# Patient Record
Sex: FEMALE | Race: WHITE | HISPANIC OR LATINO | Employment: OTHER | URBAN - METROPOLITAN AREA
[De-identification: names, ages, dates, MRNs, and addresses within clinical notes are randomized per-mention and may not be internally consistent; named-entity substitution may affect disease eponyms.]

---

## 2017-01-03 RX ORDER — PANTOPRAZOLE SODIUM 40 MG/1
TABLET, DELAYED RELEASE ORAL
Qty: 90 TABLET | Refills: 0 | Status: SHIPPED | OUTPATIENT
Start: 2017-01-03 | End: 2017-01-18

## 2017-01-03 NOTE — TELEPHONE ENCOUNTER
Pt's PCP is now Dr. Gaston.  Advised Omeprazole refill has been received.  Important to see Dr. Gaston once a year for annual exam and fasting labs.  LOV Dr. Gaston 9/26/16.

## 2017-01-11 ENCOUNTER — LAB VISIT (OUTPATIENT)
Dept: LAB | Facility: HOSPITAL | Age: 82
End: 2017-01-11
Attending: INTERNAL MEDICINE
Payer: MEDICARE

## 2017-01-11 DIAGNOSIS — I25.10 CORONARY ARTERY DISEASE INVOLVING NATIVE CORONARY ARTERY OF NATIVE HEART WITHOUT ANGINA PECTORIS: ICD-10-CM

## 2017-01-11 DIAGNOSIS — E78.5 HYPERLIPIDEMIA: ICD-10-CM

## 2017-01-11 LAB
ALT SERPL W/O P-5'-P-CCNC: 25 U/L
ANION GAP SERPL CALC-SCNC: 7 MMOL/L
AST SERPL-CCNC: 23 U/L
BUN SERPL-MCNC: 10 MG/DL
CALCIUM SERPL-MCNC: 11.6 MG/DL
CHLORIDE SERPL-SCNC: 105 MMOL/L
CHOLEST/HDLC SERPL: 2.7 {RATIO}
CO2 SERPL-SCNC: 28 MMOL/L
CREAT SERPL-MCNC: 0.8 MG/DL
EST. GFR  (AFRICAN AMERICAN): >60 ML/MIN/1.73 M^2
EST. GFR  (NON AFRICAN AMERICAN): >60 ML/MIN/1.73 M^2
GLUCOSE SERPL-MCNC: 104 MG/DL
HDL/CHOLESTEROL RATIO: 36.9 %
HDLC SERPL-MCNC: 111 MG/DL
HDLC SERPL-MCNC: 41 MG/DL
LDLC SERPL CALC-MCNC: 51.2 MG/DL
NONHDLC SERPL-MCNC: 70 MG/DL
POTASSIUM SERPL-SCNC: 4.3 MMOL/L
SODIUM SERPL-SCNC: 140 MMOL/L
TRIGL SERPL-MCNC: 94 MG/DL

## 2017-01-11 PROCEDURE — 84450 TRANSFERASE (AST) (SGOT): CPT

## 2017-01-11 PROCEDURE — 84460 ALANINE AMINO (ALT) (SGPT): CPT

## 2017-01-11 PROCEDURE — 80048 BASIC METABOLIC PNL TOTAL CA: CPT

## 2017-01-11 PROCEDURE — 36415 COLL VENOUS BLD VENIPUNCTURE: CPT | Mod: PO

## 2017-01-11 PROCEDURE — 80061 LIPID PANEL: CPT

## 2017-01-18 ENCOUNTER — OFFICE VISIT (OUTPATIENT)
Dept: CARDIOLOGY | Facility: CLINIC | Age: 82
End: 2017-01-18
Payer: MEDICARE

## 2017-01-18 VITALS
HEIGHT: 60 IN | BODY MASS INDEX: 25.51 KG/M2 | SYSTOLIC BLOOD PRESSURE: 164 MMHG | DIASTOLIC BLOOD PRESSURE: 66 MMHG | WEIGHT: 129.94 LBS | HEART RATE: 64 BPM

## 2017-01-18 DIAGNOSIS — I25.10 CORONARY ARTERY DISEASE INVOLVING NATIVE CORONARY ARTERY OF NATIVE HEART WITHOUT ANGINA PECTORIS: ICD-10-CM

## 2017-01-18 DIAGNOSIS — I10 ESSENTIAL HYPERTENSION: Primary | ICD-10-CM

## 2017-01-18 DIAGNOSIS — I70.1 RENAL ARTERY STENOSIS: ICD-10-CM

## 2017-01-18 DIAGNOSIS — E78.00 PURE HYPERCHOLESTEROLEMIA: ICD-10-CM

## 2017-01-18 PROCEDURE — 93000 ELECTROCARDIOGRAM COMPLETE: CPT | Mod: S$GLB,,, | Performed by: INTERNAL MEDICINE

## 2017-01-18 PROCEDURE — 99499 UNLISTED E&M SERVICE: CPT | Mod: S$GLB,,, | Performed by: INTERNAL MEDICINE

## 2017-01-18 PROCEDURE — 1159F MED LIST DOCD IN RCRD: CPT | Mod: S$GLB,,, | Performed by: INTERNAL MEDICINE

## 2017-01-18 PROCEDURE — 1126F AMNT PAIN NOTED NONE PRSNT: CPT | Mod: S$GLB,,, | Performed by: INTERNAL MEDICINE

## 2017-01-18 PROCEDURE — 1157F ADVNC CARE PLAN IN RCRD: CPT | Mod: S$GLB,,, | Performed by: INTERNAL MEDICINE

## 2017-01-18 PROCEDURE — 1160F RVW MEDS BY RX/DR IN RCRD: CPT | Mod: S$GLB,,, | Performed by: INTERNAL MEDICINE

## 2017-01-18 PROCEDURE — 99213 OFFICE O/P EST LOW 20 MIN: CPT | Mod: S$GLB,,, | Performed by: INTERNAL MEDICINE

## 2017-01-18 PROCEDURE — 99999 PR PBB SHADOW E&M-EST. PATIENT-LVL IV: CPT | Mod: PBBFAC,,, | Performed by: INTERNAL MEDICINE

## 2017-01-18 RX ORDER — CLONIDINE HYDROCHLORIDE 0.1 MG/1
0.1 TABLET ORAL DAILY PRN
Qty: 30 TABLET | Refills: 3 | Status: SHIPPED | OUTPATIENT
Start: 2017-01-18 | End: 2018-08-13 | Stop reason: SDUPTHER

## 2017-01-18 RX ORDER — ATORVASTATIN CALCIUM 80 MG/1
80 TABLET, FILM COATED ORAL DAILY
Qty: 90 TABLET | Refills: 3 | Status: SHIPPED | OUTPATIENT
Start: 2017-01-18 | End: 2018-03-17 | Stop reason: SDUPTHER

## 2017-01-18 RX ORDER — METOPROLOL SUCCINATE 50 MG/1
50 TABLET, EXTENDED RELEASE ORAL DAILY
Qty: 30 TABLET | Refills: 11 | Status: SHIPPED | OUTPATIENT
Start: 2017-01-18 | End: 2017-11-16 | Stop reason: DRUGHIGH

## 2017-01-18 NOTE — PROGRESS NOTES
Subjective:   Patient ID:  Nicolasa Jurado is a 86 y.o. female who presents for follow-up of Coronary Artery Disease and Hypertension      Problem List:  Labile hypertension    CAD    -LCX coated stent 08/14/2003    Diastolic heart failure    SANA s/p stent on the right side  Hypercholesterolemia    Hypercalcemia and primary hyperparathyroidism    Asthma      HPI:   Nicolasa Jurado has difficult to control hypertension. I had referred her for renal denervation or CALM. She did not qualify for renal denervation because she had renal artery stent in the past.  She used to drink 3 cups of coffee a day with 2 tsp of sugar in each. She tells me that she lost 10 lbs because she cut out the sugar. Her BP is elevated today, but she did not take any of her antihypertensive meds this morning or afternoon because her BP was 118/55 mm Hg. She tells me that her BP is usually high in the evenings and low in the mornings. She takes ramipril bid, amlodipine in the afternoon and metoprolol around 10 pm. Also on a loop diuretic. She holds amlodipine and takes 1/2 a tab of metoprolol if her BP is a bit low.  She has mild shortness of breath on exertion. She does not report orthopnea, PND or edema.      Review of Systems   Constitution: Positive for malaise/fatigue and weight loss. Negative for weakness and weight gain.   HENT: Negative for headaches.    Eyes: Positive for visual disturbance.   Cardiovascular: Positive for chest pain (sometimes), dyspnea on exertion and palpitations (sometimes). Negative for claudication, irregular heartbeat, leg swelling, near-syncope and syncope.   Respiratory: Negative for cough, hemoptysis, sputum production and wheezing.    Hematologic/Lymphatic: Does not bruise/bleed easily.   Musculoskeletal: Positive for arthritis, muscle cramps and muscle weakness. Negative for back pain, joint pain and joint swelling.   Gastrointestinal: Positive for abdominal pain and heartburn. Negative for change in bowel  habit and melena.   Genitourinary: Positive for frequency and nocturia. Negative for hematuria.   Neurological: Positive for dizziness, loss of balance, numbness and paresthesias. Negative for light-headedness.   Psychiatric/Behavioral: Negative for depression. The patient is nervous/anxious.        Current Outpatient Prescriptions   Medication Sig    albuterol (PROVENTIL) 2.5 mg /3 mL (0.083 %) nebulizer solution 1 VIAL PER NEBULIZER EVERY 4 TO 6 HOURS AS NEEDED    albuterol 90 mcg/actuation inhaler Inhale 2 puffs into the lungs every 6 (six) hours as needed for Wheezing.    amlodipine (NORVASC) 10 MG tablet TAKE 1 TABLET BY MOUTH DAILY    amlodipine (NORVASC) 10 MG tablet TAKE 1 TABLET BY MOUTH DAILY at lunch    aspirin 81 mg Tab Take 81 mg by mouth every other day.     atorvastatin (LIPITOR) 80 MG tablet TAKE 1 TABLET BY MOUTH EVERY DAY    AZOPT 1 % ophthalmic suspension Place 1 drop into the left eye every 12 (twelve) hours.    brimonidine 0.2% (ALPHAGAN) 0.2 % Drop Place 1 drop into both eyes 3 (three) times daily.     clonazePAM (KLONOPIN) 0.5 MG tablet Take 1 tablet (0.5 mg total) by mouth 2 (two) times daily. as needed for anxiety.    furosemide (LASIX) 20 MG tablet Take 1 tablet (20 mg total) by mouth once daily.    gabapentin (NEURONTIN) 100 MG capsule Take 1 capsule (100 mg total) by mouth every evening.    latanoprost 0.005 % ophthalmic solution 1 drop every evening.    metoprolol succinate (TOPROL-XL) 100 MG 24 hr tablet TAKE 1 TABLET BY MOUTH DAILY at about 10 pm    nitroGLYCERIN (NITROSTAT) 0.4 MG SL tablet Place 0.4 mg under the tongue. 1 Tablet, Sublingual Sublingual .  One tablet under tounge as needed for chest pain.    omega-3 fatty acids 1,000 mg Cap     pantoprazole (PROTONIX) 40 MG tablet TAKE 1 TABLET BY MOUTH EVERY DAY    ramipril (ALTACE) 10 MG capsule Take 1 capsule (10 mg total) by mouth 2 (two) times daily.    vit C-vit E-copper-ZnOx-lutein (PRESERVISION) 226-200-5  mg-unit-mg Cap Take by mouth. 2 Capsule Oral Every day         Social History   Substance Use Topics    Smoking status: Never Smoker    Smokeless tobacco: Never Used    Alcohol use No         Objective:     Physical Exam   Constitutional: She is oriented to person, place, and time. She appears well-developed and well-nourished.   BP (!) 164/66  Pulse 64  Ht 5' (1.524 m)  Wt 59 kg (129 lb 15.4 oz)  BMI 25.38 kg/m2     Neck: No JVD present.   Cardiovascular: Normal rate and regular rhythm.    No murmur heard.  Pulses:       Radial pulses are 2+ on the right side, and 2+ on the left side.        Posterior tibial pulses are 2+ on the right side, and 2+ on the left side.   Pulmonary/Chest: She has no decreased breath sounds. She has no wheezes. She has no rales. Chest wall is not dull to percussion.   Abdominal: Soft. Normal appearance. There is no splenomegaly or hepatomegaly. There is no tenderness.   Musculoskeletal:        Right lower leg: She exhibits no edema.        Left lower leg: She exhibits no edema.   Neurological: She is alert and oriented to person, place, and time.   Skin: Skin is warm. No bruising noted. Nails show no clubbing.   Psychiatric: Her speech is normal and behavior is normal. Cognition and memory are normal.         Lab Results   Component Value Date    CHOL 111 (L) 01/11/2017    HDL 41 01/11/2017    LDLCALC 51.2 (L) 01/11/2017    TRIG 94 01/11/2017    CHOLHDL 36.9 01/11/2017     Lab Results   Component Value Date     01/11/2017    CREATININE 0.8 01/11/2017    BUN 10 01/11/2017     01/11/2017    K 4.3 01/11/2017     01/11/2017    CO2 28 01/11/2017     Lab Results   Component Value Date    ALT 25 01/11/2017    AST 23 01/11/2017    ALKPHOS 92 08/18/2016    BILITOT 0.7 08/18/2016       ECG today reviewed by me. It reveals sinus rhythm with no ST or T abnormality.      Assessment:     1. Essential hypertension    2. Coronary artery disease involving native coronary artery of  native heart without angina pectoris    3. Renal artery stenosis    4. Pure hypercholesterolemia          Plan:     Essential hypertension  Decrease metoprolol to 50 mg. Take with pm dose of ramipril.  Clonidine 0.1 mg PRN for SBP >200 mm Hg    RTC in 11/17

## 2017-01-18 NOTE — MR AVS SNAPSHOT
[place] [facility]   [address]  [place] [address]  Phone: [phone]                  Nicolasa Jurado   2017 1:30 PM   Office Visit    Descripción:  Female : 1930   Personal Médico:  Omaira Zapien MD   Departamento:  [place] [facility]           Razón de la joseph     Coronary Artery Disease     Hypertension           Diagnósticos de Esta Visita        Comentarios    Essential hypertension    -  Primario     Coronary artery disease involving native coronary artery of native heart without angina pectoris         Renal artery stenosis         Pure hypercholesterolemia                Lista de tareas           Citas próximas        Personal Médico Departamento Tfno del dpto    2017 8:40 AM Blair [facility] MD [facility] [phone]      Metas (5 Years of Data)     Ninguna      Follow-Up and Disposition     Return in about 10 months (around 2017).      Recetas para recoger        Disp Refills Start End    atorvastatin (LIPITOR) 80 MG tablet 90 tablet 3 2017     Take 1 tablet (80 mg total) by mouth once daily. - Oral    [facility] [address] [facility] [address] [employer] [place] [address] de tlfo: #: [phone]       cloNIDine (CATAPRES) 0.1 MG tablet 30 tablet 3 2017    Take 1 tablet (0.1 mg total) by mouth daily as needed (for SBP >200 mm Hg). - Oral    [facility] [address] [place] No[address] de tlfo: #: [phone]       metoprolol succinate (TOPROL-XL) 50 MG 24 hr tablet 30 tablet 11 2017     Take 1 tablet (50 mg total) by mouth once daily. - Oral    [facility] [place] [facility]  [address] [place] No. de tlfo: #: [phone]         [facility]     [facility] [place] [facility] - Asistencia   Enfermeras registradas de [facility] pueden ayudarle a  reservar rashida joseph, proveer educación para la oanh, asesoría clínica, y otros servicios de asesoramiento.   Llame para jair servicio gratuito a [phone].             Medicamentos           Mensaje sobre Medicamentos     Verificar los cambios y / o adiciones a machado régimen de medicación son los mismos que discutir con machado médico. Si cualquiera de estos cambios o adiciones son incorrectos, por favor notifique a machado proveedor de atención médica.        EMPEZAR a ibis estos medicamentos NUEVOS        Refills    cloNIDine (CATAPRES) 0.1 MG tablet 3    Sig: Take 1 tablet (0.1 mg total) by mouth daily as needed (for SBP >200 mm Hg).    Categoría: Normal    Vía: Oral      CAMBIAR la forma en que está tomando estos medicamentos     Start Taking Instead of    atorvastatin (LIPITOR) 80 MG tablet atorvastatin (LIPITOR) 80 MG tablet    Dosage:  Take 1 tablet (80 mg total) by mouth once daily. Dosage:  TAKE 1 TABLET BY MOUTH EVERY DAY    Reason for Change:  Reorder     metoprolol succinate (TOPROL-XL) 50 MG 24 hr tablet metoprolol succinate (TOPROL-XL) 100 MG 24 hr tablet    Dosage:  Take 1 tablet (50 mg total) by mouth once daily. Dosage:  TAKE 1 TABLET BY MOUTH DAILY    Reason for Change:  Reorder       DEJAR de ibis estos medicamentos     pantoprazole (PROTONIX) 40 MG tablet TAKE 1 TABLET BY MOUTH EVERY DAY           Verifique que la siguiente lista de medicamentos es rashida representación exacta de los medicamentos que está tomando actualmente. Si no hay ningunos reportados, la lista puede estar en quinteros. Si no es correcta, por favor póngase en contacto con machado proveedor de atención médica. Lleve esta lista con usted en tiffani de emergencia.           Medicamentos Actuales     albuterol (PROVENTIL) 2.5 mg /3 mL (0.083 %) nebulizer solution 1 VIAL PER NEBULIZER EVERY 4 TO 6 HOURS AS NEEDED    albuterol 90 mcg/actuation inhaler Inhale 2 puffs into the lungs every 6 (six) hours as needed for Wheezing.    amlodipine (NORVASC) 10  MG tablet TAKE 1 TABLET BY MOUTH DAILY    aspirin 81 mg Tab Take 81 mg by mouth every other day.     atorvastatin (LIPITOR) 80 MG tablet Take 1 tablet (80 mg total) by mouth once daily.    AZOPT 1 % ophthalmic suspension Place 1 drop into the left eye every 12 (twelve) hours.    brimonidine 0.2% (ALPHAGAN) 0.2 % Drop Place 1 drop into both eyes 3 (three) times daily.     clonazePAM (KLONOPIN) 0.5 MG tablet Take 1 tablet (0.5 mg total) by mouth 2 (two) times daily. as needed for anxiety.    ERGOCALCIFEROL, VITAMIN D2, (VITAMIN D2 ORAL) Take 1 tablet by mouth every evening.    furosemide (LASIX) 20 MG tablet Take 1 tablet (20 mg total) by mouth once daily.    gabapentin (NEURONTIN) 100 MG capsule Take 1 capsule (100 mg total) by mouth every evening.    latanoprost 0.005 % ophthalmic solution 1 drop every evening.    metoprolol succinate (TOPROL-XL) 50 MG 24 hr tablet Take 1 tablet (50 mg total) by mouth once daily.    nitroGLYCERIN (NITROSTAT) 0.4 MG SL tablet Place 0.4 mg under the tongue. 1 Tablet, Sublingual Sublingual .  One tablet under tounge as needed for chest pain.    omega-3 fatty acids 1,000 mg Cap     ramipril (ALTACE) 10 MG capsule Take 1 capsule (10 mg total) by mouth 2 (two) times daily.    vit C-vit E-copper-ZnOx-lutein (PRESERVISION) 226-200-5 mg-unit-mg Cap Take by mouth. 2 Capsule Oral Every day    cloNIDine (CATAPRES) 0.1 MG tablet Take 1 tablet (0.1 mg total) by mouth daily as needed (for SBP >200 mm Hg).           Información de referencia clínica           Signos vitales - más recientes  Última actualización: 1/18/2017  1:59 PM por Yvonne Barron LPN    PS [place] BMI ([facility])       (!) 164/66 64 5' (1.524 m) 59 kg (129 lb 15.4 oz) 25.38 kg/m2       Blood Pressure          Most Recent Value    BP  (!)  164/66      Alergias     A partir del:  1/18/2017        Venom-wasp    Augmentin  [Amoxicillin-pot Clavulanate]      Vacunas     Administradas en la fecha de la visita:  1/18/2017         None      Orders Placed During Today's Visit      Órdenes normales de esta visita    EKG 12-lead       Registrarse para [facility]     La activación de machado cuenta [facility] es tan fácil hannah 1-2-3!    1) Ir a [email or web address], seleccione Registrarse Ahora, meter el código de activación y machado fecha de nacimiento, y seleccione Próximo.    [id number]  Expires: 3/4/2017  3:02 PM      2) Crear un nombre de usuario y contraseña para usar cuando se visita [facility] en el futuro y selecciona rashida pregunta de seguridad en tiffani de que pierda machado contraseña y seleccione Próximo.    3) Introduzca machado dirección de correo electrónico y dwight clic en Registrarse!    Información Adicional  Si tiene alguna pregunta, por favor, e-mail [email or web address] o llame al [phone] para hablar con nuestro personal. Recuerde, [facility] no debe ser usada para necesidades urgentes. En tiffani de emergencia médica, llame al [phone].        Instrucciones    Take your NORVASC aka amlodipine as soon as you get home.  Take 50 mg METOPROLOL at the same time as the pm dose of RAMIPRIL.  Take CLONIDINE for SBP >200 mm Hg                  Nicolasa Jurado   2017 1:30 PM   Office Visit    Description:  Female : 1930   Provider:  Omaira Zapien MD   Department:  [place] - Cardiology           Reason for Visit     Coronary Artery Disease     Hypertension           Diagnoses this Visit        Comments    Essential hypertension    -  Primary     Coronary artery disease involving native coronary artery of native heart without angina pectoris         Renal artery stenosis         Pure hypercholesterolemia                To Do List           Future Appointments        Provider Department Dept Phone    2017 8:40 AM Blair Sinha MD [facility] Card [phone]      Goals     None      Follow-Up and Disposition     Return in about 10 months (around 2017).       These Medications        Disp Refills Start End     atorvastatin (LIPITOR) 80 MG tablet 90 tablet 3 1/18/2017     Take 1 tablet (80 mg total) by mouth once daily. - Oral    Pharmacy: [facility] [address] [place] [address] [address] [facility] [address] [place] Ph #: [phone]       cloNIDine (CATAPRES) 0.1 MG tablet 30 tablet 3 1/18/2017 1/18/2018    Take 1 tablet (0.1 mg total) by mouth daily as needed (for SBP >200 mm Hg). - Oral    Pharmacy: [facility] [address] [place] [address] [address] AT [facility] [address] [place] Ph #: [phone]       metoprolol succinate (TOPROL-XL) 50 MG 24 hr tablet 30 tablet 11 1/18/2017     Take 1 tablet (50 mg total) by mouth once daily. - Oral    Pharmacy: [facility] [address] [place] [id number] [address] AT [facility] [address] [place] Ph #: [phone]         [facility]     [facility] Care Line - 24/7 Assistance  Registered nurses in the [facility] provide clinical advisement, health education, appointment booking, and other advisory services.  Call for this free service at [phone].             Medications           Message regarding Medications     Verify the changes and/or additions to your medication regime listed below are the same as discussed with your clinician today.  If any of these changes or additions are incorrect, please notify your healthcare provider.        START taking these NEW medications        Refills    cloNIDine (CATAPRES) 0.1 MG tablet 3    Sig: Take 1 tablet (0.1 mg total) by mouth daily as needed (for SBP >200 mm Hg).    Class: Normal    Route: Oral      CHANGE how you are taking these medications     Start Taking Instead of    atorvastatin (LIPITOR) 80 MG tablet atorvastatin (LIPITOR) 80 MG tablet    Dosage:  Take 1 tablet (80 mg total) by mouth once daily. Dosage:  TAKE 1 TABLET BY MOUTH EVERY DAY    Reason for Change:  Reorder     metoprolol succinate (TOPROL-XL) 50 MG 24 hr tablet metoprolol succinate (TOPROL-XL) 100 MG 24 hr tablet     Dosage:  Take 1 tablet (50 mg total) by mouth once daily. Dosage:  TAKE 1 TABLET BY MOUTH DAILY    Reason for Change:  Reorder       STOP taking these medications     pantoprazole (PROTONIX) 40 MG tablet TAKE 1 TABLET BY MOUTH EVERY DAY           Verify that the below list of medications is an accurate representation of the medications you are currently taking.  If none reported, the list may be blank. If incorrect, please contact your healthcare provider. Carry this list with you in case of emergency.           Current Medications     albuterol (PROVENTIL) 2.5 mg /3 mL (0.083 %) nebulizer solution 1 VIAL PER NEBULIZER EVERY 4 TO 6 HOURS AS NEEDED    albuterol 90 mcg/actuation inhaler Inhale 2 puffs into the lungs every 6 (six) hours as needed for Wheezing.    amlodipine (NORVASC) 10 MG tablet TAKE 1 TABLET BY MOUTH DAILY    aspirin 81 mg Tab Take 81 mg by mouth every other day.     atorvastatin (LIPITOR) 80 MG tablet Take 1 tablet (80 mg total) by mouth once daily.    AZOPT 1 % ophthalmic suspension Place 1 drop into the left eye every 12 (twelve) hours.    brimonidine 0.2% (ALPHAGAN) 0.2 % Drop Place 1 drop into both eyes 3 (three) times daily.     clonazePAM (KLONOPIN) 0.5 MG tablet Take 1 tablet (0.5 mg total) by mouth 2 (two) times daily. as needed for anxiety.    ERGOCALCIFEROL, VITAMIN D2, (VITAMIN D2 ORAL) Take 1 tablet by mouth every evening.    furosemide (LASIX) 20 MG tablet Take 1 tablet (20 mg total) by mouth once daily.    gabapentin (NEURONTIN) 100 MG capsule Take 1 capsule (100 mg total) by mouth every evening.    latanoprost 0.005 % ophthalmic solution 1 drop every evening.    metoprolol succinate (TOPROL-XL) 50 MG 24 hr tablet Take 1 tablet (50 mg total) by mouth once daily.    nitroGLYCERIN (NITROSTAT) 0.4 MG SL tablet Place 0.4 mg under the tongue. 1 Tablet, Sublingual Sublingual .  One tablet under tounge as needed for chest pain.    omega-3 fatty acids 1,000 mg Cap     ramipril (ALTACE) 10  MG capsule Take 1 capsule (10 mg total) by mouth 2 (two) times daily.    vit C-vit E-copper-ZnOx-lutein (PRESERVISION) 226-200-5 mg-unit-mg Cap Take by mouth. 2 Capsule Oral Every day    cloNIDine (CATAPRES) 0.1 MG tablet Take 1 tablet (0.1 mg total) by mouth daily as needed (for SBP >200 mm Hg).           Clinical Reference Information           Vital Signs - Last Recorded  Most recent update: 1/18/2017  1:59 PM by Yvonne Barron LPN    BP Pulse Ht Wt BMI       (!) 164/66 64 5' (1.524 m) 59 kg (129 lb 15.4 oz) 25.38 kg/m2       Blood Pressure          Most Recent Value    BP  (!)  164/66      Allergies as of 1/18/2017     Venom-wasp    Augmentin  [Amoxicillin-pot Clavulanate]      Immunizations Administered on Date of Encounter - 1/18/2017     None      Orders Placed During Today's Visit      Normal Orders This Visit    EKG 12-lead       [facility] Sign-Up     Activating your [facility] account is as easy as 1-2-3!     1) Visit [email or web address], select Sign Up Now, enter this activation code and your date of birth, then select Next.  [email or web address]  Expires: 3/4/2017  3:02 PM      2) Create a username and password to use when you visit [facility] in the future and select a security question in case you lose your password and select Next.    3) Enter your e-mail address and click Sign Up!    Additional Information  If you have questions, please e-mail [email or web address] or call [phone] to talk to our [facility] staff. Remember, [facility] is NOT to be used for urgent needs. For medical emergencies, dial 911.         Instructions    Take your NORVASC aka amlodipine as soon as you get home.  Take 50 mg METOPROLOL at the same time as the pm dose of RAMIPRIL.  Take CLONIDINE for SBP >200 mm Hg

## 2017-01-18 NOTE — ASSESSMENT & PLAN NOTE
Decrease metoprolol to 50 mg. Take with pm dose of ramipril.  Clonidine 0.1 mg PRN for SBP >200 mm Hg

## 2017-01-18 NOTE — PATIENT INSTRUCTIONS
Take your NORVASC aka amlodipine as soon as you get home.  Take 50 mg METOPROLOL at the same time as the pm dose of RAMIPRIL.  Take CLONIDINE for SBP >200 mm Hg

## 2017-01-24 RX ORDER — CLONAZEPAM 0.5 MG/1
TABLET ORAL
Qty: 30 TABLET | Refills: 2 | Status: SHIPPED | OUTPATIENT
Start: 2017-01-24 | End: 2017-09-14 | Stop reason: SDUPTHER

## 2017-01-25 RX ORDER — CLONIDINE HYDROCHLORIDE 0.1 MG/1
TABLET ORAL
Qty: 30 TABLET | Refills: 3 | Status: SHIPPED | OUTPATIENT
Start: 2017-01-25 | End: 2017-09-28 | Stop reason: SDUPTHER

## 2017-03-21 RX ORDER — RAMIPRIL 10 MG/1
CAPSULE ORAL
Qty: 180 CAPSULE | Refills: 1 | Status: SHIPPED | OUTPATIENT
Start: 2017-03-21 | End: 2018-01-13 | Stop reason: SDUPTHER

## 2017-05-02 ENCOUNTER — HOSPITAL ENCOUNTER (OUTPATIENT)
Dept: RADIOLOGY | Facility: HOSPITAL | Age: 82
Discharge: HOME | End: 2017-05-02
Attending: INTERNAL MEDICINE
Payer: MEDICARE

## 2017-05-02 ENCOUNTER — OFFICE VISIT (OUTPATIENT)
Dept: INTERNAL MEDICINE | Facility: CLINIC | Age: 82
End: 2017-05-02
Payer: MEDICARE

## 2017-05-02 ENCOUNTER — DOCUMENTATION ONLY (OUTPATIENT)
Dept: FAMILY MEDICINE | Facility: CLINIC | Age: 82
End: 2017-05-02

## 2017-05-02 VITALS
BODY MASS INDEX: 25.93 KG/M2 | DIASTOLIC BLOOD PRESSURE: 80 MMHG | HEART RATE: 80 BPM | SYSTOLIC BLOOD PRESSURE: 160 MMHG | HEIGHT: 60 IN | WEIGHT: 132.06 LBS

## 2017-05-02 DIAGNOSIS — E73.9 LACTOSE INTOLERANCE: Primary | ICD-10-CM

## 2017-05-02 DIAGNOSIS — D69.6 THROMBOCYTOPENIA: ICD-10-CM

## 2017-05-02 DIAGNOSIS — K55.1 MESENTERIC ARTERY STENOSIS: Chronic | ICD-10-CM

## 2017-05-02 DIAGNOSIS — R25.2 CRAMPS OF LOWER EXTREMITY, UNSPECIFIED LATERALITY: ICD-10-CM

## 2017-05-02 DIAGNOSIS — I70.1 RENAL ARTERY STENOSIS: Chronic | ICD-10-CM

## 2017-05-02 DIAGNOSIS — R50.9 FEVER, UNSPECIFIED FEVER CAUSE: ICD-10-CM

## 2017-05-02 DIAGNOSIS — I10 ESSENTIAL HYPERTENSION: ICD-10-CM

## 2017-05-02 LAB
BILIRUB SERPL-MCNC: ABNORMAL MG/DL
BILIRUB UR QL STRIP: NEGATIVE
BLOOD URINE, POC: ABNORMAL
CLARITY UR REFRACT.AUTO: CLEAR
COLOR UR AUTO: ABNORMAL
COLOR, POC UA: YELLOW
GLUCOSE UR QL STRIP: NEGATIVE
GLUCOSE UR QL STRIP: NORMAL
HGB UR QL STRIP: NEGATIVE
KETONES UR QL STRIP: ABNORMAL
KETONES UR QL STRIP: NEGATIVE
LEUKOCYTE ESTERASE UR QL STRIP: ABNORMAL
LEUKOCYTE ESTERASE URINE, POC: ABNORMAL
MICROSCOPIC COMMENT: NORMAL
NITRITE UR QL STRIP: NEGATIVE
NITRITE, POC UA: ABNORMAL
PH UR STRIP: 5 [PH] (ref 5–8)
PH, POC UA: 5
PROT UR QL STRIP: NEGATIVE
PROTEIN, POC: ABNORMAL
RBC #/AREA URNS AUTO: 1 /HPF (ref 0–4)
SP GR UR STRIP: 1 (ref 1–1.03)
SPECIFIC GRAVITY, POC UA: 1.01
URN SPEC COLLECT METH UR: ABNORMAL
UROBILINOGEN UR STRIP-ACNC: NEGATIVE EU/DL
UROBILINOGEN, POC UA: NORMAL
WBC #/AREA URNS AUTO: 1 /HPF (ref 0–5)

## 2017-05-02 PROCEDURE — 1159F MED LIST DOCD IN RCRD: CPT | Mod: S$GLB,,, | Performed by: INTERNAL MEDICINE

## 2017-05-02 PROCEDURE — 99214 OFFICE O/P EST MOD 30 MIN: CPT | Mod: 25,S$GLB,, | Performed by: INTERNAL MEDICINE

## 2017-05-02 PROCEDURE — 71020 XR CHEST PA AND LATERAL: CPT | Mod: TC,PO

## 2017-05-02 PROCEDURE — 99999 PR PBB SHADOW E&M-EST. PATIENT-LVL III: CPT | Mod: PBBFAC,,, | Performed by: INTERNAL MEDICINE

## 2017-05-02 PROCEDURE — 99499 UNLISTED E&M SERVICE: CPT | Mod: S$GLB,,, | Performed by: INTERNAL MEDICINE

## 2017-05-02 PROCEDURE — 71020 XR CHEST PA AND LATERAL: CPT | Mod: 26,,, | Performed by: RADIOLOGY

## 2017-05-02 PROCEDURE — 1126F AMNT PAIN NOTED NONE PRSNT: CPT | Mod: S$GLB,,, | Performed by: INTERNAL MEDICINE

## 2017-05-02 PROCEDURE — 81002 URINALYSIS NONAUTO W/O SCOPE: CPT | Mod: S$GLB,,, | Performed by: INTERNAL MEDICINE

## 2017-05-02 PROCEDURE — 1160F RVW MEDS BY RX/DR IN RCRD: CPT | Mod: S$GLB,,, | Performed by: INTERNAL MEDICINE

## 2017-05-02 RX ORDER — DOXAZOSIN 2 MG/1
2 TABLET ORAL NIGHTLY
Qty: 30 TABLET | Refills: 11 | Status: SHIPPED | OUTPATIENT
Start: 2017-05-02 | End: 2017-09-28

## 2017-05-02 NOTE — PROGRESS NOTES
Subjective:       Patient ID: Nicolasa Jurado is a 86 y.o. female.    Chief Complaint: Fever and Diarrhea    HPI     86-year-old female here for evaluation of fever, digestive issues, diarrhea.  She reports that when she eats pork, she has diarrhea the next day. She has to take zantac.  She used to have reflux.  She took pills in the past for this, but stopped.  She uses alovera tea for her reflux, which seems to help.  She used to have an opening and burning in her chest.  Now, she sleeps all night without reflux.  She reports that she had fever yesterday.  Before yesterday, she had pain in her bladder.  She had pain in her bladder for a couple days, which has resolved.  She has diarrhea if she drinks milk.  She is not having diarrhea since she stopped drinking milk.  This does happen if she has pork.  She ate one piece of pork and had diarrhea.    She had some pain when urinating and had to get up at night a few times as well to urinate.  She has pain in her left abdomen in her intestine.  She presses and has pain.  She cannot touch it, because of the pain.  This has been going on about a week.  This started at the same time she stopped the alovera.  Her pain has resolved.  No current dysuria, hematuria.  She does have increased urinary frequency.  She reports a temp of 100.  She has had the post nasal drip and sinus pressure for 2 months.  She has a sore throat.  She has an occasional cough.  She has SOB - chronic (no change recently).    She complains of muscle cramps in her lower extremities.    HTN - Patient is currently on norvasc 10 mg, clonidine 0.1 mg, altace 10 mg, toprol XL 50 mg. She does check her BP at home, and it runs 180s-200s at night. Side effects of medications note: none.    Review of Systems    Objective:      Physical Exam   Constitutional: She is oriented to person, place, and time. She appears well-developed and well-nourished.   HENT:   Head: Normocephalic and atraumatic.   Mouth/Throat:  No oropharyngeal exudate.   Eyes: EOM are normal. Pupils are equal, round, and reactive to light. Right eye exhibits no discharge. Left eye exhibits no discharge. No scleral icterus.   Neck: Normal range of motion. Neck supple. No tracheal deviation present. No thyromegaly present.   Cardiovascular: Normal rate, regular rhythm and normal heart sounds.  Exam reveals no gallop and no friction rub.    No murmur heard.  Pulmonary/Chest: Effort normal and breath sounds normal. No respiratory distress. She has no wheezes. She has no rales. She exhibits no tenderness.   Abdominal: Soft. Bowel sounds are normal. She exhibits no distension and no mass. There is no tenderness. There is no rebound and no guarding.   Musculoskeletal: Normal range of motion. She exhibits no edema or tenderness.   Neurological: She is alert and oriented to person, place, and time.   Skin: Skin is warm and dry. No rash noted. No erythema. No pallor.   Psychiatric: She has a normal mood and affect. Her behavior is normal.   Vitals reviewed.      Assessment:       1. Lactose intolerance    2. Fever, unspecified fever cause    3. Cramps of lower extremity, unspecified laterality    4. Essential hypertension    5. Thrombocytopenia    6. Mesenteric artery stenosis    7. Renal artery stenosis        Plan:       1.  Patient advised to avoid milk.  In addition, I think her diarrhea is secondary to pork as well.  Advised her to avoid pork.  2.  Unclear cause.  Patient will give us urine specimen, check CBC, CMP, ESR, CRP, chest x-ray.  3.  Advised patient to use mustard when this occurs.  4.  Continue Norvasc 10 mg, clonidine 0.1 mrem as prescribed by cardiology, Toprol-XL 50 mrem.  Start Cardura 2 mg daily.  Return to clinic in 2 months.  5.  Stable.  Monitor.  6/7.  This is why patient is not on ACE inhibitor or an ARB.

## 2017-05-03 ENCOUNTER — HOSPITAL ENCOUNTER (EMERGENCY)
Facility: HOSPITAL | Age: 82
Discharge: HOME | End: 2017-05-03
Attending: EMERGENCY MEDICINE
Payer: MEDICARE

## 2017-05-03 ENCOUNTER — TELEPHONE (OUTPATIENT)
Dept: INTERNAL MEDICINE | Facility: CLINIC | Age: 82
End: 2017-05-03

## 2017-05-03 VITALS
WEIGHT: 132 LBS | TEMPERATURE: 98 F | DIASTOLIC BLOOD PRESSURE: 82 MMHG | HEIGHT: 60 IN | RESPIRATION RATE: 21 BRPM | OXYGEN SATURATION: 98 % | SYSTOLIC BLOOD PRESSURE: 204 MMHG | BODY MASS INDEX: 25.91 KG/M2 | HEART RATE: 58 BPM

## 2017-05-03 DIAGNOSIS — E21.0 PRIMARY HYPERPARATHYROIDISM: Chronic | ICD-10-CM

## 2017-05-03 DIAGNOSIS — E83.52 HYPERCALCEMIA: Primary | ICD-10-CM

## 2017-05-03 DIAGNOSIS — I10 HYPERTENSION: ICD-10-CM

## 2017-05-03 LAB
25(OH)D3+25(OH)D2 SERPL-MCNC: 57 NG/ML
ALBUMIN SERPL BCP-MCNC: 4 G/DL
ALP SERPL-CCNC: 83 U/L
ALT SERPL W/O P-5'-P-CCNC: 22 U/L
ANION GAP SERPL CALC-SCNC: 6 MMOL/L
AST SERPL-CCNC: 22 U/L
BASOPHILS # BLD AUTO: 0.01 K/UL
BASOPHILS NFR BLD: 0.2 %
BILIRUB SERPL-MCNC: 0.6 MG/DL
BUN SERPL-MCNC: 10 MG/DL
CA-I BLDV-SCNC: 1.56 MMOL/L
CALCIUM SERPL-MCNC: 11.6 MG/DL
CHLORIDE SERPL-SCNC: 109 MMOL/L
CO2 SERPL-SCNC: 29 MMOL/L
CREAT SERPL-MCNC: 0.8 MG/DL
DIFFERENTIAL METHOD: ABNORMAL
EOSINOPHIL # BLD AUTO: 0 K/UL
EOSINOPHIL NFR BLD: 0.7 %
ERYTHROCYTE [DISTWIDTH] IN BLOOD BY AUTOMATED COUNT: 13.9 %
EST. GFR  (AFRICAN AMERICAN): >60 ML/MIN/1.73 M^2
EST. GFR  (NON AFRICAN AMERICAN): >60 ML/MIN/1.73 M^2
GLUCOSE SERPL-MCNC: 108 MG/DL
HCT VFR BLD AUTO: 38.6 %
HGB BLD-MCNC: 12.9 G/DL
LYMPHOCYTES # BLD AUTO: 1.2 K/UL
LYMPHOCYTES NFR BLD: 27.8 %
MCH RBC QN AUTO: 28 PG
MCHC RBC AUTO-ENTMCNC: 33.4 %
MCV RBC AUTO: 84 FL
MONOCYTES # BLD AUTO: 0.3 K/UL
MONOCYTES NFR BLD: 5.7 %
NEUTROPHILS # BLD AUTO: 2.9 K/UL
NEUTROPHILS NFR BLD: 65.4 %
PLATELET # BLD AUTO: 116 K/UL
PMV BLD AUTO: 12.2 FL
POTASSIUM SERPL-SCNC: 3.9 MMOL/L
PROT SERPL-MCNC: 7.4 G/DL
PTH-INTACT SERPL-MCNC: 139 PG/ML
RBC # BLD AUTO: 4.61 M/UL
SODIUM SERPL-SCNC: 144 MMOL/L
WBC # BLD AUTO: 4.42 K/UL

## 2017-05-03 PROCEDURE — 25000003 PHARM REV CODE 250: Performed by: EMERGENCY MEDICINE

## 2017-05-03 PROCEDURE — 80053 COMPREHEN METABOLIC PANEL: CPT

## 2017-05-03 PROCEDURE — 82330 ASSAY OF CALCIUM: CPT

## 2017-05-03 PROCEDURE — 93005 ELECTROCARDIOGRAM TRACING: CPT

## 2017-05-03 PROCEDURE — 99284 EMERGENCY DEPT VISIT MOD MDM: CPT | Mod: 25

## 2017-05-03 PROCEDURE — 82306 VITAMIN D 25 HYDROXY: CPT

## 2017-05-03 PROCEDURE — 96360 HYDRATION IV INFUSION INIT: CPT

## 2017-05-03 PROCEDURE — 83970 ASSAY OF PARATHORMONE: CPT

## 2017-05-03 PROCEDURE — 82397 CHEMILUMINESCENT ASSAY: CPT

## 2017-05-03 PROCEDURE — 99285 EMERGENCY DEPT VISIT HI MDM: CPT | Mod: ,,, | Performed by: EMERGENCY MEDICINE

## 2017-05-03 PROCEDURE — 96361 HYDRATE IV INFUSION ADD-ON: CPT

## 2017-05-03 PROCEDURE — 85025 COMPLETE CBC W/AUTO DIFF WBC: CPT

## 2017-05-03 PROCEDURE — 93010 ELECTROCARDIOGRAM REPORT: CPT | Mod: ,,, | Performed by: INTERNAL MEDICINE

## 2017-05-03 RX ORDER — CLONIDINE HYDROCHLORIDE 0.1 MG/1
0.1 TABLET ORAL
Status: COMPLETED | OUTPATIENT
Start: 2017-05-03 | End: 2017-05-03

## 2017-05-03 RX ADMIN — SODIUM CHLORIDE 1000 ML: 0.9 INJECTION, SOLUTION INTRAVENOUS at 01:05

## 2017-05-03 RX ADMIN — CLONIDINE HYDROCHLORIDE 0.1 MG: 0.1 TABLET ORAL at 01:05

## 2017-05-03 NOTE — PROGRESS NOTES
Internal Medicine On-Call Note:    Contacted by lab regarding critical calcium.  I attempted to contact the patient on both her home and mobile number but there was no answer.  I left a message asking her to call the  tonight to ensure no cardiac symptoms and her PCP's office in the AM.  Patient has a h/o primary hyperparathyroidism listed.  Will send message to PCP as well    Ry Javier

## 2017-05-03 NOTE — ED PROVIDER NOTES
Encounter Date: 5/3/2017    SCRIBE #1 NOTE: I, Debra Smith, am scribing for, and in the presence of,  Dr. Schwab. I have scribed the entire note.       History     Chief Complaint   Patient presents with    Abnormal Lab     md called me to come to er, calcium 12.5 yest, i don't feel bad     Review of patient's allergies indicates:   Allergen Reactions    Venom-wasp     Augmentin  [amoxicillin-pot clavulanate]      Other reaction(s): diarrea  Other reaction(s): Vomiting     HPI Comments: Time seen by provider: 1:04 PM    This is a 86 y.o. female with history of CAD, HLD, HTN, hypercalcemia, and hyperparathyroidism who presents s/p abnormal labs showing acute hypercalcemia yesterday. Pt states she had been on medication for calcium which was suspended a year ago due to elevated calcium. Pt reports chronic left sided abdominal pain without acute changes. Pt denies urinary symptoms.     The history is provided by the patient.     Past Medical History:   Diagnosis Date    Anemia     Asthma, chronic     CAD (coronary artery disease)     Elevated glucose 2015    GERD (gastroesophageal reflux disease)     protonix 40    Hypercalcemia 10/16/2012    Hyperlipidemia     Hyperparathyroidism     Hypertension     Insomnia 2016    Renal artery stenosis     Right low back pain 2016     Past Surgical History:   Procedure Laterality Date    CHOLECYSTECTOMY      COLONOSCOPY      CORONARY ANGIOPLASTY WITH STENT PLACEMENT      1 heart/ kidney    HYSTERECTOMY      KIDNEY SURGERY      stent in renal artery     Family History   Problem Relation Age of Onset    Splenomegaly Daughter        from ruptured spleen    Miscarriages / Stillbirths Mother     Liver disease Father     No Known Problems Sister     Hypertension Son     Hypercalcemia Neg Hx     Thyroid cancer Neg Hx      Social History   Substance Use Topics    Smoking status: Never Smoker    Smokeless tobacco: Never Used     Alcohol use No     Review of Systems   Constitutional: Negative for fever.   HENT: Negative for nosebleeds.    Eyes: Negative for visual disturbance.   Respiratory: Negative for shortness of breath.    Cardiovascular: Negative for chest pain.   Gastrointestinal: Positive for abdominal pain (Left sided).   Genitourinary: Negative for difficulty urinating and dysuria.   Musculoskeletal: Negative for back pain.   Skin: Negative for rash.   Neurological: Negative for headaches.       Physical Exam   Initial Vitals   BP Pulse Resp Temp SpO2   05/03/17 1200 05/03/17 1200 05/03/17 1200 05/03/17 1200 05/03/17 1200   235/116 90 20 98.3 °F (36.8 °C) 97 %     Physical Exam    Nursing note and vitals reviewed.  Constitutional: She appears well-developed and well-nourished. No distress.   HENT:   Head: Normocephalic and atraumatic.   Mouth/Throat: Oropharynx is clear and moist.   Eyes: Conjunctivae are normal.   Neck: Normal range of motion.   Cardiovascular: Normal rate, regular rhythm and normal heart sounds.   Pulmonary/Chest: Breath sounds normal. No respiratory distress.   Abdominal: Soft. She exhibits no distension. There is no tenderness.   Musculoskeletal: Normal range of motion.   Neurological: She is alert and oriented to person, place, and time.   Skin: Skin is warm and dry.         ED Course   Procedures  Labs Reviewed   CBC W/ AUTO DIFFERENTIAL - Abnormal; Notable for the following:        Result Value    Platelets 116 (*)     All other components within normal limits   COMPREHENSIVE METABOLIC PANEL - Abnormal; Notable for the following:     Calcium 11.6 (*)     Anion Gap 6 (*)     All other components within normal limits   CALCIUM, IONIZED - Abnormal; Notable for the following:     Calcium, Ion 1.56 (*)     All other components within normal limits   PTH, INTACT - Abnormal; Notable for the following:     PTH, Intact 139.0 (*)     All other components within normal limits   VITAMIN D   PTH-RELATED PEPTIDE     EKG  Readings: (Independently Interpreted)   Normal sinus rhythm at 85. Left axis. No acute ischemic changes.           Medical Decision Making:   History:   Old Medical Records: I decided to obtain old medical records.  Initial Assessment:   Emergent evaluation of hypercalcemia. She is asymptomatic. Pt has history of hypercalcemia secondary to primary hyperparathyroidism. Last seen endocrine about a year ago. Will recheck labs including calcium levels, vitamin D level, and PTH level. Will give IV fluids. Will discuss with endocrine.   Independently Interpreted Test(s):   I have ordered and independently interpreted EKG Reading(s) - see prior notes  Clinical Tests:   Lab Tests: Reviewed and Ordered  Medical Tests: Reviewed and Ordered  Other:   I have discussed this case with another health care provider.       <> Summary of the Discussion: Endocrine.             Scribe Attestation:   Scribe #1: I performed the above scribed service and the documentation accurately describes the services I performed. I attest to the accuracy of the note.    Attending Attestation:           Physician Attestation for Scribe:  Physician Attestation Statement for Scribe #1: I, Dr. Schwab, reviewed documentation, as scribed by Debra Smith  in my presence, and it is both accurate and complete.         Attending ED Notes:   4:48 PM  Discussed with endocrinology.  Patient stable for discharge home.  No indication for additional medications at this time.  Patient will follow up with endocrine clinic.  They will contact the patient to arrange follow-up.          ED Course     Clinical Impression:   The primary encounter diagnosis was Hypercalcemia. Diagnoses of Hypertension and Primary hyperparathyroidism were also pertinent to this visit.    Disposition:   Disposition: Discharged  Condition: Stable       Yocasta Schwab MD  05/04/17 [id number]

## 2017-05-03 NOTE — TELEPHONE ENCOUNTER
Spoke with patient and informed per Dr. Gaston.  Patient states she will call for a ride and report to the ER.

## 2017-05-03 NOTE — ED AVS SNAPSHOT
[facility]  [address] [facility] [address]               Nicolasa Jurado   5/3/2017 12:22 PM   ED    Descripción:  Female : 1930   Departamento:  [facility]           Machado Cuidado fue coordinado por:     Provider Role From To    Yocasta Schwab MD Attending Provider 17 1258 --      Razón de la joseph     Abnormal Lab           Diagnósticos de Esta Visita        Comentarios    Hypercalcemia    -  Primario     Hypertension         Primary hyperparathyroidism           ED Disposition     Ninguna           Lista de tareas           Información de seguimiento     Realice un seguimiento con:  [facility] - Endo/Diab/Metab    Especialidad:  Endocrinology    Por qué:  Will contact you for follow-up appointment    Información de contacto:    [address] [facility] [id number] [phone]    Información adicional:    [facility]th Floor [facility]     [facility] - Asistencia   Enfermeras registradas de [facility] pueden ayudarle a reservar rashida joseph, proveer educación para la oanh, asesoría clínica, y otros servicios de asesoramiento.   Llame para jair servicio gratuito a [phone].             Medicamentos           Mensaje sobre Medicamentos     Verificar los cambios y / o adiciones a machado régimen de medicación son los mismos que discutir con machado médico. Si cualquiera de estos cambios o adiciones son incorrectos, por favor notifique a machado proveedor de atención médica.        These medications were administered today        Dose Freq    sodium chloride 0.9% bolus 1,000 mL 1,000 mL ED 1 Time    Sig: Inject 1,000 mLs into the vein ED 1 Time.    Categoría: Normal    Vía: Intravenous    cloNIDine tablet 0.1 mg 0.1 mg ED 1 Time    Sig: Take 1 tablet (0.1 mg total) by mouth ED 1 Time.    Categoría: Normal    Vía: Oral           Verifique que la siguiente lista de medicamentos es rashida representación  exacta de los medicamentos que está tomando actualmente. Si no hay ningunos reportados, la lista puede estar en quinteros. Si no es correcta, por favor póngase en contacto con machado proveedor de atención médica. Lleve esta lista con usted en tiffani de emergencia.           Medicamentos Actuales     albuterol (PROVENTIL) 2.5 mg /3 mL (0.083 %) nebulizer solution 1 VIAL PER NEBULIZER EVERY 4 TO 6 HOURS AS NEEDED    albuterol 90 mcg/actuation inhaler Inhale 2 puffs into the lungs every 6 (six) hours as needed for Wheezing.    amlodipine (NORVASC) 10 MG tablet TAKE 1 TABLET BY MOUTH DAILY    aspirin 81 mg Tab Take 81 mg by mouth every other day.     atorvastatin (LIPITOR) 80 MG tablet Take 1 tablet (80 mg total) by mouth once daily.    AZOPT 1 % ophthalmic suspension Place 1 drop into the left eye every 12 (twelve) hours.    brimonidine 0.2% (ALPHAGAN) 0.2 % Drop Place 1 drop into both eyes 3 (three) times daily.     clonazePAM (KLONOPIN) 0.5 MG tablet TAKE 1 TABLET BY MOUTH TWICE DAILY AS NEEDED FOR ANXIETY    cloNIDine (CATAPRES) 0.1 MG tablet Take 1 tablet (0.1 mg total) by mouth daily as needed (for SBP >200 mm Hg).    cloNIDine (CATAPRES) 0.1 MG tablet TAKE 1 TABLET BY MOUTH EVERY 2 HOURS AS NEEDED FOR SYSTOLIC BLOOD PRESSURE>200    cloNIDine tablet 0.1 mg Take 1 tablet (0.1 mg total) by mouth ED 1 Time.    doxazosin (CARDURA) 2 MG tablet Take 1 tablet (2 mg total) by mouth every evening.    ERGOCALCIFEROL, VITAMIN D2, (VITAMIN D2 ORAL) Take 1 tablet by mouth every evening.    furosemide (LASIX) 20 MG tablet Take 1 tablet (20 mg total) by mouth once daily.    gabapentin (NEURONTIN) 100 MG capsule Take 1 capsule (100 mg total) by mouth every evening.    latanoprost 0.005 % ophthalmic solution 1 drop every evening.    metoprolol succinate (TOPROL-XL) 50 MG 24 hr tablet Take 1 tablet (50 mg total) by mouth once daily.    nitroGLYCERIN (NITROSTAT) 0.4 MG SL tablet Place 0.4 mg under the tongue. 1 Tablet, Sublingual Sublingual  .  One tablet under tounge as needed for chest pain.    omega-3 fatty acids 1,000 mg Cap     ramipril (ALTACE) 10 MG capsule TAKE 1 CAPSULE BY MOUTH TWICE DAILY    vit C-vit E-copper-ZnOx-lutein (PRESERVISION) 226-200-5 mg-unit-mg Cap Take by mouth. 2 Capsule Oral Every day           Información de referencia clínica           Monica signos vitales brynn     PS Pulso Temperatura Resp [place]    185/73 58 98 °F (36.7 °C) (Oral) 21 5' (1.524 m) 59.9 kg (132 lb)    SpO2 BMI ([facility])                98% 25.78 kg/m2          Alergias     A partir del:  5/3/2017           Reacciones    Venom-wasp     Augmentin  [Amoxicillin-pot Clavulanate]     Other reaction(s): diarrea  Other reaction(s): Vomiting      Vacunas     Administradas en la fecha de la visita:  5/3/2017        None      ED Micro, Lab, POCT     Start Ordered       Status Ordering Provider    05/03/17 1310 05/03/17 1309  PTH-related peptide  STAT      In process     05/03/17 1309 05/03/17 1309  Calcium, ionized  STAT      Final result     05/03/17 1309 05/03/17 1309  Vitamin D  STAT      Final result     05/03/17 1309 05/03/17 1309  PTH, intact  STAT      Final result     05/03/17 1308 05/03/17 1309  CBC auto differential  STAT      Final result     05/03/17 1308 05/03/17 1309  Comprehensive metabolic panel  STAT      Final result       ED Imaging Orders     None        Instrucciones a garcía de dwight         Instrucciones de dwight para la hipercalcemia  A usted le baptiste diagnosticado hipercalcemia (exceso de calcio en la ayesha). El calcio es un mineral que ayuda a desarrollar los huesos y los dientes, controla el ritmo del corazón y permite que se contraigan los músculos. Por lo regular la hipercalcemia se da hannah resultado de otros problemas en el cuerpo, incluyendo glándulas hiperactivas, trastornos de los huesos, descanso prolongado en la cama y ciertos tumores o cánceres.  Cuidados en la casa  · Pregúntele al médico cuánto líquido debe beber. La mayoría de los  pacientes con hipercalcemia necesitan beber entre 3 cuartos a 1 galón (de 3 a 4 litros) de líquidos diariamente.  · Lleve la cuenta de cuánto líquido arvin.  ¨ Llene de agua un recipiente enjuagado de 1 galón de leche, o compre un galón de agua, y manténgalo en el refrigerador.  ¨ Procure beber toda el agua del recipiente cornelia el transcurso del día.  · Reduzca machado consumo de productos lácteos.  ¨ Limite radicalmente la cantidad de leche, queso, requesón, yogur y helados que consume.  ¨ Leonor las etiquetas de los alimentos. No compre productos lácteos con calcio añadido.  · Evite los medicamentos antiácidos si indican que contienen calcio. Muchos de estos contienen calcio. Algunos contienen magnesio mary no calcio.  · No reduzca el consumo de sal.  · Chichi ejercicio. Si la hipercalcemia fue causada por un descanso prolongado en la cama, trate de aumentar machado actividad si es posible.  · Vuelva a sharmila actividades normales según lo indique el médico.  · Egan los medicamentos exactamente según las indicaciones.  · Informe al médico sobre todos los medicamentos que esté tomando, con o sin receta, incluyendo las hierbas medicinales.  · Asista a todas las pruebas de laboratorio y a las visitas de control. El médico deberá monitorear de cerca machado trastorno.  Visitas de control  Programe rashida visita de control según le indique el personal médico.  Cuándo debe llamar al médico  Llame al médico inmediatamente si presenta cualquiera de los siguientes síntomas:  · Fatiga extrema  · Pérdida de apetito  · Problemas para orinar o dolor al orinar  · Jarod en la orina  · Vómito o diarrea  · Aumento de la sed  · Ritmo cardíaco irregular  · Mareos o aturdimiento  · Depresión  · Confusión   Date Last Reviewed: 6/19/2015 © 2000-2016 [employer]. [address] [place] [address], [place] [address]. Todos los derechos reservados. Esta información no pretende sustituir la atención médica profesional. Sólo machado médico puede diagnosticar y  tratar un problema de oanh.          Registrarse para [facility]     La activación de machado cuenta [facility] es tan fácil hannah 1-2-3!    1) Ir a [email or web address], seleccione Registrarse Ahora, meter el código de activación y machado fecha de nacimiento, y seleccione Próximo.    [id number]  Expires: 2017  4:50 PM      2) Crear un nombre de usuario y contraseña para usar cuando se visita [facility] en el futuro y selecciona rashida pregunta de seguridad en tiffani de que pierda machado contraseña y seleccione Próximo.    3) Introduzca machado dirección de correo electrónico y dwight clic en Registrarse!    Información Adicional  Si tiene alguna pregunta, por favor, e-mail [email or web address] o llame al [phone] para hablar con nuestro personal. Recuerde, [facility] no debe ser usada para necesidades urgentes. En tiffani de emergencia médica, llame al [phone].         [facility] cumple con las leyes federales aplicables de derechos civiles y no discrimina por motivos de giacomo, color, origen nacional, edad, discapacidad, o sexo.        Language Assistance Services     ATTENTION: Language assistance services are available, free of charge. Please call [phone].      ATENCIÓN: Si habla español, tiene a machado disposición servicios gratuitos de asistencia lingüística. Llame al [phone].     AMANDA Ý: N?u b?n nói Ti?ng Vi?t, có các d?ch v? h? tr? ngôn ng? mi?n phí dành cho b?n. G?i s? [phone].                      [facility]  [address] [facility] [place] [address]               Nicolasa Jurado   5/3/2017 12:22 PM   ED    Description:  Female : 1930   Department:  [facility]           Your Care was Coordinated By:     Provider Role From To    Yocasta Schwab MD Attending Provider 17 1258 --      Reason for Visit     Abnormal Lab           Diagnoses this Visit        Comments    Hypercalcemia    -  Primary     Hypertension         Primary  hyperparathyroidism           ED Disposition     None           To Do List           Follow-up Information     Follow up with [facility] - Endo/Diab/Metab.    Specialty:  Endocrinology    Why:  Will contact you for follow-up appointment    Contact information:    Grecia Morales  [facility] [address] [phone]    Additional information:    9th Floor      [facility]     [facility] Nurse Care Line - 24/7 Assistance  Unless otherwise directed by your provider, please contact [facility], our nurse care line that is available for 24/7 assistance.     Registered nurses in the [facility] Center provide: appointment scheduling, clinical advisement, health education, and other advisory services.  Call: [phone] (toll free)               Medications           Message regarding Medications     Verify the changes and/or additions to your medication regime listed below are the same as discussed with your clinician today.  If any of these changes or additions are incorrect, please notify your healthcare provider.        These medications were administered today        Dose Freq    sodium chloride 0.9% bolus 1,000 mL 1,000 mL ED 1 Time    Sig: Inject 1,000 mLs into the vein ED 1 Time.    Class: Normal    Route: Intravenous    cloNIDine tablet 0.1 mg 0.1 mg ED 1 Time    Sig: Take 1 tablet (0.1 mg total) by mouth ED 1 Time.    Class: Normal    Route: Oral           Verify that the below list of medications is an accurate representation of the medications you are currently taking.  If none reported, the list may be blank. If incorrect, please contact your healthcare provider. Carry this list with you in case of emergency.           Current Medications     albuterol (PROVENTIL) 2.5 mg /3 mL (0.083 %) nebulizer solution 1 VIAL PER NEBULIZER EVERY 4 TO 6 HOURS AS NEEDED    albuterol 90 mcg/actuation inhaler Inhale 2 puffs into the lungs every 6 (six) hours as needed for Wheezing.    amlodipine  (NORVASC) 10 MG tablet TAKE 1 TABLET BY MOUTH DAILY    aspirin 81 mg Tab Take 81 mg by mouth every other day.     atorvastatin (LIPITOR) 80 MG tablet Take 1 tablet (80 mg total) by mouth once daily.    AZOPT 1 % ophthalmic suspension Place 1 drop into the left eye every 12 (twelve) hours.    brimonidine 0.2% (ALPHAGAN) 0.2 % Drop Place 1 drop into both eyes 3 (three) times daily.     clonazePAM (KLONOPIN) 0.5 MG tablet TAKE 1 TABLET BY MOUTH TWICE DAILY AS NEEDED FOR ANXIETY    cloNIDine (CATAPRES) 0.1 MG tablet Take 1 tablet (0.1 mg total) by mouth daily as needed (for SBP >200 mm Hg).    cloNIDine (CATAPRES) 0.1 MG tablet TAKE 1 TABLET BY MOUTH EVERY 2 HOURS AS NEEDED FOR SYSTOLIC BLOOD PRESSURE>200    cloNIDine tablet 0.1 mg Take 1 tablet (0.1 mg total) by mouth ED 1 Time.    doxazosin (CARDURA) 2 MG tablet Take 1 tablet (2 mg total) by mouth every evening.    ERGOCALCIFEROL, VITAMIN D2, (VITAMIN D2 ORAL) Take 1 tablet by mouth every evening.    furosemide (LASIX) 20 MG tablet Take 1 tablet (20 mg total) by mouth once daily.    gabapentin (NEURONTIN) 100 MG capsule Take 1 capsule (100 mg total) by mouth every evening.    latanoprost 0.005 % ophthalmic solution 1 drop every evening.    metoprolol succinate (TOPROL-XL) 50 MG 24 hr tablet Take 1 tablet (50 mg total) by mouth once daily.    nitroGLYCERIN (NITROSTAT) 0.4 MG SL tablet Place 0.4 mg under the tongue. 1 Tablet, Sublingual Sublingual .  One tablet under tounge as needed for chest pain.    omega-3 fatty acids 1,000 mg Cap     ramipril (ALTACE) 10 MG capsule TAKE 1 CAPSULE BY MOUTH TWICE DAILY    vit C-vit E-copper-ZnOx-lutein (PRESERVISION) 226-200-5 mg-unit-mg Cap Take by mouth. 2 Capsule Oral Every day           Clinical Reference Information           Your Vitals Were     BP Pulse Temp Resp Height Weight    185/73 58 98 °F (36.7 °C) (Oral) 21 5' (1.524 m) 59.9 kg (132 lb)    SpO2 BMI                98% 25.78 kg/m2          Allergies as of 5/3/2017         Reactions    Venom-wasp     Augmentin  [Amoxicillin-pot Clavulanate]     Other reaction(s): diarrea  Other reaction(s): Vomiting      Immunizations Administered on Date of Encounter - 5/3/2017     None      ED Micro, Lab, POCT     Start Ordered       Status Ordering Provider    05/03/17 1310 05/03/17 1309  PTH-related peptide  STAT      In process     05/03/17 1309 05/03/17 1309  Calcium, ionized  STAT      Final result     05/03/17 1309 05/03/17 1309  Vitamin D  STAT      Final result     05/03/17 1309 05/03/17 1309  PTH, intact  STAT      Final result     05/03/17 1308 05/03/17 1309  CBC auto differential  STAT      Final result     05/03/17 1308 05/03/17 1309  Comprehensive metabolic panel  STAT      Final result       ED Imaging Orders     None        Discharge Instructions         Instrucciones de dwight para la hipercalcemia  A usted le baptiste diagnosticado hipercalcemia (exceso de calcio en la ayesha). El calcio es un mineral que ayuda a desarrollar los huesos y los dientes, controla el ritmo del corazón y permite que se contraigan los músculos. Por lo regular la hipercalcemia se da hannah resultado de otros problemas en el cuerpo, incluyendo glándulas hiperactivas, trastornos de los huesos, descanso prolongado en la cama y ciertos tumores o cánceres.  Cuidados en la casa  · Pregúntele al médico cuánto líquido debe beber. La mayoría de los pacientes con hipercalcemia necesitan beber entre 3 cuartos a 1 galón (de 3 a 4 litros) de líquidos diariamente.  · Lleve la cuenta de cuánto líquido arvin.  ¨ Llene de agua un recipiente enjuagado de 1 galón de leche, o compre un galón de agua, y manténgalo en el refrigerador.  ¨ Procure beber toda el agua del recipiente cornelia el transcurso del día.  · Reduzca machado consumo de productos lácteos.  ¨ Limite radicalmente la cantidad de leche, queso, requesón, yogur y helados que consume.  ¨ Leonor las etiquetas de los alimentos. No compre productos lácteos con calcio añadido.  · Evite  los medicamentos antiácidos si indican que contienen calcio. Muchos de estos contienen calcio. Algunos contienen magnesio mary no calcio.  · No reduzca el consumo de sal.  · Chichi ejercicio. Si la hipercalcemia fue causada por un descanso prolongado en la cama, trate de aumentar machado actividad si es posible.  · Vuelva a sharmila actividades normales según lo indique el médico.  · [place] los medicamentos exactamente según las indicaciones.  · Informe al médico sobre todos los medicamentos que esté tomando, con o sin receta, incluyendo las hierbas medicinales.  · Asista a todas las pruebas de laboratorio y a las visitas de control. El médico deberá monitorear de cerca machado trastorno.  Visitas de control  Programe rashida visita de control según le indique el personal médico.  Cuándo debe llamar al médico  Llame al médico inmediatamente si presenta cualquiera de los siguientes síntomas:  · Fatiga extrema  · Pérdida de apetito  · Problemas para orinar o dolor al orinar  · Jarod en la orina  · Vómito o diarrea  · Aumento de la sed  · Ritmo cardíaco irregular  · Mareos o aturdimiento  · Depresión  · Confusión   Date Last Reviewed: 6/19/2015  © 2715-0403 [employer]. [address] [place] [address]. Todos los derechos reservados. Esta información no pretende sustituir la atención médica profesional. Sólo machado médico puede diagnosticar y tratar un problema de oanh.          [facility] Sign-Up     Activating your [facility] account is as easy as 1-2-3!     1) Visit [email or web address], select Sign Up Now, enter this activation code and your date of birth, then select Next.  [email or web address]  Expires: 6/17/2017  4:50 PM      2) Create a username and password to use when you visit [facility] in the future and select a security question in case you lose your password and select Next.    3) Enter your e-mail address and click Sign Up!    Additional Information  If you have questions, please e-mail [email or web address] or call  [phone] to talk to our [facility] staff. Remember, [facility] is NOT to be used for urgent needs. For medical emergencies, dial 911.          [facility] complies with applicable Federal civil rights laws and does not discriminate on the basis of race, color, national origin, age, disability, or sex.        Language Assistance Services     ATTENTION: Language assistance services are available, free of charge. Please call [phone].      ATENCIÓN: Si habla rowena, tiene a machado disposición servicios gratuitos de asistencia lingüística. Llame al [phone].     AMANDA Ý: N?u b?n nói Ti?ng Vi?t, có các d?ch v? h? tr? ngôn ng? mi?n phí dành cho b?n. G?i s? [phone].

## 2017-05-03 NOTE — DISCHARGE INSTRUCTIONS
Instrucciones de dwight para la hipercalcemia  A usted le baptiste diagnosticado hipercalcemia (exceso de calcio en la ayesha). El calcio es un mineral que ayuda a desarrollar los huesos y los dientes, controla el ritmo del corazón y permite que se contraigan los músculos. Por lo regular la hipercalcemia se da hannah resultado de otros problemas en el cuerpo, incluyendo glándulas hiperactivas, trastornos de los huesos, descanso prolongado en la cama y ciertos tumores o cánceres.  Cuidados en la casa  · Pregúntele al médico cuánto líquido debe beber. La mayoría de los pacientes con hipercalcemia necesitan beber entre 3 cuartos a 1 galón (de 3 a 4 litros) de líquidos diariamente.  · Lleve la cuenta de cuánto líquido arvin.  ¨ Llene de agua un recipiente enjuagado de 1 galón de leche, o compre un galón de agua, y manténgalo en el refrigerador.  ¨ Procure beber toda el agua del recipiente cornelia el transcurso del día.  · Reduzca machado consumo de productos lácteos.  ¨ Limite radicalmente la cantidad de leche, queso, requesón, yogur y helados que consume.  ¨ Leonor las etiquetas de los alimentos. No compre productos lácteos con calcio añadido.  · Evite los medicamentos antiácidos si indican que contienen calcio. Muchos de estos contienen calcio. Algunos contienen magnesio mary no calcio.  · No reduzca el consumo de sal.  · Chichi ejercicio. Si la hipercalcemia fue causada por un descanso prolongado en la cama, trate de aumentar machado actividad si es posible.  · Vuelva a sharmila actividades normales según lo indique el médico.  · [place] los medicamentos exactamente según las indicaciones.  · Informe al médico sobre todos los medicamentos que esté tomando, con o sin receta, incluyendo las hierbas medicinales.  · Asista a todas las pruebas de laboratorio y a las visitas de control. El médico deberá monitorear de cerca machado trastorno.  Visitas de control  Programe rashida visita de control según le indique el personal médico.  Cuándo debe llamar al  médico  Llame al médico inmediatamente si presenta cualquiera de los siguientes síntomas:  · Fatiga extrema  · Pérdida de apetito  · Problemas para orinar o dolor al orinar  · Jarod en la orina  · Vómito o diarrea  · Aumento de la sed  · Ritmo cardíaco irregular  · Mareos o aturdimiento  · Depresión  · Confusión   Date Last Reviewed: 6/19/2015 © 2000-2016 [employer]. [address] [place] [address]. Todos los derechos reservados. Esta información no pretende sustituir la atención médica profesional. Sólo machado médico puede diagnosticar y tratar un problema de oanh.

## 2017-05-03 NOTE — ED TRIAGE NOTES
Patient received with complaint of abnormal laboratory findings requiring emergency department evaluation.  Reports elevated calcium.  Denies complaints.    No LDA's in place on arrival to department.    Family present.    Pain:  Denies pain.    Psychosocial:  Patient is calm and cooperative.  Patients insight and judgement are appropriate to situation.  Appears clean, well maintained, with clothing appropriate to environment.  No evidence of delusions, hallucinations, or psychosis.    Neuro:  Eyes open spontaneously.  Awake, alert, oriented x 4.  Speech clear and appropriate.  Tolerating saliva secretions well.  Able to follow commands, demonstrating ability to actively and appropriately communicate within context of current conversation.  Symmetrical facial muscles.  Moving all extremities well with no noted weakness.  Adequate muscle tone present.    Movement is purposeful.  No evidence of impaired sensation.  Responds to external stimuli with appropriate reflexes.  Pupils equally round and reactive to light.  No noted drifts.      Airway:  Bilateral chest rise and fall.  RR regular and non-labored.  No crepitus or subcutaneous emphysema noted on palpation.      Circulatory:  Skin warm, dry, and pink.  Radial pulses strong and regular.  Capillary refill/skin blanching less than 3 seconds to distal of 4 extremities.  SR on the CM without ectopy.    Abdomen:  Abdomen soft and non-distended.       Urinary:  Patient reports routine urination without pain, frequency, or urgency.  Voids independently.  Reports urine appears brii/yellow in color.    Extremities:  No redness, heat, swelling, deformity, or pain.    Skin:  Intact with no bruising/discolorations noted.

## 2017-05-03 NOTE — TELEPHONE ENCOUNTER
----- Message from Houston Gaston MD sent at 5/3/2017  7:18 AM CDT -----  Blood counts, inflammatory markers are normal.  With elevated calcium level, please advise patient to go to emergency room.

## 2017-05-04 LAB — BACTERIA UR CULT: NO GROWTH

## 2017-05-05 ENCOUNTER — TELEPHONE (OUTPATIENT)
Dept: INTERNAL MEDICINE | Facility: CLINIC | Age: 82
End: 2017-05-05

## 2017-05-05 DIAGNOSIS — E21.0 PRIMARY HYPERPARATHYROIDISM: Primary | Chronic | ICD-10-CM

## 2017-05-05 LAB — PTH RELATED PROT SERPL-SCNC: 0.4 PMOL/L

## 2017-05-15 ENCOUNTER — OFFICE VISIT (OUTPATIENT)
Dept: NEPHROLOGY | Facility: CLINIC | Age: 82
End: 2017-05-15
Payer: MEDICARE

## 2017-05-15 VITALS
HEIGHT: 60 IN | SYSTOLIC BLOOD PRESSURE: 170 MMHG | HEART RATE: 83 BPM | DIASTOLIC BLOOD PRESSURE: 90 MMHG | OXYGEN SATURATION: 96 % | WEIGHT: 130.94 LBS | BODY MASS INDEX: 25.71 KG/M2

## 2017-05-15 DIAGNOSIS — I10 ESSENTIAL HYPERTENSION: ICD-10-CM

## 2017-05-15 DIAGNOSIS — N18.2 CKD (CHRONIC KIDNEY DISEASE) STAGE 2, GFR 60-89 ML/MIN: ICD-10-CM

## 2017-05-15 DIAGNOSIS — E83.52 HYPERCALCEMIA: ICD-10-CM

## 2017-05-15 DIAGNOSIS — I70.1 RENAL ARTERY STENOSIS: Chronic | ICD-10-CM

## 2017-05-15 DIAGNOSIS — I51.89 LEFT VENTRICULAR DIASTOLIC DYSFUNCTION WITH PRESERVED SYSTOLIC FUNCTION: Chronic | ICD-10-CM

## 2017-05-15 DIAGNOSIS — N18.30 CKD (CHRONIC KIDNEY DISEASE) STAGE 3, GFR 30-59 ML/MIN: ICD-10-CM

## 2017-05-15 DIAGNOSIS — E21.0 PRIMARY HYPERPARATHYROIDISM: Primary | Chronic | ICD-10-CM

## 2017-05-15 PROCEDURE — 1160F RVW MEDS BY RX/DR IN RCRD: CPT | Mod: S$GLB,,, | Performed by: INTERNAL MEDICINE

## 2017-05-15 PROCEDURE — 99204 OFFICE O/P NEW MOD 45 MIN: CPT | Mod: S$GLB,,, | Performed by: INTERNAL MEDICINE

## 2017-05-15 PROCEDURE — 1126F AMNT PAIN NOTED NONE PRSNT: CPT | Mod: S$GLB,,, | Performed by: INTERNAL MEDICINE

## 2017-05-15 PROCEDURE — 99499 UNLISTED E&M SERVICE: CPT | Mod: S$GLB,,, | Performed by: INTERNAL MEDICINE

## 2017-05-15 PROCEDURE — 1159F MED LIST DOCD IN RCRD: CPT | Mod: S$GLB,,, | Performed by: INTERNAL MEDICINE

## 2017-05-15 PROCEDURE — 99999 PR PBB SHADOW E&M-EST. PATIENT-LVL III: CPT | Mod: PBBFAC,,, | Performed by: INTERNAL MEDICINE

## 2017-05-15 RX ORDER — CINACALCET 30 MG/1
30 TABLET, FILM COATED ORAL
Qty: 30 TABLET | Refills: 11 | Status: SHIPPED | OUTPATIENT
Start: 2017-05-15 | End: 2017-07-27 | Stop reason: SDUPTHER

## 2017-05-15 RX ORDER — FUROSEMIDE 20 MG/1
TABLET ORAL
Qty: 90 TABLET | Refills: 3 | Status: SHIPPED | OUTPATIENT
Start: 2017-05-15 | End: 2018-06-21 | Stop reason: SDUPTHER

## 2017-05-15 NOTE — PROGRESS NOTES
Subjective:       Patient ID: Nicolasa Jurado is a 86 y.o. White female who presents for new evaluation of renal function.She speaks both Japanese and English and dose not want an .    HPI  85 yo HF with HTN and primary hyperparathyroidism with elevated serum calcium. 11.5-12.5 recently and ionized calcium of 1.56.  Since at least 2011 her calcium as been as high as 11.   recently. Serum crt 0.8,  UA no protein or hematuria but 1 rbc and wbc. Jadyn has fluctuating Bp and takes medication based on BP.  Yesterday she did not take her medicine because her bp was 115-130 systolic. and today she has not taken amlodipine. She is on RAAS blockade.   No UTIs or nephrolithaisis.  no imagine of kidneys available. Sh urinates well and is on lasix. It is unclear if she is on this for h/o peripheral edema, cardiac disease, or because of elevated serum calcium.    Review of Systems   Constitutional: Negative for appetite change, chills, fatigue, fever and unexpected weight change.   HENT: Negative for congestion, facial swelling, hearing loss, nosebleeds and trouble swallowing.    Eyes: Negative for pain, discharge, redness and visual disturbance.   Respiratory: Negative for cough, chest tightness, shortness of breath and wheezing.    Cardiovascular: Negative for chest pain, palpitations and leg swelling.   Gastrointestinal: Negative for abdominal pain, constipation, diarrhea, nausea and vomiting.   Endocrine: Negative for cold intolerance, heat intolerance and polydipsia.   Genitourinary: Negative for decreased urine volume, difficulty urinating, dysuria, flank pain, hematuria and urgency.   Musculoskeletal: Negative for arthralgias, back pain, joint swelling and myalgias.   Skin: Negative for color change, pallor, rash and wound.   Neurological: Negative for dizziness, tremors, seizures, syncope, speech difficulty, weakness and headaches.   Hematological: Negative for adenopathy. Does not bruise/bleed easily.    Psychiatric/Behavioral: Negative for agitation, behavioral problems, dysphoric mood, self-injury and sleep disturbance.       Objective:     Blood pressure (!) 170/90, pulse 83, height 5' (1.524 m), weight 59.4 kg (130 lb 15.3 oz), SpO2 96 %.    Physical Exam   Constitutional: She is oriented to person, place, and time. She appears well-developed and well-nourished. No distress.   NAD   HENT:   Head: Normocephalic and atraumatic.   Mouth/Throat: No oropharyngeal exudate.   Eyes: Conjunctivae and EOM are normal. Pupils are equal, round, and reactive to light. Right eye exhibits no discharge. Left eye exhibits no discharge. No scleral icterus.   Neck: Normal range of motion. Neck supple. No JVD present. No tracheal deviation present. No thyromegaly present.   Cardiovascular: Normal rate, regular rhythm and intact distal pulses.  Exam reveals no gallop.    No murmur heard.  2/6 socrates no peripheral edema   Pulmonary/Chest: Effort normal and breath sounds normal. No stridor. No respiratory distress. She has no wheezes. She has no rales. She exhibits no tenderness.   Abdominal: Soft. Bowel sounds are normal. She exhibits no distension and no mass. There is no tenderness. There is no rebound and no guarding. No hernia.   Musculoskeletal: She exhibits no edema or tenderness.   Lymphadenopathy:     She has no cervical adenopathy.   Neurological: She is alert and oriented to person, place, and time. She exhibits normal muscle tone.   Skin: Skin is warm and dry. No rash noted. She is not diaphoretic. No erythema.   Psychiatric: She has a normal mood and affect. Judgment and thought content normal.   Nursing note and vitals reviewed.      Assessment:       1. Primary hyperparathyroidism    2. Hypercalcemia    3. CKD (chronic kidney disease) stage 3, GFR 30-59 ml/min    4. Essential hypertension    5. Left ventricular diastolic dysfunction with preserved systolic function    6. Renal artery stenosis    7. CKD (chronic kidney  disease) stage 2, GFR 60-89 ml/min        Plan:    85 yo HF with HTN hypercalcemia of at least 5 years now with worsening hypercalcemia, elevated PTH , PTHrp 0.4, on vit d, with normal renal function. Probable primary hyperparathyroid, will check U calcium excretion as well.    Hypercalemia:  Will check serologies including SPEP and immunofixation and r PTH for malignancy.  Suggest adding sensipar 30 mg daily and follow serum calcium , ionized calcium and PTH. Stop vit d , > 2 L po fluid intake daily    HTN: may in part be due to hypercalcemia, and patient may have h/o SANA: suggest renal US but with normal renal function would treat medically and try to regulate consistent medication schedule so that patient takes them routinely.   No fluid overload, unlear that patient requires furosemide, may want to consider stopping if no h/o pulmonary edema. Will regulate calcium  as above.  Repeat labs 2  And 4 weeks and rtc 6 weeks.

## 2017-05-15 NOTE — LETTER
May 15, 2017      Houston Gaston MD  [address] [facility] [facility] [address] [facility]  [place] [address]           [facility] Nephrology  [address]  [place] [address]  Phone: [phone]  Fax: [phone]          Patient: Nicolasa Jurado   MR Number: [id number]   YOB: 1930   Date of Visit: 5/15/2017       Dear Dr. Houston Gaston:    Thank you for referring Nicolasa Jurado to me for evaluation. Attached you will find relevant portions of my assessment and plan of care.    If you have questions, please do not hesitate to call me. I look forward to following Nicolasa Jurado along with you.    Sincerely,    Nereyda Lange MD    Enclosure  CC:  No Recipients    If you would like to receive this communication electronically, please contact [email or web address] or [phone] to request more information on [facility] access.    For providers and/or their staff who would like to refer a patient to [facility], please contact us through our one-stop-shop provider referral line, [facility], at [phone].    If you feel you have received this communication in error or would no longer like to receive these types of communications, please e-mail [email or web address]

## 2017-05-15 NOTE — PATIENT INSTRUCTIONS
Labs in 2 weeks and  6 weeks    Begin sensipar 30 mg daily    Stop vit d  Fluid intake  2 L daily     take blood pressure medications regularly    rtc 6 weeks

## 2017-05-29 ENCOUNTER — LAB VISIT (OUTPATIENT)
Dept: LAB | Facility: HOSPITAL | Age: 82
End: 2017-05-29
Attending: INTERNAL MEDICINE
Payer: MEDICARE

## 2017-05-29 DIAGNOSIS — I51.89 LEFT VENTRICULAR DIASTOLIC DYSFUNCTION WITH PRESERVED SYSTOLIC FUNCTION: Chronic | ICD-10-CM

## 2017-05-29 DIAGNOSIS — N18.30 CKD (CHRONIC KIDNEY DISEASE) STAGE 3, GFR 30-59 ML/MIN: ICD-10-CM

## 2017-05-29 DIAGNOSIS — E83.52 HYPERCALCEMIA: ICD-10-CM

## 2017-05-29 DIAGNOSIS — I10 ESSENTIAL HYPERTENSION: ICD-10-CM

## 2017-05-29 DIAGNOSIS — E21.0 PRIMARY HYPERPARATHYROIDISM: Chronic | ICD-10-CM

## 2017-05-29 DIAGNOSIS — N18.2 CKD (CHRONIC KIDNEY DISEASE) STAGE 2, GFR 60-89 ML/MIN: ICD-10-CM

## 2017-05-29 DIAGNOSIS — I70.1 RENAL ARTERY STENOSIS: Chronic | ICD-10-CM

## 2017-05-29 LAB
ALBUMIN SERPL BCP-MCNC: 3.6 G/DL
ALBUMIN SERPL BCP-MCNC: 3.6 G/DL
ANION GAP SERPL CALC-SCNC: 7 MMOL/L
ANION GAP SERPL CALC-SCNC: 7 MMOL/L
BUN SERPL-MCNC: 11 MG/DL
BUN SERPL-MCNC: 11 MG/DL
CA-I BLDV-SCNC: 1.42 MMOL/L
CA-I BLDV-SCNC: 1.42 MMOL/L
CALCIUM SERPL-MCNC: 10.3 MG/DL
CALCIUM SERPL-MCNC: 10.3 MG/DL
CHLORIDE SERPL-SCNC: 106 MMOL/L
CHLORIDE SERPL-SCNC: 106 MMOL/L
CO2 SERPL-SCNC: 29 MMOL/L
CO2 SERPL-SCNC: 29 MMOL/L
CREAT SERPL-MCNC: 0.8 MG/DL
CREAT SERPL-MCNC: 0.8 MG/DL
EST. GFR  (AFRICAN AMERICAN): >60 ML/MIN/1.73 M^2
EST. GFR  (AFRICAN AMERICAN): >60 ML/MIN/1.73 M^2
EST. GFR  (NON AFRICAN AMERICAN): >60 ML/MIN/1.73 M^2
EST. GFR  (NON AFRICAN AMERICAN): >60 ML/MIN/1.73 M^2
GLUCOSE SERPL-MCNC: 110 MG/DL
GLUCOSE SERPL-MCNC: 110 MG/DL
MAGNESIUM SERPL-MCNC: 1.9 MG/DL
PHOSPHATE SERPL-MCNC: 2.8 MG/DL
POTASSIUM SERPL-SCNC: 3.6 MMOL/L
POTASSIUM SERPL-SCNC: 3.6 MMOL/L
SODIUM SERPL-SCNC: 142 MMOL/L
SODIUM SERPL-SCNC: 142 MMOL/L
TSH SERPL DL<=0.005 MIU/L-ACNC: 2.08 UIU/ML

## 2017-05-29 PROCEDURE — 80069 RENAL FUNCTION PANEL: CPT

## 2017-05-29 PROCEDURE — 86334 IMMUNOFIX E-PHORESIS SERUM: CPT | Mod: 26,,, | Performed by: PATHOLOGY

## 2017-05-29 PROCEDURE — 86038 ANTINUCLEAR ANTIBODIES: CPT

## 2017-05-29 PROCEDURE — 84443 ASSAY THYROID STIM HORMONE: CPT

## 2017-05-29 PROCEDURE — 86334 IMMUNOFIX E-PHORESIS SERUM: CPT

## 2017-05-29 PROCEDURE — 86255 FLUORESCENT ANTIBODY SCREEN: CPT | Mod: 91

## 2017-05-29 PROCEDURE — 83735 ASSAY OF MAGNESIUM: CPT

## 2017-05-29 PROCEDURE — 84165 PROTEIN E-PHORESIS SERUM: CPT

## 2017-05-29 PROCEDURE — 84165 PROTEIN E-PHORESIS SERUM: CPT | Mod: 26,,, | Performed by: PATHOLOGY

## 2017-05-29 PROCEDURE — 82330 ASSAY OF CALCIUM: CPT

## 2017-05-30 DIAGNOSIS — E83.52 HYPERCALCEMIA: Primary | ICD-10-CM

## 2017-05-30 LAB
ALBUMIN SERPL ELPH-MCNC: 4.01 G/DL
ALPHA1 GLOB SERPL ELPH-MCNC: 0.29 G/DL
ALPHA2 GLOB SERPL ELPH-MCNC: 0.7 G/DL
ANA SER QL IF: NORMAL
B-GLOBULIN SERPL ELPH-MCNC: 0.8 G/DL
GAMMA GLOB SERPL ELPH-MCNC: 0.9 G/DL
INTERPRETATION SERPL IFE-IMP: NORMAL
PATHOLOGIST INTERPRETATION IFE: NORMAL
PATHOLOGIST INTERPRETATION SPE: NORMAL
PROT SERPL-MCNC: 6.7 G/DL

## 2017-05-31 ENCOUNTER — TELEPHONE (OUTPATIENT)
Dept: NEPHROLOGY | Facility: CLINIC | Age: 82
End: 2017-05-31

## 2017-05-31 NOTE — TELEPHONE ENCOUNTER
MD ISABEL Hallman Staff             Serum calcium improved, continue off vitamin d and keep taking sensipar as ordered an be sure to take your BP meds regularly.   Please have patient in for a bp check next week     Spoke with pt pt will follow  instructions and bp check next week

## 2017-06-05 ENCOUNTER — OFFICE VISIT (OUTPATIENT)
Dept: INTERNAL MEDICINE | Facility: CLINIC | Age: 82
End: 2017-06-05
Payer: MEDICARE

## 2017-06-05 VITALS
HEIGHT: 60 IN | RESPIRATION RATE: 15 BRPM | DIASTOLIC BLOOD PRESSURE: 70 MMHG | OXYGEN SATURATION: 98 % | HEART RATE: 79 BPM | SYSTOLIC BLOOD PRESSURE: 138 MMHG | BODY MASS INDEX: 25.52 KG/M2 | WEIGHT: 130 LBS | TEMPERATURE: 99 F

## 2017-06-05 DIAGNOSIS — I51.89 LEFT VENTRICULAR DIASTOLIC DYSFUNCTION WITH PRESERVED SYSTOLIC FUNCTION: Chronic | ICD-10-CM

## 2017-06-05 DIAGNOSIS — E21.0 PRIMARY HYPERPARATHYROIDISM: Chronic | ICD-10-CM

## 2017-06-05 DIAGNOSIS — H35.3290: Chronic | ICD-10-CM

## 2017-06-05 DIAGNOSIS — I25.10 CORONARY ARTERY DISEASE INVOLVING NATIVE CORONARY ARTERY OF NATIVE HEART WITHOUT ANGINA PECTORIS: ICD-10-CM

## 2017-06-05 DIAGNOSIS — I10 ESSENTIAL HYPERTENSION: ICD-10-CM

## 2017-06-05 DIAGNOSIS — Z00.00 ENCOUNTER FOR PREVENTIVE HEALTH EXAMINATION: ICD-10-CM

## 2017-06-05 DIAGNOSIS — E73.9 LACTOSE INTOLERANCE: Chronic | ICD-10-CM

## 2017-06-05 DIAGNOSIS — M81.0 OSTEOPOROSIS, POSTMENOPAUSAL: ICD-10-CM

## 2017-06-05 DIAGNOSIS — G47.00 INSOMNIA, UNSPECIFIED TYPE: ICD-10-CM

## 2017-06-05 DIAGNOSIS — I70.1 RENAL ARTERY STENOSIS: Primary | Chronic | ICD-10-CM

## 2017-06-05 DIAGNOSIS — H91.93 DECREASED HEARING, BILATERAL: ICD-10-CM

## 2017-06-05 DIAGNOSIS — M54.41 CHRONIC RIGHT-SIDED LOW BACK PAIN WITH RIGHT-SIDED SCIATICA: ICD-10-CM

## 2017-06-05 DIAGNOSIS — G89.29 CHRONIC RIGHT-SIDED LOW BACK PAIN WITH RIGHT-SIDED SCIATICA: ICD-10-CM

## 2017-06-05 DIAGNOSIS — I70.0 ATHEROSCLEROSIS OF AORTA: Chronic | ICD-10-CM

## 2017-06-05 DIAGNOSIS — G62.9 NEUROPATHY: ICD-10-CM

## 2017-06-05 DIAGNOSIS — J45.20 MILD INTERMITTENT ASTHMA WITHOUT COMPLICATION: ICD-10-CM

## 2017-06-05 DIAGNOSIS — E66.3 OVERWEIGHT: ICD-10-CM

## 2017-06-05 DIAGNOSIS — D69.6 THROMBOCYTOPENIA: ICD-10-CM

## 2017-06-05 DIAGNOSIS — E83.52 HYPERCALCEMIA: ICD-10-CM

## 2017-06-05 DIAGNOSIS — K55.1 MESENTERIC ARTERY STENOSIS: Chronic | ICD-10-CM

## 2017-06-05 DIAGNOSIS — E78.00 PURE HYPERCHOLESTEROLEMIA: ICD-10-CM

## 2017-06-05 DIAGNOSIS — N18.2 CKD (CHRONIC KIDNEY DISEASE) STAGE 2, GFR 60-89 ML/MIN: ICD-10-CM

## 2017-06-05 DIAGNOSIS — M79.604 RIGHT LEG PAIN: ICD-10-CM

## 2017-06-05 PROCEDURE — G0439 PPPS, SUBSEQ VISIT: HCPCS | Mod: S$GLB,,, | Performed by: NURSE PRACTITIONER

## 2017-06-05 PROCEDURE — 99499 UNLISTED E&M SERVICE: CPT | Mod: S$GLB,,, | Performed by: NURSE PRACTITIONER

## 2017-06-05 PROCEDURE — 99999 PR PBB SHADOW E&M-EST. PATIENT-LVL V: CPT | Mod: PBBFAC,,, | Performed by: NURSE PRACTITIONER

## 2017-06-05 NOTE — PROGRESS NOTES
Nicolasa Jurado presented for a  Medicare AWV and comprehensive Health Risk Assessment today. The following components were reviewed and updated:    · Medical history  · Family History  · Social history  · Allergies and Current Medications  · Health Risk Assessment  · Health Maintenance  · Care Team     ** See Completed Assessments for Annual Wellness Visit within the encounter summary.**       The following assessments were completed:  · Living Situation  · CAGE  · Depression Screening  · Timed Get Up and Go  · Whisper Test  · Cognitive Function Screening  · Nutrition Screening  · ADL Screening  · PAQ Screening    Vitals:    06/05/17 1253   BP: 138/70   Pulse: 79   Resp: 15   Temp: 98.5 °F (36.9 °C)   TempSrc: Oral   SpO2: 98%   Weight: 59 kg (130 lb)   Height: 5' (1.524 m)     Body mass index is 25.39 kg/m².  Physical Exam   Constitutional: She is oriented to person, place, and time. She appears well-developed and well-nourished.   HENT:   Head: Normocephalic and atraumatic.   Cardiovascular: Normal rate and regular rhythm.    Murmur heard.  Pulmonary/Chest: Effort normal and breath sounds normal.   Abdominal: Soft. Bowel sounds are normal. There is no tenderness.   Musculoskeletal: She exhibits no edema.   Neurological: She is alert and oriented to person, place, and time.   Skin: Skin is warm and dry.   Psychiatric: She has a normal mood and affect. Her behavior is normal. Judgment and thought content normal.   Nursing note and vitals reviewed.        Diagnoses and health risks identified today and associated recommendations/orders:    1. Renal artery stenosis  Stable- followed by cardiology    2. Pure hypercholesterolemia  Stable- followed by cardiology    3. Primary hyperparathyroidism  Stable- followed by nephrology    4. Osteoporosis, postmenopausal  Stable- followed by PCP    5. Mesenteric artery stenosis  Stable- followed by cardiology/PCP    6. Essential hypertension  Stable- followed by cardiology    7.  Exudative macular degeneration  Stable- followed by opthalmology  8. CKD (chronic kidney disease) stage 2, GFR 60-89 ml/min  Stable- followed by nephrology    9. Coronary artery disease involving native coronary artery of native heart without angina pectoris  Stable- followed by cardiology    10. Atherosclerosis of aorta  Stable- followed by cardiology    11. Mild intermittent asthma without complication  Stable- followed by PCP  12. Thrombocytopenia  Stable- followed by PCP    13. Hypercalcemia  Stable- followed by nephrology    14. Encounter for preventive health examination  Assessments completed  Preventative health recommendations reviewed       15. Decreased hearing, bilateral  Stable- followed by PCP  - Ambulatory Referral to Audiology    16. Right leg pain  Stable- followed by PCP    17. Chronic right-sided low back pain with right-sided sciatica  Stable- followed by PCP    18. Overweight  CHronic with gradual intentional weight loss.Reviewed current BMI & ideal BMI range. Encouraged weight loss,  diet and exercise. Followed by PCP.    19. Neuropathy  Stable- followed by PCP    20. Insomnia, unspecified type  Stable- followed by PCP    21. Lactose intolerance  Stable- followed by PCP  22. Left ventricular diastolic dysfunction with preserved systolic function  Stable- followed by cardiology      Provided Nicolasa with a 5-10 year written screening schedule and personal prevention plan. Recommendations were developed using the USPSTF age appropriate recommendations. Education, counseling, and referrals were provided as needed. After Visit Summary printed and given to patient which includes a list of additional screenings\tests needed.    Return in about 3 months (around 9/5/2017).    Lisseth Sheehan NP

## 2017-06-05 NOTE — PATIENT INSTRUCTIONS
Counseling and Referral of Other Preventative  (Italic type indicates deductible and co-insurance are waived)    Patient Name: Nicolasa Jurado  Today's Date: 6/5/2017      SERVICE LIMITATIONS RECOMMENDATION    Vaccines    · Pneumococcal (once after 65)    · Influenza (annually)    · Hepatitis B (if medium/high risk)    · Prevnar 13      Hepatitis B medium/high risk factors:       - End-stage renal disease       - Hemophiliacs who received Factor VII or         IX concentrates       - Clients of institutions for the mentally             retarded       - Persons who live in the same house as          a HepB carrier       - Homosexual men       - Illicit injectable drug abusers     Pneumococcal: may need- discuss with Dr Gaston- has had 2 vaccines already     Influenza: current     Hepatitis B: [facility] handout given     Prevnar 13: current    Mammogram (biennial age 50-74)  Annually (age 40 or over)  10/20/16 last done- due in Oct 2017    Pap (up to age 70 and after 70 if unknown history or abnormal study last 10 years)         N/A    Colorectal cancer screening (to age 75)    · Fecal occult blood test (annual)  · Flexible sigmoidoscopy (5y)  · Screening colonoscopy (10y)  · Barium enema   N/A    Diabetes self-management training (no USPSTF recommendations)  Requires referral by treating physician for patient with diabetes or renal disease. 10 hours of initial DSMT sessions of no less than 30 minutes each in a continuous 12-month period. 2 hours of follow-up DSMT in subsequent years.  N/A    Bone mass measurements (age 65 & older, biennial)  Requires diagnosis related to osteoporosis or estrogen deficiency. Biennial benefit unless patient has history of long-term glucocorticoid  6/2/16- last scan- due in 2018    Glaucoma screening (no USPSTF recommendation)  Diabetes mellitus, family history   , age 50 or over    American, age 65 or over  current    Medical nutrition therapy for diabetes or renal  disease (no recommended schedule)  Requires referral by treating physician for patient with diabetes or renal disease or kidney transplant within the past 3 years.  Can be provided in same year as diabetes self-management training (DSMT), and CMS recommends medical nutrition therapy take place after DSMT. Up to 3 hours for initial year and 2 hours in subsequent years.  N/A    Cardiovascular screening blood tests (every 5 years)  · Fasting lipid panel  Order as a panel if possible  current 1/11/17-due in 2018    Diabetes screening tests (at least every 3 years, Medicare covers annually or at 6-month intervals for prediabetic patients)  · Fasting blood sugar (FBS) or glucose tolerance test (GTT)  Patient must be diagnosed with one of the following:       - Hypertension       - Dyslipidemia       - Obesity (BMI 30kg/m2)       - Previous elevated impaired FBS or GTT       ... or any two of the following:       - Overweight (BMI 25 but <30)       - Family history of diabetes       - Age 65 or older       - History of gestational diabetes or birth of baby weighing more than 9 pounds  done    Abdominal aortic aneurysm screening (once)  · Sonogram   Limited to patients who meet one of the following criteria:       - Men who are 65-75 years old and have smoked more than 100 cigarette in their lifetime       - Anyone with a family history of abdominal aortic aneurysm       - Anyone recommended for screening by the USPSTF  N/A    HIV screening (annually for increased risk patients)  · HIV-1 and HIV-2 by EIA, or ALEXANDRA, rapid antibody test or oral mucosa transudate  Patients must be at increased risk for HIV infection per USPSTF guidelines or pregnant. Tests covered annually for patient at increased risk or as requested by the patient. Pregnant patients may receive up to 3 tests during pregnancy.  Risks discussed, screening is declined    Smoking cessation counseling (up to 8 sessions per year)  Patients must be asymptomatic of  tobacco-related conditions to receive as a preventative service.  N/A    Subsequent annual wellness visit  At least 12 months since last AWV  Return in one year     The following information is provided to all patients.  This information is to help you find resources for any of the problems found today that may be affecting your health:                Living healthy guide: www.[facility].[place].[facility]      Understanding Diabetes: www.diabetes.org      Eating healthy: www.cdc.gov/healthyweight      CDC home safety checklist: www.cdc.gov/steadi/patient.html      Agency on Aging: www.goea.[place].[facility]      Alcoholics anonymous (AA): www.aa.org      Physical Activity: www.pierre.nih.gov/cp9tpvw      Tobacco use: www.quitwithusla.org

## 2017-06-05 NOTE — Clinical Note
Primary Care Providers: Houston Gaston MD, MD (General)  Your patient was seen today for a HRA visit. NO  Gap(s) in care (HEDIS gaps) have been identified during this visit that require additional testing and possible follow up.  Orders Placed This Encounter     Ambulatory Referral to Audiology         Referral Priority:Routine         Referral Type:Audiology Exam         Referral Reason:Specialty Services Required         Requested Specialty:Audiology         Number of Visits Requested:1    I have included a copy of my visit note; please review the note and feel free to contact me with any questions.   Thank you for allowing me to participate in the care of your patients. Lisseth Sheehan, PIPPA

## 2017-06-07 ENCOUNTER — CLINICAL SUPPORT (OUTPATIENT)
Dept: NEPHROLOGY | Facility: CLINIC | Age: 82
End: 2017-06-07
Payer: MEDICARE

## 2017-06-07 NOTE — PROGRESS NOTES
First bp [id number] pt sat for about 10 mins next bp reading 160/70 . of the day stated it was ok for pt to leave. Pt son stated the pt bp goes up and down and that usually her normal reading. Dr advised pt to be more accurate with taking medication at the same time.

## 2017-07-13 DIAGNOSIS — E21.0 PRIMARY HYPERPARATHYROIDISM: Primary | Chronic | ICD-10-CM

## 2017-07-13 DIAGNOSIS — E83.52 HYPERCALCEMIA: ICD-10-CM

## 2017-07-13 LAB
ANCA AB TITR SER IF: NORMAL TITER
P-ANCA TITR SER IF: NORMAL TITER

## 2017-07-20 ENCOUNTER — TELEPHONE (OUTPATIENT)
Dept: INTERNAL MEDICINE | Facility: CLINIC | Age: 82
End: 2017-07-20

## 2017-07-21 DIAGNOSIS — N18.2 CKD (CHRONIC KIDNEY DISEASE) STAGE 2, GFR 60-89 ML/MIN: Primary | ICD-10-CM

## 2017-07-21 DIAGNOSIS — E83.52 HYPERCALCEMIA: ICD-10-CM

## 2017-07-27 ENCOUNTER — LAB VISIT (OUTPATIENT)
Dept: LAB | Facility: HOSPITAL | Age: 82
End: 2017-07-27
Attending: INTERNAL MEDICINE
Payer: MEDICARE

## 2017-07-27 DIAGNOSIS — E21.0 PRIMARY HYPERPARATHYROIDISM: Primary | Chronic | ICD-10-CM

## 2017-07-27 DIAGNOSIS — E83.52 HYPERCALCEMIA: ICD-10-CM

## 2017-07-27 DIAGNOSIS — N18.2 CKD (CHRONIC KIDNEY DISEASE) STAGE 2, GFR 60-89 ML/MIN: ICD-10-CM

## 2017-07-27 DIAGNOSIS — E21.0 PRIMARY HYPERPARATHYROIDISM: Chronic | ICD-10-CM

## 2017-07-27 LAB
ALBUMIN SERPL BCP-MCNC: 3.9 G/DL
ALBUMIN SERPL BCP-MCNC: 3.9 G/DL
ANION GAP SERPL CALC-SCNC: 5 MMOL/L
BUN SERPL-MCNC: 8 MG/DL
CA-I BLDV-SCNC: 1.49 MMOL/L
CALCIUM SERPL-MCNC: 10.7 MG/DL
CHLORIDE SERPL-SCNC: 107 MMOL/L
CO2 SERPL-SCNC: 28 MMOL/L
CREAT SERPL-MCNC: 0.8 MG/DL
EST. GFR  (AFRICAN AMERICAN): >60 ML/MIN/1.73 M^2
EST. GFR  (NON AFRICAN AMERICAN): >60 ML/MIN/1.73 M^2
GLUCOSE SERPL-MCNC: 104 MG/DL
PHOSPHATE SERPL-MCNC: 2.9 MG/DL
POTASSIUM SERPL-SCNC: 4.4 MMOL/L
SODIUM SERPL-SCNC: 140 MMOL/L

## 2017-07-27 PROCEDURE — 36415 COLL VENOUS BLD VENIPUNCTURE: CPT

## 2017-07-27 PROCEDURE — 80069 RENAL FUNCTION PANEL: CPT

## 2017-07-27 PROCEDURE — 82330 ASSAY OF CALCIUM: CPT

## 2017-07-27 RX ORDER — CINACALCET 30 MG/1
60 TABLET, FILM COATED ORAL
Qty: 180 TABLET | Refills: 3 | Status: SHIPPED | OUTPATIENT
Start: 2017-07-27 | End: 2017-09-22 | Stop reason: SDUPTHER

## 2017-09-15 RX ORDER — CLONAZEPAM 0.5 MG/1
TABLET ORAL
Qty: 30 TABLET | Refills: 0 | Status: SHIPPED | OUTPATIENT
Start: 2017-09-15 | End: 2018-01-13 | Stop reason: SDUPTHER

## 2017-09-15 NOTE — TELEPHONE ENCOUNTER
Checked  for [place], [place], [place], [place], [place].  1 Prescribers noted.  Refill provided.

## 2017-09-22 DIAGNOSIS — N18.30 CHRONIC KIDNEY DISEASE, STAGE III (MODERATE): Primary | ICD-10-CM

## 2017-09-25 RX ORDER — CINACALCET 30 MG/1
60 TABLET, FILM COATED ORAL
Qty: 180 TABLET | Refills: 3 | Status: SHIPPED | OUTPATIENT
Start: 2017-09-25 | End: 2018-06-01 | Stop reason: SDUPTHER

## 2017-09-28 ENCOUNTER — OFFICE VISIT (OUTPATIENT)
Dept: INTERNAL MEDICINE | Facility: CLINIC | Age: 82
End: 2017-09-28
Payer: MEDICARE

## 2017-09-28 VITALS
WEIGHT: 129 LBS | SYSTOLIC BLOOD PRESSURE: 187 MMHG | DIASTOLIC BLOOD PRESSURE: 87 MMHG | TEMPERATURE: 99 F | HEIGHT: 60 IN | BODY MASS INDEX: 25.32 KG/M2 | HEART RATE: 67 BPM

## 2017-09-28 DIAGNOSIS — E78.00 PURE HYPERCHOLESTEROLEMIA: ICD-10-CM

## 2017-09-28 DIAGNOSIS — E21.0 PRIMARY HYPERPARATHYROIDISM: Chronic | ICD-10-CM

## 2017-09-28 DIAGNOSIS — I10 ESSENTIAL HYPERTENSION: ICD-10-CM

## 2017-09-28 DIAGNOSIS — Z01.818 PRE-OP EVALUATION: Primary | ICD-10-CM

## 2017-09-28 DIAGNOSIS — L82.1 SEBORRHEIC KERATOSES: ICD-10-CM

## 2017-09-28 DIAGNOSIS — D69.6 THROMBOCYTOPENIA: ICD-10-CM

## 2017-09-28 DIAGNOSIS — J30.1 SEASONAL ALLERGIC RHINITIS DUE TO POLLEN, UNSPECIFIED CHRONICITY: ICD-10-CM

## 2017-09-28 PROCEDURE — 93005 ELECTROCARDIOGRAM TRACING: CPT | Mod: S$GLB,,, | Performed by: INTERNAL MEDICINE

## 2017-09-28 PROCEDURE — 3008F BODY MASS INDEX DOCD: CPT | Mod: S$GLB,,, | Performed by: INTERNAL MEDICINE

## 2017-09-28 PROCEDURE — 99999 PR PBB SHADOW E&M-EST. PATIENT-LVL III: CPT | Mod: PBBFAC,,, | Performed by: INTERNAL MEDICINE

## 2017-09-28 PROCEDURE — 99499 UNLISTED E&M SERVICE: CPT | Mod: S$GLB,,, | Performed by: INTERNAL MEDICINE

## 2017-09-28 PROCEDURE — 1159F MED LIST DOCD IN RCRD: CPT | Mod: S$GLB,,, | Performed by: INTERNAL MEDICINE

## 2017-09-28 PROCEDURE — 99214 OFFICE O/P EST MOD 30 MIN: CPT | Mod: S$GLB,,, | Performed by: INTERNAL MEDICINE

## 2017-09-28 PROCEDURE — 93010 ELECTROCARDIOGRAM REPORT: CPT | Mod: S$GLB,,, | Performed by: INTERNAL MEDICINE

## 2017-09-28 PROCEDURE — 1125F AMNT PAIN NOTED PAIN PRSNT: CPT | Mod: S$GLB,,, | Performed by: INTERNAL MEDICINE

## 2017-09-28 RX ORDER — LEVOCETIRIZINE DIHYDROCHLORIDE 5 MG/1
5 TABLET, FILM COATED ORAL NIGHTLY
Qty: 30 TABLET | Refills: 0 | Status: SHIPPED | OUTPATIENT
Start: 2017-09-28 | End: 2017-11-16

## 2017-09-28 RX ORDER — FLUTICASONE PROPIONATE 50 MCG
SPRAY, SUSPENSION (ML) NASAL
Qty: 48 G | Refills: 2 | Status: SHIPPED | OUTPATIENT
Start: 2017-09-28 | End: 2017-11-28

## 2017-09-28 RX ORDER — CLONIDINE HYDROCHLORIDE 0.1 MG/1
0.2 TABLET ORAL
Status: COMPLETED | OUTPATIENT
Start: 2017-09-28 | End: 2017-09-28

## 2017-09-28 RX ORDER — FLUTICASONE PROPIONATE 50 MCG
1 SPRAY, SUSPENSION (ML) NASAL DAILY
Qty: 16 G | Refills: 0 | Status: SHIPPED | OUTPATIENT
Start: 2017-09-28 | End: 2017-09-28 | Stop reason: SDUPTHER

## 2017-09-28 RX ORDER — DORZOLAMIDE HCL 20 MG/ML
1 SOLUTION/ DROPS OPHTHALMIC 3 TIMES DAILY
Refills: 3 | COMMUNITY
Start: 2017-08-24

## 2017-09-28 RX ORDER — NIFEDIPINE 60 MG/1
60 TABLET, EXTENDED RELEASE ORAL DAILY
Qty: 30 TABLET | Refills: 11 | Status: SHIPPED | OUTPATIENT
Start: 2017-09-28 | End: 2017-11-16 | Stop reason: SDUPTHER

## 2017-09-28 RX ADMIN — CLONIDINE HYDROCHLORIDE 0.2 MG: 0.1 TABLET ORAL at 03:09

## 2017-09-28 NOTE — Clinical Note
It looks like she might be having side effects of the sensipar.  She was supposed to follow-up with you a few weeks ago, but missed this appointment.  I am advising her to stop for the next week.  I am going to have her arrange a f/u with you.  Wanted to keep you abreast of the situation.  Houston Gaston

## 2017-09-28 NOTE — PROGRESS NOTES
Subjective:       Patient ID: Nicolasa Jurado is a 86 y.o. female.    Chief Complaint: Pre-op Exam    HPI     85 yo female here for pre-op evaluation of left eye surgery for glaucoma.    Denies CP/SOB/nausea/MI last 3 months, never diagnosed with heart failure, never diagnosed with arrhythmias, never diagnosed with valvular heart disease.    RCRI criteria: - IDDM, - CAD, - CVA, - chronic renal insufficiency, - heart failure, - high risk surgery    Functional status: she is very active. She does everything in her house. She takes care of cleaning and cooking.  She walks daily 2 blocks.  No stairs in her house.  She does garden - weeding and planting.    Bleeding risk - history of bleeding with prior surgeries - none    History of prior anesthetic complications - none    - tobacco, - EtOH, - Illicit substances    Chronic conditions/medication usage - albuterol 90 µg, Norvasc 10 mg (take), aspirin 81 mg (may hold for a week before if needed), Lipitor 80 mg (take), Sensipar 30 mrem (take), Klonopin 0.5 mg (take if needed), clonidine 0.1 mg (take if needed - patient has parameters to take medication), Cardura 2 mg (take), Toprol-XL 50 mg (take), ramipril 10 mg (take).    Chronic Steroid usage - 1 in last year    She gets a pulling sensation in her chest since taking the sensipar.  She has been on this medication for two months.  For a month, she was ok.  For the second month, she has renewed this.  She felt like she was going to pass out after she takes the pill in the morning and gets soreness in her legs.  She has been losing her hair as well.  She has been off the sensipar for the last week.  She does not feel as badly now.    HTN - Patient is currently on norvasc 10 mg, toprol XL 50 mg, altace 10 mg bid, cardura 2 mg, clonidine 0.1 mg as needed. She does check her BP at home, and it runs 140s in the am, sometimes, 124. Side effects of medications note: none. Denies blurred vision, chest pain, shortness of breath,  nausea.  She started with her headache for the last 3 days.  She thinks the sensipar is causing her headaches as well.  She is having pain in her left flank.      Review of Systems    Objective:      Physical Exam   Constitutional: She is oriented to person, place, and time. She appears well-developed and well-nourished.   HENT:   Head: Normocephalic and atraumatic.   Mouth/Throat: No oropharyngeal exudate.   Eyes: EOM are normal. Pupils are equal, round, and reactive to light. Right eye exhibits no discharge. Left eye exhibits no discharge. No scleral icterus.   Neck: Normal range of motion. Neck supple. No tracheal deviation present. No thyromegaly present.   Cardiovascular: Normal rate, regular rhythm and normal heart sounds.  Exam reveals no gallop and no friction rub.    No murmur heard.  Pulmonary/Chest: Effort normal and breath sounds normal. No respiratory distress. She has no wheezes. She has no rales. She exhibits no tenderness.   Abdominal: Soft. Bowel sounds are normal. She exhibits no distension and no mass. There is no tenderness. There is no rebound and no guarding.   Musculoskeletal: Normal range of motion. She exhibits no edema or tenderness.   Neurological: She is alert and oriented to person, place, and time.   Skin: Skin is warm and dry. No rash noted. No erythema. No pallor.   Psychiatric: She has a normal mood and affect. Her behavior is normal.   Vitals reviewed.      Assessment:       1. Pre-op evaluation    2. Primary hyperparathyroidism    3. Essential hypertension    4. Pure hypercholesterolemia    5. Thrombocytopenia    6. Seasonal allergic rhinitis due to pollen, unspecified chronicity    7. Seborrheic keratoses        Plan:       1.  Surgery is not emergent.  Patient has no active cardiac conditions.  Surgery is low risk.  From cardiac standpoint, patient proceed with planned procedure.  However, with hypertension being controlled currently, and other issues, would recommend holding off  until these are resolved.  2.  Advised patient to hold Sensipar 30 mg for now.  Set message to nephrology about potential side effects of the medication.  Advised patient to follow-up with nephrology.  3.  Continue Norvasc 10 mg, Toprol-XL 50 mg, ramipril 10 mg twice a day.  Stop Cardura.  Start nifedipine 60 mg daily.  4.  Continue Lipitor 80 mrem daily.  5.  Monitor.  6.  Flonase and Xyzal prescribed  7.  Refer to dermatology.  8.  EKG - done  9.  Check CBC, CMP.  10 DVT prevention - Recommend use of TEDs, and early ambulation if able.  Consider short term use of anti-coagulation if appropriate post-op.  11.  Chronic medications - albuterol 90 µg, Norvasc 10 mg (take), aspirin 81 mg (may hold for a week before if needed), Lipitor 80 mg (take), Sensipar 30 mrem (take), Klonopin 0.5 mg (take if needed), clonidine 0.1 mg (take if needed - patient has parameters to take medication), Cardura 2 mg (take), Toprol-XL 50 mg (take), ramipril 10 mg (take).  12. Minimize urinary catheter use.    RTC 1 week to reassess or sooner if needed

## 2017-10-04 ENCOUNTER — LAB VISIT (OUTPATIENT)
Dept: LAB | Facility: HOSPITAL | Age: 82
End: 2017-10-04
Attending: INTERNAL MEDICINE
Payer: MEDICARE

## 2017-10-04 DIAGNOSIS — Z01.818 PRE-OP EVALUATION: ICD-10-CM

## 2017-10-04 DIAGNOSIS — E83.52 HYPERCALCEMIA: ICD-10-CM

## 2017-10-04 DIAGNOSIS — E21.0 PRIMARY HYPERPARATHYROIDISM: Chronic | ICD-10-CM

## 2017-10-04 LAB
ALBUMIN SERPL BCP-MCNC: 3.8 G/DL
ALBUMIN SERPL BCP-MCNC: 3.8 G/DL
ALP SERPL-CCNC: 96 U/L
ALT SERPL W/O P-5'-P-CCNC: 20 U/L
ANION GAP SERPL CALC-SCNC: 6 MMOL/L
ANION GAP SERPL CALC-SCNC: 6 MMOL/L
AST SERPL-CCNC: 18 U/L
BASOPHILS # BLD AUTO: 0.01 K/UL
BASOPHILS NFR BLD: 0.2 %
BILIRUB SERPL-MCNC: 0.7 MG/DL
BUN SERPL-MCNC: 11 MG/DL
BUN SERPL-MCNC: 11 MG/DL
CALCIUM SERPL-MCNC: 11.5 MG/DL
CALCIUM SERPL-MCNC: 11.5 MG/DL
CHLORIDE SERPL-SCNC: 108 MMOL/L
CHLORIDE SERPL-SCNC: 108 MMOL/L
CO2 SERPL-SCNC: 26 MMOL/L
CO2 SERPL-SCNC: 26 MMOL/L
CREAT SERPL-MCNC: 0.9 MG/DL
CREAT SERPL-MCNC: 0.9 MG/DL
DIFFERENTIAL METHOD: ABNORMAL
EOSINOPHIL # BLD AUTO: 0 K/UL
EOSINOPHIL NFR BLD: 0.7 %
ERYTHROCYTE [DISTWIDTH] IN BLOOD BY AUTOMATED COUNT: 14.4 %
EST. GFR  (AFRICAN AMERICAN): >60 ML/MIN/1.73 M^2
EST. GFR  (AFRICAN AMERICAN): >60 ML/MIN/1.73 M^2
EST. GFR  (NON AFRICAN AMERICAN): 58.1 ML/MIN/1.73 M^2
EST. GFR  (NON AFRICAN AMERICAN): 58.1 ML/MIN/1.73 M^2
GLUCOSE SERPL-MCNC: 106 MG/DL
GLUCOSE SERPL-MCNC: 106 MG/DL
HCT VFR BLD AUTO: 36.8 %
HGB BLD-MCNC: 11.9 G/DL
LYMPHOCYTES # BLD AUTO: 1.4 K/UL
LYMPHOCYTES NFR BLD: 26.2 %
MCH RBC QN AUTO: 27.3 PG
MCHC RBC AUTO-ENTMCNC: 32.3 G/DL
MCV RBC AUTO: 84 FL
MONOCYTES # BLD AUTO: 0.4 K/UL
MONOCYTES NFR BLD: 7.7 %
NEUTROPHILS # BLD AUTO: 3.5 K/UL
NEUTROPHILS NFR BLD: 65 %
PHOSPHATE SERPL-MCNC: 2.3 MG/DL
PLATELET # BLD AUTO: 121 K/UL
PMV BLD AUTO: 12 FL
POTASSIUM SERPL-SCNC: 4.1 MMOL/L
POTASSIUM SERPL-SCNC: 4.1 MMOL/L
PROT SERPL-MCNC: 6.8 G/DL
RBC # BLD AUTO: 4.36 M/UL
SODIUM SERPL-SCNC: 140 MMOL/L
SODIUM SERPL-SCNC: 140 MMOL/L
WBC # BLD AUTO: 5.34 K/UL

## 2017-10-04 PROCEDURE — 84100 ASSAY OF PHOSPHORUS: CPT

## 2017-10-04 PROCEDURE — 36415 COLL VENOUS BLD VENIPUNCTURE: CPT | Mod: PO

## 2017-10-04 PROCEDURE — 85025 COMPLETE CBC W/AUTO DIFF WBC: CPT

## 2017-10-04 PROCEDURE — 80053 COMPREHEN METABOLIC PANEL: CPT

## 2017-10-05 ENCOUNTER — TELEPHONE (OUTPATIENT)
Dept: INTERNAL MEDICINE | Facility: CLINIC | Age: 82
End: 2017-10-05

## 2017-10-05 ENCOUNTER — OFFICE VISIT (OUTPATIENT)
Dept: INTERNAL MEDICINE | Facility: CLINIC | Age: 82
End: 2017-10-05
Payer: MEDICARE

## 2017-10-05 ENCOUNTER — LAB VISIT (OUTPATIENT)
Dept: LAB | Facility: HOSPITAL | Age: 82
End: 2017-10-05
Attending: INTERNAL MEDICINE
Payer: MEDICARE

## 2017-10-05 VITALS
SYSTOLIC BLOOD PRESSURE: 152 MMHG | DIASTOLIC BLOOD PRESSURE: 63 MMHG | HEART RATE: 93 BPM | WEIGHT: 130.06 LBS | OXYGEN SATURATION: 94 % | TEMPERATURE: 99 F | HEIGHT: 60 IN | BODY MASS INDEX: 25.53 KG/M2

## 2017-10-05 DIAGNOSIS — R10.11 RUQ PAIN: ICD-10-CM

## 2017-10-05 DIAGNOSIS — E83.52 HYPERCALCEMIA: ICD-10-CM

## 2017-10-05 DIAGNOSIS — K21.9 GASTROESOPHAGEAL REFLUX DISEASE, ESOPHAGITIS PRESENCE NOT SPECIFIED: ICD-10-CM

## 2017-10-05 DIAGNOSIS — E83.52 HYPERCALCEMIA: Primary | ICD-10-CM

## 2017-10-05 DIAGNOSIS — I10 ESSENTIAL HYPERTENSION: Primary | ICD-10-CM

## 2017-10-05 LAB
25(OH)D3+25(OH)D2 SERPL-MCNC: 37 NG/ML
ALBUMIN SERPL BCP-MCNC: 4 G/DL
ALP SERPL-CCNC: 102 U/L
ALT SERPL W/O P-5'-P-CCNC: 22 U/L
AMYLASE SERPL-CCNC: 62 U/L
ANION GAP SERPL CALC-SCNC: 5 MMOL/L
AST SERPL-CCNC: 22 U/L
BILIRUB SERPL-MCNC: 0.7 MG/DL
BUN SERPL-MCNC: 11 MG/DL
CALCIUM SERPL-MCNC: 11.5 MG/DL
CHLORIDE SERPL-SCNC: 104 MMOL/L
CO2 SERPL-SCNC: 28 MMOL/L
CREAT SERPL-MCNC: 0.9 MG/DL
EST. GFR  (AFRICAN AMERICAN): >60 ML/MIN/1.73 M^2
EST. GFR  (NON AFRICAN AMERICAN): 58.1 ML/MIN/1.73 M^2
GLUCOSE SERPL-MCNC: 110 MG/DL
LIPASE SERPL-CCNC: 27 U/L
PHOSPHATE SERPL-MCNC: 2.7 MG/DL
POTASSIUM SERPL-SCNC: 4 MMOL/L
PROT SERPL-MCNC: 7.4 G/DL
PTH-INTACT SERPL-MCNC: 169 PG/ML
SODIUM SERPL-SCNC: 137 MMOL/L

## 2017-10-05 PROCEDURE — 83690 ASSAY OF LIPASE: CPT

## 2017-10-05 PROCEDURE — 82150 ASSAY OF AMYLASE: CPT

## 2017-10-05 PROCEDURE — 84165 PROTEIN E-PHORESIS SERUM: CPT | Mod: 26,,, | Performed by: PATHOLOGY

## 2017-10-05 PROCEDURE — 83970 ASSAY OF PARATHORMONE: CPT

## 2017-10-05 PROCEDURE — 84165 PROTEIN E-PHORESIS SERUM: CPT

## 2017-10-05 PROCEDURE — 84100 ASSAY OF PHOSPHORUS: CPT

## 2017-10-05 PROCEDURE — 80053 COMPREHEN METABOLIC PANEL: CPT

## 2017-10-05 PROCEDURE — 99214 OFFICE O/P EST MOD 30 MIN: CPT | Mod: S$GLB,,, | Performed by: INTERNAL MEDICINE

## 2017-10-05 PROCEDURE — 82397 CHEMILUMINESCENT ASSAY: CPT

## 2017-10-05 PROCEDURE — 99999 PR PBB SHADOW E&M-EST. PATIENT-LVL V: CPT | Mod: PBBFAC,,, | Performed by: INTERNAL MEDICINE

## 2017-10-05 PROCEDURE — 99499 UNLISTED E&M SERVICE: CPT | Mod: S$GLB,,, | Performed by: INTERNAL MEDICINE

## 2017-10-05 PROCEDURE — 82306 VITAMIN D 25 HYDROXY: CPT

## 2017-10-05 NOTE — TELEPHONE ENCOUNTER
Blood counts, electrolytes, kidney/liver function are normal.  Calcium is elevated.  Need to workup further.

## 2017-10-05 NOTE — PROGRESS NOTES
Subjective:       Patient ID: Nicolasa Jurado is a 86 y.o. female.    Chief Complaint: Follow-up    HPI     86-year-old female here for one-week follow-up.  HTN - Patient is currently on Norvasc 10 mg, Toprol-XL 50 mrem, ramipril 10 mg twice a day, nifedipine 60 mg. She does check her BP at home, and it runs 143/57 this am, 150/70 last night. Side effects of medications note: none. Denies headaches, blurred vision, chest pain, shortness of breath, nausea.  She is feeling better since stopping the cardura.  Cholesterol   Date Value Ref Range Status   01/11/2017 111 (L) 120 - 199 mg/dL Final     Comment:     The National Cholesterol Education Program (NCEP) has set the  following guidelines (reference ranges) for Cholesterol:  Optimal.....................<200 mg/dL  Borderline High.............200-239 mg/dL  High........................> or = 240 mg/dL       Triglycerides   Date Value Ref Range Status   01/11/2017 94 30 - 150 mg/dL Final     Comment:     The National Cholesterol Education Program (NCEP) has set the  following guidelines (reference values) for triglycerides:  Normal......................<150 mg/dL  Borderline High.............150-199 mg/dL  High........................200-499 mg/dL       HDL   Date Value Ref Range Status   01/11/2017 41 40 - 75 mg/dL Final     Comment:     The National Cholesterol Education Program (NCEP) has set the  following guidelines (reference values) for HDL Cholesterol:  Low...............<40 mg/dL  Optimal...........>60 mg/dL       LDL Cholesterol   Date Value Ref Range Status   01/11/2017 51.2 (L) 63.0 - 159.0 mg/dL Final     Comment:     The National Cholesterol Education Program (NCEP) has set the  following guidelines (reference values) for LDL Cholesterol:  Optimal.......................<130 mg/dL  Borderline High...............130-159 mg/dL  High..........................160-189 mg/dL  Very High.....................>190 mg/dL       She is off the sensipar.  I  communicated with nephrology about the sensipar, which they think is not responsible for the side effects she described.    When she eats fast, she has a RUQ burning pain and epigastric pain as well.  This has been going on the last three days.  She had ground meat, black beans, and rice.  This caused bad pain.  She took an antacid.  This helped her significantly.  She has reflux all the time    She is getting ready to have glaucoma surgery.    Review of Systems    Objective:      Physical Exam   Constitutional: She is oriented to person, place, and time. She appears well-developed and well-nourished.   HENT:   Head: Normocephalic and atraumatic.   Mouth/Throat: No oropharyngeal exudate.   Eyes: EOM are normal. Pupils are equal, round, and reactive to light. Right eye exhibits no discharge. Left eye exhibits no discharge. No scleral icterus.   Neck: Normal range of motion. Neck supple. No tracheal deviation present. No thyromegaly present.   Cardiovascular: Normal rate, regular rhythm and normal heart sounds.  Exam reveals no gallop and no friction rub.    No murmur heard.  Pulmonary/Chest: Effort normal and breath sounds normal. No respiratory distress. She has no wheezes. She has no rales. She exhibits no tenderness.   Abdominal: Soft. Bowel sounds are normal. She exhibits no distension and no mass. There is no tenderness. There is no rebound and no guarding.   Musculoskeletal: Normal range of motion. She exhibits no edema or tenderness.   Neurological: She is alert and oriented to person, place, and time.   Skin: Skin is warm and dry. No rash noted. No erythema. No pallor.   Psychiatric: She has a normal mood and affect. Her behavior is normal.   Vitals reviewed.      Assessment:       1. Essential hypertension    2. RUQ pain    3. Gastroesophageal reflux disease, esophagitis presence not specified        Plan:       1.  Continue Norvasc 10 mg, Toprol-XL 50 mrem, ramipril 10 mg twice a day, nifedipine 60 mg  2.   Check amylase, lipase, right upper quadrant ultrasound.  3.  Zantac provided to take as needed.    Patient may proceed with planned procedure.

## 2017-10-06 ENCOUNTER — HOSPITAL ENCOUNTER (OUTPATIENT)
Dept: RADIOLOGY | Facility: HOSPITAL | Age: 82
Discharge: HOME | End: 2017-10-06
Attending: INTERNAL MEDICINE
Payer: MEDICARE

## 2017-10-06 DIAGNOSIS — R10.11 RUQ PAIN: ICD-10-CM

## 2017-10-06 LAB
ALBUMIN SERPL ELPH-MCNC: 4.21 G/DL
ALPHA1 GLOB SERPL ELPH-MCNC: 0.31 G/DL
ALPHA2 GLOB SERPL ELPH-MCNC: 0.73 G/DL
B-GLOBULIN SERPL ELPH-MCNC: 0.81 G/DL
GAMMA GLOB SERPL ELPH-MCNC: 0.94 G/DL
PATHOLOGIST INTERPRETATION SPE: NORMAL
PROT SERPL-MCNC: 7 G/DL

## 2017-10-06 PROCEDURE — 76705 ECHO EXAM OF ABDOMEN: CPT | Mod: 26,,, | Performed by: RADIOLOGY

## 2017-10-06 PROCEDURE — 76705 ECHO EXAM OF ABDOMEN: CPT | Mod: TC

## 2017-10-10 LAB — PTH RELATED PROT SERPL-SCNC: 0.3 PMOL/L

## 2017-11-02 ENCOUNTER — LAB VISIT (OUTPATIENT)
Dept: LAB | Facility: HOSPITAL | Age: 82
End: 2017-11-02
Attending: INTERNAL MEDICINE
Payer: MEDICARE

## 2017-11-02 DIAGNOSIS — E78.00 PURE HYPERCHOLESTEROLEMIA: ICD-10-CM

## 2017-11-02 DIAGNOSIS — I10 ESSENTIAL HYPERTENSION: ICD-10-CM

## 2017-11-02 DIAGNOSIS — I25.10 CORONARY ARTERY DISEASE INVOLVING NATIVE CORONARY ARTERY OF NATIVE HEART WITHOUT ANGINA PECTORIS: ICD-10-CM

## 2017-11-02 LAB
ALT SERPL W/O P-5'-P-CCNC: 22 U/L
ANION GAP SERPL CALC-SCNC: 5 MMOL/L
AST SERPL-CCNC: 20 U/L
BUN SERPL-MCNC: 12 MG/DL
CALCIUM SERPL-MCNC: 10.1 MG/DL
CHLORIDE SERPL-SCNC: 107 MMOL/L
CHOLEST SERPL-MCNC: 123 MG/DL
CHOLEST/HDLC SERPL: 3.5 {RATIO}
CO2 SERPL-SCNC: 29 MMOL/L
CREAT SERPL-MCNC: 0.8 MG/DL
EST. GFR  (AFRICAN AMERICAN): >60 ML/MIN/1.73 M^2
EST. GFR  (NON AFRICAN AMERICAN): >60 ML/MIN/1.73 M^2
GLUCOSE SERPL-MCNC: 110 MG/DL
HDLC SERPL-MCNC: 35 MG/DL
HDLC SERPL: 28.5 %
LDLC SERPL CALC-MCNC: 64 MG/DL
NONHDLC SERPL-MCNC: 88 MG/DL
POTASSIUM SERPL-SCNC: 3.9 MMOL/L
SODIUM SERPL-SCNC: 141 MMOL/L
TRIGL SERPL-MCNC: 120 MG/DL

## 2017-11-02 PROCEDURE — 80061 LIPID PANEL: CPT

## 2017-11-02 PROCEDURE — 36415 COLL VENOUS BLD VENIPUNCTURE: CPT | Mod: PO

## 2017-11-02 PROCEDURE — 80048 BASIC METABOLIC PNL TOTAL CA: CPT

## 2017-11-02 PROCEDURE — 84450 TRANSFERASE (AST) (SGOT): CPT

## 2017-11-02 PROCEDURE — 84460 ALANINE AMINO (ALT) (SGPT): CPT

## 2017-11-03 DIAGNOSIS — N18.2 CKD (CHRONIC KIDNEY DISEASE) STAGE 2, GFR 60-89 ML/MIN: Primary | ICD-10-CM

## 2017-11-03 DIAGNOSIS — E83.52 HYPERCALCEMIA: ICD-10-CM

## 2017-11-03 NOTE — PROGRESS NOTES
Patient missed October 9 appointment.  Repeat serum calcium 11/2/17 is 10.1.  Repeat RFP and ionized calcium and PTH in 8 weeks and reschedule patient for January.

## 2017-11-16 ENCOUNTER — OFFICE VISIT (OUTPATIENT)
Dept: CARDIOLOGY | Facility: CLINIC | Age: 82
End: 2017-11-16
Payer: MEDICARE

## 2017-11-16 VITALS
SYSTOLIC BLOOD PRESSURE: 221 MMHG | DIASTOLIC BLOOD PRESSURE: 98 MMHG | WEIGHT: 125.13 LBS | BODY MASS INDEX: 24.57 KG/M2 | HEIGHT: 60 IN | HEART RATE: 109 BPM

## 2017-11-16 DIAGNOSIS — I70.1 RENAL ARTERY STENOSIS: Chronic | ICD-10-CM

## 2017-11-16 DIAGNOSIS — E78.00 PURE HYPERCHOLESTEROLEMIA: ICD-10-CM

## 2017-11-16 DIAGNOSIS — R69 DIAGNOSIS DEFERRED: ICD-10-CM

## 2017-11-16 DIAGNOSIS — I10 ESSENTIAL HYPERTENSION: ICD-10-CM

## 2017-11-16 DIAGNOSIS — I25.10 CORONARY ARTERY DISEASE INVOLVING NATIVE CORONARY ARTERY OF NATIVE HEART WITHOUT ANGINA PECTORIS: Primary | ICD-10-CM

## 2017-11-16 PROCEDURE — 99213 OFFICE O/P EST LOW 20 MIN: CPT | Mod: S$GLB,,, | Performed by: INTERNAL MEDICINE

## 2017-11-16 PROCEDURE — 99499 UNLISTED E&M SERVICE: CPT | Mod: S$GLB,,, | Performed by: INTERNAL MEDICINE

## 2017-11-16 PROCEDURE — 99999 PR PBB SHADOW E&M-EST. PATIENT-LVL III: CPT | Mod: PBBFAC,,, | Performed by: INTERNAL MEDICINE

## 2017-11-16 PROCEDURE — 93000 ELECTROCARDIOGRAM COMPLETE: CPT | Mod: S$GLB,,, | Performed by: INTERNAL MEDICINE

## 2017-11-16 RX ORDER — METOPROLOL SUCCINATE 100 MG/1
100 TABLET, EXTENDED RELEASE ORAL DAILY
COMMUNITY
End: 2018-08-13 | Stop reason: SDUPTHER

## 2017-11-16 NOTE — PROGRESS NOTES
Subjective:   Patient ID:  Nicolasa Jurado is a 86 y.o. female who presents for follow-up of Coronary Artery Disease and Hypertension      Problem List:  Labile hypertension    CAD    -LCX coated stent 08/14/2003    Diastolic heart failure    SANA s/p stent on the right side  Hypercholesterolemia    Hypercalcemia and primary hyperparathyroidism    Asthma    HPI:   Nicolasa Jurado' BPs have fluctuated. She woke up one night and felt disoriented. BP was 96/44 mm Hg   She is taking amlodipine and nifedipine.      Review of Systems   Constitution: Positive for malaise/fatigue. Negative for weight gain and weight loss.   Cardiovascular: Positive for chest pain (when BP is elevated) and dyspnea on exertion. Negative for leg swelling and palpitations.   Hematologic/Lymphatic: Does not bruise/bleed easily.   Musculoskeletal: Positive for arthritis. Negative for muscle cramps.   Gastrointestinal: Negative for abdominal pain and melena.   Genitourinary: Positive for nocturia.   Psychiatric/Behavioral: The patient is nervous/anxious.        Current Outpatient Prescriptions   Medication Sig    albuterol (PROVENTIL) 2.5 mg /3 mL (0.083 %) nebulizer solution 1 VIAL PER NEBULIZER EVERY 4 TO 6 HOURS AS NEEDED    albuterol 90 mcg/actuation inhaler Inhale 2 puffs into the lungs every 6 (six) hours as needed for Wheezing.    amlodipine (NORVASC) 10 MG tablet TAKE 1 TABLET BY MOUTH DAILY    aspirin 81 mg Tab Take 81 mg by mouth every other day.     atorvastatin (LIPITOR) 80 MG tablet Take 1 tablet (80 mg total) by mouth once daily.    brimonidine 0.2% (ALPHAGAN) 0.2 % Drop Place 1 drop into both eyes 3 (three) times daily.     cinacalcet (SENSIPAR) 30 MG Tab Take 2 tablets (60 mg total) by mouth daily with breakfast.    clonazePAM (KLONOPIN) 0.5 MG tablet TAKE 1 TABLET BY MOUTH TWICE DAILY AS NEEDED FOR ANXIETY    cloNIDine (CATAPRES) 0.1 MG tablet Take 1 tablet (0.1 mg total) by mouth daily as needed (for SBP >200 mm Hg).     dorzolamide (TRUSOPT) 2 % ophthalmic solution Place 1 drop into both eyes 3 (three) times daily.     fluticasone (FLONASE) 50 mcg/actuation nasal spray SHAKE LIQUID AND USE 1 SPRAY IN EACH NOSTRIL EVERY DAY    furosemide (LASIX) 20 MG tablet TAKE 1 TABLET BY MOUTH ONCE DAILY    latanoprost 0.005 % ophthalmic solution 1 drop every evening.    metoprolol succinate (TOPROL-XL) 100 MG 24 hr tablet Take 100 mg by mouth once daily. Pt taking 1/2 pill once a day.    nifedipine (ADALAT CC) 60 MG TbSR Take 1 tablet (60 mg total) by mouth once daily.    nitroGLYCERIN (NITROSTAT) 0.4 MG SL tablet Place 0.4 mg under the tongue. 1 Tablet, Sublingual Sublingual .  One tablet under tounge as needed for chest pain.    omega-3 fatty acids 1,000 mg Cap     ramipril (ALTACE) 10 MG capsule TAKE 1 CAPSULE BY MOUTH TWICE DAILY    ranitidine (ZANTAC) 150 MG tablet Take 1 tablet (150 mg total) by mouth 2 (two) times daily. PRN heartburn    vit C-vit E-copper-ZnOx-lutein (PRESERVISION) 226-200-5 mg-unit-mg Cap Take by mouth. 2 Capsule Oral Every day    gabapentin (NEURONTIN) 100 MG capsule Take 1 capsule (100 mg total) by mouth every evening.         Social History   Substance Use Topics    Smoking status: Never Smoker    Smokeless tobacco: Never Used    Alcohol use No         Objective:     Physical Exam   Constitutional: She is oriented to person, place, and time. She appears well-developed and well-nourished.   BP (!) 221/98   Pulse 109   Ht 5' (1.524 m)   Wt 56.8 kg (125 lb 1.8 oz)   BMI 24.43 kg/m²      Neck: No JVD present.   Cardiovascular: Regular rhythm.  Tachycardia present.    No murmur heard.  Pulses:       Radial pulses are 2+ on the right side, and 2+ on the left side.        Dorsalis pedis pulses are 2+ on the right side, and 2+ on the left side.   Pulmonary/Chest: She has no decreased breath sounds. She has no wheezes. She has no rales. Chest wall is not dull to percussion.   Abdominal: Soft. Normal  appearance. There is no splenomegaly or hepatomegaly. There is no tenderness.   Musculoskeletal:        Right lower leg: She exhibits no edema.        Left lower leg: She exhibits no edema.   Neurological: She is alert and oriented to person, place, and time.   Skin: Skin is warm. No bruising noted. Nails show no clubbing.   Psychiatric: Her speech is normal and behavior is normal. Cognition and memory are normal.           Lab Results   Component Value Date    CHOL 123 11/02/2017    HDL 35 (L) 11/02/2017    LDLCALC 64.0 11/02/2017    TRIG 120 11/02/2017    CHOLHDL 28.5 11/02/2017     Lab Results   Component Value Date     11/02/2017    CREATININE 0.8 11/02/2017    BUN 12 11/02/2017     11/02/2017    K 3.9 11/02/2017     11/02/2017    CO2 29 11/02/2017     Lab Results   Component Value Date    ALT 22 11/02/2017    AST 20 11/02/2017    ALKPHOS 102 10/05/2017    BILITOT 0.7 10/05/2017       ECG today reviewed by me. It reveals sinus rhythm with no ST or T abnormality.      Assessment and Plan:     1. Coronary artery disease involving native coronary artery of native heart without angina pectoris    2. Essential hypertension    3. Pure hypercholesterolemia    4. Renal artery stenosis             Coronary artery disease involving native coronary artery of native heart without angina pectoris    Essential hypertension    Pure hypercholesterolemia    Renal artery stenosis    Other orders  -     IN OFFICE EKG 12-LEAD (to Muse); Future

## 2017-11-16 NOTE — PATIENT INSTRUCTIONS
Ramipril twice a day  Amlodipine in the morning  Metoprolol (half tablet) at night  Do not take nifedipine; it is the same medicine as amlodipine

## 2017-11-28 RX ORDER — FLUTICASONE PROPIONATE 50 MCG
SPRAY, SUSPENSION (ML) NASAL
Qty: 16 G | Refills: 4 | Status: SHIPPED | OUTPATIENT
Start: 2017-11-28 | End: 2018-01-08 | Stop reason: SDUPTHER

## 2018-01-08 ENCOUNTER — OFFICE VISIT (OUTPATIENT)
Dept: INTERNAL MEDICINE | Facility: CLINIC | Age: 83
End: 2018-01-08
Payer: MEDICARE

## 2018-01-08 ENCOUNTER — LAB VISIT (OUTPATIENT)
Dept: LAB | Facility: HOSPITAL | Age: 83
End: 2018-01-08
Attending: INTERNAL MEDICINE
Payer: MEDICARE

## 2018-01-08 VITALS
WEIGHT: 129.63 LBS | SYSTOLIC BLOOD PRESSURE: 214 MMHG | DIASTOLIC BLOOD PRESSURE: 71 MMHG | HEIGHT: 60 IN | TEMPERATURE: 99 F | BODY MASS INDEX: 25.45 KG/M2 | HEART RATE: 58 BPM

## 2018-01-08 DIAGNOSIS — E83.52 HYPERCALCEMIA: ICD-10-CM

## 2018-01-08 DIAGNOSIS — M25.512 ACUTE PAIN OF LEFT SHOULDER: ICD-10-CM

## 2018-01-08 DIAGNOSIS — N18.2 CKD (CHRONIC KIDNEY DISEASE) STAGE 2, GFR 60-89 ML/MIN: ICD-10-CM

## 2018-01-08 DIAGNOSIS — J32.9 BACTERIAL SINUSITIS: Primary | ICD-10-CM

## 2018-01-08 DIAGNOSIS — B96.89 BACTERIAL SINUSITIS: Primary | ICD-10-CM

## 2018-01-08 LAB
ALBUMIN SERPL BCP-MCNC: 3.8 G/DL
ANION GAP SERPL CALC-SCNC: 5 MMOL/L
BUN SERPL-MCNC: 15 MG/DL
CA-I BLDV-SCNC: 1.32 MMOL/L
CALCIUM SERPL-MCNC: 9.7 MG/DL
CHLORIDE SERPL-SCNC: 104 MMOL/L
CO2 SERPL-SCNC: 31 MMOL/L
CREAT SERPL-MCNC: 0.8 MG/DL
EST. GFR  (AFRICAN AMERICAN): >60 ML/MIN/1.73 M^2
EST. GFR  (NON AFRICAN AMERICAN): >60 ML/MIN/1.73 M^2
GLUCOSE SERPL-MCNC: 93 MG/DL
PHOSPHATE SERPL-MCNC: 3.7 MG/DL
POTASSIUM SERPL-SCNC: 4.5 MMOL/L
PTH-INTACT SERPL-MCNC: 86 PG/ML
SODIUM SERPL-SCNC: 140 MMOL/L

## 2018-01-08 PROCEDURE — 83970 ASSAY OF PARATHORMONE: CPT

## 2018-01-08 PROCEDURE — 80069 RENAL FUNCTION PANEL: CPT

## 2018-01-08 PROCEDURE — 99214 OFFICE O/P EST MOD 30 MIN: CPT | Mod: S$GLB,,, | Performed by: INTERNAL MEDICINE

## 2018-01-08 PROCEDURE — 99999 PR PBB SHADOW E&M-EST. PATIENT-LVL III: CPT | Mod: PBBFAC,,, | Performed by: INTERNAL MEDICINE

## 2018-01-08 PROCEDURE — 36415 COLL VENOUS BLD VENIPUNCTURE: CPT | Mod: PO

## 2018-01-08 PROCEDURE — 82330 ASSAY OF CALCIUM: CPT

## 2018-01-08 RX ORDER — FLUTICASONE PROPIONATE 50 MCG
SPRAY, SUSPENSION (ML) NASAL
Qty: 16 G | Refills: 4 | Status: SHIPPED | OUTPATIENT
Start: 2018-01-08 | End: 2019-07-05

## 2018-01-08 RX ORDER — TIZANIDINE 2 MG/1
TABLET ORAL
Qty: 270 TABLET | Refills: 0 | Status: SHIPPED | OUTPATIENT
Start: 2018-01-08 | End: 2018-04-14 | Stop reason: SDUPTHER

## 2018-01-08 RX ORDER — TIZANIDINE HYDROCHLORIDE 2 MG/1
1 CAPSULE, GELATIN COATED ORAL 3 TIMES DAILY PRN
Qty: 30 CAPSULE | Refills: 0 | Status: SHIPPED | OUTPATIENT
Start: 2018-01-08 | End: 2018-01-08 | Stop reason: SDUPTHER

## 2018-01-08 RX ORDER — DOXYCYCLINE 100 MG/1
100 CAPSULE ORAL 2 TIMES DAILY
Qty: 14 CAPSULE | Refills: 0 | Status: SHIPPED | OUTPATIENT
Start: 2018-01-08 | End: 2018-01-15

## 2018-01-08 RX ORDER — DICLOFENAC SODIUM 30 MG/G
GEL TOPICAL 2 TIMES DAILY
Qty: 100 G | Refills: 3 | Status: SHIPPED | OUTPATIENT
Start: 2018-01-08 | End: 2018-08-10 | Stop reason: SDUPTHER

## 2018-01-08 NOTE — PROGRESS NOTES
Subjective:       Patient ID: Nicolasa Jurado is a 87 y.o. female.    Chief Complaint: Back Pain; Cough; and Nasal Congestion    HPI     87-year-old female here for evaluation of nasal congestion, cough, back pain.  She has been sneezing and eye itching for two weeks.  She has had horrible congestion with her nose.  Saturday, she started with pain in her left shoulder/back.  When she pushes, she feels the pain.  She has trouble turning her head to the left.  No fevers or chills.  She has sinus pressure and congestion.  No chest congestion.  No SOB.  She has tried ASA for the pain.  She did cover her plants before the freeze.    Review of Systems    Objective:      Physical Exam   Constitutional: She is oriented to person, place, and time. She appears well-developed and well-nourished.   HENT:   Head: Normocephalic and atraumatic.   Mouth/Throat: No oropharyngeal exudate.   Eyes: EOM are normal. Pupils are equal, round, and reactive to light. Right eye exhibits no discharge. Left eye exhibits no discharge. No scleral icterus.   Neck: Normal range of motion. Neck supple. No tracheal deviation present. No thyromegaly present.   Cardiovascular: Normal rate, regular rhythm and normal heart sounds.  Exam reveals no gallop and no friction rub.    No murmur heard.  Pulmonary/Chest: Effort normal and breath sounds normal. No respiratory distress. She has no wheezes. She has no rales. She exhibits no tenderness.   Abdominal: Soft. Bowel sounds are normal. She exhibits no distension and no mass. There is no tenderness. There is no rebound and no guarding.   Musculoskeletal: She exhibits no edema.        Right shoulder: Normal.        Left shoulder: She exhibits decreased range of motion and tenderness.        Arms:  Neurological: She is alert and oriented to person, place, and time.   Skin: Skin is warm and dry. No rash noted. No erythema. No pallor.   Psychiatric: She has a normal mood and affect. Her behavior is normal.    Vitals reviewed.      Assessment:       1. Bacterial sinusitis    2. Acute pain of left shoulder        Plan:       1.  Doxycycline 100 mg twice a day ×7 days, Flonase.  2.  Likely muscular skeletal.  Diclofenac gel prescribed.  Zanaflex prescribed as well.

## 2018-01-14 RX ORDER — AMLODIPINE BESYLATE 10 MG/1
TABLET ORAL
Qty: 30 TABLET | Refills: 11 | Status: SHIPPED | OUTPATIENT
Start: 2018-01-14 | End: 2019-02-28 | Stop reason: SDUPTHER

## 2018-01-15 RX ORDER — RAMIPRIL 10 MG/1
CAPSULE ORAL
Qty: 180 CAPSULE | Refills: 3 | Status: SHIPPED | OUTPATIENT
Start: 2018-01-15 | End: 2019-02-01

## 2018-01-15 RX ORDER — CLONAZEPAM 0.5 MG/1
TABLET ORAL
Qty: 30 TABLET | Refills: 2 | Status: SHIPPED | OUTPATIENT
Start: 2018-01-15 | End: 2018-08-31 | Stop reason: SDUPTHER

## 2018-02-11 RX ORDER — METOPROLOL SUCCINATE 100 MG/1
TABLET, EXTENDED RELEASE ORAL
Qty: 30 TABLET | Refills: 11 | Status: SHIPPED | OUTPATIENT
Start: 2018-02-11 | End: 2018-08-13

## 2018-03-07 ENCOUNTER — OFFICE VISIT (OUTPATIENT)
Dept: NEPHROLOGY | Facility: CLINIC | Age: 83
End: 2018-03-07
Payer: MEDICARE

## 2018-03-07 ENCOUNTER — HOSPITAL ENCOUNTER (OUTPATIENT)
Dept: RADIOLOGY | Facility: HOSPITAL | Age: 83
Discharge: HOME | End: 2018-03-07
Attending: INTERNAL MEDICINE
Payer: MEDICARE

## 2018-03-07 VITALS
HEART RATE: 69 BPM | OXYGEN SATURATION: 97 % | WEIGHT: 126.75 LBS | SYSTOLIC BLOOD PRESSURE: 200 MMHG | BODY MASS INDEX: 24.88 KG/M2 | DIASTOLIC BLOOD PRESSURE: 80 MMHG | HEIGHT: 60 IN

## 2018-03-07 DIAGNOSIS — N18.2 CKD (CHRONIC KIDNEY DISEASE) STAGE 2, GFR 60-89 ML/MIN: ICD-10-CM

## 2018-03-07 DIAGNOSIS — R05.9 COUGH: ICD-10-CM

## 2018-03-07 DIAGNOSIS — E83.52 HYPERCALCEMIA: Primary | ICD-10-CM

## 2018-03-07 DIAGNOSIS — I10 HYPERTENSION, UNSPECIFIED TYPE: ICD-10-CM

## 2018-03-07 DIAGNOSIS — E83.52 HYPERCALCEMIA: ICD-10-CM

## 2018-03-07 PROCEDURE — 99499 UNLISTED E&M SERVICE: CPT | Mod: S$GLB,,, | Performed by: INTERNAL MEDICINE

## 2018-03-07 PROCEDURE — 71046 X-RAY EXAM CHEST 2 VIEWS: CPT | Mod: TC

## 2018-03-07 PROCEDURE — 99213 OFFICE O/P EST LOW 20 MIN: CPT | Mod: S$GLB,,, | Performed by: INTERNAL MEDICINE

## 2018-03-07 PROCEDURE — 71046 X-RAY EXAM CHEST 2 VIEWS: CPT | Mod: 26,,, | Performed by: RADIOLOGY

## 2018-03-07 PROCEDURE — 99999 PR PBB SHADOW E&M-EST. PATIENT-LVL III: CPT | Mod: PBBFAC,,, | Performed by: INTERNAL MEDICINE

## 2018-03-07 NOTE — PROGRESS NOTES
Subjective:       Patient ID: Nicolasa Jurado is a 87 y.o. White female who presents for new evaluation of renal function.She speaks both Ecuadorean and English and dose not want an .    Interval Hx:   She is taking sensipar 30 mg daily and ca 9.7 pth 86 ionized calcium 1.3   /80 her on repeat she is taking both clonidine and metoprolol prn   She has been having a cough and wheeze for 3 weeks, nofever no phlegm      HPI  85 yo HF with HTN and primary hyperparathyroidism with elevated serum calcium. 11.5-12.5 recently and ionized calcium of 1.56.  Since at least 2011 her calcium as been as high as 11.   recently. Serum crt 0.8,  UA no protein or hematuria but 1 rbc and wbc. Jadyn has fluctuating Bp and takes medication based on BP.  Yesterday she did not take her medicine because her bp was 115-130 systolic. and today she has not taken amlodipine. She is on RAAS blockade.   No UTIs or nephrolithaisis.  no imagine of kidneys available. Nava urinates well and is on lasix. It is unclear if she is on this for h/o peripheral edema, cardiac disease, or because of elevated serum calcium.    Review of Systems   Constitutional: Negative for appetite change, chills, fatigue, fever and unexpected weight change.   HENT: Negative for congestion, facial swelling, hearing loss, nosebleeds and trouble swallowing.    Eyes: Negative for pain, discharge, redness and visual disturbance.   Respiratory: Negative for cough, chest tightness, shortness of breath and wheezing.    Cardiovascular: Negative for chest pain, palpitations and leg swelling.   Gastrointestinal: Negative for abdominal pain, constipation, diarrhea, nausea and vomiting.   Endocrine: Negative for cold intolerance, heat intolerance and polydipsia.   Genitourinary: Negative for decreased urine volume, difficulty urinating, dysuria, flank pain, hematuria and urgency.   Musculoskeletal: Negative for arthralgias, back pain, joint swelling and myalgias.    Skin: Negative for color change, pallor, rash and wound.   Neurological: Negative for dizziness, tremors, seizures, syncope, speech difficulty, weakness and headaches.   Hematological: Negative for adenopathy. Does not bruise/bleed easily.   Psychiatric/Behavioral: Negative for agitation, behavioral problems, dysphoric mood, self-injury and sleep disturbance.       Objective:     Blood pressure (!) 200/80, pulse 69, height 5' (1.524 m), weight 57.5 kg (126 lb 12.2 oz), SpO2 97 %.    Physical Exam   Constitutional: She is oriented to person, place, and time. She appears well-developed and well-nourished. No distress.   NAD   HENT:   Head: Normocephalic and atraumatic.   Mouth/Throat: No oropharyngeal exudate.   Eyes: Conjunctivae and EOM are normal. Pupils are equal, round, and reactive to light. Right eye exhibits no discharge. Left eye exhibits no discharge. No scleral icterus.   Neck: Normal range of motion. Neck supple. No JVD present. No tracheal deviation present. No thyromegaly present.   Cardiovascular: Normal rate, regular rhythm and intact distal pulses.  Exam reveals no gallop.    No murmur heard.  2/6 socrates no peripheral edema   Pulmonary/Chest: Effort normal and breath sounds normal. No stridor. No respiratory distress. She has no wheezes. She has no rales. She exhibits no tenderness.   No wheeze,or rhonchi   Abdominal: Soft. Bowel sounds are normal. She exhibits no distension and no mass. There is no tenderness. There is no rebound and no guarding. No hernia.   Musculoskeletal: She exhibits no edema or tenderness.   Lymphadenopathy:     She has no cervical adenopathy.   Neurological: She is alert and oriented to person, place, and time. She exhibits normal muscle tone.   Skin: Skin is warm and dry. No rash noted. She is not diaphoretic. No erythema.   Psychiatric: She has a normal mood and affect. Judgment and thought content normal.   Nursing note and vitals reviewed.      Assessment:       1.  Hypercalcemia    2. CKD (chronic kidney disease) stage 2, GFR 60-89 ml/min    3. Hypertension, unspecified type        Plan:    87 yo HF with HTN hypercalcemia of at least 5 years now with worsening hypercalcemia, elevated PTH , PTHrp 0.4, on vit d, with normal renal function. Probable primary hyperparathyroid, will check U calcium excretion as well.    Hypercalemia:  Will check serologies including SPEP and immunofixation and r PTH for malignancy : all negative   Improved with  sensipar 30 mg daily and follow serum calcium , ionized calcium and PTH. Stop vit d , > 2 L po fluid intake daily,      HTN: may in part be due to hypercalcemia, and patient may have h/o SANA: suggest renal US but with normal renal function would treat medically and try to regulate consistent medication schedule so that patient takes them routinely.   Off lasix   take metoprolol 100 mg tab 1/2 tab daily   continue ramipril 10 bid  Clonidine prn for now but long term would prefer consistent drug therapy      wheeze and cough please f/u w pcp  cxr  To eval for chf

## 2018-03-07 NOTE — PATIENT INSTRUCTIONS
cxr in next 2 days   labs and urine  In 4 mo   take bp twice daily for 2 weeks and turn in results to me    Goal < 150/90     take metoprolol 1/2 tab everyday please     see PCP about cough asap  rtc 4 mo

## 2018-03-08 ENCOUNTER — TELEPHONE (OUTPATIENT)
Dept: NEPHROLOGY | Facility: CLINIC | Age: 83
End: 2018-03-08

## 2018-03-08 NOTE — TELEPHONE ENCOUNTER
----- Message from Nereyda Lange MD sent at 3/8/2018  8:27 AM CST -----  Please callpatient and let them know the cxr is clear, but they still need to followup with her PCP for the cough and asthma

## 2018-03-16 ENCOUNTER — DOCUMENTATION ONLY (OUTPATIENT)
Dept: NEPHROLOGY | Facility: CLINIC | Age: 83
End: 2018-03-16

## 2018-03-16 DIAGNOSIS — I10 ESSENTIAL HYPERTENSION: Primary | ICD-10-CM

## 2018-03-16 DIAGNOSIS — I10 ESSENTIAL HYPERTENSION: ICD-10-CM

## 2018-03-16 DIAGNOSIS — I70.1 RENAL ARTERY STENOSIS: Chronic | ICD-10-CM

## 2018-03-16 DIAGNOSIS — N18.2 CKD (CHRONIC KIDNEY DISEASE) STAGE 2, GFR 60-89 ML/MIN: Primary | ICD-10-CM

## 2018-03-16 RX ORDER — HYDRALAZINE HYDROCHLORIDE 25 MG/1
25 TABLET, FILM COATED ORAL EVERY 12 HOURS
Qty: 180 TABLET | Refills: 3 | Status: SHIPPED | OUTPATIENT
Start: 2018-03-16 | End: 2018-04-10 | Stop reason: SDUPTHER

## 2018-03-16 NOTE — PROGRESS NOTES
Dear Dr. Lange,   Below  Are bp readings: will add hydralazine 25 mg bid   And re-evaluate.  Will also order renal US for SANA.  Goal < 150/90    Here are the blood pressure readings you requested for my mother, Nicolasa Jurado, [address] [place] [address]. ([phone])  THURSDAY 3/8/2018   MORNIN/76 EVENIN/80   FRIDAY 3/9/2018   MORNIN/80 EVENIN/90   SATURDAY 3/10/2018   MORNIN/85 EVENIN/82   RAVEN 3/11/2018   MORNIN/73 EVENIN/77   MONDAY 3/12/2018   MORNIN/80 EVENIN/77   Thank You,   Ray Jurado (Son)

## 2018-03-17 RX ORDER — ATORVASTATIN CALCIUM 80 MG/1
TABLET, FILM COATED ORAL
Qty: 90 TABLET | Refills: 3 | Status: SHIPPED | OUTPATIENT
Start: 2018-03-17 | End: 2019-03-26 | Stop reason: SDUPTHER

## 2018-03-20 ENCOUNTER — TELEPHONE (OUTPATIENT)
Dept: NEPHROLOGY | Facility: CLINIC | Age: 83
End: 2018-03-20

## 2018-03-20 NOTE — TELEPHONE ENCOUNTER
----- Message from Nereyda Lange MD sent at 3/16/2018  6:33 AM CDT -----  Please speak with Ms. Jurado son.  Thank him for the BP readings.  His mother's bp is elevated. Goal would be ,< 150/90 at this time.  I have added another medication hydralazine 25 mg twice daily.  I am also ordering a renal US to look at the size of the kidneys and blood vessels.

## 2018-03-23 ENCOUNTER — OFFICE VISIT (OUTPATIENT)
Dept: INTERNAL MEDICINE | Facility: CLINIC | Age: 83
End: 2018-03-23
Payer: MEDICARE

## 2018-03-23 VITALS
SYSTOLIC BLOOD PRESSURE: 160 MMHG | BODY MASS INDEX: 25.36 KG/M2 | HEART RATE: 60 BPM | WEIGHT: 129.19 LBS | HEIGHT: 60 IN | DIASTOLIC BLOOD PRESSURE: 70 MMHG | RESPIRATION RATE: 10 BRPM

## 2018-03-23 DIAGNOSIS — I10 ESSENTIAL HYPERTENSION: Chronic | ICD-10-CM

## 2018-03-23 DIAGNOSIS — J45.41 MODERATE PERSISTENT ASTHMA WITH ACUTE EXACERBATION: Primary | ICD-10-CM

## 2018-03-23 PROCEDURE — 99999 PR PBB SHADOW E&M-EST. PATIENT-LVL III: CPT | Mod: PBBFAC,,, | Performed by: INTERNAL MEDICINE

## 2018-03-23 PROCEDURE — 99499 UNLISTED E&M SERVICE: CPT | Mod: S$GLB,,, | Performed by: INTERNAL MEDICINE

## 2018-03-23 PROCEDURE — 99214 OFFICE O/P EST MOD 30 MIN: CPT | Mod: S$GLB,,, | Performed by: INTERNAL MEDICINE

## 2018-03-23 RX ORDER — FLUTICASONE FUROATE AND VILANTEROL 100; 25 UG/1; UG/1
1 POWDER RESPIRATORY (INHALATION) DAILY
Qty: 30 EACH | Refills: 11 | Status: SHIPPED | OUTPATIENT
Start: 2018-03-23 | End: 2019-07-16

## 2018-03-23 RX ORDER — PREDNISONE 50 MG/1
50 TABLET ORAL DAILY
Qty: 5 TABLET | Refills: 0 | Status: SHIPPED | OUTPATIENT
Start: 2018-03-23 | End: 2018-03-28

## 2018-03-23 NOTE — PROGRESS NOTES
Subjective:       Patient ID: Nicolasa Jurado is a 87 y.o. female.    Chief Complaint: Cough    HPI     88 yo female with asthma here for evaluation of a cough.  She reports that she has been coughing constantly.  It has been going on since January.  She had doxycycline at this time.  At the end of January, she noted more coughing.  The cough improved for about 2 weeks after I saw her in January.  She has had runny nose.  No fevers.  She has a lot of mucus production.   She has asthma and cannot breath as well as she would like.  She takes albuterol for the breathing treatment.  The albuterol helps her cough.  When she does the treatment, she feels ok.  The mucus is white and thick.  It feels like it stays in her chest.  She has only been able to sleep two hours at night.      HTN - Patient is currently on norvasc 10 mg, clonodine 0.1 mg, hydralazine 25 mg bid, toprol  mg, ramipirl 10 mg. She does check her BP at home, and it runs 120s/50s. Side effects of medications note: none. Denies headaches, blurred vision, chest pain, shortness of breath, nausea.  She has been feeling dizzy.    Cholesterol   Date Value Ref Range Status   11/02/2017 123 120 - 199 mg/dL Final     Comment:     The National Cholesterol Education Program (NCEP) has set the  following guidelines (reference ranges) for Cholesterol:  Optimal.....................<200 mg/dL  Borderline High.............200-239 mg/dL  High........................> or = 240 mg/dL       Triglycerides   Date Value Ref Range Status   11/02/2017 120 30 - 150 mg/dL Final     Comment:     The National Cholesterol Education Program (NCEP) has set the  following guidelines (reference values) for triglycerides:  Normal......................<150 mg/dL  Borderline High.............150-199 mg/dL  High........................200-499 mg/dL       HDL   Date Value Ref Range Status   11/02/2017 35 (L) 40 - 75 mg/dL Final     Comment:     The National Cholesterol Education Program  (NCEP) has set the  following guidelines (reference values) for HDL Cholesterol:  Low...............<40 mg/dL  Optimal...........>60 mg/dL       LDL Cholesterol   Date Value Ref Range Status   11/02/2017 64.0 63.0 - 159.0 mg/dL Final     Comment:     The National Cholesterol Education Program (NCEP) has set the  following guidelines (reference values) for LDL Cholesterol:  Optimal.......................<130 mg/dL  Borderline High...............130-159 mg/dL  High..........................160-189 mg/dL  Very High.....................>190 mg/dL         Review of Systems    Objective:      Physical Exam   Constitutional: She is oriented to person, place, and time. She appears well-developed and well-nourished.   HENT:   Head: Normocephalic and atraumatic.   Mouth/Throat: No oropharyngeal exudate.   Eyes: EOM are normal. Pupils are equal, round, and reactive to light. Right eye exhibits no discharge. Left eye exhibits no discharge. No scleral icterus.   Neck: Normal range of motion. Neck supple. No tracheal deviation present. No thyromegaly present.   Cardiovascular: Normal rate, regular rhythm and normal heart sounds.  Exam reveals no gallop and no friction rub.    No murmur heard.  Pulmonary/Chest: Effort normal and breath sounds normal. No respiratory distress. She has no wheezes. She has no rales. She exhibits no tenderness.   Abdominal: Soft. Bowel sounds are normal. She exhibits no distension and no mass. There is no tenderness. There is no rebound and no guarding.   Musculoskeletal: Normal range of motion. She exhibits no edema or tenderness.   Neurological: She is alert and oriented to person, place, and time.   Skin: Skin is warm and dry. No rash noted. No erythema. No pallor.   Psychiatric: She has a normal mood and affect. Her behavior is normal.   Vitals reviewed.      Assessment:       1. Moderate persistent asthma with acute exacerbation    2. Essential hypertension        Plan:       1.  Prednisone 50 mg  daily for 5 days.  Start Breo.    2.  Continue norvasc 10 mg, clonodine 0.1 mg, toprol  mg, ramipirl 10 mg.  Continue hydralazine 25 mg bid for two more days, then 1/2 tablet daily for a week, then stop for 3-4 days before seeing me.  Check BP twice daily and bring list to clinic.

## 2018-04-03 ENCOUNTER — CLINICAL SUPPORT (OUTPATIENT)
Dept: CARDIOLOGY | Facility: CLINIC | Age: 83
End: 2018-04-03
Attending: INTERNAL MEDICINE
Payer: MEDICARE

## 2018-04-03 DIAGNOSIS — I10 ESSENTIAL HYPERTENSION: ICD-10-CM

## 2018-04-03 DIAGNOSIS — I70.1 RENAL ARTERY STENOSIS: Chronic | ICD-10-CM

## 2018-04-03 PROCEDURE — 93975 VASCULAR STUDY: CPT | Mod: S$GLB,,, | Performed by: INTERNAL MEDICINE

## 2018-04-10 ENCOUNTER — OFFICE VISIT (OUTPATIENT)
Dept: INTERNAL MEDICINE | Facility: CLINIC | Age: 83
End: 2018-04-10
Payer: MEDICARE

## 2018-04-10 ENCOUNTER — TELEPHONE (OUTPATIENT)
Dept: INTERNAL MEDICINE | Facility: CLINIC | Age: 83
End: 2018-04-10

## 2018-04-10 ENCOUNTER — NURSE TRIAGE (OUTPATIENT)
Dept: ADMINISTRATIVE | Facility: CLINIC | Age: 83
End: 2018-04-10

## 2018-04-10 VITALS
WEIGHT: 128.5 LBS | RESPIRATION RATE: 16 BRPM | DIASTOLIC BLOOD PRESSURE: 83 MMHG | HEIGHT: 60 IN | TEMPERATURE: 98 F | HEART RATE: 63 BPM | SYSTOLIC BLOOD PRESSURE: 195 MMHG | BODY MASS INDEX: 25.23 KG/M2

## 2018-04-10 DIAGNOSIS — I10 ESSENTIAL HYPERTENSION: Primary | Chronic | ICD-10-CM

## 2018-04-10 DIAGNOSIS — B02.8 HERPES ZOSTER WITH COMPLICATION: ICD-10-CM

## 2018-04-10 DIAGNOSIS — E21.0 PRIMARY HYPERPARATHYROIDISM: Chronic | ICD-10-CM

## 2018-04-10 DIAGNOSIS — D69.6 THROMBOCYTOPENIA: ICD-10-CM

## 2018-04-10 DIAGNOSIS — J45.41 MODERATE PERSISTENT ASTHMA WITH ACUTE EXACERBATION: Chronic | ICD-10-CM

## 2018-04-10 PROCEDURE — 99499 UNLISTED E&M SERVICE: CPT | Mod: S$GLB,,, | Performed by: INTERNAL MEDICINE

## 2018-04-10 PROCEDURE — 99999 PR PBB SHADOW E&M-EST. PATIENT-LVL III: CPT | Mod: PBBFAC,,, | Performed by: INTERNAL MEDICINE

## 2018-04-10 PROCEDURE — 99214 OFFICE O/P EST MOD 30 MIN: CPT | Mod: S$GLB,,, | Performed by: INTERNAL MEDICINE

## 2018-04-10 RX ORDER — HYDRALAZINE HYDROCHLORIDE 25 MG/1
25 TABLET, FILM COATED ORAL EVERY 8 HOURS
Qty: 270 TABLET | Refills: 3 | Status: SHIPPED | OUTPATIENT
Start: 2018-04-10 | End: 2018-04-19 | Stop reason: SDUPTHER

## 2018-04-10 NOTE — PROGRESS NOTES
Subjective:       Patient ID: Nicolasa Jurado is a 87 y.o. female.    Chief Complaint: Follow-up and Hypertension    HPI     87-year-old female with primary hyperparathyroidism, thrombocytopenia here for follow-up.    HTN - Patient is currently on norvasc 10 mg, clonodine 0.1 mg (as needed), toprol  mg, ramipirl 10 mg. She does check her BP at home, and it runs 126/55 - 180/80. Side effects of medications note: none. Denies headaches, blurred vision, chest pain, shortness of breath, nausea.  She has been off the hydralazine.    She reports that her breathing is ok since she last saw me.  This first day, she was ok till now.  She has noticed blistery rash that itches, burns on  Her left breast.  This has been going on a week.  She still has gabapentin.  She had chest pain the first day of the prednisone.  The shingles pain is controlled with alvera.    Review of Systems    Objective:      Physical Exam   Constitutional: She is oriented to person, place, and time. She appears well-developed and well-nourished.   HENT:   Head: Normocephalic and atraumatic.   Mouth/Throat: No oropharyngeal exudate.   Eyes: EOM are normal. Pupils are equal, round, and reactive to light. Right eye exhibits no discharge. Left eye exhibits no discharge. No scleral icterus.   Neck: Normal range of motion. Neck supple. No tracheal deviation present. No thyromegaly present.   Cardiovascular: Normal rate, regular rhythm and normal heart sounds.  Exam reveals no gallop and no friction rub.    No murmur heard.  Pulmonary/Chest: Effort normal and breath sounds normal. No respiratory distress. She has no wheezes. She has no rales. She exhibits no tenderness.   Abdominal: Soft. Bowel sounds are normal. She exhibits no distension and no mass. There is no tenderness. There is no rebound and no guarding.   Musculoskeletal: Normal range of motion. She exhibits no edema or tenderness.   Neurological: She is alert and oriented to person, place, and  time.   Skin: Skin is warm and dry. No rash noted. No erythema. No pallor.        Psychiatric: She has a normal mood and affect. Her behavior is normal.   Vitals reviewed.      Assessment:       1. Essential hypertension    2. Herpes zoster with complication    3. Moderate persistent asthma with acute exacerbation    4. Primary hyperparathyroidism    5. Thrombocytopenia        Plan:       1.  Continue norvasc 10 mg, clonodine 0.1 mg (as needed), toprol  mg, ramipirl 10 mg.  restart hydralazine 25 mrem 3 times a day.  2.  Too late to give patient Valtrex.  Advised patient this last month.  Offered to: Gabapentin.  Patient declined for now.  3.  Continue BREO until perception runs out.  Then try stopping and see if she needs to restart.  4/5.  Monitor.

## 2018-04-10 NOTE — TELEPHONE ENCOUNTER
Reason for Disposition   Fall in systolic BP > 20 mm Hg from normal and feeling dizzy, lightheaded, or weak    Protocols used: ST LOW BLOOD PRESSURE-A-OH    Pt states she was at PCP today and BP was 195. Pt states when she got home she checked it and it was 101/56. Pt feels dizzy and that she has to hold on to something to walk. Did take daily BP med. Care advice given. Pt does not wish to go to ED. Would like to speak to PCP.

## 2018-04-10 NOTE — TELEPHONE ENCOUNTER
Please clarify if patient has taken hydralazine yet.  If not, advised patient not to take the hydralazine.  Patient with uncontrolled hypertension in clinic earlier today.

## 2018-04-10 NOTE — TELEPHONE ENCOUNTER
Spoke with patient in regards to On-Call nurse call.  She states once she returned home from her appointment this morning, she took the ramipril.    She states her blood pressure then dropped to 101/55 in which she called to speak with someone.    She states afterward she sat down for awhile and put her feet up.  She states maybe 20-30 mins prior to the my call she rechecked her blood pressure and it was up to about 140.    Informed patient per Dr. Gaston to hold hydralazine for today.

## 2018-04-13 ENCOUNTER — TELEPHONE (OUTPATIENT)
Dept: NEPHROLOGY | Facility: CLINIC | Age: 83
End: 2018-04-13

## 2018-04-13 NOTE — TELEPHONE ENCOUNTER
----- Message from Nereyda Lange MD sent at 4/3/2018  5:34 PM CDT -----  STEnT IS SEEN IN THE R RENAL ARTERY, BUT NO WORSENED STENOSIS

## 2018-04-14 RX ORDER — TIZANIDINE 2 MG/1
TABLET ORAL
Qty: 270 TABLET | Refills: 3 | Status: SHIPPED | OUTPATIENT
Start: 2018-04-14 | End: 2018-08-13

## 2018-04-19 ENCOUNTER — OFFICE VISIT (OUTPATIENT)
Dept: INTERNAL MEDICINE | Facility: CLINIC | Age: 83
End: 2018-04-19
Payer: MEDICARE

## 2018-04-19 VITALS
HEIGHT: 60 IN | TEMPERATURE: 99 F | HEART RATE: 92 BPM | BODY MASS INDEX: 25.58 KG/M2 | DIASTOLIC BLOOD PRESSURE: 60 MMHG | WEIGHT: 130.31 LBS | RESPIRATION RATE: 16 BRPM | SYSTOLIC BLOOD PRESSURE: 120 MMHG

## 2018-04-19 DIAGNOSIS — I10 HYPERTENSION, UNSPECIFIED TYPE: Primary | ICD-10-CM

## 2018-04-19 PROCEDURE — 99212 OFFICE O/P EST SF 10 MIN: CPT | Mod: S$GLB,,, | Performed by: INTERNAL MEDICINE

## 2018-04-19 PROCEDURE — 99499 UNLISTED E&M SERVICE: CPT | Mod: S$GLB,,, | Performed by: INTERNAL MEDICINE

## 2018-04-19 PROCEDURE — 99999 PR PBB SHADOW E&M-EST. PATIENT-LVL IV: CPT | Mod: PBBFAC,,, | Performed by: INTERNAL MEDICINE

## 2018-04-19 RX ORDER — HYDRALAZINE HYDROCHLORIDE 25 MG/1
25 TABLET, FILM COATED ORAL EVERY 8 HOURS PRN
Qty: 270 TABLET | Refills: 3 | Status: SHIPPED | OUTPATIENT
Start: 2018-04-19 | End: 2018-08-13

## 2018-04-19 RX ORDER — NIFEDIPINE 60 MG/1
60 TABLET, EXTENDED RELEASE ORAL DAILY
Refills: 11 | COMMUNITY
Start: 2018-04-05 | End: 2018-08-13

## 2018-04-19 NOTE — PROGRESS NOTES
Subjective:       Patient ID: Nicolasa Jurado is a 87 y.o. female.    Chief Complaint: Follow-up    HPI     87-year-old female here for one-week follow-up.    HTN - Patient is currently on  norvasc 10 mg, clonodine 0.1 mg (as needed), toprol  mg, nifedipine 60 mg daily, ramipirl 10 mg, hydralazine 25 mrem 3 times a day. She does check her BP at home, and it runs 100/54 - 165/66. Side effects of medications note: none. Denies headaches, blurred vision, chest pain, shortness of breath, nausea.    She reports that the sensipar is costing $1000 a month.      Review of Systems    Objective:      Physical Exam   Constitutional: She is oriented to person, place, and time. She appears well-developed and well-nourished.   HENT:   Head: Normocephalic and atraumatic.   Mouth/Throat: No oropharyngeal exudate.   Eyes: EOM are normal. Pupils are equal, round, and reactive to light. Right eye exhibits no discharge. Left eye exhibits no discharge. No scleral icterus.   Neck: Normal range of motion. Neck supple. No tracheal deviation present. No thyromegaly present.   Cardiovascular: Normal rate, regular rhythm and normal heart sounds.  Exam reveals no gallop and no friction rub.    No murmur heard.  Pulmonary/Chest: Effort normal and breath sounds normal. No respiratory distress. She has no wheezes. She has no rales. She exhibits no tenderness.   Abdominal: Soft. Bowel sounds are normal. She exhibits no distension and no mass. There is no tenderness. There is no rebound and no guarding.   Musculoskeletal: Normal range of motion. She exhibits no edema or tenderness.   Neurological: She is alert and oriented to person, place, and time.   Skin: Skin is warm and dry. No rash noted. No erythema. No pallor.   Psychiatric: She has a normal mood and affect. Her behavior is normal.   Vitals reviewed.      Assessment:       1. Hypertension, unspecified type        Plan:       1.  Continue norvasc 10 mg, clonodine 0.1 mg (as needed),  toprol  mg, nifedipine 60 mg daily, ramipirl 10 mg, and hydralazine 25 mrem 3 times a day as needed for BP over 140/90.

## 2018-04-19 NOTE — Clinical Note
The sensipar is costing her $1000 a month.  Is there anything cheaper that she can take?  Houston Gaston

## 2018-06-01 DIAGNOSIS — N18.30 CHRONIC KIDNEY DISEASE, STAGE III (MODERATE): Primary | ICD-10-CM

## 2018-06-01 RX ORDER — CINACALCET 30 MG/1
60 TABLET, FILM COATED ORAL
Qty: 180 TABLET | Refills: 1 | Status: SHIPPED | OUTPATIENT
Start: 2018-06-01 | End: 2019-07-12 | Stop reason: SDUPTHER

## 2018-06-21 RX ORDER — FUROSEMIDE 20 MG/1
TABLET ORAL
Qty: 90 TABLET | Refills: 0 | Status: SHIPPED | OUTPATIENT
Start: 2018-06-21 | End: 2018-09-17 | Stop reason: SDUPTHER

## 2018-07-11 ENCOUNTER — HOSPITAL ENCOUNTER (OUTPATIENT)
Dept: RADIOLOGY | Facility: HOSPITAL | Age: 83
Discharge: HOME | End: 2018-07-11
Attending: INTERNAL MEDICINE
Payer: MEDICARE

## 2018-07-11 ENCOUNTER — OFFICE VISIT (OUTPATIENT)
Dept: INTERNAL MEDICINE | Facility: CLINIC | Age: 83
End: 2018-07-11
Payer: MEDICARE

## 2018-07-11 VITALS
BODY MASS INDEX: 26.58 KG/M2 | RESPIRATION RATE: 16 BRPM | HEIGHT: 60 IN | SYSTOLIC BLOOD PRESSURE: 151 MMHG | WEIGHT: 135.38 LBS | DIASTOLIC BLOOD PRESSURE: 95 MMHG | HEART RATE: 96 BPM | TEMPERATURE: 100 F

## 2018-07-11 DIAGNOSIS — H35.3290 EXUDATIVE AGE-RELATED MACULAR DEGENERATION, UNSPECIFIED LATERALITY, UNSPECIFIED STAGE: Chronic | ICD-10-CM

## 2018-07-11 DIAGNOSIS — R06.02 SOB (SHORTNESS OF BREATH): ICD-10-CM

## 2018-07-11 DIAGNOSIS — R20.0 NUMBNESS OF LEFT FOOT: ICD-10-CM

## 2018-07-11 DIAGNOSIS — I10 ESSENTIAL HYPERTENSION: ICD-10-CM

## 2018-07-11 DIAGNOSIS — M25.552 LEFT HIP PAIN: ICD-10-CM
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
## 2018-07-11 DIAGNOSIS — M25.552 LEFT HIP PAIN: Primary | ICD-10-CM

## 2018-07-11 DIAGNOSIS — M25.572 ACUTE LEFT ANKLE PAIN: ICD-10-CM

## 2018-07-11 DIAGNOSIS — M79.602 LEFT ARM PAIN: ICD-10-CM

## 2018-07-11 PROCEDURE — 99499 UNLISTED E&M SERVICE: CPT | Mod: HCNC,S$GLB,, | Performed by: INTERNAL MEDICINE

## 2018-07-11 PROCEDURE — 99214 OFFICE O/P EST MOD 30 MIN: CPT | Mod: S$GLB,,, | Performed by: INTERNAL MEDICINE

## 2018-07-11 PROCEDURE — 71046 X-RAY EXAM CHEST 2 VIEWS: CPT | Mod: 26,,, | Performed by: RADIOLOGY

## 2018-07-11 PROCEDURE — 99999 PR PBB SHADOW E&M-EST. PATIENT-LVL III: CPT | Mod: PBBFAC,,, | Performed by: INTERNAL MEDICINE

## 2018-07-11 PROCEDURE — 71046 X-RAY EXAM CHEST 2 VIEWS: CPT | Mod: TC,PO

## 2018-07-11 PROCEDURE — 73502 X-RAY EXAM HIP UNI 2-3 VIEWS: CPT | Mod: TC,PO,LT

## 2018-07-11 PROCEDURE — 73502 X-RAY EXAM HIP UNI 2-3 VIEWS: CPT | Mod: 26,LT,, | Performed by: RADIOLOGY

## 2018-07-11 RX ORDER — METHYLPREDNISOLONE 4 MG/1
TABLET ORAL
Qty: 21 TABLET | Refills: 0 | Status: SHIPPED | OUTPATIENT
Start: 2018-07-11 | End: 2018-08-13 | Stop reason: ALTCHOICE

## 2018-07-11 RX ORDER — LEVALBUTEROL INHALATION SOLUTION 0.63 MG/3ML
SOLUTION RESPIRATORY (INHALATION)
Qty: 1350 ML | Refills: 3 | Status: SHIPPED | OUTPATIENT
Start: 2018-07-11 | End: 2019-06-19 | Stop reason: SDUPTHER

## 2018-07-11 RX ORDER — LEVALBUTEROL INHALATION SOLUTION 0.63 MG/3ML
1 SOLUTION RESPIRATORY (INHALATION) EVERY 4 HOURS PRN
Qty: 90 ML | Refills: 3 | Status: SHIPPED | OUTPATIENT
Start: 2018-07-11 | End: 2018-07-11 | Stop reason: SDUPTHER

## 2018-07-11 NOTE — PROGRESS NOTES
Subjective:       Patient ID: Nicolasa Jurado is a 87 y.o. female.    Chief Complaint: Leg Pain and Foot Pain    HPI     87-year-old female here for evaluation of leg pain and foot pain.  She reports that last Friday, she went to [place] with her son.  The second day, she was going to get her coffee and hit herself on her left lateral ankle and fell on her side and hit her left arm.  This took all her feeling through her arm.  She had pain in her back.  She is bruised on her left hip.  No ER visit or head trauma.  Her left foot was swollen./  She is able to walk on her foot.  Her foot is half numb.  She gets swollen at night.      She has some SOB.  She has had trouble breathing today.  She felt better yesterday.  She has not been using the Breo.  When she takes the albuterol nebs, the blood pressure goes up and she gets shaky.    HTN - Patient is currently on Norvasc 10 mg, clonidine 0.1 mg t.i.d., hydralazine 25 mg t.i.d., Toprol- mg, ramipril 10 mg b.i.d., nifedipine 60 mg. She does check her BP at home, and it runs 130s in the am and 140s in the evening. Side effects of medications note: none. Denies headaches, blurred vision, chest pain, nausea.  When she felt badly, she had a 149.  Before bed last night, 189 systolic.  Her BP in the middle of the night was 130 systolic.    Review of Systems    Objective:      Physical Exam   Constitutional: She is oriented to person, place, and time. She appears well-developed and well-nourished.   HENT:   Head: Normocephalic and atraumatic.   Mouth/Throat: No oropharyngeal exudate.   Eyes: EOM are normal. Pupils are equal, round, and reactive to light. Right eye exhibits no discharge. Left eye exhibits no discharge. No scleral icterus.   Neck: Normal range of motion. Neck supple. No tracheal deviation present. No thyromegaly present.   Cardiovascular: Normal rate, regular rhythm and normal heart sounds.  Exam reveals no gallop and no friction rub.    No murmur  heard.  Pulmonary/Chest: Effort normal and breath sounds normal. No respiratory distress. She has no wheezes. She has no rales. She exhibits no tenderness.   Abdominal: Soft. Bowel sounds are normal. She exhibits no distension and no mass. There is no tenderness. There is no rebound and no guarding.   Musculoskeletal: Normal range of motion. She exhibits no edema or tenderness.   Neurological: She is alert and oriented to person, place, and time.   Skin: Skin is warm and dry. No rash noted. No erythema. No pallor.        Psychiatric: She has a normal mood and affect. Her behavior is normal.   Vitals reviewed.      Assessment:       1. Left hip pain    2. Acute left ankle pain    3. Numbness of left foot    4. Left arm pain    5. Fall, initial encounter    6. Essential hypertension    7. SOB (shortness of breath)    8. Exudative age-related macular degeneration, unspecified laterality, unspecified stage        Plan:       1/2/3/4/5.  Check x-ray left hip.  Patient to ice and elevate ankle.  No head trauma reported.  6.  Continue Norvasc 10 mg, clonidine 0.1 mg t.i.d., hydralazine 25 mg t.i.d., Toprol- mg, ramipril 10 mg b.i.d., nifedipine 60 mg.  Patient instructed to call back in a week with blood pressure readings.  Call in 2 weeks with blood pressures.  7.  Check x-ray chest.  Encouraged compliance with Breo.  Change to Xopenex inhaler.  8.  Monitor.

## 2018-07-13 ENCOUNTER — PES CALL (OUTPATIENT)
Dept: ADMINISTRATIVE | Facility: CLINIC | Age: 83
End: 2018-07-13

## 2018-08-10 ENCOUNTER — TELEPHONE (OUTPATIENT)
Dept: INTERNAL MEDICINE | Facility: CLINIC | Age: 83
End: 2018-08-10

## 2018-08-10 ENCOUNTER — HOSPITAL ENCOUNTER (OUTPATIENT)
Dept: RADIOLOGY | Facility: HOSPITAL | Age: 83
Discharge: HOME | End: 2018-08-10
Attending: INTERNAL MEDICINE
Payer: MEDICARE

## 2018-08-10 ENCOUNTER — OFFICE VISIT (OUTPATIENT)
Dept: INTERNAL MEDICINE | Facility: CLINIC | Age: 83
End: 2018-08-10
Payer: MEDICARE

## 2018-08-10 VITALS
RESPIRATION RATE: 18 BRPM | DIASTOLIC BLOOD PRESSURE: 96 MMHG | WEIGHT: 131.38 LBS | BODY MASS INDEX: 25.79 KG/M2 | TEMPERATURE: 99 F | HEIGHT: 60 IN | HEART RATE: 81 BPM | SYSTOLIC BLOOD PRESSURE: 155 MMHG

## 2018-08-10 DIAGNOSIS — R10.32 LLQ PAIN: ICD-10-CM

## 2018-08-10 DIAGNOSIS — E83.52 HYPERCALCEMIA: Primary | ICD-10-CM

## 2018-08-10 DIAGNOSIS — N18.2 CKD (CHRONIC KIDNEY DISEASE) STAGE 2, GFR 60-89 ML/MIN: ICD-10-CM

## 2018-08-10 DIAGNOSIS — I10 ESSENTIAL HYPERTENSION: Primary | Chronic | ICD-10-CM

## 2018-08-10 DIAGNOSIS — R22.42 ANKLE MASS, LEFT: ICD-10-CM

## 2018-08-10 DIAGNOSIS — M54.50 ACUTE MIDLINE LOW BACK PAIN WITHOUT SCIATICA: ICD-10-CM

## 2018-08-10 PROCEDURE — 72100 X-RAY EXAM L-S SPINE 2/3 VWS: CPT | Mod: TC,FY

## 2018-08-10 PROCEDURE — 99999 PR PBB SHADOW E&M-EST. PATIENT-LVL IV: CPT | Mod: PBBFAC,,, | Performed by: INTERNAL MEDICINE

## 2018-08-10 PROCEDURE — 74177 CT ABD & PELVIS W/CONTRAST: CPT | Mod: TC

## 2018-08-10 PROCEDURE — 74177 CT ABD & PELVIS W/CONTRAST: CPT | Mod: 26,,, | Performed by: RADIOLOGY

## 2018-08-10 PROCEDURE — 72100 X-RAY EXAM L-S SPINE 2/3 VWS: CPT | Mod: 26,,, | Performed by: RADIOLOGY

## 2018-08-10 PROCEDURE — 76882 US LMTD JT/FCL EVL NVASC XTR: CPT | Mod: 26,LR,, | Performed by: RADIOLOGY

## 2018-08-10 PROCEDURE — 76999 ECHO EXAMINATION PROCEDURE: CPT | Mod: TC

## 2018-08-10 PROCEDURE — 25500020 PHARM REV CODE 255: Performed by: INTERNAL MEDICINE

## 2018-08-10 PROCEDURE — 99214 OFFICE O/P EST MOD 30 MIN: CPT | Mod: S$GLB,,, | Performed by: INTERNAL MEDICINE

## 2018-08-10 RX ORDER — DICLOFENAC SODIUM 30 MG/G
GEL TOPICAL 2 TIMES DAILY
Qty: 100 G | Refills: 3 | Status: SHIPPED | OUTPATIENT
Start: 2018-08-10 | End: 2019-02-28

## 2018-08-10 RX ADMIN — IOHEXOL 75 ML: 350 INJECTION, SOLUTION INTRAVENOUS at 01:08

## 2018-08-10 RX ADMIN — IOHEXOL 30 ML: 350 INJECTION, SOLUTION INTRAVENOUS at 12:08

## 2018-08-10 NOTE — TELEPHONE ENCOUNTER
----- Message from Houston Gaston MD sent at 8/10/2018  4:02 PM CDT -----  Please let patient know that CT scan shows no signs of infection.  She does have some appear to have fatty infiltration of her liver.

## 2018-08-10 NOTE — TELEPHONE ENCOUNTER
Blood counts, electrolytes, kidney/liver function are normal.  Calcium is a little elevated.  We need to recheck this along with some other labs next week.

## 2018-08-10 NOTE — PROGRESS NOTES
Subjective:       Patient ID: Nicolasa Jurado is a 87 y.o. female.    Chief Complaint: Back Pain; Lump (left lower leg); and Abdominal Pain (left side)    HPI     87-year-old female here for evaluation of back pain, a lump on her left lower leg, and abdominal pain on left side.    HTN - Patient is currently on Toprol  mg, norvasc 10 mg, hydralazine 25 mg TID, nifedipine 60 mg, clonodine 0.1 mg TID, altace 10 mg BID. She does check her BP at home, and it runs 112/69 - 187/80. Side effects of medications note: none. Denies headaches, blurred vision, chest pain, shortness of breath, nausea.    She has a mass on her left lower leg.  She noticed this when she fell two weeks ago.  This occurred as a result of the fall.  She has noticed that her left foot falls asleep when she sits for 3-4 hours.  This is better when she moves around    She has some back pain from when she fell.  She has lower back pain.  She cannot turn in bed.  She has to be careful how she turns due to pain from the fall.  The pain stays in her back only.  She feels the pain when she turns herself.  At night when she moves, she has pain in the bed as well.    She stopped ASA on advice of eye doctor.    She has left sided abdominal pain.  She has a stomach ache.  She cannot have coke, because of diarrhea.  She has had diarrhea for two days and has been trying alovera and pomegranite and cranberry juice to stop the diarrhea.  She has pain in her left lower quadrant.  She feels like there is a balloon in there.  She has bad pain night and day for the week.  The pain is better today.    Review of Systems    Objective:      Physical Exam   Constitutional: She is oriented to person, place, and time. She appears well-developed and well-nourished.   HENT:   Head: Normocephalic and atraumatic.   Mouth/Throat: No oropharyngeal exudate.   Eyes: EOM are normal. Pupils are equal, round, and reactive to light. Right eye exhibits no discharge. Left eye exhibits  no discharge. No scleral icterus.   Neck: Normal range of motion. Neck supple. No tracheal deviation present. No thyromegaly present.   Cardiovascular: Normal rate, regular rhythm and normal heart sounds.  Exam reveals no gallop and no friction rub.    No murmur heard.  Pulmonary/Chest: Effort normal and breath sounds normal. No respiratory distress. She has no wheezes. She has no rales. She exhibits no tenderness.   Abdominal: Soft. Bowel sounds are normal. She exhibits no distension and no mass. There is tenderness in the left lower quadrant. There is no rebound and no guarding.   Musculoskeletal: Normal range of motion. She exhibits no edema or tenderness.   Neurological: She is alert and oriented to person, place, and time.   Skin: Skin is warm and dry. No rash noted. No erythema. No pallor.   Psychiatric: She has a normal mood and affect. Her behavior is normal.   Vitals reviewed.      Assessment:       1. Essential hypertension    2. Ankle mass, left    3. Acute midline low back pain without sciatica    4. CKD (chronic kidney disease) stage 2, GFR 60-89 ml/min    5. LLQ pain        Plan:       1.  Continue Toprol  mg, norvasc 10 mg, hydralazine 25 mg TID, nifedipine 60 mg, clonodine 0.1 mg TID, altace 10 mg BID.  Needs Cardiology assistance with elevated blood pressures  2.  Check ultrasound soft tissue.  3.  Check x-ray lumbar spine.  Diclofenac gel prescribed.  4.  Should not be on oral anti-inflammatories.  Should be on topical NSAIDs.  5.  Check CT scan abdomen pelvis with contrast.  Check CBC and CMP stat.  Concern for infection.  Also, check urinalysis and culture.

## 2018-08-13 ENCOUNTER — OFFICE VISIT (OUTPATIENT)
Dept: CARDIOLOGY | Facility: CLINIC | Age: 83
End: 2018-08-13
Payer: MEDICARE

## 2018-08-13 VITALS
HEIGHT: 60 IN | SYSTOLIC BLOOD PRESSURE: 199 MMHG | HEART RATE: 93 BPM | BODY MASS INDEX: 25.67 KG/M2 | WEIGHT: 130.75 LBS | DIASTOLIC BLOOD PRESSURE: 86 MMHG

## 2018-08-13 DIAGNOSIS — I70.1 RENAL ARTERY STENOSIS: Chronic | ICD-10-CM

## 2018-08-13 DIAGNOSIS — E78.00 PURE HYPERCHOLESTEROLEMIA: ICD-10-CM

## 2018-08-13 DIAGNOSIS — I70.0 ATHEROSCLEROSIS OF AORTA: Chronic | ICD-10-CM

## 2018-08-13 DIAGNOSIS — I25.10 CORONARY ARTERY DISEASE INVOLVING NATIVE CORONARY ARTERY OF NATIVE HEART WITHOUT ANGINA PECTORIS: ICD-10-CM

## 2018-08-13 DIAGNOSIS — I10 HYPERTENSION, UNSPECIFIED TYPE: Primary | ICD-10-CM

## 2018-08-13 DIAGNOSIS — I10 ESSENTIAL HYPERTENSION: Chronic | ICD-10-CM

## 2018-08-13 PROCEDURE — 99214 OFFICE O/P EST MOD 30 MIN: CPT | Mod: S$PBB,,, | Performed by: INTERNAL MEDICINE

## 2018-08-13 PROCEDURE — 99499 UNLISTED E&M SERVICE: CPT | Mod: HCNC,S$GLB,, | Performed by: INTERNAL MEDICINE

## 2018-08-13 PROCEDURE — 99999 PR PBB SHADOW E&M-EST. PATIENT-LVL III: CPT | Mod: PBBFAC,,, | Performed by: INTERNAL MEDICINE

## 2018-08-13 RX ORDER — HYDRALAZINE HYDROCHLORIDE 50 MG/1
50 TABLET, FILM COATED ORAL 2 TIMES DAILY
Qty: 180 TABLET | Refills: 3 | Status: SHIPPED | OUTPATIENT
Start: 2018-08-13 | End: 2018-12-03

## 2018-08-13 RX ORDER — CLONIDINE HYDROCHLORIDE 0.1 MG/1
0.1 TABLET ORAL DAILY PRN
Qty: 30 TABLET | Refills: 3 | Status: SHIPPED | OUTPATIENT
Start: 2018-08-13 | End: 2019-02-10 | Stop reason: SDUPTHER

## 2018-08-13 NOTE — PROGRESS NOTES
Subjective:   Chief Complaint: Hypertension    History of Present Illness: Nicolasa Jurado is a 87 y.o. lady coronary artery disease status post stent in 2003, renal artery stenosis status post stent, hypertension, and hyperlipidemia, who presents to clinic to discuss labile blood pressures. She has a history of chronically elevated labile blood pressures, and is on several different medications to address this.  She also follows with primary care who has referred her blood pressure issue back to us.  She reports that her blood pressure fluctuates widely at home from 120s systolic in the morning, to 145 mid morning, and then in the afternoon it will go as high as the 200s systolic.  She also reports episodes of waking up in the middle of the night hypotensive with systolic blood pressures in the 90s.  In review of her medications per telephone note from Dr. Zapien after last clinic visit, he asked her to stop nifedipine given that she was also on amlodipine.  In review of her medications today from her bag, she has been taking nifedipine for chest pain p.r.n., and has not had any chest pain and therefore has not been taking it.  She also has been taking metoprolol primarily p.r.n. as she has a bottle which is largely full of pills filled 6 months ago.  She has been taking clonidine p.r.n. for elevated blood pressures, but cannot give a specific number at which she takes it.  Reports she may have been taking it every day but not sure.  She is taking regularly ramipril twice daily, and amlodipine once daily in the middle of the day.  She has been taking hydralazine but in review of pills she has been cutting them up into smaller doses and taking these also p.r.n..  Continues to report feeling poor when she has elevated blood pressures and feeling weak and lightheaded when she has low blood pressures systolics in the 100s.  Denies any chest pain, no shortness of breath, no palpitations, no lower extremity edema, no  weight gain.    Review of Systems: Review of Systems   HENT: Negative.    Eyes: Negative.    Cardiovascular: Negative.    Gastrointestinal: Negative.    Genitourinary: Negative.    Musculoskeletal: Negative.    Skin: Negative.    Neurological: Positive for weakness.   Endo/Heme/Allergies: Negative.    Psychiatric/Behavioral: Negative.        Medications:  Current Outpatient Medications on File Prior to Visit   Medication Sig Dispense Refill    albuterol (PROVENTIL) 2.5 mg /3 mL (0.083 %) nebulizer solution 1 VIAL PER NEBULIZER EVERY 4 TO 6 HOURS AS NEEDED 180 mL 11    albuterol 90 mcg/actuation inhaler Inhale 2 puffs into the lungs every 6 (six) hours as needed for Wheezing. 18 g 6    amLODIPine (NORVASC) 10 MG tablet TAKE 1 TABLET BY MOUTH DAILY 30 tablet 11    aspirin 81 mg Tab Take 81 mg by mouth every other day.       atorvastatin (LIPITOR) 80 MG tablet TAKE 1 TABLET(80 MG) BY MOUTH EVERY DAY 90 tablet 3    brimonidine 0.2% (ALPHAGAN) 0.2 % Drop Place 1 drop into both eyes 3 (three) times daily.   3    clonazePAM (KLONOPIN) 0.5 MG tablet TAKE 1 TABLET BY MOUTH TWICE DAILY AS NEEDED FOR ANXIETY 30 tablet 2    dorzolamide (TRUSOPT) 2 % ophthalmic solution Place 1 drop into both eyes 3 (three) times daily.   3    fluticasone (FLONASE) 50 mcg/actuation nasal spray SHAKE LIQUID AND USE 1 SPRAY IN EACH NOSTRIL EVERY DAY 16 g 4    fluticasone-vilanterol (BREO ELLIPTA) 100-25 mcg/dose diskus inhaler Inhale 1 puff into the lungs once daily. Controller 30 each 11    furosemide (LASIX) 20 MG tablet TAKE 1 TABLET BY MOUTH ONCE DAILY 90 tablet 0    latanoprost 0.005 % ophthalmic solution 1 drop every evening.      levalbuterol (XOPENEX) 0.63 mg/3 mL nebulizer solution TAKE 3 MILLILITERS BY NEBULIZATION EVERY 4 HOURS AS NEEDED FOR WHEEZING(RESCUE) 1350 mL 3    nitroGLYCERIN (NITROSTAT) 0.4 MG SL tablet Place 0.4 mg under the tongue. 1 Tablet, Sublingual Sublingual .  One tablet under tounge as needed for chest  pain.      omega-3 fatty acids 1,000 mg Cap       ramipril (ALTACE) 10 MG capsule TAKE 1 CAPSULE BY MOUTH TWICE DAILY 180 capsule 3    ranitidine (ZANTAC) 150 MG tablet Take 1 tablet (150 mg total) by mouth 2 (two) times daily. PRN heartburn 60 tablet 11    vit C-vit E-copper-ZnOx-lutein (PRESERVISION) 226-200-5 mg-unit-mg Cap Take by mouth. 2 Capsule Oral Every day      [DISCONTINUED] cloNIDine (CATAPRES) 0.1 MG tablet Take 1 tablet (0.1 mg total) by mouth daily as needed (for SBP >200 mm Hg). 30 tablet 3    [DISCONTINUED] hydrALAZINE (APRESOLINE) 25 MG tablet Take 1 tablet (25 mg total) by mouth every 8 (eight) hours as needed. If Blood pressure is above 140/90 270 tablet 3    [DISCONTINUED] metoprolol succinate (TOPROL-XL) 100 MG 24 hr tablet TAKE 1 TABLET BY MOUTH DAILY 30 tablet 11    [DISCONTINUED] NIFEdipine (ADALAT CC) 60 MG TbSR Take 60 mg by mouth once daily.   11    cinacalcet (SENSIPAR) 30 MG Tab Take 2 tablets (60 mg total) by mouth daily with breakfast. 180 tablet 1    diclofenac sodium (SOLARAZE) 3 % gel Apply topically 2 (two) times daily. 100 g 3    gabapentin (NEURONTIN) 100 MG capsule Take 1 capsule (100 mg total) by mouth every evening. 30 capsule 11    [DISCONTINUED] amlodipine (NORVASC) 10 MG tablet TAKE 1 TABLET BY MOUTH DAILY 90 tablet 3    [DISCONTINUED] methylPREDNISolone (MEDROL DOSEPACK) 4 mg tablet Take as directed 21 tablet 0    [DISCONTINUED] metoprolol succinate (TOPROL-XL) 100 MG 24 hr tablet Take 100 mg by mouth once daily. Pt taking 1/2 pill once a day.      [DISCONTINUED] tiZANidine (ZANAFLEX) 2 MG tablet TAKE 1 TABLET(2 MG) BY MOUTH THREE TIMES DAILY AS NEEDED. DO NOT DRIVE OR OPERATE HEAVY MACHINERY WHEN TAKING THIS MEDICATION 270 tablet 3     No current facility-administered medications on file prior to visit.      Family History:  Their family history includes Hypertension in her son; Liver disease in her father; Miscarriages / Stillbirths in her mother; No  Known Problems in her sister; Splenomegaly in her daughter.    Social History:  Nicolasa reports that  has never smoked. she has never used smokeless tobacco. She reports that she does not drink alcohol or use drugs.    Objective:   BP (!) 199/86   Pulse 93   Ht 5' (1.524 m)   Wt 59.3 kg (130 lb 11.7 oz)   BMI 25.53 kg/m²     Physical Exam   Constitutional: She is oriented to person, place, and time and well-developed, well-nourished, and in no distress. No distress.   HENT:   Head: Normocephalic and atraumatic.   Mouth/Throat: No oropharyngeal exudate.   Eyes: EOM are normal. No scleral icterus.   Neck: Neck supple. No JVD present. No tracheal deviation present. No thyromegaly present.   Cardiovascular: Normal rate and regular rhythm. Exam reveals no gallop and no friction rub.   No murmur heard.  Pulmonary/Chest: Effort normal and breath sounds normal. No respiratory distress. She has no wheezes. She has no rales. She exhibits no tenderness.   Abdominal: Soft. She exhibits no distension. There is no tenderness. There is no rebound and no guarding.   Musculoskeletal: Normal range of motion. She exhibits no edema.   Neurological: She is alert and oriented to person, place, and time.   Skin: Skin is warm and dry. She is not diaphoretic. No erythema.   Psychiatric: Affect normal.     EK2017: SInus tachycardia    Echo:  2013:  The aortic root is normal in size, measuring 2.4 cm at sinotubular junction and 2.6 cm at Sinuses of Valsalva. The proximal ascending aorta is normal in size, measuring 3.2 cm across.   The left atrial volume index is moderately enlarged, measuring 35.78 cc/m2. The left ventricle is normal in size, with an end-diastolic diameter of 4.6 cm, and an end-systolic diameter of 3.1 cm. LV wall thickness is normal, with the septum measuring 0.9   cm and the posterior wall measuring 0.7 cm across. Relative wall thickness was normal at 0.30, and the LV mass index was 81.6 g/m2 consistent with normal  left ventricular mass.   Regional and global left ventricular systolic function appears normal with a visually estimated ejection fraction of 65%.   The LV Doppler derived stroke volume equals 79.0 ccs. There is normal systolic/diastolic flow in the pulmonary vein indicating normal left atrial pressures. The E/e'(lat) is 27, consistent with diastolic dysfunction secondary to relaxation abnormality.   The ight atrium is normal in size, measuring 5.2 cm in length and 2.6 cm in width in the apical view. The right ventricle is normal in size measuring 2.2 cm at the base in the apical right ventricle-focused view. Global right ventricular systolic   function appears normal. Right atrial pressure as assessed by IVC dynamics is normal at 3 mmHg. The estimated PA systolic pressure is 31 mmHg.   There is trivial mitral regurgitation. There is marked mitral annular calcification.   e is trivial tricuspid regurgitation.   There is trivial pulmonic regurgitation.   There is evidence of a trivial anterior pericardial effusion. There is no evidence of intracavity mass, thrombi, or vegetation.   CONCLUSIONS     1 - Normal biventricular systolic function (LVEF 65%).     2 - Diastolic dysfunction.     3 - Moderate left atrial enlargement.     U/S:  4/3/2018 Renal artery U/S  PRE-TEST DATA   RIGHT                                  PSV/EDV  Ostium Renal Artery           174/17 cm/sec   Proximal Renal Artery         201/24 cm/sec   Mid Renal Artery                   183/23 cm/sec   Distal Renal Artery                71/10 cm/sec   LEFT                                     PSV/EDV  Ostium Renal Artery           229/21 cm/sec   Proximal Renal Artery         326/35 cm/sec   Mid Renal Artery                   196/20 cm/sec   Distal Renal Artery                182/17 cm/sec   AORTA                                PSV/EDV  Mid Aorta                          139/ cm/sec   TEST DESCRIPTION   RIGHT   Right  RAR is: 1.45  There is a stent in right  renal artery.   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   Right kidney 11 cm.  Right Resistive Index: 0.85  Right Acceleration Time: 140 ms.   LEFT   Left  RAR is: 2.35  Elevated left renal resistive index suggestive of intrinsic kidney disease.   Left kidney 10.5 cm.   Left Resistive Index: 0.82  Left Acceleration Time: 157 ms.   CONCLUSIONS   There is insignificant stenosis (0-59%) in the Right renal artery.   Right kidney 11 cm.  There is insignificant stenosis (0-59%) in the Left renal artery.   Left kidney 10.5 cm.   Aortic artery has a velocity > 100 cm/sec which makes interpretation less reliable.     Assessment:     1. Hypertension, unspecified type    2. Atherosclerosis of aorta    3. Coronary artery disease involving native coronary artery of native heart without angina pectoris    4. Essential hypertension    5. Pure hypercholesterolemia    6. Renal artery stenosis        Plan:     # Hypertension - difficult to control, chronic labile blood pressure, and this is the 1st time this provider is meeting patient.  Discussed the importance of taking medications regularly and knowing what and why medications were being taken with patient and her son for 45 minutes.  We will discontinue nifedipine.  Also asked her to only take clonidine for systolic blood pressures over 200, otherwise will try to manage with scheduled medications.  Discussed that given issues concerning which medications are what, and clearly the instructions, I did not feel that a p.r.n. strategy was the best way to control Mrs. Jurado is blood pressure.  Given that she is taking medications such as metoprolol and hydralazine on a p.r.n. basis, when they have never been prescribed as such.  Will continue amlodipine and ramipril, as she has been taking.  Asked her to increase morning hydralazine to 50 mg, and then another 50 mg dose mid day, but will hold the evening dose given reported nighttime  hypotension.  She reports only taking metoprolol twice in the last month therefore will discontinue with this time, and start b-blocker in the future if needed.    # coronary artery disease - no si/sx of angina - continue aspirin and Lipitor.    # hyperlipidemia-most recent LDL less than 70 in 2017, continue Lipitor.    # renal artery stenosis-hypertension will be controlled as above, recent renal artery ultrasound this past spring showing no ISR    Hypertension, unspecified type  -     hydrALAZINE (APRESOLINE) 50 MG tablet; Take 1 tablet (50 mg total) by mouth 2 (two) times daily. If Blood pressure is above 140/90  Dispense: 180 tablet; Refill: 3    Atherosclerosis of aorta    Coronary artery disease involving native coronary artery of native heart without angina pectoris    Essential hypertension    Pure hypercholesterolemia    Renal artery stenosis    Other orders  -     cloNIDine (CATAPRES) 0.1 MG tablet; Take 1 tablet (0.1 mg total) by mouth daily as needed (for SBP >200 mm Hg).  Dispense: 30 tablet; Refill: 3

## 2018-08-17 ENCOUNTER — TELEPHONE (OUTPATIENT)
Dept: INTERNAL MEDICINE | Facility: CLINIC | Age: 83
End: 2018-08-17

## 2018-08-17 NOTE — LETTER
August 17, 2018    Nicolasa Jurado  [address] [place] [id number]             [place] [facility]  [address] [facility] [address]  Phone: [phone]  Fax: [phone] Dear Mrs. Jurado:      Urinalysis is not significant for infection.  Urine culture is negative for infection as well.     If you have any questions or concerns, please don't hesitate to call.    Sincerely,        Marina Torre LPN

## 2018-08-17 NOTE — TELEPHONE ENCOUNTER
----- Message from Houston Gaston MD sent at 8/12/2018 10:40 AM CDT -----  Urinalysis is not significant for infection.  Urine culture is negative for infection as well.    Please send letter.

## 2018-08-27 ENCOUNTER — OFFICE VISIT (OUTPATIENT)
Dept: INTERNAL MEDICINE | Facility: CLINIC | Age: 83
End: 2018-08-27
Payer: MEDICARE

## 2018-08-27 VITALS
DIASTOLIC BLOOD PRESSURE: 79 MMHG | TEMPERATURE: 99 F | SYSTOLIC BLOOD PRESSURE: 171 MMHG | HEART RATE: 103 BPM | RESPIRATION RATE: 16 BRPM | WEIGHT: 132.5 LBS | BODY MASS INDEX: 26.01 KG/M2 | HEIGHT: 60 IN

## 2018-08-27 DIAGNOSIS — R00.2 PALPITATION: Primary | ICD-10-CM

## 2018-08-27 DIAGNOSIS — I10 HYPERTENSION, UNSPECIFIED TYPE: ICD-10-CM

## 2018-08-27 PROCEDURE — 93010 ELECTROCARDIOGRAM REPORT: CPT | Mod: S$GLB,,, | Performed by: INTERNAL MEDICINE

## 2018-08-27 PROCEDURE — 99214 OFFICE O/P EST MOD 30 MIN: CPT | Mod: S$GLB,,, | Performed by: INTERNAL MEDICINE

## 2018-08-27 PROCEDURE — 99999 PR PBB SHADOW E&M-EST. PATIENT-LVL V: CPT | Mod: PBBFAC,,, | Performed by: INTERNAL MEDICINE

## 2018-08-27 PROCEDURE — 93005 ELECTROCARDIOGRAM TRACING: CPT | Mod: S$GLB,,, | Performed by: INTERNAL MEDICINE

## 2018-08-27 NOTE — PROGRESS NOTES
Subjective:       Patient ID: Nicolasa Jurado is a 87 y.o. female.    Chief Complaint: Follow-up    HPI     87-year-old female here for follow-up.    HTN - Patient is currently on Toprol  mg, norvasc 10 mg, hydralazine 50 mg TID, nifedipine 60 mg, clonodine 0.1 mg TID, altace 10 mg BID. She does check her BP at home, and it runs (did not bring the list). Side effects of medications note: none. Denies headaches, blurred vision, chest pain, shortness of breath, nausea.  She has seen cardiology since I last saw her.  The hydralazine has been increased.  She is confused as to which time she should be taking her medication.  She has palpitations when she takes a certain medication.  She cannot walk a block before she feels like her heart rate is going fast.  Her arm and neck goes numb.      Review of Systems    Objective:      Physical Exam   Constitutional: She is oriented to person, place, and time. She appears well-developed and well-nourished.   HENT:   Head: Normocephalic and atraumatic.   Mouth/Throat: No oropharyngeal exudate.   Eyes: EOM are normal. Pupils are equal, round, and reactive to light. Right eye exhibits no discharge. Left eye exhibits no discharge. No scleral icterus.   Neck: Normal range of motion. Neck supple. No tracheal deviation present. No thyromegaly present.   Cardiovascular: Normal rate, regular rhythm and normal heart sounds. Exam reveals no gallop and no friction rub.   No murmur heard.  Pulmonary/Chest: Effort normal and breath sounds normal. No respiratory distress. She has no wheezes. She has no rales. She exhibits no tenderness.   Abdominal: Soft. Bowel sounds are normal. She exhibits no distension and no mass. There is no tenderness. There is no rebound and no guarding.   Musculoskeletal: Normal range of motion. She exhibits no edema or tenderness.   Neurological: She is alert and oriented to person, place, and time.   Skin: Skin is warm and dry. No rash noted. No erythema. No  pallor.   Psychiatric: She has a normal mood and affect. Her behavior is normal.   Vitals reviewed.      Assessment:       1. Palpitation    2. Hypertension, unspecified type        Plan:       1.  Check EKG, 24 hr Holter monitor.  2.  Start ramipril 10 mg twice daily, amlodipine 10 mg daily.  Patient also has other medications at home that she has not been taking.  Advised to take these medications with supervision of her children and colon week with blood pressure readings.  The other medications that have been on her chart from time to time include:  Toprol, hydralazine, clonidine, nifedipine.

## 2018-08-31 RX ORDER — CLONAZEPAM 0.5 MG/1
0.5 TABLET ORAL 2 TIMES DAILY
Qty: 30 TABLET | Refills: 2 | Status: SHIPPED | OUTPATIENT
Start: 2018-08-31 | End: 2019-03-13 | Stop reason: SDUPTHER

## 2018-09-12 ENCOUNTER — CLINICAL SUPPORT (OUTPATIENT)
Dept: CARDIOLOGY | Facility: CLINIC | Age: 83
End: 2018-09-12
Attending: INTERNAL MEDICINE
Payer: MEDICARE

## 2018-09-12 DIAGNOSIS — R00.2 PALPITATION: ICD-10-CM

## 2018-09-12 PROCEDURE — 93227 XTRNL ECG REC<48 HR R&I: CPT | Mod: S$PBB,,, | Performed by: INTERNAL MEDICINE

## 2018-09-12 PROCEDURE — 93226 XTRNL ECG REC<48 HR SCAN A/R: CPT | Mod: PBBFAC,PO | Performed by: INTERNAL MEDICINE

## 2018-09-12 NOTE — PROGRESS NOTES
Verified pt name and .  Pt signed consent stated that the monitor will be returned to 8th floor Cardiology at the [facility].  Pt prepped and EKG leads placed.  Pt to be on holter monitor for 24 hours.  Instructions, diary and extra EKG pads provided.

## 2018-09-17 ENCOUNTER — TELEPHONE (OUTPATIENT)
Dept: INTERNAL MEDICINE | Facility: CLINIC | Age: 83
End: 2018-09-17

## 2018-09-17 RX ORDER — FUROSEMIDE 20 MG/1
TABLET ORAL
Qty: 90 TABLET | Refills: 0 | Status: SHIPPED | OUTPATIENT
Start: 2018-09-17 | End: 2019-01-09 | Stop reason: SDUPTHER

## 2018-10-06 RX ORDER — NIFEDIPINE 60 MG/1
TABLET, EXTENDED RELEASE ORAL
Qty: 30 TABLET | Refills: 10 | Status: SHIPPED | OUTPATIENT
Start: 2018-10-06 | End: 2019-02-01 | Stop reason: ALTCHOICE

## 2019-01-09 RX ORDER — FUROSEMIDE 20 MG/1
TABLET ORAL
Qty: 90 TABLET | Refills: 0 | Status: SHIPPED | OUTPATIENT
Start: 2019-01-09 | End: 2019-07-16 | Stop reason: SDUPTHER

## 2019-02-01 ENCOUNTER — OFFICE VISIT (OUTPATIENT)
Dept: CARDIOLOGY | Facility: CLINIC | Age: 84
End: 2019-02-01
Payer: MEDICARE

## 2019-02-01 ENCOUNTER — LAB VISIT (OUTPATIENT)
Dept: LAB | Facility: HOSPITAL | Age: 84
End: 2019-02-01
Attending: INTERNAL MEDICINE
Payer: MEDICARE

## 2019-02-01 ENCOUNTER — TELEPHONE (OUTPATIENT)
Dept: CARDIOLOGY | Facility: CLINIC | Age: 84
End: 2019-02-01

## 2019-02-01 VITALS
HEART RATE: 64 BPM | BODY MASS INDEX: 26.05 KG/M2 | HEIGHT: 60 IN | SYSTOLIC BLOOD PRESSURE: 130 MMHG | DIASTOLIC BLOOD PRESSURE: 62 MMHG | WEIGHT: 132.69 LBS

## 2019-02-01 DIAGNOSIS — K55.1 MESENTERIC ARTERY STENOSIS: Chronic | ICD-10-CM

## 2019-02-01 DIAGNOSIS — I50.32 CHRONIC DIASTOLIC HEART FAILURE: Chronic | ICD-10-CM

## 2019-02-01 DIAGNOSIS — I70.1 RENAL ARTERY STENOSIS: Chronic | ICD-10-CM

## 2019-02-01 DIAGNOSIS — R25.2 MUSCLE CRAMP: ICD-10-CM

## 2019-02-01 DIAGNOSIS — I25.10 CORONARY ARTERY DISEASE INVOLVING NATIVE CORONARY ARTERY OF NATIVE HEART WITHOUT ANGINA PECTORIS: Primary | ICD-10-CM

## 2019-02-01 DIAGNOSIS — I10 ESSENTIAL HYPERTENSION: Chronic | ICD-10-CM

## 2019-02-01 DIAGNOSIS — I25.10 CORONARY ARTERY DISEASE INVOLVING NATIVE CORONARY ARTERY OF NATIVE HEART WITHOUT ANGINA PECTORIS: ICD-10-CM

## 2019-02-01 DIAGNOSIS — E83.52 HYPERCALCEMIA: ICD-10-CM

## 2019-02-01 DIAGNOSIS — R06.02 SOB (SHORTNESS OF BREATH): ICD-10-CM

## 2019-02-01 DIAGNOSIS — N18.2 CKD (CHRONIC KIDNEY DISEASE) STAGE 2, GFR 60-89 ML/MIN: ICD-10-CM

## 2019-02-01 DIAGNOSIS — E78.00 PURE HYPERCHOLESTEROLEMIA: ICD-10-CM

## 2019-02-01 DIAGNOSIS — I70.0 ATHEROSCLEROSIS OF AORTA: Chronic | ICD-10-CM

## 2019-02-01 LAB
25(OH)D3+25(OH)D2 SERPL-MCNC: 27 NG/ML
ALBUMIN SERPL BCP-MCNC: 4.1 G/DL
ALP SERPL-CCNC: 127 U/L
ALT SERPL W/O P-5'-P-CCNC: 23 U/L
ANION GAP SERPL CALC-SCNC: 6 MMOL/L
AST SERPL-CCNC: 24 U/L
BILIRUB SERPL-MCNC: 0.6 MG/DL
BNP SERPL-MCNC: 93 PG/ML
BUN SERPL-MCNC: 11 MG/DL
CALCIUM SERPL-MCNC: 10.9 MG/DL
CHLORIDE SERPL-SCNC: 103 MMOL/L
CHOLEST SERPL-MCNC: 131 MG/DL
CHOLEST/HDLC SERPL: 2.8 {RATIO}
CO2 SERPL-SCNC: 30 MMOL/L
CREAT SERPL-MCNC: 0.8 MG/DL
EST. GFR  (AFRICAN AMERICAN): >60 ML/MIN/1.73 M^2
EST. GFR  (NON AFRICAN AMERICAN): >60 ML/MIN/1.73 M^2
GLUCOSE SERPL-MCNC: 101 MG/DL
HDLC SERPL-MCNC: 46 MG/DL
HDLC SERPL: 35.1 %
LDLC SERPL CALC-MCNC: 58.6 MG/DL
MAGNESIUM SERPL-MCNC: 2.1 MG/DL
NONHDLC SERPL-MCNC: 85 MG/DL
POTASSIUM SERPL-SCNC: 4.5 MMOL/L
PROT SERPL-MCNC: 7.5 G/DL
SODIUM SERPL-SCNC: 139 MMOL/L
TRIGL SERPL-MCNC: 132 MG/DL

## 2019-02-01 PROCEDURE — 99999 PR PBB SHADOW E&M-EST. PATIENT-LVL III: CPT | Mod: PBBFAC,HCNC,, | Performed by: NURSE PRACTITIONER

## 2019-02-01 PROCEDURE — 83880 ASSAY OF NATRIURETIC PEPTIDE: CPT | Mod: HCNC

## 2019-02-01 PROCEDURE — 80053 COMPREHEN METABOLIC PANEL: CPT | Mod: HCNC

## 2019-02-01 PROCEDURE — 80061 LIPID PANEL: CPT | Mod: HCNC

## 2019-02-01 PROCEDURE — 83735 ASSAY OF MAGNESIUM: CPT | Mod: HCNC

## 2019-02-01 PROCEDURE — 99999 PR PBB SHADOW E&M-EST. PATIENT-LVL III: ICD-10-PCS | Mod: PBBFAC,HCNC,, | Performed by: NURSE PRACTITIONER

## 2019-02-01 PROCEDURE — 99214 OFFICE O/P EST MOD 30 MIN: CPT | Mod: HCNC,S$GLB,, | Performed by: NURSE PRACTITIONER

## 2019-02-01 PROCEDURE — 1101F PR PT FALLS ASSESS DOC 0-1 FALLS W/OUT INJ PAST YR: ICD-10-PCS | Mod: HCNC,CPTII,S$GLB, | Performed by: NURSE PRACTITIONER

## 2019-02-01 PROCEDURE — 1101F PT FALLS ASSESS-DOCD LE1/YR: CPT | Mod: HCNC,CPTII,S$GLB, | Performed by: NURSE PRACTITIONER

## 2019-02-01 PROCEDURE — 99214 PR OFFICE/OUTPT VISIT, EST, LEVL IV, 30-39 MIN: ICD-10-PCS | Mod: HCNC,S$GLB,, | Performed by: NURSE PRACTITIONER

## 2019-02-01 PROCEDURE — 36415 COLL VENOUS BLD VENIPUNCTURE: CPT | Mod: HCNC,PO

## 2019-02-01 PROCEDURE — 82306 VITAMIN D 25 HYDROXY: CPT | Mod: HCNC

## 2019-02-01 RX ORDER — CHLORTHALIDONE 25 MG/1
25 TABLET ORAL DAILY
Qty: 30 TABLET | Refills: 11 | Status: CANCELLED | OUTPATIENT
Start: 2019-02-01

## 2019-02-01 RX ORDER — TELMISARTAN 40 MG/1
40 TABLET ORAL DAILY
Qty: 90 TABLET | Refills: 3 | Status: SHIPPED | OUTPATIENT
Start: 2019-02-01 | End: 2019-03-30 | Stop reason: SDUPTHER

## 2019-02-01 RX ORDER — CLONIDINE 0.1 MG/24H
1 PATCH, EXTENDED RELEASE TRANSDERMAL
Qty: 4 PATCH | Refills: 11 | Status: SHIPPED | OUTPATIENT
Start: 2019-02-01 | End: 2019-02-28 | Stop reason: SDUPTHER

## 2019-02-01 NOTE — TELEPHONE ENCOUNTER
Antonella with [facility] pharmacy notified to cancel prescriptions of Metoprolol succinate 100mg and Nifedipine 60mg from pt's profile.

## 2019-02-01 NOTE — PATIENT INSTRUCTIONS
Try tonic water for the muscle cramps    Stop nefidipine.    Continue the amlodipine 10 mg by mouth daily.    Stop the clonidine tablets once you start the clonidine patch.

## 2019-02-01 NOTE — PROGRESS NOTES
Ms. Jurado is a patient of Dr. Zapien and was last seen in [facility] 9/12/2018.      Subjective:   Patient ID:  Nicolasa Jurado is a 88 y.o. female who presents for follow-up of Hypertension    Problem List:  Labile hypertension    CAD    -LCX coated stent 08/14/2003    Diastolic heart failure, EF 65%    SANA s/p stent on the right side  Hypercholesterolemia    Hypercalcemia and primary hyperparathyroidism    Asthma      HPI:   Nicolasa Jurado' is in clinic today for ED follow up for hypertensive urgency. She is currently taking ramipril 10mg, nifedipine 60mg, amlodipine 10mg, clonidine 0.1 mg TID.  Patient denies chest pain with exertion or at rest, palpitations, SOB, IBARRA, dizziness, syncope, edema, orthopnea, PND, or claudication.  Reports no routine exercise.  She is active gardening, cooking, and cleaning.  Reports SOB that started years ago. She is sleeping on 3 pillows because otherwise she feels SOB.  She does have a h/o asthma and a h/o diastolic dysfunction without heart failure.  Reports good urinary response to the furosemide 20mg.  She is drinking 3 of the 16 oz bottles of water a day, coffee (1 cup), and juice (3 cups). Reports some relief of the SOB with use of her rescue inhaler.  She walks 2 blocks and becomes SOB.   Her SOB is unchanged.     Review of Systems   Constitution: Negative for decreased appetite, diaphoresis, weakness, malaise/fatigue, weight gain and weight loss.   Eyes: Negative for visual disturbance.   Cardiovascular: Negative for chest pain, claudication, dyspnea on exertion, irregular heartbeat, leg swelling, near-syncope, orthopnea, palpitations, paroxysmal nocturnal dyspnea and syncope.        Denies chest pressure   Respiratory: Negative for cough, hemoptysis, shortness of breath, sleep disturbances due to breathing and snoring.    Endocrine: Negative for cold intolerance and heat intolerance.   Hematologic/Lymphatic: Negative for bleeding problem. Does not bruise/bleed  easily.   Musculoskeletal: Positive for back pain, falls, muscle cramps and muscle weakness. Negative for myalgias.   Gastrointestinal: Negative for bloating, abdominal pain, anorexia, change in bowel habit, constipation, diarrhea, nausea and vomiting.   Neurological: Negative for difficulty with concentration, disturbances in coordination, excessive daytime sleepiness, dizziness, headaches, light-headedness, loss of balance and numbness.   Psychiatric/Behavioral: The patient does not have insomnia.        Allergies and current medications updated and reviewed:  Review of patient's allergies indicates:   Allergen Reactions    Venom-wasp     Augmentin  [amoxicillin-pot clavulanate]      Other reaction(s): diarrea  Other reaction(s): Vomiting     Current Outpatient Medications   Medication Sig    albuterol 90 mcg/actuation inhaler Inhale 2 puffs into the lungs every 6 (six) hours as needed for Wheezing.    amLODIPine (NORVASC) 10 MG tablet TAKE 1 TABLET BY MOUTH DAILY    aspirin 81 mg Tab Take 81 mg by mouth every other day.     atorvastatin (LIPITOR) 80 MG tablet TAKE 1 TABLET(80 MG) BY MOUTH EVERY DAY    brimonidine 0.2% (ALPHAGAN) 0.2 % Drop Place 1 drop into both eyes 3 (three) times daily.     cinacalcet (SENSIPAR) 30 MG Tab Take 2 tablets (60 mg total) by mouth daily with breakfast.    clonazePAM (KLONOPIN) 0.5 MG tablet Take 1 tablet (0.5 mg total) by mouth 2 (two) times daily. as needed for anxiety.    cloNIDine (CATAPRES) 0.1 MG tablet Take 1 tablet (0.1 mg total) by mouth daily as needed (for SBP >200 mm Hg). (Patient taking differently: Take 0.1 mg by mouth daily as needed. )    dorzolamide (TRUSOPT) 2 % ophthalmic solution Place 1 drop into both eyes 3 (three) times daily.     fluticasone (FLONASE) 50 mcg/actuation nasal spray SHAKE LIQUID AND USE 1 SPRAY IN EACH NOSTRIL EVERY DAY    fluticasone-vilanterol (BREO ELLIPTA) 100-25 mcg/dose diskus inhaler Inhale 1 puff into the lungs once daily.  Controller    furosemide (LASIX) 20 MG tablet TAKE 1 TABLET BY MOUTH EVERY DAY    latanoprost 0.005 % ophthalmic solution 1 drop every evening.    levalbuterol (XOPENEX) 0.63 mg/3 mL nebulizer solution TAKE 3 MILLILITERS BY NEBULIZATION EVERY 4 HOURS AS NEEDED FOR WHEEZING(RESCUE)    NIFEdipine (ADALAT CC) 60 MG TbSR TAKE 1 TABLET(60 MG) BY MOUTH EVERY DAY    nitroGLYCERIN (NITROSTAT) 0.4 MG SL tablet Place 0.4 mg under the tongue. 1 Tablet, Sublingual Sublingual .  One tablet under tounge as needed for chest pain.    ramipril (ALTACE) 10 MG capsule TAKE 1 CAPSULE BY MOUTH TWICE DAILY    ranitidine (ZANTAC) 150 MG tablet Take 1 tablet (150 mg total) by mouth 2 (two) times daily. PRN heartburn    vit C-vit E-copper-ZnOx-lutein (PRESERVISION) 226-200-5 mg-unit-mg Cap Take by mouth. 2 Capsule Oral Every day    diclofenac sodium (SOLARAZE) 3 % gel Apply topically 2 (two) times daily.     No current facility-administered medications for this visit.        Objective:        /62   Pulse 64   Ht 5' (1.524 m)   Wt 60.2 kg (132 lb 11.5 oz)   BMI 25.92 kg/m²     Physical Exam   Constitutional: She is oriented to person, place, and time. Vital signs are normal. She appears well-developed and well-nourished. She is active. No distress.   HENT:   Head: Normocephalic and atraumatic.   Eyes: Conjunctivae and lids are normal. No scleral icterus.   Neck: Neck supple. Normal carotid pulses, no hepatojugular reflux and no JVD present. Carotid bruit is not present.   Cardiovascular: Normal rate, regular rhythm, S1 normal, S2 normal and intact distal pulses. PMI is not displaced. Exam reveals no gallop and no friction rub.   No murmur heard.  Pulses:       Carotid pulses are 2+ on the right side, and 2+ on the left side.       Radial pulses are 2+ on the right side, and 2+ on the left side.        Dorsalis pedis pulses are 2+ on the right side, and 2+ on the left side.        Posterior tibial pulses are 1+ on the right  side, and 1+ on the left side.   Pulmonary/Chest: Effort normal and breath sounds normal. No respiratory distress. She has no decreased breath sounds. She has no wheezes. She has no rhonchi. She has no rales. She exhibits no tenderness.   Abdominal: Soft. Normal appearance and bowel sounds are normal. She exhibits no distension, no fluid wave, no abdominal bruit, no ascites and no pulsatile midline mass. There is no hepatosplenomegaly. There is no tenderness.   Musculoskeletal: She exhibits no edema.   Neurological: She is alert and oriented to person, place, and time. Gait normal.   Skin: Skin is warm, dry and intact. No rash noted. She is not diaphoretic. Nails show no clubbing.   Psychiatric: She has a normal mood and affect. Her speech is normal and behavior is normal. Judgment and thought content normal. Cognition and memory are normal.   Nursing note and vitals reviewed.      Chemistry        Component Value Date/Time     08/10/2018 1200    K 4.0 08/10/2018 1200     08/10/2018 1200    CO2 29 08/10/2018 1200    BUN 10 08/10/2018 1200    CREATININE 0.8 08/10/2018 1200     08/10/2018 1200        Component Value Date/Time    CALCIUM 10.8 (H) 08/10/2018 1200    ALKPHOS 106 08/10/2018 1200    AST 18 08/10/2018 1200    ALT 16 08/10/2018 1200    BILITOT 0.7 08/10/2018 1200    ESTGFRAFRICA >60 08/10/2018 1200    EGFRNONAA >60 08/10/2018 1200        Lab Results   Component Value Date    HGBA1C 5.9 01/22/2015       Recent Labs   Lab 05/29/17  1034  11/02/17  0900 08/10/18  1200   WHITE BLOOD CELL COUNT  --    < >  --  6.33   HEMOGLOBIN  --    < >  --  12.3   HEMATOCRIT  --    < >  --  37.8   MCV  --    < >  --  85   PLATELETS  --    < >  --  135 L   TSH 2.078  --   --   --    CHOLESTEROL  --   --  123  --    HDL  --   --  35 L  --    LDL CHOLESTEROL  --   --  64.0  --    TRIGLYCERIDES  --   --  120  --    HDL/CHOLESTEROL RATIO  --   --  28.5  --     < > = values in this interval not displayed.               Test(s) Reviewed  I have reviewed the following in detail:  [] Stress test   [] Angiography   [x] Echocardiogram   [x] Labs   [x] Other:  Renal artery ultrasound       Assessment/Plan:   1. Coronary artery disease involving native coronary artery of native heart without angina pectoris  Asymptomatic. Taking high intensity statin and ASA. No changes.    - Comprehensive metabolic panel; Future  - Lipid panel; Future    2. Atherosclerosis of aorta      3. Pure hypercholesterolemia  LDL at goal <70. No changes      4. Essential hypertension  BP at goal <130/80 today. Change clonidine to clonidine patch 0.1mg.  Stop nifedipine. ACEI have been associated with a small increased risk for lung CA. Switch ACEI to ARB. Stop ramipril and start telmisartan 40mg.     - telmisartan (MICARDIS) 40 MG Tab; Take 1 tablet (40 mg total) by mouth once daily.  Dispense: 90 tablet; Refill: 3  - cloNIDine 0.1 mg/24 hr td ptwk (CATAPRES) 0.1 mg/24 hr; Place 1 patch onto the skin every 7 days.  Dispense: 4 patch; Refill: 11    5. Renal artery stenosis  Elevated velocities in the renal artery, particularly on the left in 2018.  Repeat ultrasound.     - CV Ultrasound renal artery stenosis hypertension complete (Cupid Only); Future    6. Hypercalcemia  Calcium 10.8 Check vitamin D    - VITAMIN D; Future    7. CKD (chronic kidney disease) stage 2, GFR 60-89 ml/min    - VITAMIN D; Future    8. Muscle cramp  Check electrolytes (potassium, magnesium, and vitamin D)    - Magnesium; Future  - VITAMIN D; Future    9. SOB (shortness of breath)    - Brain natriuretic peptide; Future  - Transthoracic echo (TTE) complete (Cupid Only); Future    10. Chronic diastolic heart failure  Euvolemic on exam. Check BNP given continued SOB      11. Mesenteric artery stenosis    12. Vitamin D insufficiency  Vitamin D level 27.  Suspect hypercalcemia is secondary to vitamin D insufficiency. Instructed to take vitamin D3 2,000 units daily. F/U with PCP.    13.  Hypercalcemia    The patient was discussed and examined by Dr. Zapien    Follow-up in about 4 weeks (around 3/1/2019).

## 2019-02-04 ENCOUNTER — PATIENT MESSAGE (OUTPATIENT)
Dept: CARDIOLOGY | Facility: CLINIC | Age: 84
End: 2019-02-04

## 2019-02-06 ENCOUNTER — TELEPHONE (OUTPATIENT)
Dept: CARDIOLOGY | Facility: CLINIC | Age: 84
End: 2019-02-06

## 2019-02-06 NOTE — TELEPHONE ENCOUNTER
Nishi calling to report BP/P elevated. Pt taking Micardis 40mg daily, started Clonidine 0.1mg patch yesterday. Patch was applied at approximately 5pm yesterday. Nishi states /93, P 115 after pt tried to walk a block at noon time today.   1PM 190/89,105- pt took Clonazepam  1:50pm 195/94,114  2:00pm 189/90,100  2:20pm 200/98,113  Nishi asking if pt should continue with current medications, also asking if pt should take PRN Clonidine tablet even though she took Clonazepam. Please advise.

## 2019-02-06 NOTE — TELEPHONE ENCOUNTER
Increase telmisartan to 80 mg.  She can take 2 of the 40 mg tablets. I will send a prescription for the 80 mg tab.  Also she should resume metoprolol 50 mg, ie 1/2 of the 100 mg tab or I can send a prescription for the 50 mg dose. My guess is that even though she told us that she was not taking the metoprolol she probably was because her heart rate is now much higher.

## 2019-02-10 RX ORDER — CLONIDINE HYDROCHLORIDE 0.1 MG/1
TABLET ORAL
Qty: 30 TABLET | Refills: 0 | Status: SHIPPED | OUTPATIENT
Start: 2019-02-10 | End: 2019-02-28 | Stop reason: SDUPTHER

## 2019-02-18 ENCOUNTER — CLINICAL SUPPORT (OUTPATIENT)
Dept: CARDIOLOGY | Facility: CLINIC | Age: 84
End: 2019-02-18
Attending: NURSE PRACTITIONER
Payer: MEDICARE

## 2019-02-18 VITALS
WEIGHT: 132 LBS | DIASTOLIC BLOOD PRESSURE: 80 MMHG | SYSTOLIC BLOOD PRESSURE: 120 MMHG | HEIGHT: 60 IN | BODY MASS INDEX: 25.91 KG/M2 | HEART RATE: 58 BPM

## 2019-02-18 DIAGNOSIS — I70.1 RENAL ARTERY STENOSIS: ICD-10-CM

## 2019-02-18 DIAGNOSIS — R06.02 SOB (SHORTNESS OF BREATH): ICD-10-CM

## 2019-02-18 LAB
ABDOMINAL AORTA PROX EDV: 11 CM/S
ABDOMINAL AORTA PROX PSV: 90 CM/S
ASCENDING AORTA: 3.5 CM
AV INDEX (PROSTH): 0.87
AV MEAN GRADIENT: 3.25 MMHG
AV PEAK GRADIENT: 5.66 MMHG
AV VALVE AREA: 2.53 CM2
AV VELOCITY RATIO: 0.89
BSA FOR ECHO PROCEDURE: 1.59 M2
CV ECHO LV RWT: 0.48 CM
DOP CALC AO PEAK VEL: 1.19 M/S
DOP CALC AO VTI: 28.68 CM
DOP CALC LVOT AREA: 2.92 CM2
DOP CALC LVOT DIAMETER: 1.93 CM
DOP CALC LVOT PEAK VEL: 1.06 M/S
DOP CALC LVOT STROKE VOLUME: 72.63 CM3
DOP CALCLVOT PEAK VEL VTI: 24.84 CM
E WAVE DECELERATION TIME: 284.06 MSEC
E/A RATIO: 0.8
E/E' RATIO: 47.6
ECHO LV POSTERIOR WALL: 0.96 CM (ref 0.6–1.1)
FRACTIONAL SHORTENING: 32 % (ref 28–44)
INTERVENTRICULAR SEPTUM: 1.03 CM (ref 0.6–1.1)
IVRT: 0.1 MSEC
LA MAJOR: 5.27 CM
LA MINOR: 5.28 CM
LA WIDTH: 4.2 CM
LEFT ATRIUM SIZE: 3.8 CM
LEFT ATRIUM VOLUME INDEX: 45.7 ML/M2
LEFT ATRIUM VOLUME: 71.56 CM3
LEFT INTERNAL DIMENSION IN SYSTOLE: 2.72 CM (ref 2.1–4)
LEFT RENAL DIST DIAS: 17 CM/S
LEFT RENAL DIST SYS: 223 CM/S
LEFT RENAL MID DIAS: 10 CM/S
LEFT RENAL MID SYS: 230 CM/S
LEFT RENAL ORIGIN DIAS: 25 CM/S
LEFT RENAL ORIGIN SYS: 237 CM/S
LEFT RENAL PROX DIAS: 16 CM/S
LEFT RENAL PROX RAR: 2.17
LEFT RENAL PROX SYS: 195 CM/S
LEFT RENAL ULTRASOUND ACCELERATION TIME MEASUREMENT 1: 187 MS
LEFT RENAL ULTRASOUND ACCELERATION TIME MEASUREMENT 2: 156 MS
LEFT RENAL ULTRASOUND ACCELERATION TIME MEASUREMENT 3: 138 MS
LEFT RENAL ULTRASOUND ACCELERATION TIME MEASUREMENT AVERAGE: 187 MS
LEFT RENAL ULTRASOUND KIDNEY SIZE MEASUREMENT 1: 10.6 CM
LEFT RENAL ULTRASOUND KIDNEY SIZE MEASUREMENT 2: 10.6 CM
LEFT RENAL ULTRASOUND KIDNEY SIZE MEASUREMENT 3: 10.5 CM
LEFT RENAL ULTRASOUND KIDNEY SIZE MEASUREMENT AVERAGE: 10.6 CM
LEFT RENAL ULTRASOUND RESISTIVE INDEX MEASUREMENT 1: 86
LEFT RENAL ULTRASOUND RESISTIVE INDEX MEASUREMENT 2: 77
LEFT RENAL ULTRASOUND RESISTIVE INDEX MEASUREMENT 3: 75
LEFT RENAL ULTRASOUND RESISTIVE INDEX MEASUREMENT AVERAGE: 86
LEFT VENTRICLE DIASTOLIC VOLUME INDEX: 45.28 ML/M2
LEFT VENTRICLE DIASTOLIC VOLUME: 70.84 ML
LEFT VENTRICLE MASS INDEX: 81.3 G/M2
LEFT VENTRICLE SYSTOLIC VOLUME INDEX: 17.5 ML/M2
LEFT VENTRICLE SYSTOLIC VOLUME: 27.44 ML
LEFT VENTRICULAR INTERNAL DIMENSION IN DIASTOLE: 4.02 CM (ref 3.5–6)
LEFT VENTRICULAR MASS: 127.16 G
LV LATERAL E/E' RATIO: 39.67
LV SEPTAL E/E' RATIO: 59.5
MV MEAN GRADIENT: 3 MMHG
MV PEAK A VEL: 1.49 M/S
MV PEAK E VEL: 1.19 M/S
OHS CV LEFT RENAL RAR: 2.63
OHS CV RIGHT RENAL ACCESSORY RAR: 2.73
OHS CV RIGHT RENAL RAR: 1.52
OHS CV US LEFT RENAL HIGHEST EDV: 25
OHS CV US LEFT RENAL HIGHEST PSV: 237
OHS CV US RIGHT RENAL HIGHEST EDV: 21
OHS CV US RIGHT RENAL HIGHEST PSV: 137
PISA TR MAX VEL: 2.67 M/S
PULM VEIN S/D RATIO: 1.97
PV PEAK D VEL: 0.38 M/S
PV PEAK S VEL: 0.75 M/S
PV PEAK VELOCITY: 0.93 CM/S
RA MAJOR: 5.26 CM
RA PRESSURE: 3 MMHG
RA WIDTH: 2.11 CM
RIGHT ACCESSORY RENAL ORIGIN DIAS: 0 CM/S
RIGHT ACCESSORY RENAL ORIGIN SYS: 246 CM/S
RIGHT RENAL DIST DIAS: 20 CM/S
RIGHT RENAL DIST SYS: 107 CM/S
RIGHT RENAL MID DIAS: 21 CM/S
RIGHT RENAL MID SYS: 137 CM/S
RIGHT RENAL ORIGIN DIAS: 18 CM/S
RIGHT RENAL ORIGIN SYS: 132 CM/S
RIGHT RENAL PROX DIAS: 19 CM/S
RIGHT RENAL PROX RAR: 1.49
RIGHT RENAL PROX SYS: 134 CM/S
RIGHT RENAL ULTRASOUND ACCELERATION TIME MEASUREMENT 1: 166 MS
RIGHT RENAL ULTRASOUND ACCELERATION TIME MEASUREMENT 2: 152 MS
RIGHT RENAL ULTRASOUND ACCELERATION TIME MEASUREMENT 3: 159 MS
RIGHT RENAL ULTRASOUND ACCELERATION TIME MEASUREMENT AVERAGE: 166 MS
RIGHT RENAL ULTRASOUND KIDNEY SIZE MEASUREMENT 1: 10.5 CM
RIGHT RENAL ULTRASOUND KIDNEY SIZE MEASUREMENT 2: 10.6 CM
RIGHT RENAL ULTRASOUND KIDNEY SIZE MEASUREMENT 3: 10.6 CM
RIGHT RENAL ULTRASOUND KIDNEY SIZE MEASUREMENT AVERAGE: 10.6 CM
RIGHT RENAL ULTRASOUND RESISTIVE INDEX MEASUREMENT 1: 81
RIGHT RENAL ULTRASOUND RESISTIVE INDEX MEASUREMENT 2: 87
RIGHT RENAL ULTRASOUND RESISTIVE INDEX MEASUREMENT 3: 82
RIGHT RENAL ULTRASOUND RESISTIVE INDEX MEASUREMENT AVERAGE: 87
RIGHT VENTRICULAR END-DIASTOLIC DIMENSION: 2.54 CM
SINUS: 2.74 CM
STJ: 2.42 CM
TDI LATERAL: 0.03
TDI SEPTAL: 0.02
TDI: 0.03
TR MAX PG: 28.52 MMHG
TRICUSPID ANNULAR PLANE SYSTOLIC EXCURSION: 2.37 CM
TV REST PULMONARY ARTERY PRESSURE: 32 MMHG

## 2019-02-18 PROCEDURE — 93975 VASCULAR STUDY: CPT | Mod: HCNC,S$GLB,, | Performed by: INTERNAL MEDICINE

## 2019-02-18 PROCEDURE — 93975 CV US RENAL ARTERY STENOSIS HYPERTENSION COMPLETE (CUPID ONLY): ICD-10-PCS | Mod: HCNC,S$GLB,, | Performed by: INTERNAL MEDICINE

## 2019-02-18 PROCEDURE — 99999 PR PBB SHADOW E&M-EST. PATIENT-LVL II: ICD-10-PCS | Mod: PBBFAC,HCNC,,

## 2019-02-18 PROCEDURE — 93306 TTE W/DOPPLER COMPLETE: CPT | Mod: HCNC,S$GLB,, | Performed by: INTERNAL MEDICINE

## 2019-02-18 PROCEDURE — 93306 TRANSTHORACIC ECHO (TTE) COMPLETE (CUPID ONLY): ICD-10-PCS | Mod: HCNC,S$GLB,, | Performed by: INTERNAL MEDICINE

## 2019-02-18 PROCEDURE — 99999 PR PBB SHADOW E&M-EST. PATIENT-LVL II: CPT | Mod: PBBFAC,HCNC,,

## 2019-02-21 ENCOUNTER — PATIENT MESSAGE (OUTPATIENT)
Dept: CARDIOLOGY | Facility: CLINIC | Age: 84
End: 2019-02-21

## 2019-02-22 ENCOUNTER — PES CALL (OUTPATIENT)
Dept: ADMINISTRATIVE | Facility: CLINIC | Age: 84
End: 2019-02-22

## 2019-02-28 ENCOUNTER — OFFICE VISIT (OUTPATIENT)
Dept: CARDIOLOGY | Facility: CLINIC | Age: 84
End: 2019-02-28
Payer: MEDICARE

## 2019-02-28 VITALS
WEIGHT: 133.5 LBS | DIASTOLIC BLOOD PRESSURE: 63 MMHG | HEART RATE: 87 BPM | SYSTOLIC BLOOD PRESSURE: 138 MMHG | HEIGHT: 60 IN | BODY MASS INDEX: 26.21 KG/M2

## 2019-02-28 DIAGNOSIS — I10 ESSENTIAL HYPERTENSION: Chronic | ICD-10-CM

## 2019-02-28 PROCEDURE — 99213 PR OFFICE/OUTPT VISIT, EST, LEVL III, 20-29 MIN: ICD-10-PCS | Mod: HCNC,S$GLB,, | Performed by: INTERNAL MEDICINE

## 2019-02-28 PROCEDURE — 99999 PR PBB SHADOW E&M-EST. PATIENT-LVL III: CPT | Mod: PBBFAC,HCNC,, | Performed by: INTERNAL MEDICINE

## 2019-02-28 PROCEDURE — 1101F PR PT FALLS ASSESS DOC 0-1 FALLS W/OUT INJ PAST YR: ICD-10-PCS | Mod: HCNC,CPTII,S$GLB, | Performed by: INTERNAL MEDICINE

## 2019-02-28 PROCEDURE — 99213 OFFICE O/P EST LOW 20 MIN: CPT | Mod: HCNC,S$GLB,, | Performed by: INTERNAL MEDICINE

## 2019-02-28 PROCEDURE — 99999 PR PBB SHADOW E&M-EST. PATIENT-LVL III: ICD-10-PCS | Mod: PBBFAC,HCNC,, | Performed by: INTERNAL MEDICINE

## 2019-02-28 PROCEDURE — 1101F PT FALLS ASSESS-DOCD LE1/YR: CPT | Mod: HCNC,CPTII,S$GLB, | Performed by: INTERNAL MEDICINE

## 2019-02-28 RX ORDER — CLONIDINE HYDROCHLORIDE 0.1 MG/1
0.1 TABLET ORAL 2 TIMES DAILY
COMMUNITY
End: 2019-07-16 | Stop reason: SDUPTHER

## 2019-02-28 RX ORDER — CLONIDINE 0.1 MG/24H
1 PATCH, EXTENDED RELEASE TRANSDERMAL
Qty: 6 PATCH | Refills: 11 | Status: SHIPPED | OUTPATIENT
Start: 2019-02-28 | End: 2019-07-16

## 2019-02-28 NOTE — PATIENT INSTRUCTIONS
Avoid salt.  I will try and see if I can get the insurance to cover clonidine patches every 5 days.  If not continue to take clonidine tablet or half when the patch starts to wear off

## 2019-02-28 NOTE — PROGRESS NOTES
Subjective:   Patient ID:  Nicolasa Jurado is a 88 y.o. female who presents for follow-up of Hypertension      Problem List:  Labile hypertension    CAD    -LCX coated stent 08/14/2003    Diastolic heart failure    SANA s/p stent on the right side  Hypercholesterolemia    Hypercalcemia and primary hyperparathyroidism    Asthma    HPI:   Nicolasa Jurado is accompanied by her granddaughter Nishi.   Renal         ROS    Current Outpatient Medications   Medication Sig    albuterol 90 mcg/actuation inhaler Inhale 2 puffs into the lungs every 6 (six) hours as needed for Wheezing.    amLODIPine (NORVASC) 10 MG tablet TAKE 1 TABLET BY MOUTH DAILY    aspirin 81 mg Tab Take 81 mg by mouth every other day.     atorvastatin (LIPITOR) 80 MG tablet TAKE 1 TABLET(80 MG) BY MOUTH EVERY DAY    brimonidine 0.2% (ALPHAGAN) 0.2 % Drop Place 1 drop into both eyes 3 (three) times daily.     cinacalcet (SENSIPAR) 30 MG Tab Take 2 tablets (60 mg total) by mouth daily with breakfast. (Patient taking differently: Take 60 mg by mouth daily with breakfast. Pt taking one pill in am.)    clonazePAM (KLONOPIN) 0.5 MG tablet Take 1 tablet (0.5 mg total) by mouth 2 (two) times daily. as needed for anxiety.    cloNIDine (CATAPRES) 0.1 MG tablet Take 0.1 mg by mouth 2 (two) times daily. Pt taking if sbp is 200 or greater.    cloNIDine 0.1 mg/24 hr td ptwk (CATAPRES) 0.1 mg/24 hr Place 1 patch onto the skin every 7 days.    dorzolamide (TRUSOPT) 2 % ophthalmic solution Place 1 drop into both eyes 3 (three) times daily.     fluticasone (FLONASE) 50 mcg/actuation nasal spray SHAKE LIQUID AND USE 1 SPRAY IN EACH NOSTRIL EVERY DAY    fluticasone-vilanterol (BREO ELLIPTA) 100-25 mcg/dose diskus inhaler Inhale 1 puff into the lungs once daily. Controller    furosemide (LASIX) 20 MG tablet TAKE 1 TABLET BY MOUTH EVERY DAY    latanoprost 0.005 % ophthalmic solution Place 1 drop into both eyes every evening.     levalbuterol (XOPENEX) 0.63 mg/3  mL nebulizer solution TAKE 3 MILLILITERS BY NEBULIZATION EVERY 4 HOURS AS NEEDED FOR WHEEZING(RESCUE)    nitroGLYCERIN (NITROSTAT) 0.4 MG SL tablet Place 0.4 mg under the tongue. 1 Tablet, Sublingual Sublingual .  One tablet under tounge as needed for chest pain.    ranitidine (ZANTAC) 150 MG tablet Take 1 tablet (150 mg total) by mouth 2 (two) times daily. PRN heartburn    telmisartan (MICARDIS) 40 MG Tab Take 1 tablet (40 mg total) by mouth once daily.    vit C-vit E-copper-ZnOx-lutein (PRESERVISION) 226-200-5 mg-unit-mg Cap Take by mouth. 2 Capsule Oral Every day         Social History     Tobacco Use    Smoking status: Never Smoker    Smokeless tobacco: Never Used   Substance Use Topics    Alcohol use: No    Drug use: No         Objective:     Physical Exam        Lab Results   Component Value Date    CHOL 131 02/01/2019    HDL 46 02/01/2019    LDLCALC 58.6 (L) 02/01/2019    TRIG 132 02/01/2019    CHOLHDL 35.1 02/01/2019     Lab Results   Component Value Date     02/01/2019    CREATININE 0.8 02/01/2019    BUN 11 02/01/2019     02/01/2019    K 4.5 02/01/2019     02/01/2019    CO2 30 (H) 02/01/2019     Lab Results   Component Value Date    ALT 23 02/01/2019    AST 24 02/01/2019    ALKPHOS 127 02/01/2019    BILITOT 0.6 02/01/2019           Assessment and Plan:     There are no diagnoses linked to this encounter.    No Follow-up on file.

## 2019-03-01 RX ORDER — AMLODIPINE BESYLATE 10 MG/1
TABLET ORAL
Qty: 30 TABLET | Refills: 11 | Status: SHIPPED | OUTPATIENT
Start: 2019-03-01 | End: 2019-10-03

## 2019-03-04 DIAGNOSIS — E78.00 PURE HYPERCHOLESTEROLEMIA: ICD-10-CM

## 2019-03-04 DIAGNOSIS — I10 ESSENTIAL HYPERTENSION: Primary | ICD-10-CM

## 2019-03-13 RX ORDER — CLONAZEPAM 0.5 MG/1
TABLET ORAL
Qty: 30 TABLET | Refills: 0 | Status: SHIPPED | OUTPATIENT
Start: 2019-03-13 | End: 2019-06-19 | Stop reason: SDUPTHER

## 2019-03-26 RX ORDER — ATORVASTATIN CALCIUM 80 MG/1
TABLET, FILM COATED ORAL
Qty: 90 TABLET | Refills: 3 | Status: SHIPPED | OUTPATIENT
Start: 2019-03-26 | End: 2020-03-09 | Stop reason: SDUPTHER

## 2019-03-28 ENCOUNTER — PATIENT MESSAGE (OUTPATIENT)
Dept: CARDIOLOGY | Facility: CLINIC | Age: 84
End: 2019-03-28

## 2019-03-30 DIAGNOSIS — I10 ESSENTIAL HYPERTENSION: Chronic | ICD-10-CM

## 2019-03-30 RX ORDER — TELMISARTAN 80 MG/1
80 TABLET ORAL DAILY
Qty: 90 TABLET | Refills: 3 | Status: SHIPPED | OUTPATIENT
Start: 2019-03-30 | End: 2020-03-09

## 2019-03-30 NOTE — PROGRESS NOTES
Dr. Zapien instructed pt to increase telmisartan from 40 mg to 80 mg. Pt reports doing well on olmesartan 80 mg with BP in the 130-140s.  Change prescription to reflect current dosing

## 2019-04-30 RX ORDER — FUROSEMIDE 20 MG/1
TABLET ORAL
Qty: 90 TABLET | Refills: 0 | Status: SHIPPED | OUTPATIENT
Start: 2019-04-30 | End: 2019-07-23

## 2019-05-09 RX ORDER — CLONIDINE HYDROCHLORIDE 0.1 MG/1
TABLET ORAL
Qty: 30 TABLET | Refills: 0 | OUTPATIENT
Start: 2019-05-09

## 2019-06-05 RX ORDER — CLONIDINE HYDROCHLORIDE 0.1 MG/1
TABLET ORAL
Qty: 30 TABLET | Refills: 0 | Status: SHIPPED | OUTPATIENT
Start: 2019-06-05 | End: 2019-07-16

## 2019-06-06 RX ORDER — CLONAZEPAM 0.5 MG/1
0.5 TABLET ORAL 2 TIMES DAILY
Qty: 30 TABLET | Refills: 0 | OUTPATIENT
Start: 2019-06-06

## 2019-06-17 ENCOUNTER — PES CALL (OUTPATIENT)
Dept: ADMINISTRATIVE | Facility: CLINIC | Age: 84
End: 2019-06-17

## 2019-06-19 ENCOUNTER — OFFICE VISIT (OUTPATIENT)
Dept: INTERNAL MEDICINE | Facility: CLINIC | Age: 84
End: 2019-06-19
Payer: MEDICARE

## 2019-06-19 VITALS
HEIGHT: 60 IN | TEMPERATURE: 99 F | DIASTOLIC BLOOD PRESSURE: 70 MMHG | HEART RATE: 74 BPM | WEIGHT: 133.19 LBS | BODY MASS INDEX: 26.15 KG/M2 | SYSTOLIC BLOOD PRESSURE: 132 MMHG

## 2019-06-19 DIAGNOSIS — D69.6 THROMBOCYTOPENIA: Chronic | ICD-10-CM

## 2019-06-19 DIAGNOSIS — R07.9 CHEST PAIN, UNSPECIFIED TYPE: ICD-10-CM

## 2019-06-19 DIAGNOSIS — G47.00 INSOMNIA, UNSPECIFIED TYPE: ICD-10-CM

## 2019-06-19 DIAGNOSIS — E21.0 PRIMARY HYPERPARATHYROIDISM: Chronic | ICD-10-CM

## 2019-06-19 DIAGNOSIS — I50.32 CHRONIC DIASTOLIC HEART FAILURE: Chronic | ICD-10-CM

## 2019-06-19 DIAGNOSIS — J45.41 MODERATE PERSISTENT ASTHMA WITH ACUTE EXACERBATION: Chronic | ICD-10-CM

## 2019-06-19 DIAGNOSIS — I10 ESSENTIAL HYPERTENSION: Primary | Chronic | ICD-10-CM

## 2019-06-19 DIAGNOSIS — H35.3290 EXUDATIVE AGE-RELATED MACULAR DEGENERATION, UNSPECIFIED LATERALITY, UNSPECIFIED STAGE: Chronic | ICD-10-CM

## 2019-06-19 PROCEDURE — 93005 EKG 12-LEAD: ICD-10-PCS | Mod: HCNC,S$GLB,, | Performed by: INTERNAL MEDICINE

## 2019-06-19 PROCEDURE — 99214 PR OFFICE/OUTPT VISIT, EST, LEVL IV, 30-39 MIN: ICD-10-PCS | Mod: HCNC,S$GLB,, | Performed by: INTERNAL MEDICINE

## 2019-06-19 PROCEDURE — 99499 UNLISTED E&M SERVICE: CPT | Mod: HCNC,S$GLB,, | Performed by: INTERNAL MEDICINE

## 2019-06-19 PROCEDURE — 99999 PR PBB SHADOW E&M-EST. PATIENT-LVL III: CPT | Mod: PBBFAC,HCNC,, | Performed by: INTERNAL MEDICINE

## 2019-06-19 PROCEDURE — 99499 RISK ADDL DX/OHS AUDIT: ICD-10-PCS | Mod: HCNC,S$GLB,, | Performed by: INTERNAL MEDICINE

## 2019-06-19 PROCEDURE — 99999 PR PBB SHADOW E&M-EST. PATIENT-LVL III: ICD-10-PCS | Mod: PBBFAC,HCNC,, | Performed by: INTERNAL MEDICINE

## 2019-06-19 PROCEDURE — 93005 ELECTROCARDIOGRAM TRACING: CPT | Mod: HCNC,S$GLB,, | Performed by: INTERNAL MEDICINE

## 2019-06-19 PROCEDURE — 99214 OFFICE O/P EST MOD 30 MIN: CPT | Mod: HCNC,S$GLB,, | Performed by: INTERNAL MEDICINE

## 2019-06-19 PROCEDURE — 93010 ELECTROCARDIOGRAM REPORT: CPT | Mod: HCNC,S$GLB,, | Performed by: INTERNAL MEDICINE

## 2019-06-19 PROCEDURE — 1101F PR PT FALLS ASSESS DOC 0-1 FALLS W/OUT INJ PAST YR: ICD-10-PCS | Mod: HCNC,CPTII,S$GLB, | Performed by: INTERNAL MEDICINE

## 2019-06-19 PROCEDURE — 93010 EKG 12-LEAD: ICD-10-PCS | Mod: HCNC,S$GLB,, | Performed by: INTERNAL MEDICINE

## 2019-06-19 PROCEDURE — 1101F PT FALLS ASSESS-DOCD LE1/YR: CPT | Mod: HCNC,CPTII,S$GLB, | Performed by: INTERNAL MEDICINE

## 2019-06-19 RX ORDER — PILOCARPINE HYDROCHLORIDE 20 MG/ML
SOLUTION/ DROPS OPHTHALMIC
Refills: 3 | COMMUNITY
Start: 2019-04-22

## 2019-06-19 RX ORDER — ALBUTEROL SULFATE 90 UG/1
2 AEROSOL, METERED RESPIRATORY (INHALATION) EVERY 6 HOURS PRN
Qty: 18 G | Refills: 6 | Status: SHIPPED | OUTPATIENT
Start: 2019-06-19 | End: 2020-02-06 | Stop reason: SDUPTHER

## 2019-06-19 RX ORDER — CLONIDINE HYDROCHLORIDE 0.1 MG/1
0.1 TABLET ORAL ONCE
Status: COMPLETED | OUTPATIENT
Start: 2019-06-19 | End: 2019-06-19

## 2019-06-19 RX ORDER — LEVALBUTEROL INHALATION SOLUTION 0.63 MG/3ML
SOLUTION RESPIRATORY (INHALATION)
Qty: 1350 ML | Refills: 3 | Status: SHIPPED | OUTPATIENT
Start: 2019-06-19 | End: 2020-11-18 | Stop reason: SDUPTHER

## 2019-06-19 RX ORDER — CLONAZEPAM 0.5 MG/1
0.5 TABLET ORAL 2 TIMES DAILY
Qty: 30 TABLET | Refills: 0 | Status: SHIPPED | OUTPATIENT
Start: 2019-06-19 | End: 2019-10-07 | Stop reason: SDUPTHER

## 2019-06-19 RX ADMIN — CLONIDINE HYDROCHLORIDE 0.1 MG: 0.1 TABLET ORAL at 03:06
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
## 2019-06-24 RX ORDER — CLONIDINE HYDROCHLORIDE 0.1 MG/1
TABLET ORAL
Qty: 30 TABLET | Refills: 0 | OUTPATIENT
Start: 2019-06-24

## 2019-07-01 ENCOUNTER — CLINICAL SUPPORT (OUTPATIENT)
Dept: CARDIOLOGY | Facility: CLINIC | Age: 84
End: 2019-07-01
Attending: INTERNAL MEDICINE
Payer: MEDICARE

## 2019-07-01 ENCOUNTER — TELEPHONE (OUTPATIENT)
Dept: CARDIOLOGY | Facility: CLINIC | Age: 84
End: 2019-07-01

## 2019-07-01 DIAGNOSIS — R07.9 CHEST PAIN, UNSPECIFIED TYPE: ICD-10-CM

## 2019-07-01 NOTE — TELEPHONE ENCOUNTER
Patient here today for DSE. Initial Blood pressure reading 35356. Rechecked manually 202/80. Patient states she took her BP meds this am. Discussed with Dr Hwang and it was decided that we would not proceed with the DSE today. Dr. Houston Gaston notified of patient's BP and he agreed.  Instructions were given to patient to recheck her BP this evening and in the morning and call Dr. Gaston's office by 12 or 1 pm tomorrow. Patient verbalized understanding

## 2019-07-02 ENCOUNTER — TELEPHONE (OUTPATIENT)
Dept: INTERNAL MEDICINE | Facility: CLINIC | Age: 84
End: 2019-07-02

## 2019-07-02 NOTE — TELEPHONE ENCOUNTER
----- Message from Palmira Beverly sent at 7/2/2019  9:57 AM CDT -----  Contact: Self [phone]  Patient will like to leave B/P reading starting from last night at 7:00 pm it was 159/75 and in the morning it was 174/72 heart rate is 70.

## 2019-07-02 NOTE — TELEPHONE ENCOUNTER
Let's have patient follow-up.  Sounds like her blood pressures are running higher than what we are aware of.

## 2019-07-05 ENCOUNTER — OFFICE VISIT (OUTPATIENT)
Dept: INTERNAL MEDICINE | Facility: CLINIC | Age: 84
End: 2019-07-05
Payer: MEDICARE

## 2019-07-05 VITALS
TEMPERATURE: 99 F | SYSTOLIC BLOOD PRESSURE: 130 MMHG | HEIGHT: 60 IN | HEART RATE: 68 BPM | RESPIRATION RATE: 18 BRPM | DIASTOLIC BLOOD PRESSURE: 70 MMHG | WEIGHT: 132.06 LBS | BODY MASS INDEX: 25.93 KG/M2

## 2019-07-05 DIAGNOSIS — I10 ESSENTIAL HYPERTENSION: Primary | Chronic | ICD-10-CM

## 2019-07-05 DIAGNOSIS — J45.41 MODERATE PERSISTENT ASTHMA WITH ACUTE EXACERBATION: Chronic | ICD-10-CM

## 2019-07-05 DIAGNOSIS — R09.89 LABILE HYPERTENSION: ICD-10-CM

## 2019-07-05 DIAGNOSIS — S99.911A INJURY OF RIGHT ANKLE, INITIAL ENCOUNTER: ICD-10-CM

## 2019-07-05 PROCEDURE — 99999 PR PBB SHADOW E&M-EST. PATIENT-LVL V: CPT | Mod: PBBFAC,HCNC,, | Performed by: INTERNAL MEDICINE

## 2019-07-05 PROCEDURE — 99214 OFFICE O/P EST MOD 30 MIN: CPT | Mod: HCNC,S$GLB,, | Performed by: INTERNAL MEDICINE

## 2019-07-05 PROCEDURE — 1101F PR PT FALLS ASSESS DOC 0-1 FALLS W/OUT INJ PAST YR: ICD-10-PCS | Mod: HCNC,CPTII,S$GLB, | Performed by: INTERNAL MEDICINE

## 2019-07-05 PROCEDURE — 1101F PT FALLS ASSESS-DOCD LE1/YR: CPT | Mod: HCNC,CPTII,S$GLB, | Performed by: INTERNAL MEDICINE

## 2019-07-05 PROCEDURE — 99999 PR PBB SHADOW E&M-EST. PATIENT-LVL V: ICD-10-PCS | Mod: PBBFAC,HCNC,, | Performed by: INTERNAL MEDICINE

## 2019-07-05 PROCEDURE — 99214 PR OFFICE/OUTPT VISIT, EST, LEVL IV, 30-39 MIN: ICD-10-PCS | Mod: HCNC,S$GLB,, | Performed by: INTERNAL MEDICINE

## 2019-07-05 RX ORDER — AZELASTINE 1 MG/ML
1 SPRAY, METERED NASAL 2 TIMES DAILY
Qty: 30 ML | Refills: 11 | Status: SHIPPED | OUTPATIENT
Start: 2019-07-05 | End: 2019-07-16

## 2019-07-05 RX ORDER — DORZOLAMIDE HYDROCHLORIDE AND TIMOLOL MALEATE 20; 5 MG/ML; MG/ML
SOLUTION/ DROPS OPHTHALMIC
Refills: 3 | COMMUNITY
Start: 2019-06-25 | End: 2019-07-16

## 2019-07-05 RX ORDER — FLUTICASONE PROPIONATE 50 MCG
SPRAY, SUSPENSION (ML) NASAL
Qty: 48 ML | Refills: 0 | OUTPATIENT
Start: 2019-07-05

## 2019-07-05 RX ORDER — METHYLPREDNISOLONE 4 MG/1
TABLET ORAL
Qty: 21 TABLET | Refills: 0 | Status: SHIPPED | OUTPATIENT
Start: 2019-07-05 | End: 2019-07-16 | Stop reason: ALTCHOICE

## 2019-07-05 RX ORDER — FLUTICASONE PROPIONATE 50 MCG
1 SPRAY, SUSPENSION (ML) NASAL DAILY
Qty: 16 G | Refills: 0 | Status: SHIPPED | OUTPATIENT
Start: 2019-07-05 | End: 2019-07-05
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
## 2019-07-12 DIAGNOSIS — N18.30 CHRONIC KIDNEY DISEASE, STAGE III (MODERATE): ICD-10-CM

## 2019-07-12 RX ORDER — CINACALCET 30 MG/1
60 TABLET, FILM COATED ORAL
Qty: 180 TABLET | Refills: 1 | Status: SHIPPED | OUTPATIENT
Start: 2019-07-12 | End: 2019-11-11 | Stop reason: SDUPTHER

## 2019-07-16 ENCOUNTER — OFFICE VISIT (OUTPATIENT)
Dept: CARDIOLOGY | Facility: CLINIC | Age: 84
End: 2019-07-16
Payer: MEDICARE

## 2019-07-16 VITALS
HEIGHT: 60 IN | DIASTOLIC BLOOD PRESSURE: 91 MMHG | HEART RATE: 86 BPM | BODY MASS INDEX: 25.95 KG/M2 | WEIGHT: 132.19 LBS | SYSTOLIC BLOOD PRESSURE: 198 MMHG

## 2019-07-16 DIAGNOSIS — I10 ESSENTIAL HYPERTENSION: Primary | Chronic | ICD-10-CM

## 2019-07-16 DIAGNOSIS — I25.10 CORONARY ARTERY DISEASE INVOLVING NATIVE CORONARY ARTERY OF NATIVE HEART WITHOUT ANGINA PECTORIS: ICD-10-CM

## 2019-07-16 DIAGNOSIS — I70.0 ATHEROSCLEROSIS OF AORTA: Chronic | ICD-10-CM

## 2019-07-16 DIAGNOSIS — I51.89 LEFT VENTRICULAR DIASTOLIC DYSFUNCTION WITH PRESERVED SYSTOLIC FUNCTION: Chronic | ICD-10-CM

## 2019-07-16 DIAGNOSIS — K55.1 MESENTERIC ARTERY STENOSIS: Chronic | ICD-10-CM

## 2019-07-16 DIAGNOSIS — I70.1 RENAL ARTERY STENOSIS: Chronic | ICD-10-CM

## 2019-07-16 PROCEDURE — 3288F PR FALLS RISK ASSESSMENT DOCUMENTED: ICD-10-PCS | Mod: HCNC,CPTII,S$GLB, | Performed by: INTERNAL MEDICINE

## 2019-07-16 PROCEDURE — 99999 PR PBB SHADOW E&M-EST. PATIENT-LVL III: CPT | Mod: PBBFAC,HCNC,, | Performed by: INTERNAL MEDICINE

## 2019-07-16 PROCEDURE — 99999 PR PBB SHADOW E&M-EST. PATIENT-LVL III: ICD-10-PCS | Mod: PBBFAC,HCNC,, | Performed by: INTERNAL MEDICINE

## 2019-07-16 PROCEDURE — 1100F PTFALLS ASSESS-DOCD GE2>/YR: CPT | Mod: HCNC,CPTII,S$GLB, | Performed by: INTERNAL MEDICINE

## 2019-07-16 PROCEDURE — 3288F FALL RISK ASSESSMENT DOCD: CPT | Mod: HCNC,CPTII,S$GLB, | Performed by: INTERNAL MEDICINE

## 2019-07-16 PROCEDURE — 1100F PR PT FALLS ASSESS DOC 2+ FALLS/FALL W/INJURY/YR: ICD-10-PCS | Mod: HCNC,CPTII,S$GLB, | Performed by: INTERNAL MEDICINE

## 2019-07-16 PROCEDURE — 99499 RISK ADDL DX/OHS AUDIT: ICD-10-PCS | Mod: HCNC,S$GLB,, | Performed by: INTERNAL MEDICINE

## 2019-07-16 PROCEDURE — 99214 OFFICE O/P EST MOD 30 MIN: CPT | Mod: HCNC,S$GLB,, | Performed by: INTERNAL MEDICINE

## 2019-07-16 PROCEDURE — 99214 PR OFFICE/OUTPT VISIT, EST, LEVL IV, 30-39 MIN: ICD-10-PCS | Mod: HCNC,S$GLB,, | Performed by: INTERNAL MEDICINE

## 2019-07-16 PROCEDURE — 99499 UNLISTED E&M SERVICE: CPT | Mod: HCNC,S$GLB,, | Performed by: INTERNAL MEDICINE

## 2019-07-16 RX ORDER — DORZOLAMIDE HYDROCHLORIDE AND TIMOLOL MALEATE 20; 5 MG/ML; MG/ML
1 SOLUTION/ DROPS OPHTHALMIC 2 TIMES DAILY
COMMUNITY

## 2019-07-16 RX ORDER — FLUTICASONE PROPIONATE 50 MCG
1 SPRAY, SUSPENSION (ML) NASAL DAILY PRN
Refills: 0 | COMMUNITY
Start: 2019-07-05

## 2019-07-16 RX ORDER — NITROGLYCERIN 0.4 MG/1
TABLET SUBLINGUAL
Qty: 100 TABLET | Refills: 0 | Status: SHIPPED | OUTPATIENT
Start: 2019-07-16 | End: 2023-11-20 | Stop reason: SDUPTHER

## 2019-07-16 RX ORDER — CARVEDILOL 12.5 MG/1
12.5 TABLET ORAL 2 TIMES DAILY WITH MEALS
Qty: 60 TABLET | Refills: 11 | Status: SHIPPED | OUTPATIENT
Start: 2019-07-16 | End: 2019-11-12

## 2019-07-16 RX ORDER — HYDROCHLOROTHIAZIDE 25 MG/1
25 TABLET ORAL DAILY
Qty: 30 TABLET | Refills: 11 | Status: SHIPPED | OUTPATIENT
Start: 2019-07-16 | End: 2019-11-12

## 2019-07-16 NOTE — PROGRESS NOTES
Subjective:   Patient ID:  Nicolasa Jurado is a 88 y.o. female who presents for evaluation of Hypertension      HPI: Very pleasant woman here with her grandson's wife for evaluation of her hypertension.  Her HTN has been very difficult to control.  She continues to use clonidine tablets most days, once or twice daily.    Her grandson's wife, a NP, is willing to help with using digital HTN.    Pressures run 140s to 170s systolic at home, but sometimes she has higher spikes.  Recently, she was referred for DSE (by Dr. Gaston) because of some chest pain reported when her BP spiked to 225 systolic.  When she showed up for the stress test, her SBP was too high to proceed.    She is on max doses of an ARB and Norvasc.  She does not take HCTZ because of a history of hypercalcemia - mild - and that was deemed to be a consequence of primary hyperparathyroidism, controlled with Sensipar.    Problem List:  Labile hypertension    CAD    -LCX coated stent 08/14/2003    Diastolic heart failure, EF 65%    SANA s/p stent on the right side  Hypercholesterolemia    Hypercalcemia and primary hyperparathyroidism    Asthma        Karen Ramsey Feb 2019 HPI: Nicolasa Duncan is in clinic today for ED follow up for hypertensive urgency. She is currently taking ramipril 10mg, nifedipine 60mg, amlodipine 10mg, clonidine 0.1 mg TID.  Patient denies chest pain with exertion or at rest, palpitations, SOB, IBARRA, dizziness, syncope, edema, orthopnea, PND, or claudication.  Reports no routine exercise.  She is active gardening, cooking, and cleaning.  Reports SOB that started years ago. She is sleeping on 3 pillows because otherwise she feels SOB.  She does have a h/o asthma and a h/o diastolic dysfunction without heart failure.  Reports good urinary response to the furosemide 20mg.  She is drinking 3 of the 16 oz bottles of water a day, coffee (1 cup), and juice (3 cups). Reports some relief of the SOB with use of her rescue inhaler.  She walks 2  blocks and becomes SOB.   Her SOB is unchanged.     Past Medical History:   Diagnosis Date    Anemia     Asthma, chronic     CAD (coronary artery disease)     Elevated glucose 2015    GERD (gastroesophageal reflux disease)     protonix 40    Hypercalcemia 10/16/2012    Hyperlipidemia     Hyperparathyroidism     Hypertension     Insomnia 2016    Renal artery stenosis     Right low back pain 2016       Past Surgical History:   Procedure Laterality Date    CHOLECYSTECTOMY      COLONOSCOPY  2006    CORONARY ANGIOPLASTY WITH STENT PLACEMENT      1 heart/1 kidney    EGD (ESOPHAGOGASTRODUODENOSCOPY) N/A 2013    Performed [facility] Dem[facility]quin [facility] at [facility])    HYSTERECTOMY      KIDNEY SURGERY      stent in renal artery       Social History     Tobacco Use    Smoking status: Never Smoker    Smokeless tobacco: Never Used   Substance Use Topics    Alcohol use: No    Drug use: No       Family History   Problem Relation Age of Onset    Splenomegaly Daughter           from ruptured spleen    Miscarriages / Stillbirths Mother     Liver disease Father     No Known Problems Sister     Hypertension Son     Hypercalcemia Neg Hx     Thyroid cancer Neg Hx        Current Outpatient Medications   Medication Sig    albuterol (PROVENTIL/VENTOLIN HFA) 90 mcg/actuation inhaler Inhale 2 puffs into the lungs every 6 (six) hours as needed for Wheezing.    amLODIPine (NORVASC) 10 MG tablet TAKE 1 TABLET BY MOUTH DAILY    aspirin 81 mg Tab Take 81 mg by mouth every other day.     atorvastatin (LIPITOR) 80 MG tablet TAKE 1 TABLET(80 MG) BY MOUTH EVERY DAY    brimonidine 0.2% (ALPHAGAN) 0.2 % Drop Place 1 drop into both eyes 3 (three) times daily.     cinacalcet (SENSIPAR) 30 MG Tab Take 2 tablets (60 mg total) by mouth daily with breakfast.    clonazePAM (KLONOPIN) 0.5 MG tablet Take 1 tablet (0.5 mg total) by mouth 2 (two) times daily. as needed for anxiety.    cloNIDine (CATAPRES)  0.1 MG tablet TAKE 1 TABLET BY MOUTH EVERY DAY AS NEEDED FOR SYSTOLIC BLOOD PRESSURE>200    cloNIDine 0.1 mg/24 hr td ptwk (CATAPRES) 0.1 mg/24 hr Place 1 patch onto the skin Every 5 (five) days. (Patient taking differently: Place 1 patch onto the skin every 7 days. )    dorzolamide (TRUSOPT) 2 % ophthalmic solution Place 1 drop into both eyes 3 (three) times daily.     dorzolamide-timolol 2-0.5% (COSOPT) 22.3-6.8 mg/mL ophthalmic solution 1 drop 2 (two) times daily.    fluticasone propionate (FLONASE) 50 mcg/actuation nasal spray 1 spray by Each Nare route daily as needed.     furosemide (LASIX) 20 MG tablet TAKE 1 TABLET BY MOUTH EVERY DAY    latanoprost 0.005 % ophthalmic solution Place 1 drop into both eyes every evening.     levalbuterol (XOPENEX) 0.63 mg/3 mL nebulizer solution TAKE 3 MILLILITERS BY NEBULIZATION EVERY 4 HOURS AS NEEDED FOR WHEEZING(RESCUE)    nitroGLYCERIN (NITROSTAT) 0.4 MG SL tablet 1 Tablet Sublingual  One tablet under tounge as needed for chest pain.    pilocarpine HCL 2% (PILOCAR) 2 % ophthalmic solution INT 1 GTT INTO OU QID    ranitidine (ZANTAC) 150 MG tablet Take 1 tablet (150 mg total) by mouth 2 (two) times daily. PRN heartburn    telmisartan (MICARDIS) 80 MG Tab Take 1 tablet (80 mg total) by mouth once daily.    vit C-vit E-copper-ZnOx-lutein (PRESERVISION) 226-200-5 mg-unit-mg Cap Take by mouth. 2 Capsule Oral Every day     No current facility-administered medications for this visit.      She's also on Toprol 50.    Review of patient's allergies indicates:   Allergen Reactions    Venom-wasp     Penicillin      Other reaction(s): Rash    Amoxicillin-pot clavulanate      Other reaction(s): diarrea  Other reaction(s): Vomiting  Other reaction(s): diarrea       Review of Systems   Constitution: Negative.   HENT: Negative.    Eyes: Negative.    Cardiovascular: Negative.  Negative for chest pain, dyspnea on exertion, near-syncope, orthopnea and palpitations.    Respiratory: Negative.  Negative for cough, hemoptysis and shortness of breath.    Endocrine: Negative.    Hematologic/Lymphatic: Negative.    Skin: Negative.    Musculoskeletal: Negative.    Gastrointestinal: Negative.    Genitourinary: Negative.    Neurological: Negative.    Psychiatric/Behavioral: Negative.      Objective:   Physical Exam   Constitutional: She is oriented to person, place, and time. She appears well-developed and well-nourished.   HENT:   Head: Normocephalic and atraumatic.   Mouth/Throat: Oropharynx is clear and moist.   Eyes: Conjunctivae and EOM are normal. No scleral icterus.   Neck: Normal range of motion. Neck supple. No JVD present.   Cardiovascular: Normal rate, regular rhythm, normal heart sounds and intact distal pulses. Exam reveals no gallop and no friction rub.   No murmur heard.  Pulmonary/Chest: Effort normal and breath sounds normal. She has no wheezes. She has no rales.   Abdominal: Soft. Bowel sounds are normal. She exhibits no distension. There is no tenderness.   Musculoskeletal: Normal range of motion. She exhibits no edema.   Neurological: She is alert and oriented to person, place, and time.   Skin: Skin is warm and dry. No rash noted. No erythema.   Psychiatric: She has a normal mood and affect. Her behavior is normal. Judgment and thought content normal.   Vitals reviewed.      Lab Results   Component Value Date    WBC 6.33 08/10/2018    HGB 12.3 08/10/2018    HCT 37.8 08/10/2018    MCV 85 08/10/2018     (L) 08/10/2018         Chemistry        Component Value Date/Time     02/01/2019 1235    K 4.5 02/01/2019 1235     02/01/2019 1235    CO2 30 (H) 02/01/2019 1235    BUN 11 02/01/2019 1235    CREATININE 0.8 02/01/2019 1235     02/01/2019 1235        Component Value Date/Time    CALCIUM 10.9 (H) 02/01/2019 1235    ALKPHOS 127 02/01/2019 1235    AST 24 02/01/2019 1235    ALT 23 02/01/2019 1235    BILITOT 0.6 02/01/2019 1235    ESTGFRAFRICA >60.0  02/01/2019 1235    EGFRNONAA >60.0 02/01/2019 1235            Lab Results   Component Value Date    CHOL 131 02/01/2019    CHOL 123 11/02/2017    CHOL 111 (L) 01/11/2017     Lab Results   Component Value Date    HDL 46 02/01/2019    HDL 35 (L) 11/02/2017    HDL 41 01/11/2017     Lab Results   Component Value Date    LDLCALC 58.6 (L) 02/01/2019    LDLCALC 64.0 11/02/2017    LDLCALC 51.2 (L) 01/11/2017     Lab Results   Component Value Date    TRIG 132 02/01/2019    TRIG 120 11/02/2017    TRIG 94 01/11/2017     Lab Results   Component Value Date    CHOLHDL 35.1 02/01/2019    CHOLHDL 28.5 11/02/2017    CHOLHDL 36.9 01/11/2017       Lab Results   Component Value Date    TSH 2.078 05/29/2017       Lab Results   Component Value Date    HGBA1C 5.9 01/22/2015         Assessment:     1. Essential hypertension    2. Coronary artery disease involving native coronary artery of native heart without angina pectoris    3. Atherosclerosis of aorta    4. Left ventricular diastolic dysfunction with preserved systolic function    5. Mesenteric artery stenosis    6. Renal artery stenosis        Plan:     Stop Toprol, Lasix, and absolutely stop all clonidine products.    Continue telmisartan 80 and amlodipine 10.  Add Coreg 12.5 bid and HCTZ 25.    Digital Hypertension enrollment    BMP in 3 weeks    F/U 2 months with me.

## 2019-07-16 NOTE — LETTER
July 16, 2019      Houston Gaston MD  [address]  [place] [address]           [place] [facility]  [address]  [place] [address]  Phone: [phone]          Patient: Nicolasa Jurado   MR Number: [id number]   YOB: 1930   Date of Visit: 7/16/2019       Dear Dr. Houston Gaston:    Thank you for referring Nicolasa Jurado to me for evaluation. Attached you will find relevant portions of my assessment and plan of care.    If you have questions, please do not hesitate to call me. I look forward to following Nicolasa Jurado along with you.    Sincerely,    Vu Maxwell MD    Enclosure  CC:  No Recipients    If you would like to receive this communication electronically, please contact [email or web address] or [phone] to request more information on [facility] access.    For providers and/or their staff who would like to refer a patient to [facility], please contact us through our one-stop-shop provider referral line, [facility], at [phone].    If you feel you have received this communication in error or would no longer like to receive these types of communications, please e-mail [email or web address]

## 2019-07-22 ENCOUNTER — PATIENT MESSAGE (OUTPATIENT)
Dept: ADMINISTRATIVE | Facility: OTHER | Age: 84
End: 2019-07-22

## 2019-07-23 ENCOUNTER — PATIENT MESSAGE (OUTPATIENT)
Dept: ADMINISTRATIVE | Facility: OTHER | Age: 84
End: 2019-07-23

## 2019-08-08 ENCOUNTER — LAB VISIT (OUTPATIENT)
Dept: LAB | Facility: HOSPITAL | Age: 84
End: 2019-08-08
Attending: INTERNAL MEDICINE
Payer: MEDICARE

## 2019-08-08 DIAGNOSIS — I10 ESSENTIAL HYPERTENSION: Chronic | ICD-10-CM

## 2019-08-08 LAB
ANION GAP SERPL CALC-SCNC: 10 MMOL/L (ref 8–16)
BUN SERPL-MCNC: 19 MG/DL (ref 8–23)
CALCIUM SERPL-MCNC: 10.3 MG/DL (ref 8.7–10.5)
CHLORIDE SERPL-SCNC: 99 MMOL/L (ref 95–110)
CO2 SERPL-SCNC: 28 MMOL/L (ref 23–29)
CREAT SERPL-MCNC: 1 MG/DL (ref 0.5–1.4)
EST. GFR  (AFRICAN AMERICAN): 58.1 ML/MIN/1.73 M^2
EST. GFR  (NON AFRICAN AMERICAN): 50.4 ML/MIN/1.73 M^2
GLUCOSE SERPL-MCNC: 84 MG/DL (ref 70–110)
POTASSIUM SERPL-SCNC: 3.6 MMOL/L (ref 3.5–5.1)
SODIUM SERPL-SCNC: 137 MMOL/L (ref 136–145)

## 2019-08-08 PROCEDURE — 36415 COLL VENOUS BLD VENIPUNCTURE: CPT | Mod: HCNC,PO

## 2019-08-08 PROCEDURE — 80048 BASIC METABOLIC PNL TOTAL CA: CPT | Mod: HCNC

## 2019-09-04 ENCOUNTER — OFFICE VISIT (OUTPATIENT)
Dept: CARDIOLOGY | Facility: CLINIC | Age: 84
End: 2019-09-04
Payer: MEDICARE

## 2019-09-04 VITALS
SYSTOLIC BLOOD PRESSURE: 187 MMHG | HEART RATE: 66 BPM | DIASTOLIC BLOOD PRESSURE: 83 MMHG | HEIGHT: 60 IN | BODY MASS INDEX: 25.93 KG/M2 | WEIGHT: 132.06 LBS

## 2019-09-04 DIAGNOSIS — I25.10 CORONARY ARTERY DISEASE INVOLVING NATIVE CORONARY ARTERY OF NATIVE HEART WITHOUT ANGINA PECTORIS: ICD-10-CM

## 2019-09-04 DIAGNOSIS — I10 ESSENTIAL HYPERTENSION: Primary | Chronic | ICD-10-CM

## 2019-09-04 DIAGNOSIS — I70.0 ATHEROSCLEROSIS OF AORTA: Chronic | ICD-10-CM

## 2019-09-04 DIAGNOSIS — I70.1 RENAL ARTERY STENOSIS: Chronic | ICD-10-CM

## 2019-09-04 PROCEDURE — 1101F PT FALLS ASSESS-DOCD LE1/YR: CPT | Mod: HCNC,CPTII,S$GLB, | Performed by: INTERNAL MEDICINE

## 2019-09-04 PROCEDURE — 99213 OFFICE O/P EST LOW 20 MIN: CPT | Mod: HCNC,S$GLB,, | Performed by: INTERNAL MEDICINE

## 2019-09-04 PROCEDURE — 99999 PR PBB SHADOW E&M-EST. PATIENT-LVL III: CPT | Mod: PBBFAC,HCNC,, | Performed by: INTERNAL MEDICINE

## 2019-09-04 PROCEDURE — 99999 PR PBB SHADOW E&M-EST. PATIENT-LVL III: ICD-10-PCS | Mod: PBBFAC,HCNC,, | Performed by: INTERNAL MEDICINE

## 2019-09-04 PROCEDURE — 99213 PR OFFICE/OUTPT VISIT, EST, LEVL III, 20-29 MIN: ICD-10-PCS | Mod: HCNC,S$GLB,, | Performed by: INTERNAL MEDICINE

## 2019-09-04 PROCEDURE — 1101F PR PT FALLS ASSESS DOC 0-1 FALLS W/OUT INJ PAST YR: ICD-10-PCS | Mod: HCNC,CPTII,S$GLB, | Performed by: INTERNAL MEDICINE

## 2019-09-04 NOTE — PROGRESS NOTES
Subjective:   Patient ID:  Nicolasa Jurado is a 88 y.o. female who presents for follow up of Hypertension      HPI: Back for 2 month f/u of difficult HTN.  She did not get enrolled in digital HTN but states that her pressures at home have been much better.  Sometimes, in the AM, her BP is near 100 systolic (and this is checked with two separate cuffs - hers and her son-in-law's) and in the evenings her spikes are much less than they were previously (no higher than 140s, they say).  Occasionally, she has lightheadedness with the lowest BPs.    No angina or new dyspnea.     July 2019 HPI: Very pleasant woman here with her grandson's wife for evaluation of her hypertension.  Her HTN has been very difficult to control.  She continues to use clonidine tablets most days, once or twice daily.     Her grandson's wife, a NP, is willing to help with using digital HTN.     Pressures run 140s to 170s systolic at home, but sometimes she has higher spikes.  Recently, she was referred for DSE (by Dr. Gaston) because of some chest pain reported when her BP spiked to 225 systolic.  When she showed up for the stress test, her SBP was too high to proceed.     She is on max doses of an ARB and Norvasc.  She does not take HCTZ because of a history of hypercalcemia - mild - and that was deemed to be a consequence of primary hyperparathyroidism, controlled with Sensipar.     Problem List:  Labile hypertension    CAD    -LCX coated stent 08/14/2003    Diastolic heart failure, EF 65%    SANA s/p stent on the right side  Hypercholesterolemia    Hypercalcemia and primary hyperparathyroidism    Asthma        Karen Cerrato' Feb 2019 HPI: Nicolasa Duncan is in clinic today for ED follow up for hypertensive urgency. She is currently taking ramipril 10mg, nifedipine 60mg, amlodipine 10mg, clonidine 0.1 mg TID.  Patient denies chest pain with exertion or at rest, palpitations, SOB, IBARRA, dizziness, syncope, edema, orthopnea, PND, or claudication.   Reports no routine exercise.  She is active gardening, cooking, and cleaning.  Reports SOB that started years ago. She is sleeping on 3 pillows because otherwise she feels SOB.  She does have a h/o asthma and a h/o diastolic dysfunction without heart failure.  Reports good urinary response to the furosemide 20mg.  She is drinking 3 of the 16 oz bottles of water a day, coffee (1 cup), and juice (3 cups). Reports some relief of the SOB with use of her rescue inhaler.  She walks 2 blocks and becomes SOB.   Her SOB is unchanged.     Patient Active Problem List   Diagnosis    Pure hypercholesterolemia    CAD (coronary artery disease)    Osteoporosis, postmenopausal    Primary hyperparathyroidism    Renal artery stenosis    Mesenteric artery stenosis    Essential hypertension    Hypercalcemia    Atherosclerosis of aorta    Asthma    Chronic diastolic heart failure    Left ventricular diastolic dysfunction with preserved systolic function    Exudative macular degeneration    Insomnia    Right low back pain    Thrombocytopenia    Overweight    Neuropathy    Right leg pain    Lactose intolerance    CKD (chronic kidney disease) stage 2, GFR 60-89 ml/min    Cough       Current Outpatient Medications   Medication Sig    albuterol (PROVENTIL/VENTOLIN HFA) 90 mcg/actuation inhaler Inhale 2 puffs into the lungs every 6 (six) hours as needed for Wheezing.    amLODIPine (NORVASC) 10 MG tablet TAKE 1 TABLET BY MOUTH DAILY    aspirin 81 mg Tab Take 81 mg by mouth every other day.     atorvastatin (LIPITOR) 80 MG tablet TAKE 1 TABLET(80 MG) BY MOUTH EVERY DAY    brimonidine 0.2% (ALPHAGAN) 0.2 % Drop Place 1 drop into both eyes 3 (three) times daily.     carvedilol (COREG) 12.5 MG tablet Take 1 tablet (12.5 mg total) by mouth 2 (two) times daily with meals.    cinacalcet (SENSIPAR) 30 MG Tab Take 2 tablets (60 mg total) by mouth daily with breakfast.    clonazePAM (KLONOPIN) 0.5 MG tablet Take 1 tablet  (0.5 mg total) by mouth 2 (two) times daily. as needed for anxiety.    dorzolamide (TRUSOPT) 2 % ophthalmic solution Place 1 drop into both eyes 3 (three) times daily.     dorzolamide-timolol 2-0.5% (COSOPT) 22.3-6.8 mg/mL ophthalmic solution 1 drop 2 (two) times daily.    fluticasone propionate (FLONASE) 50 mcg/actuation nasal spray 1 spray by Each Nare route daily as needed.     hydroCHLOROthiazide (HYDRODIURIL) 25 MG tablet Take 1 tablet (25 mg total) by mouth once daily. (Patient taking differently: Take 25 mg by mouth once daily. Pt taking one pill qod.)    latanoprost 0.005 % ophthalmic solution Place 1 drop into both eyes every evening.     levalbuterol (XOPENEX) 0.63 mg/3 mL nebulizer solution TAKE 3 MILLILITERS BY NEBULIZATION EVERY 4 HOURS AS NEEDED FOR WHEEZING(RESCUE)    nitroGLYCERIN (NITROSTAT) 0.4 MG SL tablet 1 Tablet Sublingual  One tablet under tounge as needed for chest pain.    pilocarpine HCL 2% (PILOCAR) 2 % ophthalmic solution INT 1 GTT INTO OU QID    ranitidine (ZANTAC) 150 MG tablet Take 1 tablet (150 mg total) by mouth 2 (two) times daily. PRN heartburn    telmisartan (MICARDIS) 80 MG Tab Take 1 tablet (80 mg total) by mouth once daily.    vit C-vit E-copper-ZnOx-lutein (PRESERVISION) 226-200-5 mg-unit-mg Cap Take by mouth. 2 Capsule Oral Every day     No current facility-administered medications for this visit.        Review of Systems   Constitution: Negative.   HENT: Negative.    Eyes: Negative.    Cardiovascular: Negative.  Negative for chest pain, dyspnea on exertion, near-syncope, orthopnea and palpitations.   Respiratory: Negative.  Negative for cough, hemoptysis and shortness of breath.    Endocrine: Negative.    Hematologic/Lymphatic: Negative.    Skin: Negative.    Musculoskeletal: Negative.    Gastrointestinal: Negative.    Genitourinary: Negative.    Neurological: Negative.    Psychiatric/Behavioral: Negative.      Objective:   Physical Exam   Constitutional: She is  oriented to person, place, and time. She appears well-developed and well-nourished.   HENT:   Head: Normocephalic and atraumatic.   Mouth/Throat: Oropharynx is clear and moist.   Eyes: Conjunctivae and EOM are normal. No scleral icterus.   Neck: Normal range of motion. Neck supple. No JVD present.   Cardiovascular: Normal rate, regular rhythm, normal heart sounds and intact distal pulses. Exam reveals no gallop and no friction rub.   No murmur heard.  Pulmonary/Chest: Effort normal and breath sounds normal. She has no wheezes. She has no rales.   Abdominal: Soft. Bowel sounds are normal. She exhibits no distension. There is no tenderness.   Musculoskeletal: Normal range of motion. She exhibits no edema.   Neurological: She is alert and oriented to person, place, and time.   Skin: Skin is warm and dry. No rash noted. No erythema.   Psychiatric: She has a normal mood and affect. Her behavior is normal. Judgment and thought content normal.   Vitals reviewed.      Lab Results   Component Value Date    WBC 6.33 08/10/2018    HGB 12.3 08/10/2018    HCT 37.8 08/10/2018    MCV 85 08/10/2018     (L) 08/10/2018         Chemistry        Component Value Date/Time     08/08/2019 1022    K 3.6 08/08/2019 1022    CL 99 08/08/2019 1022    CO2 28 08/08/2019 1022    BUN 19 08/08/2019 1022    CREATININE 1.0 08/08/2019 1022    GLU 84 08/08/2019 1022        Component Value Date/Time    CALCIUM 10.3 08/08/2019 1022    ALKPHOS 127 02/01/2019 1235    AST 24 02/01/2019 1235    ALT 23 02/01/2019 1235    BILITOT 0.6 02/01/2019 1235    ESTGFRAFRICA 58.1 (A) 08/08/2019 1022    EGFRNONAA 50.4 (A) 08/08/2019 1022            Lab Results   Component Value Date    CHOL 131 02/01/2019    CHOL 123 11/02/2017    CHOL 111 (L) 01/11/2017     Lab Results   Component Value Date    HDL 46 02/01/2019    HDL 35 (L) 11/02/2017    HDL 41 01/11/2017     Lab Results   Component Value Date    LDLCALC 58.6 (L) 02/01/2019    LDLCALC 64.0 11/02/2017     LDLCALC 51.2 (L) 01/11/2017     Lab Results   Component Value Date    TRIG 132 02/01/2019    TRIG 120 11/02/2017    TRIG 94 01/11/2017     Lab Results   Component Value Date    CHOLHDL 35.1 02/01/2019    CHOLHDL 28.5 11/02/2017    CHOLHDL 36.9 01/11/2017       Lab Results   Component Value Date    TSH 2.078 05/29/2017       Lab Results   Component Value Date    HGBA1C 5.9 01/22/2015       Assessment:     1. Essential hypertension    2. Renal artery stenosis    3. Coronary artery disease involving native coronary artery of native heart without angina pectoris    4. Atherosclerosis of aorta        Plan:     Again try to re-enroll in digital HTN.    No changes to medicines now.    Diet/exercise goals reinforced.    F/U 4 months

## 2019-09-09 ENCOUNTER — PATIENT OUTREACH (OUTPATIENT)
Dept: OTHER | Facility: OTHER | Age: 84
End: 2019-09-09

## 2019-09-09 NOTE — LETTER
September 9, 2019     Nicolasa Jurado  [address] [place] [id number]       Dear Nicolasa,    Welcome to [facility]! Our goal is to make care effective, proactive and convenient by using data you send us from home to better treat your chronic conditions.          My name is Rebecca Sierra, and I am your dedicated Digital Medicine clinician. As an expert in medication management, I will help ensure that the medications you are taking continue to provide the intended benefits and help you reach your goals. You can reach me directly at [phone] or by sending me a message directly through your [facility] account.      I am Radha Jacobson and I will be your health . My job is to help you identify lifestyle changes to improve your disease control. We will talk about nutrition, exercise, and other ways you may be able to adjust your current habits to better your health. Additionally, we will help ensure you are completing the tests and screenings that are necessary to help manage your conditions. You can reach me directly at  or by sending me a message directly through your [facility] account.    Most importantly, YOU are at the center of this team. Together, we will work to improve your overall health and encourage you to meet your goals for a healthier lifestyle.     What we expect from YOU:  · Please take frequent home blood pressure measurements. We ask that you take at least 1 blood pressure reading per week, but more information will better help us get you know you. Be sure you rest for a few minutes before taking the reading in a quiet, comfortable place.     Be available to receive phone calls or [facility] messages, when appropriate, from your care team. Please let us know if there are any specific days or times that work best for us to reach you via phone.     Complete routine tests and screenings. Dont worry, we will help keep you on track!           What you should expect from  your Digital Medicine Care Team:   We will work with you to create a personalized plan of care and provide you with encouragement and education, including regarding lifestyle changes, that could help you manage your disease states.     We will adjust your current medications, if needed, and continue to monitor your long-term progress.     We will provide you and your physician with monthly progress reports after you have been in the program for more than 30 days.     We will send you reminders through [facility] and text messages to help ensure you do not miss any testing deadlines to help manage your disease states.    You will be able to reach us by phone or through your [facility] account by clicking our names under Care Team on the right side of the home screen.    I look forward to working with you to achieve your blood pressure goals!    We look forward to working with you to help manage your health,    Sincerely,    Your Digital Medicine Team    Please visit our websites to learn more:   · Hypertension: [email or web address]      Remember, we are not available for emergencies. If you have an emergency, please contact your doctors office directly or call [facility] ([phone] or [phone]) or [phone].

## 2019-09-09 NOTE — LETTER
September 9, 2019     Nicolasa Jurado  [address] [place] [id number]       Dear Nicolasa,    Welcome to [facility]! Our goal is to make care effective, proactive and convenient by using data you send us from home to better treat your chronic conditions.          My name is Rebecca Sierra, and I am your dedicated Digital Medicine clinician. As an expert in medication management, I will help ensure that the medications you are taking continue to provide the intended benefits and help you reach your goals. You can reach me directly at [phone] or by sending me a message directly through your [facility] account.      I am Radha Jacobson and I will be your health . My job is to help you identify lifestyle changes to improve your disease control. We will talk about nutrition, exercise, and other ways you may be able to adjust your current habits to better your health. Additionally, we will help ensure you are completing the tests and screenings that are necessary to help manage your conditions. You can reach me directly at  or by sending me a message directly through your [facility] account.    Most importantly, YOU are at the center of this team. Together, we will work to improve your overall health and encourage you to meet your goals for a healthier lifestyle.     What we expect from YOU:  · Please take frequent home blood pressure measurements. We ask that you take at least 1 blood pressure reading per week, but more information will better help us get you know you. Be sure you rest for a few minutes before taking the reading in a quiet, comfortable place.     Be available to receive phone calls or [facility] messages, when appropriate, from your care team. Please let us know if there are any specific days or times that work best for us to reach you via phone.     Complete routine tests and screenings. Dont worry, we will help keep you on track!           What you should expect from  your Digital Medicine Care Team:   We will work with you to create a personalized plan of care and provide you with encouragement and education, including regarding lifestyle changes, that could help you manage your disease states.     We will adjust your current medications, if needed, and continue to monitor your long-term progress.     We will provide you and your physician with monthly progress reports after you have been in the program for more than 30 days.     We will send you reminders through [facility] and text messages to help ensure you do not miss any testing deadlines to help manage your disease states.    You will be able to reach us by phone or through your [facility] account by clicking our names under Care Team on the right side of the home screen.    I look forward to working with you to achieve your blood pressure goals!    We look forward to working with you to help manage your health,    Sincerely,    Your Digital Medicine Team    Please visit our websites to learn more:   · Hypertension: [email or web address]      Remember, we are not available for emergencies. If you have an emergency, please contact your doctors office directly or call [facility] ([phone] or [phone]) or [phone].

## 2019-09-10 NOTE — PROGRESS NOTES
Digital Medicine Program Enrollment      Our goal is to get BP to consistently below 130/80mmHg and make the process convenient so patient can avoid extra trips to the office. Getting your blood pressure below 130/80mmHg (definition of control) will reduce your risk for heart attack, kidney failure, stroke and death (as well as kidney failure, eye disease, & dementia)      Reviewed that the Digital Medicine care team - consisting of a clinician and a health  - will follow the most current evidence-based national guidelines for treating your condition.  The health  will focus on lifestyle modifications and motivation while the clinician will focus on medication therapy.  The care team will review all data on a regular basis and reach out as needed.      Explained that one of the key parts of the program is communication with the care team.  Asked patient to respond to outreach attempts and complete questionnaires.  Stressed importance of medication adherence.      Explained that we expect patient to obtain several blood pressures per week at random times of day.  Instructed patient not to allow anyone else to use phone and monitoring device.  Confirmed appropriate BP monitoring technique.      Explained to patient that the digital medicine team is not available for emergencies.  Patient will call [facility] on-[facility] ([phone] or [phone]) or [phone] if needed.      Pt's Quinton valverde, completed the enrollment call. Quinton states that he will be assisting the pt with monitoring her BP and also completing all calls with her clinical team.

## 2019-09-13 NOTE — PROGRESS NOTES
Digital Medicine: Health  Introduction    Introduced Nicolasa Jurado to Digital Medicine. Discussed health  role and recommended lifestyle modifications.    The history is provided by a relative.     HYPERTENSION  Our goal is to get BP to consistently below 130/80mmHg and make the process convenient so patient can avoid extra trips to the office. Getting your blood pressure below 130/80mmHg (definition of control) will reduce your risk for heart attack, kidney failure, stroke and death (as well as kidney failure, eye disease, & dementia)      Reviewed non-pharmacologic therapies and impact on BP.      Explained that we expect patient to obtain several blood pressures per week at random times of day.  Instructed patient not to allow anyone else to use phone and monitoring device.  Confirmed appropriate BP monitoring technique.      Explained to patient that the digital medicine team is not available for emergencies.  Patient will call [facility] on[facility] ([phone] or [phone]) or [phone] if needed.      Patient's BP goal is 140/90.Patient's BP average is 147/66 mmHg, which is above goal, per 2017 ACC/AHA Hypertension Guidelines.          Last 5 Patient Entered Readings                                      Current 30 Day Average: 147/66     Recent Readings 9/13/2019 9/12/2019 9/11/2019 9/10/2019 9/10/2019    SBP (mmHg) 137 146 141 120 161    DBP (mmHg) 60 69 59 50 69    Pulse 70 60 61 68 63                Diet:   Patient reports eating or drinking the following: fruit, water and caffeine    She eats 2 meals and She cooks for self and has meals prepared by family.    Patient does the shopping for groceries and lets family grocery shop.  She gets groceries from the grocery store.      Assigning the following patient goals: maintain low sodium diet    Physical Activity:   When asked if exercising, patient responded: yesHer level of intensity when exercising is low.    Patient participates in the following  activities: walking    Patient's caregiver says that she is losing her vision so she has not been able to go on her normal walks around the neighborhood. Her son, Quinton, has been taking her to out to [employer] and helps her walk around.     Assigning the following patient goal(s): participate in exercise weekly    Medication Adherence:   She misses doses: never      Adherence tools used: pill box    Tobacco and Alcohol:       Patient is not eligible for referral to smoking cessation.            Caregiving [place]:   Patient is not a caregiver.      INTERVENTION(S)  recommended diet modifications, recommend physical activity, reviewed monitoring technique and encouragement/support    PLAN  patient verbalizes understanding, demonstrates understanding via teach back and patient amenable to changes      There are no preventive care reminders to display for this patient.    Reviewed the importance of self-monitoring, medication adherence, and that the health  can be used as a resource for lifestyle modifications to help reduce or maintain a healthy lifestyle.    Sent link to [facility] webpages and my contact information via [employer] for future questions. Follow up scheduled.

## 2019-10-03 ENCOUNTER — PATIENT OUTREACH (OUTPATIENT)
Dept: OTHER | Facility: OTHER | Age: 84
End: 2019-10-03

## 2019-10-03 DIAGNOSIS — I10 ESSENTIAL HYPERTENSION: Primary | Chronic | ICD-10-CM

## 2019-10-03 PROBLEM — R05.9 COUGH: Status: RESOLVED | Noted: 2018-03-07 | Resolved: 2019-10-03

## 2019-10-03 RX ORDER — AMLODIPINE BESYLATE 5 MG/1
5 TABLET ORAL 2 TIMES DAILY
Qty: 60 TABLET | Refills: 5 | Status: SHIPPED | OUTPATIENT
Start: 2019-10-03 | End: 2020-02-24 | Stop reason: SDUPTHER

## 2019-10-03 NOTE — PROGRESS NOTES
"Digital Medicine: Health  Follow-Up    The history is provided by a relative and the patient.     Follow Up  Follow-up reason(s): goal follow-up    Spoke with patient and her son about low BP symptoms and fluctuation in readings. Patient and son check her BP in the mornings when she first wakes up and then takes her medicine. Her BP is re-checked later in the afternoon.           Diet:   Patient reports eating or drinking the following: juice, water and caffeine    The patient drinks 64 ounces of water per day.    She has the following dietary restrictions: none    She eats 3 meals and She does not skip meals regularly.  She has no standard fasting periods.      Patient did not have times when they could not afford food or ran out.  She cooks for self and has meals prepared by family.    Patient does the shopping for groceries and lets family grocery shop.      She does not find cooking difficult.      Patient eats low sodium foods, fruits and vegetables.     Assigning the following patient goals: maintain low sodium diet    Physical Activity:   When asked if exercising, patient responded: unable    Patient tries to move around during the day but now becomes easily tired and cannot "do as much as she used to" as far as physical activity.     Medication Adherence:   She misses doses: more than once a week      Patient's Klonopin has not been refilled. Patient believes this is why she has not been sleeping at night, she feels anxious about not getting enough sleep at night.     Patient does not take her medicine when her BP is low and she feels her described low BP symptoms. She feels disoriented and dizzy as if she's going to faint if her BP gets to be below 125 she has to sit down a minute and elevate her feet or she will drink some coffee.     Tobacco and Alcohol:       No reported alcohol or tobacco use.     Patient is not eligible for referral to smoking cessation.        [facility]    INTERVENTION(S)  reviewed " appropriate dose schedule, recommended diet modifications, reviewed monitoring technique and encouragement/support    PLAN  patient verbalizes understanding, patient amenable to changes and Clinician follow-up    Patient has been referred/transferred to Clinician for additional information and instruction regarding medication regimen and low BP symptoms.       There are no preventive care reminders to display for this patient.    Last 5 Patient Entered Readings                                      Current 30 Day Average: 142/67     Recent Readings 10/2/2019 10/2/2019 10/2/2019 10/1/2019 9/30/2019    SBP (mmHg) 119 183 167 123 149    DBP (mmHg) 56 83 73 58 72    Pulse 69 71 62 54 64

## 2019-10-03 NOTE — PROGRESS NOTES
Digital Medicine: Health  Follow-Up    The history is provided by a relative.     Follow Up  Follow-up reason(s): goal follow-up          Assessment/Plan    SDOH    Intervention/Plan    There are no preventive care reminders to display for this patient.    Last 5 Patient Entered Readings                                      Current 30 Day Average: 142/67     Recent Readings 10/2/2019 10/2/2019 10/2/2019 10/1/2019 9/30/2019    SBP (mmHg) 119 183 167 123 149    DBP (mmHg) 56 83 73 58 72    Pulse 69 71 62 54 64

## 2019-10-07 ENCOUNTER — TELEPHONE (OUTPATIENT)
Dept: INTERNAL MEDICINE | Facility: CLINIC | Age: 84
End: 2019-10-07

## 2019-10-07 RX ORDER — CLONAZEPAM 0.5 MG/1
0.5 TABLET ORAL 2 TIMES DAILY
Qty: 30 TABLET | Refills: 0 | Status: SHIPPED | OUTPATIENT
Start: 2019-10-07 | End: 2019-12-29

## 2019-10-07 NOTE — TELEPHONE ENCOUNTER
----- Message from Raghu Carey sent at 10/7/2019  2:36 PM CDT -----  Contact: Timo Alcantara [phone]  RX request - refill or new RX.  Is this a refill or new RX:  Refill  RX name and strength: clonazePAM (KLONOPIN) 0.5 MG tablet  Directions:   Is this a 30 day or 90 day RX:    Pharmacy name and phone # [id number]  [place] - [address] AT [facility] [address] [phone] (Phone) [phone] (Fax)    Comments:  Timo Alcantara [phone], son stating has been three weeks sense late requesting for refill,would like a call to inform has been sent.    Please call an advise  Thank you

## 2019-10-08 ENCOUNTER — OFFICE VISIT (OUTPATIENT)
Dept: INTERNAL MEDICINE | Facility: CLINIC | Age: 84
End: 2019-10-08
Payer: MEDICARE

## 2019-10-08 VITALS
HEART RATE: 65 BPM | RESPIRATION RATE: 15 BRPM | DIASTOLIC BLOOD PRESSURE: 74 MMHG | HEIGHT: 60 IN | BODY MASS INDEX: 25.79 KG/M2 | OXYGEN SATURATION: 98 % | WEIGHT: 131.38 LBS | SYSTOLIC BLOOD PRESSURE: 154 MMHG

## 2019-10-08 DIAGNOSIS — E78.00 PURE HYPERCHOLESTEROLEMIA: ICD-10-CM

## 2019-10-08 DIAGNOSIS — M79.604 RIGHT LEG PAIN: ICD-10-CM

## 2019-10-08 DIAGNOSIS — Z12.39 SCREENING FOR BREAST CANCER: ICD-10-CM

## 2019-10-08 DIAGNOSIS — I51.89 LEFT VENTRICULAR DIASTOLIC DYSFUNCTION WITH PRESERVED SYSTOLIC FUNCTION: Chronic | ICD-10-CM

## 2019-10-08 DIAGNOSIS — Z23 NEED FOR INFLUENZA VACCINATION: ICD-10-CM

## 2019-10-08 DIAGNOSIS — K55.1 MESENTERIC ARTERY STENOSIS: Chronic | ICD-10-CM

## 2019-10-08 DIAGNOSIS — G62.9 NEUROPATHY: ICD-10-CM

## 2019-10-08 DIAGNOSIS — E21.0 PRIMARY HYPERPARATHYROIDISM: Chronic | ICD-10-CM

## 2019-10-08 DIAGNOSIS — M54.32 SCIATICA OF LEFT SIDE: ICD-10-CM

## 2019-10-08 DIAGNOSIS — I50.32 CHRONIC DIASTOLIC HEART FAILURE: Chronic | ICD-10-CM

## 2019-10-08 DIAGNOSIS — L98.9 SKIN LESION OF BACK: ICD-10-CM

## 2019-10-08 DIAGNOSIS — G47.00 INSOMNIA, UNSPECIFIED TYPE: ICD-10-CM

## 2019-10-08 DIAGNOSIS — H35.3290 EXUDATIVE AGE-RELATED MACULAR DEGENERATION, UNSPECIFIED LATERALITY, UNSPECIFIED STAGE: Chronic | ICD-10-CM

## 2019-10-08 DIAGNOSIS — E66.3 OVERWEIGHT: ICD-10-CM

## 2019-10-08 DIAGNOSIS — E73.9 LACTOSE INTOLERANCE: Chronic | ICD-10-CM

## 2019-10-08 DIAGNOSIS — N18.2 CKD (CHRONIC KIDNEY DISEASE) STAGE 2, GFR 60-89 ML/MIN: ICD-10-CM

## 2019-10-08 DIAGNOSIS — J45.20 MILD INTERMITTENT ASTHMA WITHOUT COMPLICATION: Chronic | ICD-10-CM

## 2019-10-08 DIAGNOSIS — L98.9 SKIN LESION: ICD-10-CM

## 2019-10-08 DIAGNOSIS — Z00.00 ENCOUNTER FOR PREVENTIVE HEALTH EXAMINATION: Primary | ICD-10-CM

## 2019-10-08 DIAGNOSIS — I70.0 ATHEROSCLEROSIS OF AORTA: Chronic | ICD-10-CM

## 2019-10-08 DIAGNOSIS — Z78.0 MENOPAUSE: ICD-10-CM

## 2019-10-08 DIAGNOSIS — I25.10 CORONARY ARTERY DISEASE INVOLVING NATIVE CORONARY ARTERY OF NATIVE HEART WITHOUT ANGINA PECTORIS: ICD-10-CM

## 2019-10-08 DIAGNOSIS — M81.0 OSTEOPOROSIS, POSTMENOPAUSAL: ICD-10-CM

## 2019-10-08 DIAGNOSIS — E83.52 HYPERCALCEMIA: ICD-10-CM

## 2019-10-08 DIAGNOSIS — I70.1 RENAL ARTERY STENOSIS: Chronic | ICD-10-CM

## 2019-10-08 DIAGNOSIS — H54.7 VISION IMPAIRMENT: ICD-10-CM

## 2019-10-08 DIAGNOSIS — I10 ESSENTIAL HYPERTENSION: Chronic | ICD-10-CM

## 2019-10-08 DIAGNOSIS — D69.6 THROMBOCYTOPENIA: Chronic | ICD-10-CM

## 2019-10-08 PROCEDURE — G0439 PPPS, SUBSEQ VISIT: HCPCS | Mod: HCNC,S$GLB,, | Performed by: NURSE PRACTITIONER

## 2019-10-08 PROCEDURE — 90662 FLU VACCINE - HIGH DOSE (65+) PRESERVATIVE FREE IM: ICD-10-PCS | Mod: HCNC,S$GLB,, | Performed by: NURSE PRACTITIONER

## 2019-10-08 PROCEDURE — 90662 IIV NO PRSV INCREASED AG IM: CPT | Mod: HCNC,S$GLB,, | Performed by: NURSE PRACTITIONER

## 2019-10-08 PROCEDURE — 99999 PR PBB SHADOW E&M-EST. PATIENT-LVL V: CPT | Mod: PBBFAC,HCNC,, | Performed by: NURSE PRACTITIONER

## 2019-10-08 PROCEDURE — 99999 PR PBB SHADOW E&M-EST. PATIENT-LVL V: ICD-10-PCS | Mod: PBBFAC,HCNC,, | Performed by: NURSE PRACTITIONER

## 2019-10-08 PROCEDURE — G0008 FLU VACCINE - HIGH DOSE (65+) PRESERVATIVE FREE IM: ICD-10-PCS | Mod: HCNC,S$GLB,, | Performed by: NURSE PRACTITIONER

## 2019-10-08 PROCEDURE — G0008 ADMIN INFLUENZA VIRUS VAC: HCPCS | Mod: HCNC,S$GLB,, | Performed by: NURSE PRACTITIONER

## 2019-10-08 PROCEDURE — G0439 PR MEDICARE ANNUAL WELLNESS SUBSEQUENT VISIT: ICD-10-PCS | Mod: HCNC,S$GLB,, | Performed by: NURSE PRACTITIONER

## 2019-10-08 NOTE — Clinical Note
Primary Care Providers:Houston Gaston MD, MD (General)Your patient was seen today for a HRA visit. Gap(s) in care (HEDIS gaps) have been identified during this visit that require additional testing and possible follow up.Orders Placed This Encounter    DXA Bone Density Spine And Hip        Standing Status: Future        Standing Expiration Date: 10/8/2022        Order Specific Question: May the Radiologist modify the order per protocol to meet the clinical needs of the patient?        Answer: Yes    Influenza - High Dose (65+) (PF) (IM)    Ambulatory Referral to Dermatology        Referral Priority:Routine        Referral Type:Consultation        Referral Reason:Specialty Services Required        Requested Specialty:Dermatology        Number of Visits Requested:1These orders were placed using [facility] approved protocol and any results will be forwarded to your office for appropriate follow up. I have included a copy of my visit note; please review the note and feel free to co

## 2019-10-08 NOTE — PROGRESS NOTES
Nicolasa Jurado presented for a  Medicare AWV and comprehensive Health Risk Assessment today. The following components were reviewed and updated:    · Medical history  · Family History  · Social history  · Allergies and Current Medications  · Health Risk Assessment  · Health Maintenance  · Care Team     ** See Completed Assessments for Annual Wellness Visit within the encounter summary.**       The following assessments were completed:  · Living Situation  · CAGE  · Depression Screening  · Timed Get Up and Go  · Whisper Test  · Cognitive Function Screening  · Nutrition Screening  · ADL Screening  · PAQ Screening    Vitals:    10/08/19 1204   BP: (!) 154/74   BP Location: Right arm   Patient Position: Sitting   BP Method: Small (Manual)   Pulse: 65   Resp: 15   SpO2: 98%   Weight: 59.6 kg (131 lb 6.3 oz)   Height: 5' (1.524 m)     Body mass index is 25.66 kg/m².  Physical Exam   Constitutional: She is oriented to person, place, and time. She appears well-developed and well-nourished.   HENT:   Head: Normocephalic and atraumatic.   Visual impairment- difficulty with clock drawing   Cardiovascular: Normal rate, regular rhythm and normal heart sounds.   No murmur heard.  Pulmonary/Chest: Effort normal and breath sounds normal. No respiratory distress. She has no wheezes. She has no rales.   Abdominal: Soft. Bowel sounds are normal.   Musculoskeletal: Normal range of motion. She exhibits no edema, tenderness or deformity.   Neurological: She is alert and oriented to person, place, and time. No cranial nerve deficit.   Skin: Skin is warm and dry.   Skin lesion back   Psychiatric: She has a normal mood and affect. Her behavior is normal. Judgment and thought content normal.   Nursing note and vitals reviewed.        Diagnoses and health risks identified today and associated recommendations/orders:    1. CKD (chronic kidney disease) stage 2, GFR 60-89 ml/min  Stable- followed by PCP    2. Right leg pain  Stable- followed by  PCP    3. Neuropathy  Stable- followed by PCP    4. Insomnia, unspecified type  Stable- followed by PCP    5. Pure hypercholesterolemia  Stable- followed by PCP    6. Coronary artery disease involving native coronary artery of native heart without angina pectoris  Stable- followed by PCP, cardiology    7. Osteoporosis, postmenopausal  Stable- followed by PCP  - DXA Bone Density Spine And Hip; Future    8. Hypercalcemia  Stable- followed by PCP    9. Primary hyperparathyroidism  Stable- followed by PCP    10. Renal artery stenosis  Stable- followed by PCP    11. Mesenteric artery stenosis  Stable- followed by PCP    12. Essential hypertension  Stable- followed by PCP    13. Atherosclerosis of aorta  Stable- followed by PCP    14. Mild intermittent asthma without complication  Stable- followed by PCP    15. Thrombocytopenia  Stable- followed by PCP    16. Exudative age-related macular degeneration, unspecified laterality, unspecified stage  Stable- followed by opth    17. Chronic diastolic heart failure  Stable- followed by cardiology    18. Left ventricular diastolic dysfunction with preserved systolic function  Stable- followed by cardiology    19. Lactose intolerance  Stable- followed by PCP    20. Need for influenza vaccination  Stable- followed by PCP  - Influenza - High Dose (65+) (PF) (IM)    21. Skin lesion  Stable- followed by PCP  - Ambulatory Referral to Dermatology    22. Screening for breast cancer  Stable- followed by PCP    23. Menopause  Stable- followed by PCP    24. Encounter for preventive health examination  Assessments completed. Preventative health recommendations reviewed.       25. Overweight  Chronic problem - no recent weight loss. Followed by PCP.   Centers for Disease Control and Prevention (CDC)  weight recommendations for current BMI & ideal BMI range discussed with patient.  Recommended  Low fat diet, regular exercise x 1 hour as tolerated every day.     26. Skin lesion of back  Stable-  followed by PCP    27. Sciatica of left side  Stable- followed by PCP    28. Vision impairment  Stable- followed by PCP, opth      Provided Nicolasa with a 5-10 year written screening schedule and personal prevention plan. Recommendations were developed using the USPSTF age appropriate recommendations. Education, counseling, and referrals were provided as needed. After Visit Summary printed and given to patient which includes a list of additional screenings\tests needed.Here w son in law- Pt c/o LBP & LLE sciatica. B/P at home elevated. Referred back to cardiology for further evaluation & appt w PCP for further eval of back & leg pain.. Pt requested derm referral to remove lesion off back. Recommend [facility] for visual impairment options. [place] long term care options for assistance-check website. Info given. SHe will check w PCP if PPSV23 needed- Fall prevention. Resides w family who assists w transportation. Received fluvax today per pt request- refer to administration documentation in epic.    Follow up in about 1 year (around 10/8/2020) for hra.    G[facility] Acharya offered to discuss end of life issues, including information on how to make advance directives that the patient could use to name someone who would make medical decisions on their behalf if they became too ill to make themselves.    ___Patient declined  _X_Patient is interested, I provided paper work and offered to discuss.

## 2019-10-08 NOTE — PATIENT INSTRUCTIONS
Counseling and Referral of Other Preventative  (Italic type indicates deductible and co-insurance are waived)    Patient Name: Nicolasa Jurado  Today's Date: 10/8/2019    Health Maintenance       Date Due Completion Date    Influenza Vaccine (1) Received   9/28/2018    Pneumococcal Vaccine (65+ High/Highest Risk) (2 of 2 - PPSV23) Check with PCP   9/29/2016    TETANUS VACCINE In the future for in jury   ---    Shingles Vaccine (2 of 3) Check w PCP   5/25/2014    Aspirin/Antiplatelet Therapy 09/04/2020 9/4/2019    Lipid Panel    Bone density scan -     mammogram 02/01/2020    Ordered    Check with Dr Gaston 2/1/2019    6/12/2016    10/20/2016            Orders Placed This Encounter   Procedures    Influenza - High Dose (65+) (PF) (IM)     The following information is provided to all patients.  This information is to help you find resources for any of the problems found today that may be affecting your health:                Living healthy guide: www.[facility].[place].gov      Understanding Diabetes: www.diabetes.org      Eating healthy: www.cdc.gov/healthyweight      CDC home safety checklist: www.cdc.gov/steadi/patient.html      Agency on Aging: www.goea.[place].gov      Alcoholics anonymous (AA): www.aa.org      Physical Activity: www.pierre.nih.gov/qo5kxtn      Tobacco use: www.quit[place].org

## 2019-10-10 ENCOUNTER — HOSPITAL ENCOUNTER (OUTPATIENT)
Dept: RADIOLOGY | Facility: HOSPITAL | Age: 84
Discharge: HOME | End: 2019-10-10
Attending: INTERNAL MEDICINE
Payer: MEDICARE

## 2019-10-10 ENCOUNTER — OFFICE VISIT (OUTPATIENT)
Dept: INTERNAL MEDICINE | Facility: CLINIC | Age: 84
End: 2019-10-10
Payer: MEDICARE

## 2019-10-10 VITALS
RESPIRATION RATE: 18 BRPM | BODY MASS INDEX: 25.84 KG/M2 | DIASTOLIC BLOOD PRESSURE: 70 MMHG | SYSTOLIC BLOOD PRESSURE: 138 MMHG | TEMPERATURE: 99 F | HEART RATE: 64 BPM | HEIGHT: 60 IN | WEIGHT: 131.63 LBS

## 2019-10-10 DIAGNOSIS — G89.29 CHRONIC MIDLINE LOW BACK PAIN WITH LEFT-SIDED SCIATICA: Primary | ICD-10-CM

## 2019-10-10 DIAGNOSIS — M54.42 CHRONIC MIDLINE LOW BACK PAIN WITH LEFT-SIDED SCIATICA: Primary | ICD-10-CM

## 2019-10-10 DIAGNOSIS — M54.42 CHRONIC MIDLINE LOW BACK PAIN WITH LEFT-SIDED SCIATICA: ICD-10-CM

## 2019-10-10 DIAGNOSIS — G89.29 CHRONIC MIDLINE LOW BACK PAIN WITH LEFT-SIDED SCIATICA: ICD-10-CM

## 2019-10-10 DIAGNOSIS — E83.52 HYPERCALCEMIA: ICD-10-CM

## 2019-10-10 PROCEDURE — 72100 X-RAY EXAM L-S SPINE 2/3 VWS: CPT | Mod: TC,HCNC,PO

## 2019-10-10 PROCEDURE — 99214 OFFICE O/P EST MOD 30 MIN: CPT | Mod: HCNC,S$GLB,, | Performed by: INTERNAL MEDICINE

## 2019-10-10 PROCEDURE — 99999 PR PBB SHADOW E&M-EST. PATIENT-LVL IV: ICD-10-PCS | Mod: PBBFAC,HCNC,, | Performed by: INTERNAL MEDICINE

## 2019-10-10 PROCEDURE — 72100 X-RAY EXAM L-S SPINE 2/3 VWS: CPT | Mod: 26,HCNC,, | Performed by: RADIOLOGY

## 2019-10-10 PROCEDURE — 72100 XR LUMBAR SPINE AP AND LATERAL: ICD-10-PCS | Mod: 26,HCNC,, | Performed by: RADIOLOGY

## 2019-10-10 PROCEDURE — 99999 PR PBB SHADOW E&M-EST. PATIENT-LVL IV: CPT | Mod: PBBFAC,HCNC,, | Performed by: INTERNAL MEDICINE

## 2019-10-10 PROCEDURE — 99214 PR OFFICE/OUTPT VISIT, EST, LEVL IV, 30-39 MIN: ICD-10-PCS | Mod: HCNC,S$GLB,, | Performed by: INTERNAL MEDICINE

## 2019-10-10 PROCEDURE — 1101F PT FALLS ASSESS-DOCD LE1/YR: CPT | Mod: HCNC,CPTII,S$GLB, | Performed by: INTERNAL MEDICINE

## 2019-10-10 PROCEDURE — 1101F PR PT FALLS ASSESS DOC 0-1 FALLS W/OUT INJ PAST YR: ICD-10-PCS | Mod: HCNC,CPTII,S$GLB, | Performed by: INTERNAL MEDICINE

## 2019-10-10 RX ORDER — DICLOFENAC SODIUM 10 MG/G
2 GEL TOPICAL 3 TIMES DAILY
Qty: 200 G | Refills: 1 | Status: SHIPPED | OUTPATIENT
Start: 2019-10-10

## 2019-10-10 NOTE — MR AVS SNAPSHOT
[facility]  [address] [facility] [place] [address]  Phone: [phone]  Fax: [phone]                  Nicolasa Jurado   5/15/2017 2:30 PM   Office Visit    Descripción:  Female : 1930   Personal Médico:  Nereyda Lange MD   Departamento:  [facility]           Diagnósticos de Esta Visita        Comentarios    Primary hyperparathyroidism    -  Primario     Hypercalcemia         CKD (chronic kidney disease) stage 3, GFR 30-59 ml/min         Essential hypertension         Left ventricular diastolic dysfunction with preserved systolic function         Renal artery stenosis         CKD (chronic kidney disease) stage 2, GFR 60-89 ml/min                Lista de tareas           Citas próximas        Personal Médico Departamento Tfno del dpto    2017 10:40 AM LAB, [place] [facility] [phone]    2017 10:45 AM LAB, [place] [facility] [phone]    2017 1:00 PM HRA, MET [facility] [phone]      Metas (5 Years of Data)     Ninguna      Follow-Up and Disposition     Return in about 6 weeks (around 2017).      Recetas para recoger        Disp Refills Start End    cinacalcet (SENSIPAR) 30 MG Tab 30 tablet 11 5/15/2017 5/15/2018    Take 1 tablet (30 mg total) by mouth daily with breakfast. - Oral    [facility]: [facility] [id number] [address] AT [address] [place] [address] de tlfo: #: [phone]         [facility]     [facility] [place] Enfermeras - Asistencia   Enfermeras registradas de [facility] pueden ayudarle a reservar rashida joseph, proveer educación para la oanh, asesoría clínica, y otros servicios de asesoramiento.   Llame para jair servicio gratuito a [phone].             Medicamentos           Mensaje sobre Medicamentos     Verificar los cambios y / o adiciones a machado régimen de medicación son los  mismos que discutir con machado médico. Si cualquiera de estos cambios o adiciones son incorrectos, por favor notifique a machado proveedor de atención médica.        EMPEZAR a ibis estos medicamentos NUEVOS        Refills    cinacalcet (SENSIPAR) 30 MG Tab 11    Sig: Take 1 tablet (30 mg total) by mouth daily with breakfast.    Categoría: Normal    Vía: Oral      DEJAR de ibis estos medicamentos     ERGOCALCIFEROL, VITAMIN D2, (VITAMIN D2 ORAL) Take 1 tablet by mouth every evening.           Verifique que la siguiente lista de medicamentos es rashida representación exacta de los medicamentos que está tomando actualmente. Si no hay ningunos reportados, la lista puede estar en quinteros. Si no es correcta, por favor póngase en contacto con machado proveedor de atención médica. Lleve esta lista con usted en tiffani de emergencia.           Medicamentos Actuales     albuterol (PROVENTIL) 2.5 mg /3 mL (0.083 %) nebulizer solution 1 VIAL PER NEBULIZER EVERY 4 TO 6 HOURS AS NEEDED    albuterol 90 mcg/actuation inhaler Inhale 2 puffs into the lungs every 6 (six) hours as needed for Wheezing.    amlodipine (NORVASC) 10 MG tablet TAKE 1 TABLET BY MOUTH DAILY    aspirin 81 mg Tab Take 81 mg by mouth every other day.     atorvastatin (LIPITOR) 80 MG tablet Take 1 tablet (80 mg total) by mouth once daily.    AZOPT 1 % ophthalmic suspension Place 1 drop into the left eye every 12 (twelve) hours.    brimonidine 0.2% (ALPHAGAN) 0.2 % Drop Place 1 drop into both eyes 3 (three) times daily.     clonazePAM (KLONOPIN) 0.5 MG tablet TAKE 1 TABLET BY MOUTH TWICE DAILY AS NEEDED FOR ANXIETY    cloNIDine (CATAPRES) 0.1 MG tablet Take 1 tablet (0.1 mg total) by mouth daily as needed (for SBP >200 mm Hg).    cloNIDine (CATAPRES) 0.1 MG tablet TAKE 1 TABLET BY MOUTH EVERY 2 HOURS AS NEEDED FOR SYSTOLIC BLOOD PRESSURE>200    doxazosin (CARDURA) 2 MG tablet Take 1 tablet (2 mg total) by mouth every evening.    furosemide (LASIX) 20 MG tablet TAKE 1 TABLET BY MOUTH  ONCE DAILY    gabapentin (NEURONTIN) 100 MG capsule Take 1 capsule (100 mg total) by mouth every evening.    latanoprost 0.005 % ophthalmic solution 1 drop every evening.    metoprolol succinate (TOPROL-XL) 50 MG 24 hr tablet Take 1 tablet (50 mg total) by mouth once daily.    nitroGLYCERIN (NITROSTAT) 0.4 MG SL tablet Place 0.4 mg under the tongue. 1 Tablet, Sublingual Sublingual .  One tablet under tounge as needed for chest pain.    omega-3 fatty acids 1,000 mg Cap     ramipril (ALTACE) 10 MG capsule TAKE 1 CAPSULE BY MOUTH TWICE DAILY    vit C-vit E-copper-ZnOx-lutein (PRESERVISION) 226-200-5 mg-unit-mg Cap Take by mouth. 2 Capsule Oral Every day    cinacalcet (SENSIPAR) 30 MG Tab Take 1 tablet (30 mg total) by mouth daily with breakfast.           Información de referencia clínica           Monica signos vitales brynn     PS [place] SpO2 BMI ([facility])    170/90 83 5' (1.524 m) 59.4 kg (130 lb 15.3 oz) 96% 25.58 kg/m2      Blood Pressure          Most Recent Value    BP  (!)  170/90      Alergias     A partir del:  5/15/2017        Venom-wasp    Augmentin  [Amoxicillin-pot Clavulanate]      Vacunas     Administradas en la fecha de la visita:  5/15/2017        None      Orders Placed During Today's Visit     Exámenes/Procedimientos futuros Se espera el Vence    GILMA  5/15/2017 7/14/2018    Anti-neutrophilic cytoplasmic antibody  5/15/2017 7/14/2018    Calcium, ionized  5/15/2017 7/14/2018    Calcium/Creatinine Ratio,Urine Random  5/15/2017 7/14/2018    Immunofixation electrophoresis  5/15/2017 7/14/2018    Protein / creatinine ratio, urine  5/15/2017 5/15/2018    Protein electrophoresis, serum  5/15/2017 7/14/2018    Renal function panel  5/15/2017 7/14/2018    TSH  5/15/2017 7/14/2018    Urinalysis  5/15/2017 5/15/2018    Calcium, ionized  7/1/2017 7/14/2018    Magnesium  7/1/2017 7/14/2018    Phosphorus  7/1/2017 7/14/2018    Renal function panel  7/1/2017 7/14/2018      Registrarse para [facility]     La  [Preoperative Visit] : for a medical evaluation prior to surgery [Scheduled Procedure ___] : a [unfilled] activación de machado cuenta [facility] es tan fácil hannah 1-2-3!    1) Ir a [email or web address], seleccione Registrarse Ahora, meter el código de activación y machado fecha de nacimiento, y seleccione Próximo.    [id number]  Expires: 2017  4:50 PM      2) Crear un nombre de usuario y contraseña para usar cuando se visita [facility] en el futuro y selecciona rashida pregunta de seguridad en tiffani de que pierda machado contraseña y seleccione Próximo.    3) Introduzca machado dirección de correo electrónico y dwight clic en Registrarse!    Información Adicional  Si tiene alguna pregunta, por favor, e-mail [email or web address] o llame al [phone] para hablar con nuestro personal. Recuerde, [facility] no debe ser usada para necesidades urgentes. En tiffani de emergencia médica, llame al [phone].        Instrucciones     Labs in 2 weeks and  6 weeks    Begin sensipar 30 mg daily    Stop vit d     take blood pressure medications regularly    rtc 6 weeks       Language Assistance Services     ATTENTION: Language assistance services are available, free of charge. Please call [phone].      ATENCIÓN: Si habla español, tiene a machado disposición servicios gratuitos de asistencia lingüística. Llame al [phone].     CHÚ Ý: N?u b?n nói Ti?ng Vi?t, có các d?ch v? h? tr? ngôn ng? mi?n phí dành cho b?n. G?i s? [phone].         [facility] cumple con las leyes federales aplicables de derechos civiles y no discrimina por motivos de giacomo, color, origen nacional, edad, discapacidad, o sexo.                 Nicolasa Jurado   5/15/2017 2:30 PM   Office Visit    Description:  Female : 1930   Provider:  Nereyda Lange MD   Department:  [facility]           Diagnoses this Visit        Comments    Primary hyperparathyroidism    -  Primary     Hypercalcemia         CKD (chronic kidney disease) stage 3, GFR 30-59 ml/min         Essential hypertension         Left ventricular diastolic dysfunction with preserved  [Date of Surgery ___] : on [unfilled] systolic function         Renal artery stenosis         CKD (chronic kidney disease) stage 2, GFR 60-89 ml/min                To Do List           Future Appointments        Provider Department Dept Phone    5/29/2017 10:40 AM LAB, [place] [facility] [phone]    5/29/2017 10:45 AM LAB, [place]POINT[place] [facility] [phone]    6/5/2017 1:00 PM HRA, [facility] [phone]      Goals     None      Follow-Up and Disposition     Return in about 6 weeks (around 6/26/2017).       These Medications        Disp Refills Start End    cinacalcet (SENSIPAR) 30 MG Tab 30 tablet 11 5/15/2017 5/15/2018    Take 1 tablet (30 mg total) by mouth daily with breakfast. - Oral    Pharmacy: [facility] [address] [place] [address] [place] [address] [address] AT [facility] [address] [place] Ph #: [phone]         [facility]     [facility] Nurse Care Line - 24/7 Assistance  Unless otherwise directed by your provider, please contact [facility], our nurse care line that is available for 24/7 assistance.     Registered nurses in the [facility] provide: appointment scheduling, clinical advisement, health education, and other advisory services.  Call: [phone] (toll free)               Medications           Message regarding Medications     Verify the changes and/or additions to your medication regime listed below are the same as discussed with your clinician today.  If any of these changes or additions are incorrect, please notify your healthcare provider.        START taking these NEW medications        Refills    cinacalcet (SENSIPAR) 30 MG Tab 11    Sig: Take 1 tablet (30 mg total) by mouth daily with breakfast.    Class: Normal    Route: Oral      STOP taking these medications     ERGOCALCIFEROL, VITAMIN D2, (VITAMIN D2 ORAL) Take 1 tablet by mouth every evening.           Verify that the below list of medications is  [Surgeon Name ___] : surgeon: [unfilled] an accurate representation of the medications you are currently taking.  If none reported, the list may be blank. If incorrect, please contact your healthcare provider. Carry this list with you in case of emergency.           Current Medications     albuterol (PROVENTIL) 2.5 mg /3 mL (0.083 %) nebulizer solution 1 VIAL PER NEBULIZER EVERY 4 TO 6 HOURS AS NEEDED    albuterol 90 mcg/actuation inhaler Inhale 2 puffs into the lungs every 6 (six) hours as needed for Wheezing.    amlodipine (NORVASC) 10 MG tablet TAKE 1 TABLET BY MOUTH DAILY    aspirin 81 mg Tab Take 81 mg by mouth every other day.     atorvastatin (LIPITOR) 80 MG tablet Take 1 tablet (80 mg total) by mouth once daily.    AZOPT 1 % ophthalmic suspension Place 1 drop into the left eye every 12 (twelve) hours.    brimonidine 0.2% (ALPHAGAN) 0.2 % Drop Place 1 drop into both eyes 3 (three) times daily.     clonazePAM (KLONOPIN) 0.5 MG tablet TAKE 1 TABLET BY MOUTH TWICE DAILY AS NEEDED FOR ANXIETY    cloNIDine (CATAPRES) 0.1 MG tablet Take 1 tablet (0.1 mg total) by mouth daily as needed (for SBP >200 mm Hg).    cloNIDine (CATAPRES) 0.1 MG tablet TAKE 1 TABLET BY MOUTH EVERY 2 HOURS AS NEEDED FOR SYSTOLIC BLOOD PRESSURE>200    doxazosin (CARDURA) 2 MG tablet Take 1 tablet (2 mg total) by mouth every evening.    furosemide (LASIX) 20 MG tablet TAKE 1 TABLET BY MOUTH ONCE DAILY    gabapentin (NEURONTIN) 100 MG capsule Take 1 capsule (100 mg total) by mouth every evening.    latanoprost 0.005 % ophthalmic solution 1 drop every evening.    metoprolol succinate (TOPROL-XL) 50 MG 24 hr tablet Take 1 tablet (50 mg total) by mouth once daily.    nitroGLYCERIN (NITROSTAT) 0.4 MG SL tablet Place 0.4 mg under the tongue. 1 Tablet, Sublingual Sublingual .  One tablet under tounge as needed for chest pain.    omega-3 fatty acids 1,000 mg Cap     ramipril (ALTACE) 10 MG capsule TAKE 1 CAPSULE BY MOUTH TWICE DAILY    vit C-vit E-copper-ZnOx-lutein (PRESERVISION) 226-200-5  [Good] : Good mg-unit-mg Cap Take by mouth. 2 Capsule Oral Every day    cinacalcet (SENSIPAR) 30 MG Tab Take 1 tablet (30 mg total) by mouth daily with breakfast.           Clinical Reference Information           Your Vitals Were     BP Pulse Height Weight SpO2 BMI    170/90 83 5' (1.524 m) 59.4 kg (130 lb 15.3 oz) 96% 25.58 kg/m2      Blood Pressure          Most Recent Value    BP  (!)  170/90      Allergies as of 5/15/2017     Venom-wasp    Augmentin  [Amoxicillin-pot Clavulanate]      Immunizations Administered on Date of Encounter - 5/15/2017     None      Orders Placed During Today's Visit     Future Labs/Procedures Expected by Expires    GILMA  5/15/2017 7/14/2018    Anti-neutrophilic cytoplasmic antibody  5/15/2017 7/14/2018    Calcium, ionized  5/15/2017 7/14/2018    Calcium/Creatinine Ratio,Urine Random  5/15/2017 7/14/2018    Immunofixation electrophoresis  5/15/2017 7/14/2018    Protein / creatinine ratio, urine  5/15/2017 5/15/2018    Protein electrophoresis, serum  5/15/2017 7/14/2018    Renal function panel  5/15/2017 7/14/2018    TSH  5/15/2017 7/14/2018    Urinalysis  5/15/2017 5/15/2018    Calcium, ionized  7/1/2017 7/14/2018    Magnesium  7/1/2017 7/14/2018    Phosphorus  7/1/2017 7/14/2018    Renal function panel  7/1/2017 7/14/2018      [facility] Sign-Up     Activating your [facility] account is as easy as 1-2-3!     1) Visit [email or web address], select Sign Up Now, enter this activation code and your date of birth, then select Next.  [email or web address]  Expires: 6/17/2017  4:50 PM      2) Create a username and password to use when you visit [facility] in the future and select a security question in case you lose your password and select Next.    3) Enter your e-mail address and click Sign Up!    Additional Information  If you have questions, please e-mail [email or web address] or call [phone] to talk to our [facility] staff. Remember, [facility] is NOT to be used for urgent needs. For medical emergencies, dial  911.         Instructions     Labs in 2 weeks and  6 weeks    Begin sensipar 30 mg daily    Stop vit d     take blood pressure medications regularly    rtc 6 weeks       Language Assistance Services     ATTENTION: Language assistance services are available, free of charge. Please call [phone].      ATENCIÓN: Si habla rowena, tiene a machado disposición servicios gratuitos de asistencia lingüística. Llame al [phone].     CHÚ Ý: N?u b?n nói Ti?ng Vi?t, có các d?ch v? h? tr? ngôn ng? mi?n phí dành cho b?n. G?i s? [phone].         [facility] complies with applicable Federal civil rights laws and does not discriminate on the basis of race, color, national origin, age, disability, or sex.           [Stable] : Stable [FreeTextEntry1] : 69 y/o female :\par \par There is a significant FHx of premature CAD in her family. \par PMH of abnormal noninvasive stress test leading to a CT scan which revealed nonobstructive CAD (<50% RCA and LAD). This was performed in July 2018. Normal LVEF, and no signficant dysrhythmias. Since then there has been no new symptoms, no change in functional status, and no hospitalizations. \par \par Good functional status, very active at baseline. Denies exertional chest pain or discomfort. Denies unusual shortness of breath, orthopnea, weight gain, or LE edema. Denies lightheadedness, dizziness, or syncope.  Denies any unusual bleeding or black/tarry stools. \par \par Symptoms of GERD. She is awaiting upper endoscopy. History of cholecystectomy and hernia repair which was uneventful \par \par \par

## 2019-10-10 NOTE — PROGRESS NOTES
Subjective:       Patient ID: Nicolasa Jurado is a 88 y.o. female.    Chief Complaint: Back Pain (Upper back travels to leg; if sitting for long time, pt state feels pain ) and Leg Pain (Left leg)    HPI     88-year-old female here for evaluation of leg pain.  She reports that she has pain in her left, lower back when she sits too long.  Her foot feels like it is going to sleep when she sits for too long (about 3 hours).  She has numbness in her foot.  The pain is in the hip.  If she touches the area, she has pain.  She feels like she has been punched.  It has been going on about a year intermittently.  She cannot sit for long.  She has known arthritis of her lower back.  She has used a heating pad on this.    Review of Systems    Objective:      Physical Exam   Constitutional: She is oriented to person, place, and time. She appears well-developed and well-nourished.   HENT:   Head: Normocephalic and atraumatic.   Mouth/Throat: No oropharyngeal exudate.   Eyes: Pupils are equal, round, and reactive to light. EOM are normal. Right eye exhibits no discharge. Left eye exhibits no discharge. No scleral icterus.   Neck: Normal range of motion. Neck supple. No tracheal deviation present. No thyromegaly present.   Cardiovascular: Normal rate, regular rhythm and normal heart sounds. Exam reveals no gallop and no friction rub.   No murmur heard.  Pulmonary/Chest: Effort normal and breath sounds normal. No respiratory distress. She has no wheezes. She has no rales. She exhibits no tenderness.   Abdominal: Soft. Bowel sounds are normal. She exhibits no distension and no mass. There is no tenderness. There is no rebound and no guarding.   Musculoskeletal: Normal range of motion. She exhibits no edema or tenderness.   Neurological: She is alert and oriented to person, place, and time.   Skin: Skin is warm and dry. No rash noted. No erythema. No pallor.   Psychiatric: She has a normal mood and affect. Her behavior is normal.    Vitals reviewed.      Assessment:       1. Chronic midline low back pain with left-sided sciatica    2. Hypercalcemia        Plan:       1.  Refer to pain management.  Check x-ray lumbar spine.  Diclofenac gel and Tylenol recommended.  2.  Check CMP, vitamin-D, PTH, phosphorus

## 2019-10-11 ENCOUNTER — TELEPHONE (OUTPATIENT)
Dept: INTERNAL MEDICINE | Facility: CLINIC | Age: 84
End: 2019-10-11

## 2019-10-11 DIAGNOSIS — E21.0 PRIMARY HYPERPARATHYROIDISM: Primary | Chronic | ICD-10-CM

## 2019-10-11 NOTE — TELEPHONE ENCOUNTER
Your parathyroid hormone is elevated.  Your vitamin-D, electrolytes, liver function, phosphorus are normal.  Kidney function is stable.  Calcium remains elevated.  This is consistent with primary hyperparathyroidism.  I am going to have you follow up with Endocrine.  Released to patient portal.

## 2019-10-15 ENCOUNTER — PATIENT OUTREACH (OUTPATIENT)
Dept: OTHER | Facility: OTHER | Age: 84
End: 2019-10-15

## 2019-10-15 NOTE — PROGRESS NOTES
Digital Medicine: Clinician Follow-Up    The history is provided by the patient and a relative.     Follow Up  Follow-up reason(s): reading review      Alert received.   Care Team received high BP alert.  Patient is experiencing symptomsof hypertension and headache.  Patient is experiencing elevated BP above her noraml rangewith associated HA.   Spoke with patient's son as patient is sleeping. Son states he spoke to Dr. Gaston's office and they will see Ms. Baldwin tomorrow afternoon.     Patient's son was informed to bring patient in if BP elevates above 180/100 mmHg and patient continues to have HA or other associated HTN symptoms.   Patient's son states she did not want to present to ER earlier today and preferred to rest.     Reviewed recent BMP with patient - Calcium elevated and patient is on HCTZ. Discussed holding/discontinuation, but son wishes to discuss at appointment tomorrow with Dr. Gaston.     Patient given referral to endocrine to further evaluate for hyperparathyroidism. Suggest discontinuing thiazide.           Sleep Apnea  Patient not previously diagnosed with CAMERON and           INTERVENTION(S)  encouragement/support    PLAN  additional monitoring needed and await MD intervention    Continue to monitor. Will follow up after PCP visit.       There are no preventive care reminders to display for this patient.    Last 5 Patient Entered Readings                                      Current 30 Day Average: 144/68     Recent Readings 10/15/2019 10/15/2019 10/15/2019 10/14/2019 10/14/2019    SBP (mmHg) 170 173 184 120 170    DBP (mmHg) 79 79 75 80 71    Pulse 87 78 76 80 69             Hypertension Medications             amLODIPine (NORVASC) 5 MG tablet Take 1 tablet (5 mg total) by mouth 2 (two) times daily.    carvedilol (COREG) 12.5 MG tablet Take 1 tablet (12.5 mg total) by mouth 2 (two) times daily with meals.    hydroCHLOROthiazide (HYDRODIURIL) 25 MG tablet Take 1 tablet (25 mg total) by mouth once daily.     nitroGLYCERIN (NITROSTAT) 0.4 MG SL tablet 1 Tablet Sublingual  One tablet under tounge as needed for chest pain.    telmisartan (MICARDIS) 80 MG Tab Take 1 tablet (80 mg total) by mouth once daily.

## 2019-10-16 ENCOUNTER — OFFICE VISIT (OUTPATIENT)
Dept: INTERNAL MEDICINE | Facility: CLINIC | Age: 84
End: 2019-10-16
Payer: MEDICARE

## 2019-10-16 VITALS
HEIGHT: 60 IN | HEART RATE: 88 BPM | BODY MASS INDEX: 25.97 KG/M2 | RESPIRATION RATE: 16 BRPM | WEIGHT: 132.25 LBS | DIASTOLIC BLOOD PRESSURE: 68 MMHG | TEMPERATURE: 99 F | SYSTOLIC BLOOD PRESSURE: 128 MMHG

## 2019-10-16 DIAGNOSIS — I10 ESSENTIAL HYPERTENSION: Primary | Chronic | ICD-10-CM

## 2019-10-16 DIAGNOSIS — I70.0 ATHEROSCLEROSIS OF AORTA: Chronic | ICD-10-CM

## 2019-10-16 DIAGNOSIS — R51.9 NONINTRACTABLE HEADACHE, UNSPECIFIED CHRONICITY PATTERN, UNSPECIFIED HEADACHE TYPE: ICD-10-CM

## 2019-10-16 DIAGNOSIS — E78.00 PURE HYPERCHOLESTEROLEMIA: ICD-10-CM

## 2019-10-16 PROCEDURE — 99214 OFFICE O/P EST MOD 30 MIN: CPT | Mod: 25,HCNC,S$GLB, | Performed by: INTERNAL MEDICINE

## 2019-10-16 PROCEDURE — 96372 PR INJECTION,THERAP/PROPH/DIAG2ST, IM OR SUBCUT: ICD-10-PCS | Mod: HCNC,S$GLB,, | Performed by: INTERNAL MEDICINE

## 2019-10-16 PROCEDURE — 1101F PR PT FALLS ASSESS DOC 0-1 FALLS W/OUT INJ PAST YR: ICD-10-PCS | Mod: HCNC,CPTII,S$GLB, | Performed by: INTERNAL MEDICINE

## 2019-10-16 PROCEDURE — 1101F PT FALLS ASSESS-DOCD LE1/YR: CPT | Mod: HCNC,CPTII,S$GLB, | Performed by: INTERNAL MEDICINE

## 2019-10-16 PROCEDURE — 99999 PR PBB SHADOW E&M-EST. PATIENT-LVL III: CPT | Mod: PBBFAC,HCNC,, | Performed by: INTERNAL MEDICINE

## 2019-10-16 PROCEDURE — 99214 PR OFFICE/OUTPT VISIT, EST, LEVL IV, 30-39 MIN: ICD-10-PCS | Mod: 25,HCNC,S$GLB, | Performed by: INTERNAL MEDICINE

## 2019-10-16 PROCEDURE — 96372 THER/PROPH/DIAG INJ SC/IM: CPT | Mod: HCNC,S$GLB,, | Performed by: INTERNAL MEDICINE

## 2019-10-16 PROCEDURE — 99999 PR PBB SHADOW E&M-EST. PATIENT-LVL III: ICD-10-PCS | Mod: PBBFAC,HCNC,, | Performed by: INTERNAL MEDICINE

## 2019-10-16 RX ORDER — TRIAMCINOLONE ACETONIDE 40 MG/ML
40 INJECTION, SUSPENSION INTRA-ARTICULAR; INTRAMUSCULAR
Status: COMPLETED | OUTPATIENT
Start: 2019-10-16 | End: 2019-10-16

## 2019-10-16 RX ADMIN — TRIAMCINOLONE ACETONIDE 40 MG: 40 INJECTION, SUSPENSION INTRA-ARTICULAR; INTRAMUSCULAR at 01:10

## 2019-10-16 NOTE — PROGRESS NOTES
Subjective:       Patient ID: Nicolasa Jurado is a 88 y.o. female.    Chief Complaint: Hypertension and headache since flu shot    HPI     88-year-old female here for follow-up HTN - Patient is currently on HCTZ 25 mg, Coreg 12.5 mg b.i.d., amlodipine 5 mg, Micardis 80 mg. She does check her BP at home, and it runs over 200 Monday night.  It was high yesterday in the 170s-180s.  If came down after the eye doctor appointment. Side effects of medications note: none. Denies headaches, blurred vision, chest pain, shortness of breath, nausea.    She has a headache and backache since her flu shot.  She has tried tylenol with minimal relief.  This has been since her flu vaccine.    HLD - Patient is currently on lipitor 80 mg.  Her last lipid panel was   Cholesterol   Date Value Ref Range Status   02/01/2019 131 120 - 199 mg/dL Final     Comment:     The National Cholesterol Education Program (NCEP) has set the  following guidelines (reference ranges) for Cholesterol:  Optimal.....................<200 mg/dL  Borderline High.............200-239 mg/dL  High........................> or = 240 mg/dL       Triglycerides   Date Value Ref Range Status   02/01/2019 132 30 - 150 mg/dL Final     Comment:     The National Cholesterol Education Program (NCEP) has set the  following guidelines (reference values) for triglycerides:  Normal......................<150 mg/dL  Borderline High.............150-199 mg/dL  High........................200-499 mg/dL       HDL   Date Value Ref Range Status   02/01/2019 46 40 - 75 mg/dL Final     Comment:     The National Cholesterol Education Program (NCEP) has set the  following guidelines (reference values) for HDL Cholesterol:  Low...............<40 mg/dL  Optimal...........>60 mg/dL       LDL Cholesterol   Date Value Ref Range Status   02/01/2019 58.6 (L) 63.0 - 159.0 mg/dL Final     Comment:     The National Cholesterol Education Program (NCEP) has set the  following guidelines (reference  values) for LDL Cholesterol:  Optimal.......................<130 mg/dL  Borderline High...............130-159 mg/dL  High..........................160-189 mg/dL  Very High.....................>190 mg/dL     .  Side effects of the medication: none.    Review of Systems    Objective:      Physical Exam   Constitutional: She is oriented to person, place, and time. She appears well-developed and well-nourished.   HENT:   Head: Normocephalic and atraumatic.   Mouth/Throat: No oropharyngeal exudate.   Eyes: Pupils are equal, round, and reactive to light. EOM are normal. Right eye exhibits no discharge. Left eye exhibits no discharge. No scleral icterus.   Neck: Normal range of motion. Neck supple. No tracheal deviation present. No thyromegaly present.   Cardiovascular: Normal rate, regular rhythm and normal heart sounds. Exam reveals no gallop and no friction rub.   No murmur heard.  Pulmonary/Chest: Effort normal and breath sounds normal. No respiratory distress. She has no wheezes. She has no rales. She exhibits no tenderness.   Abdominal: Soft. Bowel sounds are normal. She exhibits no distension and no mass. There is no tenderness. There is no rebound and no guarding.   Musculoskeletal: Normal range of motion. She exhibits no edema or tenderness.   Neurological: She is alert and oriented to person, place, and time.   Skin: Skin is warm and dry. No rash noted. No erythema. No pallor.   Psychiatric: She has a normal mood and affect. Her behavior is normal.   Vitals reviewed.      Assessment:       1. Essential hypertension    2. Pure hypercholesterolemia    3. Atherosclerosis of aorta    4. Nonintractable headache, unspecified chronicity pattern, unspecified headache type        Plan:       1.  Continue HCTZ 25 mg, Coreg 12.5 mg b.i.d., amlodipine 5 mg, Micardis 80 mg.  Think blood pressure went up because of side effects of the flu vaccine.  No adjustments required at this time.  Kenalog given.  2/3.  Continue Lipitor  80 mg daily.  4.  Think this is related to side effects from the flu vaccine.  Kenalog 40 mg IM x1.

## 2019-10-17 ENCOUNTER — PATIENT MESSAGE (OUTPATIENT)
Dept: OTHER | Facility: OTHER | Age: 84
End: 2019-10-17

## 2019-10-24 ENCOUNTER — PATIENT OUTREACH (OUTPATIENT)
Dept: OTHER | Facility: OTHER | Age: 84
End: 2019-10-24

## 2019-10-24 NOTE — PROGRESS NOTES
Digital Medicine: Clinician Follow-Up    The history is provided by the patient.     Follow Up  Follow-up reason(s): reading review and responding to task    Patient's son, Quinton, was transferred to me by health .  Quinton reports Ms. Baldwin has been taking carvedilol once daily instead of BID, by mistake.     Discussed dosing and reason for BID dosing of this medication. Offered extended formulation. Family declined stating they just didn't realize it was twice daily.         INTERVENTION(S)  reviewed appropriate dose schedule and encouragement/support    PLAN  patient verbalizes understanding, additional monitoring needed and Health  follow up    Will continue to monitor.       There are no preventive care reminders to display for this patient.    Last 5 Patient Entered Readings                                      Current 30 Day Average: 143/69     Recent Readings 10/24/2019 10/18/2019 10/18/2019 10/18/2019 10/17/2019    SBP (mmHg) 133 185 177 167 153    DBP (mmHg) 62 74 70 72 71    Pulse 62 73 72 61 67             Hypertension Medications             amLODIPine (NORVASC) 5 MG tablet Take 1 tablet (5 mg total) by mouth 2 (two) times daily.    carvedilol (COREG) 12.5 MG tablet Take 1 tablet (12.5 mg total) by mouth 2 (two) times daily with meals.    hydroCHLOROthiazide (HYDRODIURIL) 25 MG tablet Take 1 tablet (25 mg total) by mouth once daily.    nitroGLYCERIN (NITROSTAT) 0.4 MG SL tablet 1 Tablet Sublingual  One tablet under tounge as needed for chest pain.    telmisartan (MICARDIS) 80 MG Tab Take 1 tablet (80 mg total) by mouth once daily.

## 2019-10-24 NOTE — PROGRESS NOTES
Digital Medicine: Health  Follow-Up    The history is provided by the patient.     Follow Up  Follow-up reason(s): goal follow-up    Spoke with patient's son, Quinton. He said that Ms. Baldwin has been doing fine and recently went to visit a family member for the weekend. He said that she is still feeling the effects of low BP when she gets readings that are less than or at 130/80. Reassured Quinton that Ms. Baldwin feels that way because she is not accustomed to her BP being in that lower range. Quinton did have a question about Ms. Baldwin's  Carvedilol. Transferred him to clinician to address concern.       Assessment/Plan    SDOH    Intervention/Plan    There are no preventive care reminders to display for this patient.    Last 5 Patient Entered Readings                                      Current 30 Day Average: 143/69     Recent Readings 10/24/2019 10/18/2019 10/18/2019 10/18/2019 10/17/2019    SBP (mmHg) 133 185 177 167 153    DBP (mmHg) 62 74 70 72 71    Pulse 62 73 72 61 67

## 2019-11-11 ENCOUNTER — OFFICE VISIT (OUTPATIENT)
Dept: ENDOCRINOLOGY | Facility: CLINIC | Age: 84
End: 2019-11-11
Attending: INTERNAL MEDICINE
Payer: MEDICARE

## 2019-11-11 VITALS
HEART RATE: 96 BPM | BODY MASS INDEX: 23.65 KG/M2 | SYSTOLIC BLOOD PRESSURE: 140 MMHG | HEIGHT: 61 IN | DIASTOLIC BLOOD PRESSURE: 80 MMHG | WEIGHT: 125.25 LBS

## 2019-11-11 DIAGNOSIS — E21.0 PRIMARY HYPERPARATHYROIDISM: Primary | Chronic | ICD-10-CM

## 2019-11-11 DIAGNOSIS — M81.0 OSTEOPOROSIS, POSTMENOPAUSAL: ICD-10-CM

## 2019-11-11 DIAGNOSIS — N18.30 CHRONIC KIDNEY DISEASE, STAGE III (MODERATE): ICD-10-CM

## 2019-11-11 DIAGNOSIS — I10 ESSENTIAL HYPERTENSION: Chronic | ICD-10-CM

## 2019-11-11 PROCEDURE — 99204 OFFICE O/P NEW MOD 45 MIN: CPT | Mod: S$GLB,,, | Performed by: INTERNAL MEDICINE

## 2019-11-11 PROCEDURE — 1101F PT FALLS ASSESS-DOCD LE1/YR: CPT | Mod: CPTII,S$GLB,, | Performed by: INTERNAL MEDICINE

## 2019-11-11 PROCEDURE — 1101F PR PT FALLS ASSESS DOC 0-1 FALLS W/OUT INJ PAST YR: ICD-10-PCS | Mod: CPTII,S$GLB,, | Performed by: INTERNAL MEDICINE

## 2019-11-11 PROCEDURE — 99204 PR OFFICE/OUTPT VISIT, NEW, LEVL IV, 45-59 MIN: ICD-10-PCS | Mod: S$GLB,,, | Performed by: INTERNAL MEDICINE

## 2019-11-11 RX ORDER — CINACALCET 30 MG/1
30 TABLET, FILM COATED ORAL 2 TIMES DAILY WITH MEALS
Qty: 180 TABLET | Refills: 3
Start: 2019-11-11 | End: 2020-02-18

## 2019-11-11 NOTE — LETTER
November 12, 2019      Houston Gaston MD  [address] [facility] [facility]  [place] [address]           [facility] [address], [facility] [address]  [facility] [address]  Phone: [phone]  Fax: [phone]          Patient: Nicolasa Jurado   MR Number: [id number]   YOB: 1930   Date of Visit: 11/11/2019       Dear Dr. Houston Gaston:    Thank you for referring Nicolasa Jurado to me for evaluation. Attached you will find relevant portions of my assessment and plan of care.    If you have questions, please do not hesitate to call me. I look forward to following Nicolasa Jurado along with you.    Sincerely,    Melchor Jordan MD    Enclosure  CC:  No Recipients    If you would like to receive this communication electronically, please contact [email or web address] or [phone] to request more information on [employer] access.    For providers and/or their staff who would like to refer a patient to [facility], please contact us through our one-stop-shop provider referral line, [facility], at [phone].    If you feel you have received this communication in error or would no longer like to receive these types of communications, please e-mail [email or web address]

## 2019-11-11 NOTE — Clinical Note
Catia Conrad,Any chance we could decrease her HCTZ? It's likely exacerbating her hypercalcemia. I know her bp is difficult to control and this may not be possible. I increased her cinacalcet and advised her to stay well hydrated. Thanks, Melchor

## 2019-11-11 NOTE — PROGRESS NOTES
Subjective:      Patient ID: Sancho Fairchild is a 88 y.o. female.    Chief Complaint:  Hyperparathyroidism      History of Present Illness  Ms. presents for evaluation and management of hyperparathyroidism. Last visit with Dr. Lee in 6/2016. New patient to me.    Has active history of renal artery stenosis, HTN, primary HPT, and CAD with stent.    Noted to have elevated PTH with and elevated calcium. Was diagnosed with primary hyperparathyroidism several years ago but declined parathyroidectomy. She has been taking cinacalcet at 30 mg once daily. She was restarted on HCTZ earlier this year.     Calcium, PTH, and Vit D levels:  Results for SANCHO FAIRCHILD (MRN [id number]) as of 11/11/2019 07:28   Ref. Range 10/5/2017 14:48 11/2/2017 09:00 1/8/2018 13:18 8/10/2018 12:00 2/1/2019 12:35 8/8/2019 10:22 10/10/2019 11:36   Calcium Latest Ref Range: 8.7 - 10.5 mg/dL 11.5 (H) 10.1 9.7 10.8 (H) 10.9 (H) 10.3 11.5 (H)   Results for SANCHO FAIRCHILD (MRN [id number]) as of 11/11/2019 07:28   Ref. Range 10/5/2017 14:48 11/2/2017 09:00 1/8/2018 13:18 8/10/2018 12:00 2/1/2019 12:35 8/8/2019 10:22 10/10/2019 11:36   Albumin Latest Ref Range: 3.5 - 5.2 g/dL 4.0  3.8 4.2 4.1  4.6   Results for SANCHO FAIRCHILD (MRN [id number]) as of 11/11/2019 07:28   Ref. Range 10/10/2019 11:36   Vit D, 25-Hydroxy Latest Ref Range: 30 - 96 ng/mL 47   Results for SANCHO FAIRCIHLD (MRN [id number]) as of 11/11/2019 07:28   Ref. Range 5/3/2017 13:16 5/29/2017 10:34 10/5/2017 14:48 1/8/2018 13:18 10/10/2019 11:36   PTH Latest Ref Range: 9.0 - 77.0 pg/mL 139.0 (H)  169.0 (H) 86.0 (H) 80.0 (H)     Has history of right wrist fracture after tripping several years ago. No other fractures.     Last bone density dated:  10/2019:  FINDINGS:  Lumbar Spine: Lumbar bone mineral density L1-L4 is 0.830g/cm2, which is a T-score of -2.0. The Z-score is .    Total Hip: Total hip bone mineral density is 0.758g/cm2.  The T-score is -1.5, and the Z-score is .    Femoral neck: Bone  mineral density is 0.695g/cm2 and the T-score is -1.4 and the Z-score is  g/cm2.    There is a 7.2% risk of a major osteoporotic fracture and a 2% risk of hip fracture in the next 10 years (FRAX).    Compared with previous DXA, BMD at the lumbar spine has remained stable, and the BMD at the total hip has decreased by -12.2%.      Impression       Osteopenia of the femoral neck, total hip and lumbar spine.  FRAX calculations do not suggest treatment.     She declined bisphosphonate therapy in the past    No history of nephrolithiasis. GFR intermittently <60.    Has never taken lithium. No longer taking calcium supplements. Takes OTC vit D at 2000 units daily.     Not currently taking a PPI and has not taken a PPI chronically.     No family history of hypercalcemia or hyperparathyroidism. No personal history of elevated serum calcium at a young age.      No depression/low mood, bone pain, abdominal pain or constipation.     Review of Systems   Constitutional: Negative for unexpected weight change.   HENT: Negative for voice change.    Eyes: Negative for visual disturbance.   Respiratory: Negative for shortness of breath.    Cardiovascular: Negative for chest pain.   Gastrointestinal: Negative for abdominal pain.   Endocrine: Negative for cold intolerance.   Genitourinary: Negative for frequency.   Musculoskeletal: Negative for myalgias.   Skin: Negative for rash.       Objective:   Physical Exam   Constitutional: She is oriented to person, place, and time. She appears well-developed and well-nourished.   HENT:   Head: Normocephalic and atraumatic.   Right Ear: External ear normal.   Left Ear: External ear normal.   Nose: Nose normal.   Neck: No tracheal deviation present. No thyromegaly present.   Cardiovascular: Normal rate, regular rhythm and normal heart sounds.   No edema   Pulmonary/Chest: Effort normal and breath sounds normal.   Abdominal: Soft. Bowel sounds are normal. There is no tenderness.    Musculoskeletal:   Normal gait, no cyanosis or clubbing   Neurological: She is alert and oriented to person, place, and time. She has normal reflexes.   Vibration sense intact   Skin: Skin is warm and dry. No rash noted.   No nodules, no ulcers   Psychiatric: She has a normal mood and affect. Judgment normal.   Vitals reviewed.    BP Readings from Last 3 Encounters:   11/11/19 (!) 140/80   10/16/19 128/68   10/10/19 138/70     Wt Readings from Last 1 Encounters:   11/11/19 1417 56.8 kg (125 lb 3.5 oz)       Body mass index is 23.66 kg/m².    Lab Review:   Lab Results   Component Value Date    HGBA1C 5.9 01/22/2015     Lab Results   Component Value Date    CHOL 131 02/01/2019    HDL 46 02/01/2019    LDLCALC 58.6 (L) 02/01/2019    TRIG 132 02/01/2019    CHOLHDL 35.1 02/01/2019     Lab Results   Component Value Date     10/10/2019    K 3.5 10/10/2019     10/10/2019    CO2 29 10/10/2019    GLU 98 10/10/2019    BUN 16 10/10/2019    CREATININE 1.0 10/10/2019    CALCIUM 11.5 (H) 10/10/2019    PROT 8.1 10/10/2019    ALBUMIN 4.6 10/10/2019    BILITOT 0.5 10/10/2019    ALKPHOS 90 10/10/2019    AST 23 10/10/2019    ALT 23 10/10/2019    ANIONGAP 12 10/10/2019    ESTGFRAFRICA 58.1 (A) 10/10/2019    EGFRNONAA 50.4 (A) 10/10/2019    TSH 2.078 05/29/2017         Assessment and Plan     Primary hyperparathyroidism  --Patient with longstanding history of primary HPT  --Has declined parathyroidectomy in the past and does not want to pursue this now  --Given that her last calcium was elevated I recommend that she increase cinacalcet and take 30 mg PO BID WM  --Advised her to stay well hydrated  --Will discuss with PCP to see if HCTZ can be reduced as this is exacerbating hypercalcemia, but may also be necessary for adequate bp control  --Repeat renal panel in 8 weeks    Essential hypertension  --Bp stable today, but intermittently elevated    Osteoporosis, postmenopausal  --Patient with osteopenia on bone density and FRAX  not indicating need for therapy  --She has been previously classified as osteoporosis due to history of wrist fracture  --She has never been on bisphosphonate therapy and previously declined therapy  --Continue vit D supplements  --Will repeat BMD in 11/2020 and check wrist BMD at that time    Melchor Jordan M.D. Staff Endocrinology

## 2019-11-12 ENCOUNTER — TELEPHONE (OUTPATIENT)
Dept: INTERNAL MEDICINE | Facility: CLINIC | Age: 84
End: 2019-11-12

## 2019-11-12 RX ORDER — METOPROLOL SUCCINATE 50 MG/1
50 TABLET, EXTENDED RELEASE ORAL DAILY
Qty: 30 TABLET | Refills: 6 | Status: SHIPPED | OUTPATIENT
Start: 2019-11-12 | End: 2019-12-17 | Stop reason: SDUPTHER

## 2019-11-12 NOTE — TELEPHONE ENCOUNTER
Reviewed name of medication stopped and name of new medication to start. Son wrote this down, names spelled for him. Repeated this back accurately,

## 2019-11-12 NOTE — TELEPHONE ENCOUNTER
----- Message from Raghu Carey sent at 11/12/2019  2:57 PM CST -----  Contact: Timo Alcantara [phone]  Son of patient calling would like to speak with office regarding the names of Rx that pcp will be taking patient off of and the ones that will be changed? Would like a call back asap Timo Alcantara [phone]    Please call an advise  Thank you

## 2019-11-12 NOTE — ASSESSMENT & PLAN NOTE
--Patient with osteopenia on bone density and FRAX not indicating need for therapy  --She has been previously classified as osteoporosis due to history of wrist fracture  --She has never been on bisphosphonate therapy and previously declined therapy  --Continue vit D supplements  --Will repeat BMD in 11/2020 and check wrist BMD at that time

## 2019-11-12 NOTE — ASSESSMENT & PLAN NOTE
--Patient with longstanding history of primary HPT  --Has declined parathyroidectomy in the past and does not want to pursue this now  --Given that her last calcium was elevated I recommend that she increase cinacalcet and take 30 mg PO BID WM  --Advised her to stay well hydrated  --Will discuss with PCP to see if HCTZ can be reduced as this is exacerbating hypercalcemia, but may also be necessary for adequate bp control  --Repeat renal panel in 8 weeks

## 2019-11-12 NOTE — TELEPHONE ENCOUNTER
Endocrine thinks that HCTZ is exacerbating her hypercalcemia.  Let's have her changed from Coreg 12.5 mg b.i.d. to Toprol-XL 50 mg daily.  Have her follow up with me in a month.    Stop Coreg secondary to asthma.

## 2019-11-15 ENCOUNTER — INITIAL CONSULT (OUTPATIENT)
Dept: DERMATOLOGY | Facility: CLINIC | Age: 84
End: 2019-11-15
Payer: MEDICARE

## 2019-11-15 VITALS — BODY MASS INDEX: 23.62 KG/M2 | WEIGHT: 125 LBS

## 2019-11-15 DIAGNOSIS — L82.1 SEBORRHEIC KERATOSES: ICD-10-CM

## 2019-11-15 DIAGNOSIS — D48.5 NEOPLASM OF UNCERTAIN BEHAVIOR OF SKIN: ICD-10-CM

## 2019-11-15 DIAGNOSIS — B00.9 HERPES SIMPLEX: Primary | ICD-10-CM

## 2019-11-15 DIAGNOSIS — L29.9 SCALP PRURITUS: ICD-10-CM

## 2019-11-15 PROCEDURE — 99202 PR OFFICE/OUTPT VISIT, NEW, LEVL II, 15-29 MIN: ICD-10-PCS | Mod: 25,HCNC,S$GLB, | Performed by: DERMATOLOGY

## 2019-11-15 PROCEDURE — 88305 TISSUE EXAM BY PATHOLOGIST: ICD-10-PCS | Mod: 26,HCNC,, | Performed by: PATHOLOGY

## 2019-11-15 PROCEDURE — 11102 PR TANGENTIAL BIOPSY, SKIN, SINGLE LESION: ICD-10-PCS | Mod: HCNC,S$GLB,, | Performed by: DERMATOLOGY

## 2019-11-15 PROCEDURE — 88305 TISSUE EXAM BY PATHOLOGIST: CPT | Mod: HCNC | Performed by: PATHOLOGY

## 2019-11-15 PROCEDURE — 1101F PT FALLS ASSESS-DOCD LE1/YR: CPT | Mod: HCNC,CPTII,S$GLB, | Performed by: DERMATOLOGY

## 2019-11-15 PROCEDURE — 99202 OFFICE O/P NEW SF 15 MIN: CPT | Mod: 25,HCNC,S$GLB, | Performed by: DERMATOLOGY

## 2019-11-15 PROCEDURE — 1101F PR PT FALLS ASSESS DOC 0-1 FALLS W/OUT INJ PAST YR: ICD-10-PCS | Mod: HCNC,CPTII,S$GLB, | Performed by: DERMATOLOGY

## 2019-11-15 PROCEDURE — 99999 PR PBB SHADOW E&M-EST. PATIENT-LVL III: CPT | Mod: PBBFAC,HCNC,, | Performed by: DERMATOLOGY

## 2019-11-15 PROCEDURE — 88305 TISSUE EXAM BY PATHOLOGIST: CPT | Mod: 26,HCNC,, | Performed by: PATHOLOGY

## 2019-11-15 PROCEDURE — 11102 TANGNTL BX SKIN SINGLE LES: CPT | Mod: HCNC,S$GLB,, | Performed by: DERMATOLOGY

## 2019-11-15 PROCEDURE — 99999 PR PBB SHADOW E&M-EST. PATIENT-LVL III: ICD-10-PCS | Mod: PBBFAC,HCNC,, | Performed by: DERMATOLOGY

## 2019-11-15 RX ORDER — FLUOCINONIDE TOPICAL SOLUTION USP, 0.05% 0.5 MG/ML
SOLUTION TOPICAL
Qty: 60 ML | Refills: 6 | Status: SHIPPED | OUTPATIENT
Start: 2019-11-15 | End: 2021-03-15

## 2019-11-15 RX ORDER — ACYCLOVIR 50 MG/G
OINTMENT TOPICAL
Qty: 15 G | Refills: 6 | Status: SHIPPED | OUTPATIENT
Start: 2019-11-15

## 2019-11-15 NOTE — LETTER
November 16, 2019      Lisseth Sheehan, NP  [address] [facility] [address]           [place] [facility] D[facility]at[facility]  [address] [facility]  [place] [address]  Phone: [phone]  Fax: [phone]          Patient: Nicolasa Jurado   MR Number: [id number]   YOB: 1930   Date of Visit: 11/15/2019       Dear Lisseth Sheehan:    Thank you for referring Nicolasa Jurado to me for evaluation. Attached you will find relevant portions of my assessment and plan of care.    If you have questions, please do not hesitate to call me. I look forward to following Nicolasa Jurado along with you.    Sincerely,    Taylor Marroquin MD    Enclosure  CC:  No Recipients    If you would like to receive this communication electronically, please contact [email or web address] or [phone] to request more information on [employer] access.    For providers and/or their staff who would like to refer a patient to [facility], please contact us through our one-stop-shop provider referral line, [facility], at [phone].    If you feel you have received this communication in error or would no longer like to receive these types of communications, please e-mail [email or web address]

## 2019-11-15 NOTE — PROGRESS NOTES
Subjective:       Patient ID:  Nicolasa Jurado is a 88 y.o. female who presents for   Chief Complaint   Patient presents with    Spot     face    Skin Check     UBSE     Spot on back which is crusted and painful present for over a month no tx, also would like skin check.  Has scalp pruritus no rash and a pruritic area on her right buttock for several days with blisters.  Here with son in law to interpret     Spot  - Initial  Affected locations: face, back, chest, left arm and right arm  Signs / symptoms: pain and irritated  Aggravated by: nothing  Relieving factors/Treatments tried: nothing        Review of Systems   Constitutional: Negative for fever, chills, weight loss, weight gain, fatigue, night sweats and malaise.   Skin: Positive for itching, rash and wears hat. Negative for daily sunscreen use and activity-related sunscreen use.   Hematologic/Lymphatic: Bruises/bleeds easily.        Objective:    Physical Exam   Constitutional: She appears well-developed and well-nourished. No distress.   Neurological: She is alert and oriented to person, place, and time. She is not disoriented.   Psychiatric: She has a normal mood and affect.   Skin:   Areas Examined (abnormalities noted in diagram):   Scalp / Hair Palpated and Inspected  Head / Face Inspection Performed  Neck Inspection Performed  Chest / Axilla Inspection Performed  Abdomen Inspection Performed  Genitals / Buttocks / Groin Inspection Performed  Back Inspection Performed  RUE Inspected  LUE Inspection Performed                   Diagram Legend     Erythematous scaling macule/papule c/w actinic keratosis       Vascular papule c/w angioma      Pigmented verrucoid papule/plaque c/w seborrheic keratosis      Yellow umbilicated papule c/w sebaceous hyperplasia      Irregularly shaped tan macule c/w lentigo     1-2 mm smooth white papules consistent with Milia      Movable subcutaneous cyst with punctum c/w epidermal inclusion cyst      Subcutaneous movable  cyst c/w pilar cyst      Firm pink to brown papule c/w dermatofibroma      Pedunculated fleshy papule(s) c/w skin tag(s)      Evenly pigmented macule c/w junctional nevus     Mildly variegated pigmented, slightly irregular-bordered macule c/w mildly atypical nevus      Flesh colored to evenly pigmented papule c/w intradermal nevus       Pink pearly papule/plaque c/w basal cell carcinoma      Erythematous hyperkeratotic cursted plaque c/w SCC      Surgical scar with no sign of skin cancer recurrence      Open and closed comedones      Inflammatory papules and pustules      Verrucoid papule consistent consistent with wart     Erythematous eczematous patches and plaques     Dystrophic onycholytic nail with subungual debris c/w onychomycosis     Umbilicated papule    Erythematous-base heme-crusted tan verrucoid plaque consistent with inflamed seborrheic keratosis     Erythematous Silvery Scaling Plaque c/w Psoriasis     See annotation      Assessment / Plan:      Pathology Orders:     Normal Orders This Visit    Specimen to Pathology, Dermatology     Comments:    Number of Specimens:->1  ------------------------->-------------------------  Spec 1 Procedure:->Biopsy  Spec 1 Clinical Impression:->irritated sk  Spec 1 Source:->right back    Questions:    Procedure Type:  Dermatology and skin neoplasms    Number of Specimens:  1    ------------------------:  -------------------------    Spec 1 Procedure:  Biopsy    Spec 1 Clinical Impression:  irritated sk    Spec 1 Source:  right back        Herpes simplex  -     acyclovir 5% (ZOVIRAX) 5 % ointment; Use tid for buttock rash  Dispense: 15 g; Refill: 6    Neoplasm of uncertain behavior of skin  -     Specimen to Pathology, Dermatology  Shave removal procedure note:    Shave removal performed after verbal consent including risk of infection, scar, recurrence, need for additional treatment of site. Area prepped with alcohol, anesthetized 1% lidocaine with epinephrine. Lesional  tissue shaved. Lesion defect size 7mm No complications. Dressing applied. Wound care explained.        Seborrheic keratoses  reassurance  Brochure provided      Scalp pruritus  -     fluocinonide (LIDEX) 0.05 % external solution; Use hs for scalp prn pruritus  Dispense: 60 mL; Refill: 6             Follow up in about 1 year (around 11/15/2020).

## 2019-11-20 ENCOUNTER — PATIENT OUTREACH (OUTPATIENT)
Dept: OTHER | Facility: OTHER | Age: 84
End: 2019-11-20

## 2019-11-20 DIAGNOSIS — I10 ESSENTIAL HYPERTENSION: Primary | ICD-10-CM

## 2019-11-20 NOTE — PROGRESS NOTES
Called patient to discuss high alert BP readings received. NA, LVM requesting a call back.     Average remains near goal, but SBP is slightly elevated above goal of <140/90 mmHg.     Current average is 144/67 mmHg. Recent elevations with SBP in the 180's and 190's; however, down to 100's -130's in between. DBP remains at goal throughout. Patient has history of hypotensive symptoms when BP is normal/at goal.     Will continue to monitor.

## 2019-11-22 LAB
FINAL PATHOLOGIC DIAGNOSIS: NORMAL
GROSS: NORMAL

## 2019-11-26 ENCOUNTER — DOCUMENTATION ONLY (OUTPATIENT)
Dept: DERMATOLOGY | Facility: CLINIC | Age: 84
End: 2019-11-26

## 2019-11-26 NOTE — PROGRESS NOTES
Another high alert reading obtained today - called patient to inquire. NA, LVM requesting a call back.     Average now 147/68 mmHg. Goal <140/90 mmHg.     Will continue to monitor.

## 2019-12-03 ENCOUNTER — PATIENT OUTREACH (OUTPATIENT)
Dept: OTHER | Facility: OTHER | Age: 84
End: 2019-12-03

## 2019-12-17 RX ORDER — METOPROLOL SUCCINATE 100 MG/1
100 TABLET, EXTENDED RELEASE ORAL DAILY
Qty: 30 TABLET | Refills: 5 | Status: SHIPPED | OUTPATIENT
Start: 2019-12-17 | End: 2020-06-18 | Stop reason: SDUPTHER
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
## 2019-12-17 NOTE — PROGRESS NOTES
Digital Medicine: Health  Follow-Up    The history is provided by the patient.     Follow Up  Follow-up reason(s): goal follow-up    Called patient to check on her due to elevated readings and lack of communication. Patient stated that she has been dealing with the flu. She said that she has asthma as well and really has not been feeling well. I encouraged patient to be on the look out for a call from the pharmacist. Patient was receptive and said that she would wait for her call. Will continue to follow up with patient as scheduled for my next outreach.     Patient's son called me back and said that ever since her BP medication has been changed, they have not been able to get her BP under control. He said that he would like a breakdown of her BP medication so he can have a better understanding of her medication and what it is supposed to be doing for her. Patient stated that he sometimes does not have his phone on him but will make it a priority to have his phone with him today in anticipation of Rebecca contacting him.       Intervention/Plan    There are no preventive care reminders to display for this patient.    Last 5 Patient Entered Readings                                      Current 30 Day Average: 161/73     Recent Readings 12/17/2019 12/16/2019 12/15/2019 12/13/2019 12/11/2019    SBP (mmHg) 187 201 178 205 187    DBP (mmHg) 73 85 81 71 77    Pulse 61 64 64 66 65                  Screenings    SDOH

## 2019-12-18 ENCOUNTER — OFFICE VISIT (OUTPATIENT)
Dept: INTERNAL MEDICINE | Facility: CLINIC | Age: 84
End: 2019-12-18
Payer: MEDICARE

## 2019-12-18 VITALS
HEIGHT: 61 IN | SYSTOLIC BLOOD PRESSURE: 126 MMHG | HEART RATE: 76 BPM | RESPIRATION RATE: 19 BRPM | WEIGHT: 130.94 LBS | DIASTOLIC BLOOD PRESSURE: 80 MMHG | TEMPERATURE: 99 F | BODY MASS INDEX: 24.72 KG/M2

## 2019-12-18 DIAGNOSIS — I10 ESSENTIAL HYPERTENSION: Primary | Chronic | ICD-10-CM

## 2019-12-18 DIAGNOSIS — J20.8 ACUTE BACTERIAL BRONCHITIS: ICD-10-CM

## 2019-12-18 DIAGNOSIS — E78.00 PURE HYPERCHOLESTEROLEMIA: Chronic | ICD-10-CM

## 2019-12-18 DIAGNOSIS — B96.89 ACUTE BACTERIAL BRONCHITIS: ICD-10-CM

## 2019-12-18 PROCEDURE — 96372 THER/PROPH/DIAG INJ SC/IM: CPT | Mod: HCNC,S$GLB,, | Performed by: INTERNAL MEDICINE

## 2019-12-18 PROCEDURE — 1126F PR PAIN SEVERITY QUANTIFIED, NO PAIN PRESENT: ICD-10-PCS | Mod: HCNC,S$GLB,, | Performed by: INTERNAL MEDICINE

## 2019-12-18 PROCEDURE — 99214 OFFICE O/P EST MOD 30 MIN: CPT | Mod: HCNC,25,S$GLB, | Performed by: INTERNAL MEDICINE

## 2019-12-18 PROCEDURE — 99214 PR OFFICE/OUTPT VISIT, EST, LEVL IV, 30-39 MIN: ICD-10-PCS | Mod: HCNC,25,S$GLB, | Performed by: INTERNAL MEDICINE

## 2019-12-18 PROCEDURE — 99999 PR PBB SHADOW E&M-EST. PATIENT-LVL V: ICD-10-PCS | Mod: PBBFAC,HCNC,, | Performed by: INTERNAL MEDICINE

## 2019-12-18 PROCEDURE — 1101F PT FALLS ASSESS-DOCD LE1/YR: CPT | Mod: HCNC,CPTII,S$GLB, | Performed by: INTERNAL MEDICINE

## 2019-12-18 PROCEDURE — 96372 PR INJECTION,THERAP/PROPH/DIAG2ST, IM OR SUBCUT: ICD-10-PCS | Mod: HCNC,S$GLB,, | Performed by: INTERNAL MEDICINE

## 2019-12-18 PROCEDURE — 1101F PR PT FALLS ASSESS DOC 0-1 FALLS W/OUT INJ PAST YR: ICD-10-PCS | Mod: HCNC,CPTII,S$GLB, | Performed by: INTERNAL MEDICINE

## 2019-12-18 PROCEDURE — 99999 PR PBB SHADOW E&M-EST. PATIENT-LVL V: CPT | Mod: PBBFAC,HCNC,, | Performed by: INTERNAL MEDICINE

## 2019-12-18 PROCEDURE — 1159F MED LIST DOCD IN RCRD: CPT | Mod: HCNC,S$GLB,, | Performed by: INTERNAL MEDICINE

## 2019-12-18 PROCEDURE — 1159F PR MEDICATION LIST DOCUMENTED IN MEDICAL RECORD: ICD-10-PCS | Mod: HCNC,S$GLB,, | Performed by: INTERNAL MEDICINE

## 2019-12-18 PROCEDURE — 1126F AMNT PAIN NOTED NONE PRSNT: CPT | Mod: HCNC,S$GLB,, | Performed by: INTERNAL MEDICINE

## 2019-12-18 RX ORDER — TRIAMCINOLONE ACETONIDE 40 MG/ML
40 INJECTION, SUSPENSION INTRA-ARTICULAR; INTRAMUSCULAR
Status: COMPLETED | OUTPATIENT
Start: 2019-12-18 | End: 2019-12-18

## 2019-12-18 RX ORDER — HYDROCORTISONE ACETATE PRAMOXINE HCL 2.5; 1 G/100G; G/100G
CREAM TOPICAL 3 TIMES DAILY
Qty: 1 TUBE | Refills: 1 | Status: SHIPPED | OUTPATIENT
Start: 2019-12-18 | End: 2023-01-05

## 2019-12-18 RX ORDER — METHAZOLAMIDE 25 MG/1
25 TABLET ORAL 3 TIMES DAILY
Refills: 1 | COMMUNITY
Start: 2019-12-12 | End: 2024-02-15

## 2019-12-18 RX ORDER — AZITHROMYCIN 250 MG/1
TABLET, FILM COATED ORAL
Qty: 6 TABLET | Refills: 0 | Status: SHIPPED | OUTPATIENT
Start: 2019-12-18 | End: 2020-02-05 | Stop reason: ALTCHOICE

## 2019-12-18 RX ADMIN — TRIAMCINOLONE ACETONIDE 40 MG: 40 INJECTION, SUSPENSION INTRA-ARTICULAR; INTRAMUSCULAR at 11:12

## 2019-12-18 NOTE — PROGRESS NOTES
Subjective:       Patient ID: Nicolasa Jurado is a 88 y.o. female.    Chief Complaint: Follow-up (medication); Chest Congestion (x 1 week); Cough (Thick, white mucus); Wheezing (use nebulizer x3/day); and Headache (Mild)    HPI     88-year-old female here for follow-up.     She reports that in the last two weeks she has been coughing and has a chest cold.  This is in her chest and her head.  She has a lot of mucus in her head.  She has been coughing up mucus.  She does not sleep at night. She has a lot of heavy mucus.  She has temp to 100 last week.  She has no SOB.  She has been wheezing.  She has asthma.  She has been using her inhaler three times a day.    HTN - Patient is currently on HCTZ 25 mg, toprol  mg, amlodipine 5 mg, Micardis 80 mg.  She does check her BP at home, and it runs high 180s until this am, when she started her increased dose of Toprol XL.  Her lopressor was raised to . Side effects of medications note:  None.  Denies blurred vision, chest pain, shortness of breath, nausea.    HLD - Patient is currently on Lipitor 80 mg.  She last lipid panel was   Cholesterol   Date Value Ref Range Status   02/01/2019 131 120 - 199 mg/dL Final     Comment:     The National Cholesterol Education Program (NCEP) has set the  following guidelines (reference ranges) for Cholesterol:  Optimal.....................<200 mg/dL  Borderline High.............200-239 mg/dL  High........................> or = 240 mg/dL       Triglycerides   Date Value Ref Range Status   02/01/2019 132 30 - 150 mg/dL Final     Comment:     The National Cholesterol Education Program (NCEP) has set the  following guidelines (reference values) for triglycerides:  Normal......................<150 mg/dL  Borderline High.............150-199 mg/dL  High........................200-499 mg/dL       HDL   Date Value Ref Range Status   02/01/2019 46 40 - 75 mg/dL Final     Comment:     The National Cholesterol Education Program (NCEP) has set  the  following guidelines (reference values) for HDL Cholesterol:  Low...............<40 mg/dL  Optimal...........>60 mg/dL       LDL Cholesterol   Date Value Ref Range Status   02/01/2019 58.6 (L) 63.0 - 159.0 mg/dL Final     Comment:     The National Cholesterol Education Program (NCEP) has set the  following guidelines (reference values) for LDL Cholesterol:  Optimal.......................<130 mg/dL  Borderline High...............130-159 mg/dL  High..........................160-189 mg/dL  Very High.....................>190 mg/dL     .  Side effects of the medication:  None.    Review of Systems    Objective:      Physical Exam   Constitutional: She is oriented to person, place, and time. She appears well-developed and well-nourished.   HENT:   Head: Normocephalic and atraumatic.   Mouth/Throat: No oropharyngeal exudate.   Eyes: Pupils are equal, round, and reactive to light. EOM are normal. Right eye exhibits no discharge. Left eye exhibits no discharge. No scleral icterus.   Neck: Normal range of motion. Neck supple. No tracheal deviation present. No thyromegaly present.   Cardiovascular: Normal rate, regular rhythm and normal heart sounds. Exam reveals no gallop and no friction rub.   No murmur heard.  Pulmonary/Chest: Effort normal and breath sounds normal. No respiratory distress. She has no wheezes. She has no rales. She exhibits no tenderness.   Abdominal: Soft. Bowel sounds are normal. She exhibits no distension and no mass. There is no tenderness. There is no rebound and no guarding.   Musculoskeletal: Normal range of motion. She exhibits no edema or tenderness.   Neurological: She is alert and oriented to person, place, and time.   Skin: Skin is warm and dry. No rash noted. No erythema. No pallor.   Psychiatric: She has a normal mood and affect. Her behavior is normal.   Vitals reviewed.      Assessment:       1. Essential hypertension    2. Pure hypercholesterolemia    3. Acute bacterial bronchitis         Plan:       1.  Continue HCTZ 25 mg, Toprol- mg, amlodipine 5 mg.  2.  Continue Lipitor 80 mg  3.  Z-Alek prescribed.  Kenalog 40 mg IM x1.

## 2019-12-19 ENCOUNTER — TELEPHONE (OUTPATIENT)
Dept: INTERNAL MEDICINE | Facility: CLINIC | Age: 84
End: 2019-12-19

## 2019-12-19 NOTE — TELEPHONE ENCOUNTER
Caller: [facility] [place] [phone] (Yesterday, 12:12 PM)             Pharmacy is calling to clarify an RX.   RX name:  hydrocortisone-pramoxine (ANALPRAM-HC) 2.5-1 % Crea   What do they need to clarify:  This medication is not covered on the patients insurance   Comments:

## 2019-12-19 NOTE — TELEPHONE ENCOUNTER
"Attempted to call pharmacy, they are "experiencing phone issues at this time, please call back later"  "

## 2019-12-19 NOTE — TELEPHONE ENCOUNTER
----- Message from Jeremias Thorne sent at 12/18/2019 12:12 PM CST -----  Contact: [place] [phone]  Pharmacy is calling to clarify an RX.  RX name:  hydrocortisone-pramoxine (ANALPRAM-HC) 2.5-1 % Crea  What do they need to clarify:  This medication is not covered on the patients insurance   Comments:

## 2019-12-23 ENCOUNTER — PATIENT OUTREACH (OUTPATIENT)
Dept: OTHER | Facility: OTHER | Age: 84
End: 2019-12-23

## 2019-12-29 RX ORDER — CLONAZEPAM 0.5 MG/1
TABLET ORAL
Qty: 30 TABLET | Refills: 0 | Status: SHIPPED | OUTPATIENT
Start: 2019-12-29 | End: 2020-07-07

## 2019-12-31 ENCOUNTER — PATIENT OUTREACH (OUTPATIENT)
Dept: OTHER | Facility: OTHER | Age: 84
End: 2019-12-31

## 2020-01-06 ENCOUNTER — LAB VISIT (OUTPATIENT)
Dept: LAB | Facility: HOSPITAL | Age: 85
End: 2020-01-06
Attending: INTERNAL MEDICINE
Payer: MEDICARE

## 2020-01-06 DIAGNOSIS — E21.0 PRIMARY HYPERPARATHYROIDISM: Chronic | ICD-10-CM

## 2020-01-06 LAB
ALBUMIN SERPL BCP-MCNC: 3.8 G/DL (ref 3.5–5.2)
ANION GAP SERPL CALC-SCNC: 6 MMOL/L (ref 8–16)
BUN SERPL-MCNC: 16 MG/DL (ref 8–23)
CALCIUM SERPL-MCNC: 9.6 MG/DL (ref 8.7–10.5)
CHLORIDE SERPL-SCNC: 106 MMOL/L (ref 95–110)
CO2 SERPL-SCNC: 29 MMOL/L (ref 23–29)
CREAT SERPL-MCNC: 0.8 MG/DL (ref 0.5–1.4)
EST. GFR  (AFRICAN AMERICAN): >60 ML/MIN/1.73 M^2
EST. GFR  (NON AFRICAN AMERICAN): >60 ML/MIN/1.73 M^2
GLUCOSE SERPL-MCNC: 101 MG/DL (ref 70–110)
PHOSPHATE SERPL-MCNC: 3.4 MG/DL (ref 2.7–4.5)
POTASSIUM SERPL-SCNC: 4.5 MMOL/L (ref 3.5–5.1)
PTH-INTACT SERPL-MCNC: 104 PG/ML (ref 9–77)
SODIUM SERPL-SCNC: 141 MMOL/L (ref 136–145)

## 2020-01-06 PROCEDURE — 83970 ASSAY OF PARATHORMONE: CPT | Mod: HCNC

## 2020-01-06 PROCEDURE — 80069 RENAL FUNCTION PANEL: CPT | Mod: HCNC

## 2020-01-06 PROCEDURE — 36415 COLL VENOUS BLD VENIPUNCTURE: CPT | Mod: HCNC,PO

## 2020-01-07 NOTE — PROGRESS NOTES
Continuing to monitor and attempt to reach patient/family.     NA, LVM requesting a call back.     BP remains elevated. Consider clonidine use. Patient is scheduled to see cardiology next month.

## 2020-01-09 ENCOUNTER — PATIENT MESSAGE (OUTPATIENT)
Dept: ENDOCRINOLOGY | Facility: CLINIC | Age: 85
End: 2020-01-09

## 2020-01-09 DIAGNOSIS — E21.0 PRIMARY HYPERPARATHYROIDISM: Primary | Chronic | ICD-10-CM

## 2020-01-13 NOTE — PROGRESS NOTES
"Digital Medicine: Clinician Follow-Up    Called patient to discuss high alert blood pressure reading received. Patient denies any current symptoms of hypertension, but does endorse facial redness/heat when her blood pressure is elevated.         The history is provided by the patient.     Follow Up  Ms. Baldwin states her BP elevates each evening - she discussed 1 pm being "her 6 am." She does not take her medications at the same time each day. She is encouraged to do so.    She reports that she takes amlodipine 5 mg (2nd dose) around 930 -10 pm, but also endorses taking it around 7pm. I have asked her to be consistent with medication timing. If 1 pm is like her 6 am, the first dose should be taken around 1pm, with the second dose taken around 1 am.      She denies any recent physical activity, due to her schedule of awake vs sleep, she is awake during the nighttime, and if afraid to walk the block at that time.     Ms. Baldwin presents with isolated SBP, she is on multi-mechanism treatment to control her blood pressure with no success. Treatment of choice in isolated SBP include a CCB and thiazide. Patient's HCTZ was discontinued in November due to hypercalcemia.     Patient has primary Hyperparathyroidism - on ARB, BB, CCB - no thiazide which is shown beneficial in the setting of hyperparathyroidism. Again, patient did exhibit hypercalcemia previously while on HCTZ, so endocrine recommended it be stopped.      Blood pressure is much better controlled (at or near goal) at clinic appointments.         INTERVENTION(S)  reviewed appropriate dose schedule, reviewed monitoring technique, recommended med change - patient refuses and encouragement/support    PLAN  patient refuses additional therapy, Health  follow up and continue monitoring    Stick to appropriate timing of medication regimen, and keep it consistent.     Increase physical activity.     Ensure proper technique is used while taking blood pressure. I question " technique and ability to obtain appropriate readings due to consistent disparity.    Consider spironolactone 25 mg daily in the future, patient refused a change today stating she has cardiology follow up coming up.     Continue to monitor. Note will be routed to Dr. Maxwell for awareness of conversations for upcoming appointment.       There are no preventive care reminders to display for this patient.    Last 5 Patient Entered Readings                                      Current 30 Day Average: 182/77     Recent Readings 1/12/2020 1/11/2020 1/6/2020 12/29/2019 12/28/2019    SBP (mmHg) 206 219 202 120 170    DBP (mmHg) 86 89 79 80 75    Pulse 54 61 54 80 58             Hypertension Medications             amLODIPine (NORVASC) 5 MG tablet Take 1 tablet (5 mg total) by mouth 2 (two) times daily.    metoprolol succinate (TOPROL-XL) 100 MG 24 hr tablet Take 1 tablet (100 mg total) by mouth once daily.    nitroGLYCERIN (NITROSTAT) 0.4 MG SL tablet 1 Tablet Sublingual  One tablet under tounge as needed for chest pain.    telmisartan (MICARDIS) 80 MG Tab Take 1 tablet (80 mg total) by mouth once daily.                 Sleep Apnea Screening  Patient not previously diagnosed with CAMERON and     Medication Affordability Screening  Patient is currently not having problems affording medications    Medication Adherence Screening   She misses doses: never

## 2020-01-21 NOTE — PROGRESS NOTES
Digital Medicine: Health  Follow-Up    The history is provided by the patient and a relative.     Follow Up  Follow-up reason(s): goal follow-up    Patient said that she is not sure what is going on with her BP readings. She said that during the day she is okay but after 6pm, her BP rises. She said that she has been taking her medication at more consistent times since speaking with Rebecca. She takes her medication with breakfast, at lunch, and around 6/7pm.     Spoke with patient's son and he said that he thinks that her medication times have been more consistent. He said that there was more he wanted to talk about but would have to give me a call back. Will try back in a few days if he does not reach back out.       Intervention/Plan    There are no preventive care reminders to display for this patient.    Last 5 Patient Entered Readings                                      Current 30 Day Average: 176/75     Recent Readings 1/19/2020 1/18/2020 1/17/2020 1/14/2020 1/13/2020    SBP (mmHg) 178 177 148 159 149    DBP (mmHg) 75 73 68 71 70    Pulse 54 51 56 61 59                      Medication Adherence Screening   She misses doses: never    She does not wonder if medications are working.  She knows purpose of medications.      Patient identified the following reasons for non-compliance: none      SDOH

## 2020-01-24 ENCOUNTER — PATIENT OUTREACH (OUTPATIENT)
Dept: OTHER | Facility: OTHER | Age: 85
End: 2020-01-24

## 2020-01-24 NOTE — PROGRESS NOTES
Called patient in response to high alert BP readings. NA, LVM requesting a call back.     Cardiology appointment on 2/5/20.

## 2020-01-28 ENCOUNTER — PATIENT OUTREACH (OUTPATIENT)
Dept: OTHER | Facility: OTHER | Age: 85
End: 2020-01-28

## 2020-01-28 NOTE — PROGRESS NOTES
Digital Medicine: Clinician Follow-Up    Patient's son in law returned my previous call - after speaking with Ms. Baldwin's health .     AUDREY believes elevations are related to patient's anxiety and stress over her current health. She is worried about her failing vision and blood pressure. She endorses mild chest pain with anxiety, but AUDREY states it self resolves as she calms down. She denies other symptoms of hypertension. Patient and caregiver were advised to report to the ED if symptoms develop with BP >160/100 mmHg.     Compliance does not appear to be a factor is lack of control as caregiver states a pill box is used and is maintained by family.    Discussed isolated systolic hypertension, causes, and ideal treatment with Quinton. Patient has an upcoming cardiology appointment on 2/5/2020.      The history is provided by the patient, a relative and a caregiver.     Follow Up  Follow-up reason(s): reading review      Alert received.   Care Team received high BP alert.  Patient is not experiencing symptoms.      INTERVENTION(S)  reviewed appropriate dose schedule, reviewed monitoring technique and encouragement/support    PLAN  patient verbalizes understanding, Health  follow up and continue monitoring    No changes made today - patient is on CCB. Would consider a thiazide, HCTZ 12.5 mg daily.   Will continue to monitor and schedule follow up post cardiology visit.         There are no preventive care reminders to display for this patient.    Last 5 Patient Entered Readings                                      Current 30 Day Average: 175/77     Recent Readings 1/28/2020 1/25/2020 1/24/2020 1/19/2020 1/18/2020    SBP (mmHg) 191 159 186 178 177    DBP (mmHg) 81 73 77 75 73    Pulse 60 57 58 54 51             Hypertension Medications             amLODIPine (NORVASC) 5 MG tablet Take 1 tablet (5 mg total) by mouth 2 (two) times daily.    metoprolol succinate (TOPROL-XL) 100 MG 24 hr tablet Take 1 tablet (100 mg  total) by mouth once daily.    nitroGLYCERIN (NITROSTAT) 0.4 MG SL tablet 1 Tablet Sublingual  One tablet under tounge as needed for chest pain.    telmisartan (MICARDIS) 80 MG Tab Take 1 tablet (80 mg total) by mouth once daily.                 Screenings

## 2020-01-28 NOTE — PROGRESS NOTES
Digital Medicine: Health  Follow-Up    The history is provided by a relative.     Follow Up  Follow-up reason(s): goal follow-up    Called patient's relative to follow up from our last conversation. Patient's son in law Quinton told me that the patient's doctor's appointment is on 2/5/2020. He said that he has been keeping a close eye on the patient and she has not reported feeling bad or out of the ordinary. Quinton reported that the patient does not eat anything too salty as when he cooks, he does not use salt. He thinks that the patient is increasingly worried due to her loss of vision. We talked about the difference between SBP and DBP and I explained that her DBP is always well-controlled. Her SBP is usually the one that is elevated. Explained the importance of her taking resting readings in order to receive the most accurate result. Quinton said that most times when they take her BP readings, she is relaxing on the sofa watching tv or just sitting around. He said that she does not do too much around the house. Transferred patient over to clinician for further follow up. Will call to check back in after patient's doctor's appointment in two weeks.       Intervention/Plan    There are no preventive care reminders to display for this patient.    Last 5 Patient Entered Readings                                      Current 30 Day Average: 175/77     Recent Readings 1/28/2020 1/25/2020 1/24/2020 1/19/2020 1/18/2020    SBP (mmHg) 191 159 186 178 177    DBP (mmHg) 81 73 77 75 73    Pulse 60 57 58 54 51                  Screenings    SDOH

## 2020-02-04 ENCOUNTER — PATIENT MESSAGE (OUTPATIENT)
Dept: ADMINISTRATIVE | Facility: OTHER | Age: 85
End: 2020-02-04

## 2020-02-04 ENCOUNTER — PATIENT OUTREACH (OUTPATIENT)
Dept: ADMINISTRATIVE | Facility: OTHER | Age: 85
End: 2020-02-04

## 2020-02-05 ENCOUNTER — OFFICE VISIT (OUTPATIENT)
Dept: CARDIOLOGY | Facility: CLINIC | Age: 85
End: 2020-02-05
Payer: MEDICARE

## 2020-02-05 VITALS
DIASTOLIC BLOOD PRESSURE: 91 MMHG | SYSTOLIC BLOOD PRESSURE: 209 MMHG | HEIGHT: 60 IN | HEART RATE: 62 BPM | BODY MASS INDEX: 25.17 KG/M2 | WEIGHT: 128.19 LBS

## 2020-02-05 DIAGNOSIS — I70.0 ATHEROSCLEROSIS OF AORTA: Chronic | ICD-10-CM

## 2020-02-05 DIAGNOSIS — I25.10 CORONARY ARTERY DISEASE INVOLVING NATIVE CORONARY ARTERY OF NATIVE HEART WITHOUT ANGINA PECTORIS: ICD-10-CM

## 2020-02-05 DIAGNOSIS — E21.0 PRIMARY HYPERPARATHYROIDISM: Chronic | ICD-10-CM

## 2020-02-05 DIAGNOSIS — E78.00 PURE HYPERCHOLESTEROLEMIA: Chronic | ICD-10-CM

## 2020-02-05 DIAGNOSIS — I10 ESSENTIAL HYPERTENSION: Primary | ICD-10-CM

## 2020-02-05 DIAGNOSIS — I10 ESSENTIAL HYPERTENSION: Primary | Chronic | ICD-10-CM

## 2020-02-05 PROCEDURE — 99214 PR OFFICE/OUTPT VISIT, EST, LEVL IV, 30-39 MIN: ICD-10-PCS | Mod: HCNC,S$GLB,, | Performed by: INTERNAL MEDICINE

## 2020-02-05 PROCEDURE — 99999 PR PBB SHADOW E&M-EST. PATIENT-LVL III: CPT | Mod: PBBFAC,HCNC,, | Performed by: INTERNAL MEDICINE

## 2020-02-05 PROCEDURE — 1101F PR PT FALLS ASSESS DOC 0-1 FALLS W/OUT INJ PAST YR: ICD-10-PCS | Mod: HCNC,CPTII,S$GLB, | Performed by: INTERNAL MEDICINE

## 2020-02-05 PROCEDURE — 1159F MED LIST DOCD IN RCRD: CPT | Mod: HCNC,S$GLB,, | Performed by: INTERNAL MEDICINE

## 2020-02-05 PROCEDURE — 1101F PT FALLS ASSESS-DOCD LE1/YR: CPT | Mod: HCNC,CPTII,S$GLB, | Performed by: INTERNAL MEDICINE

## 2020-02-05 PROCEDURE — 1159F PR MEDICATION LIST DOCUMENTED IN MEDICAL RECORD: ICD-10-PCS | Mod: HCNC,S$GLB,, | Performed by: INTERNAL MEDICINE

## 2020-02-05 PROCEDURE — 99999 PR PBB SHADOW E&M-EST. PATIENT-LVL III: ICD-10-PCS | Mod: PBBFAC,HCNC,, | Performed by: INTERNAL MEDICINE

## 2020-02-05 PROCEDURE — 1126F AMNT PAIN NOTED NONE PRSNT: CPT | Mod: HCNC,S$GLB,, | Performed by: INTERNAL MEDICINE

## 2020-02-05 PROCEDURE — 1126F PR PAIN SEVERITY QUANTIFIED, NO PAIN PRESENT: ICD-10-PCS | Mod: HCNC,S$GLB,, | Performed by: INTERNAL MEDICINE

## 2020-02-05 PROCEDURE — 99214 OFFICE O/P EST MOD 30 MIN: CPT | Mod: HCNC,S$GLB,, | Performed by: INTERNAL MEDICINE

## 2020-02-05 RX ORDER — DOXYCYCLINE 100 MG/1
1 CAPSULE ORAL 2 TIMES DAILY
COMMUNITY
Start: 2020-02-04 | End: 2020-02-11

## 2020-02-05 RX ORDER — HYDROCHLOROTHIAZIDE 25 MG/1
25 TABLET ORAL DAILY
Qty: 30 TABLET | Refills: 11 | Status: SHIPPED | OUTPATIENT
Start: 2020-02-05 | End: 2020-03-06

## 2020-02-05 NOTE — PROGRESS NOTES
Subjective:   Patient ID:  Nicolasa Jurado is a 89 y.o. female who presents for follow up of Hypertension       Problem List:  Labile hypertension    CAD    -LCX coated stent 08/14/2003    Diastolic heart failure, EF 65%    SANA s/p stent on the right side  Hypercholesterolemia    Hypercalcemia and primary hyperparathyroidism    Asthma    HPI: 4 month f/u of challenging HTN.  She's now in the digital HTN program but her control is not even close to adequate, and she's having frequent readings at home over 200 systolic.  They note that pressures have been much higher on average since I last saw her.  She does note her BP spiking with albuterol use, but digital HTN data suggests that pressures are consistently uncontrolled.    She is doing well with no new symptoms or cardiovascular complaints and no change in exercise capacity.  She denies palpitations, PND/orthopnea, lightheadedness and syncope.  Sometimes when the pressure is particularly high, she gets a discomfort in her left chest.        Sept 2019 HPI: Back for 2 month f/u of difficult HTN.  She did not get enrolled in digital HTN but states that her pressures at home have been much better.  Sometimes, in the AM, her BP is near 100 systolic (and this is checked with two separate cuffs - hers and her son-in-law's) and in the evenings her spikes are much less than they were previously (no higher than 140s, they say).  Occasionally, she has lightheadedness with the lowest BPs.     No angina or new dyspnea.      July 2019 HPI: Very pleasant woman here with her grandson's wife for evaluation of her hypertension.  Her HTN has been very difficult to control.  She continues to use clonidine tablets most days, once or twice daily.     Her grandson's wife, a NP, is willing to help with using digital HTN.     Pressures run 140s to 170s systolic at home, but sometimes she has higher spikes.  Recently, she was referred for DSE (by Dr. Gaston) because of some chest pain reported  when her BP spiked to 225 systolic.  When she showed up for the stress test, her SBP was too high to proceed.     She is on max doses of an ARB and Norvasc.  She does not take HCTZ because of a history of hypercalcemia - mild - and that was deemed to be a consequence of primary hyperparathyroidism, controlled with Sensipar.       Karen Saqib' Feb 2019 HPI: Nicolasa Jurado' is in clinic today for ED follow up for hypertensive urgency. She is currently taking ramipril 10mg, nifedipine 60mg, amlodipine 10mg, clonidine 0.1 mg TID.  Patient denies chest pain with exertion or at rest, palpitations, SOB, IBARRA, dizziness, syncope, edema, orthopnea, PND, or claudication.  Reports no routine exercise.  She is active gardening, cooking, and cleaning.  Reports SOB that started years ago. She is sleeping on 3 pillows because otherwise she feels SOB.  She does have a h/o asthma and a h/o diastolic dysfunction without heart failure.  Reports good urinary response to the furosemide 20mg.  She is drinking 3 of the 16 oz bottles of water a day, coffee (1 cup), and juice (3 cups). Reports some relief of the SOB with use of her rescue inhaler.  She walks 2 blocks and becomes SOB.   Her SOB is unchanged.     Patient Active Problem List   Diagnosis    Pure hypercholesterolemia    CAD (coronary artery disease)    Osteoporosis, postmenopausal    Primary hyperparathyroidism    Renal artery stenosis    Mesenteric artery stenosis    Essential hypertension    Hypercalcemia    Atherosclerosis of aorta    Asthma    Chronic diastolic heart failure    Left ventricular diastolic dysfunction with preserved systolic function    Exudative macular degeneration    Insomnia    Thrombocytopenia    Overweight    Neuropathy    Lactose intolerance    CKD (chronic kidney disease) stage 2, GFR 60-89 ml/min    Skin lesion of back    Sciatica of left side    Vision impairment       Current Outpatient Medications   Medication Sig     acyclovir 5% (ZOVIRAX) 5 % ointment Use tid for buttock rash (Patient taking differently: as needed. Use tid for buttock rash)    albuterol (PROVENTIL/VENTOLIN HFA) 90 mcg/actuation inhaler Inhale 2 puffs into the lungs every 6 (six) hours as needed for Wheezing.    amLODIPine (NORVASC) 5 MG tablet Take 1 tablet (5 mg total) by mouth 2 (two) times daily.    aspirin 81 mg Tab Take 81 mg by mouth every other day.     atorvastatin (LIPITOR) 80 MG tablet TAKE 1 TABLET(80 MG) BY MOUTH EVERY DAY    brimonidine 0.2% (ALPHAGAN) 0.2 % Drop Place 1 drop into both eyes 3 (three) times daily.     cinacalcet (SENSIPAR) 30 MG Tab Take 1 tablet (30 mg total) by mouth 2 (two) times daily with meals.    clonazePAM (KLONOPIN) 0.5 MG tablet TAKE 1 TABLET BY MOUTH TWICE DAILY AS NEEDED FOR ANXIETY    diclofenac sodium (VOLTAREN) 1 % Gel Apply 2 g topically 3 (three) times daily.    dorzolamide (TRUSOPT) 2 % ophthalmic solution Place 1 drop into both eyes 3 (three) times daily.     dorzolamide-timolol 2-0.5% (COSOPT) 22.3-6.8 mg/mL ophthalmic solution 1 drop 2 (two) times daily.    doxycycline (MONODOX) 100 MG capsule Take 1 capsule by mouth 2 (two) times daily.    ergocalciferol, vitamin D2, (VITAMIN D ORAL) Take 2,000 Int'l Units by mouth once daily.    fluocinonide (LIDEX) 0.05 % external solution Use hs for scalp prn pruritus    fluticasone propionate (FLONASE) 50 mcg/actuation nasal spray 1 spray by Each Nare route daily as needed.     hydrocortisone-pramoxine (ANALPRAM-HC) 2.5-1 % Crea Place rectally 3 (three) times daily.    latanoprost 0.005 % ophthalmic solution Place 1 drop into both eyes every evening.     levalbuterol (XOPENEX) 0.63 mg/3 mL nebulizer solution TAKE 3 MILLILITERS BY NEBULIZATION EVERY 4 HOURS AS NEEDED FOR WHEEZING(RESCUE)    methazoLAMIDE (NEPTAZANE) 25 mg tablet TK 1 T PO BID UTD    metoprolol succinate (TOPROL-XL) 100 MG 24 hr tablet Take 1 tablet (100 mg total) by mouth once daily.     nitroGLYCERIN (NITROSTAT) 0.4 MG SL tablet 1 Tablet Sublingual  One tablet under tounge as needed for chest pain.    pilocarpine HCL 2% (PILOCAR) 2 % ophthalmic solution INT 1 GTT INTO OU QID    ranitidine (ZANTAC) 150 MG tablet Take 1 tablet (150 mg total) by mouth 2 (two) times daily. PRN heartburn    telmisartan (MICARDIS) 80 MG Tab Take 1 tablet (80 mg total) by mouth once daily.    vit C-vit E-copper-ZnOx-lutein (PRESERVISION) 226-200-5 mg-unit-mg Cap Take by mouth. 2 Capsule Oral Every day     No current facility-administered medications for this visit.        Review of Systems   Constitution: Negative.   HENT: Negative.    Eyes: Negative.    Cardiovascular: Positive for chest pain. Negative for near-syncope, orthopnea and palpitations.   Respiratory: Positive for shortness of breath. Negative for cough.    Endocrine: Negative.    Hematologic/Lymphatic: Negative.    Skin: Negative.    Musculoskeletal: Negative.    Gastrointestinal: Negative.    Genitourinary: Negative.    Neurological: Negative.    Psychiatric/Behavioral: Negative.      Objective:   Physical Exam   Constitutional: She is oriented to person, place, and time. She appears well-developed and well-nourished.   HENT:   Head: Normocephalic and atraumatic.   Mouth/Throat: Oropharynx is clear and moist.   Eyes: Conjunctivae and EOM are normal. No scleral icterus.   Neck: Normal range of motion. Neck supple. No JVD present.   Cardiovascular: Normal rate, regular rhythm, normal heart sounds and intact distal pulses. Exam reveals no gallop and no friction rub.   No murmur heard.  Pulmonary/Chest: Effort normal and breath sounds normal. She has no wheezes. She has no rales.   Abdominal: Soft. Bowel sounds are normal. She exhibits no distension. There is no tenderness.   Musculoskeletal: Normal range of motion. She exhibits no edema.   Neurological: She is alert and oriented to person, place, and time.   Skin: Skin is warm and dry. No rash noted. No  erythema.   Psychiatric: She has a normal mood and affect. Her behavior is normal. Judgment and thought content normal.   Vitals reviewed.      Lab Results   Component Value Date    WBC 6.33 08/10/2018    HGB 12.3 08/10/2018    HCT 37.8 08/10/2018    MCV 85 08/10/2018     (L) 08/10/2018         Chemistry        Component Value Date/Time     01/06/2020 0847    K 4.5 01/06/2020 0847     01/06/2020 0847    CO2 29 01/06/2020 0847    BUN 16 01/06/2020 0847    CREATININE 0.8 01/06/2020 0847     01/06/2020 0847        Component Value Date/Time    CALCIUM 9.6 01/06/2020 0847    ALKPHOS 90 10/10/2019 1136    AST 23 10/10/2019 1136    ALT 23 10/10/2019 1136    BILITOT 0.5 10/10/2019 1136    ESTGFRAFRICA >60.0 01/06/2020 0847    EGFRNONAA >60.0 01/06/2020 0847            Lab Results   Component Value Date    CHOL 131 02/01/2019    CHOL 123 11/02/2017    CHOL 111 (L) 01/11/2017     Lab Results   Component Value Date    HDL 46 02/01/2019    HDL 35 (L) 11/02/2017    HDL 41 01/11/2017     Lab Results   Component Value Date    LDLCALC 58.6 (L) 02/01/2019    LDLCALC 64.0 11/02/2017    LDLCALC 51.2 (L) 01/11/2017     Lab Results   Component Value Date    TRIG 132 02/01/2019    TRIG 120 11/02/2017    TRIG 94 01/11/2017     Lab Results   Component Value Date    CHOLHDL 35.1 02/01/2019    CHOLHDL 28.5 11/02/2017    CHOLHDL 36.9 01/11/2017       Lab Results   Component Value Date    TSH 2.078 05/29/2017       Lab Results   Component Value Date    HGBA1C 5.9 01/22/2015       Assessment:     1. Essential hypertension    2. Coronary artery disease involving native coronary artery of native heart without angina pectoris    3. Atherosclerosis of aorta    4. Pure hypercholesterolemia    5. Primary hyperparathyroidism        Plan:     Add HCTZ 25.  This was stopped in the past because of mild hypercalcemia, but she was subsequently found to have primary hyperparathyroidism.    BMP in 3 weeks    Continue digital HTN and  other current medicines.    Diet/exercise goals reinforced.    F/U 4 months

## 2020-02-06 ENCOUNTER — OFFICE VISIT (OUTPATIENT)
Dept: INTERNAL MEDICINE | Facility: CLINIC | Age: 85
End: 2020-02-06
Payer: MEDICARE

## 2020-02-06 VITALS
HEART RATE: 72 BPM | WEIGHT: 127.63 LBS | BODY MASS INDEX: 25.06 KG/M2 | DIASTOLIC BLOOD PRESSURE: 72 MMHG | RESPIRATION RATE: 18 BRPM | SYSTOLIC BLOOD PRESSURE: 130 MMHG | TEMPERATURE: 98 F | HEIGHT: 60 IN

## 2020-02-06 DIAGNOSIS — H35.3290 EXUDATIVE AGE-RELATED MACULAR DEGENERATION, UNSPECIFIED LATERALITY, UNSPECIFIED STAGE: Chronic | ICD-10-CM

## 2020-02-06 DIAGNOSIS — E21.0 PRIMARY HYPERPARATHYROIDISM: Chronic | ICD-10-CM

## 2020-02-06 DIAGNOSIS — I70.1 RENAL ARTERY STENOSIS: Chronic | ICD-10-CM

## 2020-02-06 DIAGNOSIS — I70.0 ATHEROSCLEROSIS OF AORTA: Chronic | ICD-10-CM

## 2020-02-06 DIAGNOSIS — J45.20 MILD INTERMITTENT ASTHMA WITHOUT COMPLICATION: Chronic | ICD-10-CM

## 2020-02-06 DIAGNOSIS — D69.6 THROMBOCYTOPENIA: Chronic | ICD-10-CM

## 2020-02-06 DIAGNOSIS — I51.89 LEFT VENTRICULAR DIASTOLIC DYSFUNCTION WITH PRESERVED SYSTOLIC FUNCTION: Chronic | ICD-10-CM

## 2020-02-06 DIAGNOSIS — I50.32 CHRONIC DIASTOLIC HEART FAILURE: Chronic | ICD-10-CM

## 2020-02-06 DIAGNOSIS — B96.89 ACUTE BACTERIAL BRONCHITIS: Primary | ICD-10-CM

## 2020-02-06 DIAGNOSIS — J20.8 ACUTE BACTERIAL BRONCHITIS: Primary | ICD-10-CM

## 2020-02-06 DIAGNOSIS — I10 ESSENTIAL HYPERTENSION: Chronic | ICD-10-CM

## 2020-02-06 PROCEDURE — 1125F PR PAIN SEVERITY QUANTIFIED, PAIN PRESENT: ICD-10-PCS | Mod: HCNC,S$GLB,, | Performed by: INTERNAL MEDICINE

## 2020-02-06 PROCEDURE — 99214 OFFICE O/P EST MOD 30 MIN: CPT | Mod: HCNC,S$GLB,, | Performed by: INTERNAL MEDICINE

## 2020-02-06 PROCEDURE — 99499 RISK ADDL DX/OHS AUDIT: ICD-10-PCS | Mod: HCNC,S$GLB,, | Performed by: INTERNAL MEDICINE

## 2020-02-06 PROCEDURE — 99499 UNLISTED E&M SERVICE: CPT | Mod: HCNC,S$GLB,, | Performed by: INTERNAL MEDICINE

## 2020-02-06 PROCEDURE — 99999 PR PBB SHADOW E&M-EST. PATIENT-LVL III: ICD-10-PCS | Mod: PBBFAC,HCNC,, | Performed by: INTERNAL MEDICINE

## 2020-02-06 PROCEDURE — 1125F AMNT PAIN NOTED PAIN PRSNT: CPT | Mod: HCNC,S$GLB,, | Performed by: INTERNAL MEDICINE

## 2020-02-06 PROCEDURE — 1159F PR MEDICATION LIST DOCUMENTED IN MEDICAL RECORD: ICD-10-PCS | Mod: HCNC,S$GLB,, | Performed by: INTERNAL MEDICINE

## 2020-02-06 PROCEDURE — 1159F MED LIST DOCD IN RCRD: CPT | Mod: HCNC,S$GLB,, | Performed by: INTERNAL MEDICINE

## 2020-02-06 PROCEDURE — 99999 PR PBB SHADOW E&M-EST. PATIENT-LVL III: CPT | Mod: PBBFAC,HCNC,, | Performed by: INTERNAL MEDICINE

## 2020-02-06 PROCEDURE — 99214 PR OFFICE/OUTPT VISIT, EST, LEVL IV, 30-39 MIN: ICD-10-PCS | Mod: HCNC,S$GLB,, | Performed by: INTERNAL MEDICINE

## 2020-02-06 PROCEDURE — 1101F PT FALLS ASSESS-DOCD LE1/YR: CPT | Mod: HCNC,CPTII,S$GLB, | Performed by: INTERNAL MEDICINE

## 2020-02-06 PROCEDURE — 1101F PR PT FALLS ASSESS DOC 0-1 FALLS W/OUT INJ PAST YR: ICD-10-PCS | Mod: HCNC,CPTII,S$GLB, | Performed by: INTERNAL MEDICINE

## 2020-02-06 RX ORDER — PREDNISONE 20 MG/1
20 TABLET ORAL DAILY
Qty: 5 TABLET | Refills: 0 | Status: SHIPPED | OUTPATIENT
Start: 2020-02-06 | End: 2020-12-16 | Stop reason: SDUPTHER

## 2020-02-06 RX ORDER — ALBUTEROL SULFATE 90 UG/1
2 AEROSOL, METERED RESPIRATORY (INHALATION) EVERY 6 HOURS PRN
Qty: 18 G | Refills: 6 | Status: SHIPPED | OUTPATIENT
Start: 2020-02-06

## 2020-02-06 NOTE — PROGRESS NOTES
Subjective:       Patient ID: Nicolasa Jurado is a 89 y.o. female.    Chief Complaint: Chest Congestion; Cough; Sinus Problem; Nasal Congestion; Fever; Fatigue; and Generalized Body Aches    HPI     89-year-old female with renal artery stenosis, atherosclerosis of aorta, chronic diastolic heart failure, left ventricular diastolic dysfunction, thrombocytopenia, primary hyperparathyroidism, exudate of macular degeneration here for evaluation of chest congestion and sinusitis.  This has been going on since the weekend.  She has had cough and congestion.  She had fever Monday night to 101.3.  She has had no SOB.  It started in her sinuses and went to her chest.  She started doxycycline Tuesday.  She is asking for a cough syrup.      HTN - Patient is currently on HCTZ 25 mg, norvasc 5 mg, toprol XL 100mg, micardis 80 mg. She does check her BP at home, and it runs 200s/80s. Side effects of medications note: none. Denies headaches, blurred vision, chest pain, shortness of breath, nausea.    Review of Systems   Constitutional: Positive for chills and fever.   HENT: Positive for postnasal drip, rhinorrhea and sore throat.    Respiratory: Positive for cough and wheezing.    Allergic/Immunologic: Positive for environmental allergies.       Objective:      Physical Exam   Constitutional: She is oriented to person, place, and time. She appears well-developed and well-nourished.   HENT:   Head: Normocephalic and atraumatic.   Mouth/Throat: No oropharyngeal exudate.   Eyes: Pupils are equal, round, and reactive to light. EOM are normal. Right eye exhibits no discharge. Left eye exhibits no discharge. No scleral icterus.   Neck: Normal range of motion. Neck supple. No tracheal deviation present. No thyromegaly present.   Cardiovascular: Normal rate, regular rhythm and normal heart sounds. Exam reveals no gallop and no friction rub.   No murmur heard.  Pulmonary/Chest: Effort normal. No respiratory distress. She has wheezes  (throughout). She has no rales. She exhibits no tenderness.   Abdominal: Soft. Bowel sounds are normal. She exhibits no distension and no mass. There is no tenderness. There is no rebound and no guarding.   Musculoskeletal: Normal range of motion. She exhibits no edema or tenderness.   Neurological: She is alert and oriented to person, place, and time.   Skin: Skin is warm and dry. No rash noted. No erythema. No pallor.   Psychiatric: She has a normal mood and affect. Her behavior is normal.   Vitals reviewed.      Assessment:       1. Acute bacterial bronchitis    2. Mild intermittent asthma without complication    3. Renal artery stenosis    4. Atherosclerosis of aorta    5. Chronic diastolic heart failure    6. Left ventricular diastolic dysfunction with preserved systolic function    7. Thrombocytopenia    8. Primary hyperparathyroidism    9. Exudative age-related macular degeneration, unspecified laterality, unspecified stage        Plan:       1/2.  Continue doxycycline 100 mg b.i.d. x7 days.  Albuterol as needed.  3.  Continue HCTZ 25 mg, norvasc 5 mg, toprol XL 100mg, micardis 80 mg.  Sent message in week with blood pressure readings.  Return to clinic in a month to reassess.  4/5/6/7/8/9.  Monitor.

## 2020-02-07 ENCOUNTER — PATIENT OUTREACH (OUTPATIENT)
Dept: OTHER | Facility: OTHER | Age: 85
End: 2020-02-07

## 2020-02-07 NOTE — PROGRESS NOTES
"Digital Medicine: Clinician Follow-Up    Another high alert BP reading received. Called and spoke to son in law who assists with her care. He is concerned about the "highs then lows, highs then lows." Within ~24 hours, BP was checked three times on digital device (which was brought to cariology appointment for device validation and was found to be valid within 5 points of office reading) and varied from 208/89 to 96/61 to 189/71. Caretaker states these are all valid readings with proper technique and fit.     Denies hypertensive symptoms - endorses weakness and fatigue with hypotensive readings.     Dr. Maxwell re-started HCTZ 25 mg daily, patient began this therapy the day it was prescribed. Today is day 3.   HCTZ was previously discontinued by PCP due to hypercalcemia, but patient has primary hyperparathyroidism.     Message was sent to Dr. Maxwell to discuss labile pressures and BP reading in PCP clinic yesterday, which is at goal.     The history is provided by a caregiver and a relative.     Follow Up  Follow-up reason(s): reading review and routine education      Alert received.   Care Team received high BP alert.  Patient is not experiencing symptoms.  Patient started new medication.    Is patient tolerating med change?:  Yes      INTERVENTION(S)  reviewed appropriate dose schedule, reviewed monitoring technique and encouragement/support    PLAN  patient verbalizes understanding, Health  follow up and continue monitoring    Continue current therapy and monitoring frequency.       There are no preventive care reminders to display for this patient.    Last 5 Patient Entered Readings                                      Current 30 Day Average: 174/78     Recent Readings 2/7/2020 2/6/2020 2/6/2020 2/5/2020 2/4/2020    SBP (mmHg) 189 96 208 158 199    DBP (mmHg) 71 61 89 73 99    Pulse 56 64 62 64 67             Hypertension Medications             amLODIPine (NORVASC) 5 MG tablet Take 1 tablet (5 mg total) by " mouth 2 (two) times daily.    hydroCHLOROthiazide (HYDRODIURIL) 25 MG tablet Take 1 tablet (25 mg total) by mouth once daily.    metoprolol succinate (TOPROL-XL) 100 MG 24 hr tablet Take 1 tablet (100 mg total) by mouth once daily.    nitroGLYCERIN (NITROSTAT) 0.4 MG SL tablet 1 Tablet Sublingual  One tablet under tounge as needed for chest pain.    telmisartan (MICARDIS) 80 MG Tab Take 1 tablet (80 mg total) by mouth once daily.                 Screenings

## 2020-02-12 ENCOUNTER — PATIENT OUTREACH (OUTPATIENT)
Dept: OTHER | Facility: OTHER | Age: 85
End: 2020-02-12

## 2020-02-13 ENCOUNTER — PATIENT OUTREACH (OUTPATIENT)
Dept: OTHER | Facility: OTHER | Age: 85
End: 2020-02-13

## 2020-02-13 DIAGNOSIS — I10 ESSENTIAL HYPERTENSION: Chronic | ICD-10-CM

## 2020-02-13 NOTE — PROGRESS NOTES
Digital Medicine: Clinician Follow-Up    High alert received - called patient to identify symptoms, discuss medication compliance, and triage care.     Nicolasa denies symptoms of hypertension. She states she feels better than she did 2 weeks ago now that her infection is clearing up and her breathing is better.     Isolated SBP elevation continues - HCTZ started about 1.5 weeks ago. Patient confirms compliance.     She does endorse poor sleep - woke up 8 times last night. Reviewed regimen. Patient is taking HCTZ each evening. Advised her to take in the morning to avoid nocturnal urinary frequency. Patient already has poor sleep habits, increasing her awakenings is contributing to his elevated blood pressure.     Advised to take HCTZ each morning.    Discussed sodium intake. Ms. Baldwin is not adding salt to foods, but is not reading labels or conscious of sodium content already in food/drink she is consuming.               The history is provided by the patient.     Follow Up  Follow-up reason(s): reading review      Alert received.   Care Team received high BP alert.  Patient is not experiencing symptoms.  Patient started new medication.    Is patient tolerating med change?:  Yes            Patient verbalizes understanding, patient amenable to changes, Health  follow up and continue monitoring    Move HCTZ to the morning.   Continue to monitor.         There are no preventive care reminders to display for this patient.    Last 5 Patient Entered Readings                                      Current 30 Day Average: 172/78     Recent Readings 2/12/2020 2/11/2020 2/10/2020 2/8/2020 2/7/2020    SBP (mmHg) 190 178 170 162 189    DBP (mmHg) 77 90 81 73 71    Pulse 54 68 108 60 56             Hypertension Medications             amLODIPine (NORVASC) 5 MG tablet Take 1 tablet (5 mg total) by mouth 2 (two) times daily.    hydroCHLOROthiazide (HYDRODIURIL) 25 MG tablet Take 1 tablet (25 mg total) by mouth once daily.     metoprolol succinate (TOPROL-XL) 100 MG 24 hr tablet Take 1 tablet (100 mg total) by mouth once daily.    nitroGLYCERIN (NITROSTAT) 0.4 MG SL tablet 1 Tablet Sublingual  One tablet under tounge as needed for chest pain.    telmisartan (MICARDIS) 80 MG Tab Take 1 tablet (80 mg total) by mouth once daily.

## 2020-02-18 DIAGNOSIS — N18.30 CHRONIC KIDNEY DISEASE, STAGE III (MODERATE): ICD-10-CM

## 2020-02-18 RX ORDER — CINACALCET 30 MG/1
TABLET, FILM COATED ORAL
Qty: 180 TABLET | Refills: 3 | Status: SHIPPED | OUTPATIENT
Start: 2020-02-18 | End: 2021-02-24

## 2020-02-19 NOTE — PROGRESS NOTES
Digital Medicine: Health  Follow-Up    The history is provided by a relative and a caregiver.     Follow Up  Follow-up reason(s): goal follow-up      Adherence Barriers: affordability  Spoke with patient's son-in-law, Quinton. He said that the patient was out of town for the weekend last week but was still taking her readings while out of town. Patient is now back home and took a reading today of 198/79 mmHg. Patient's son-in-law and I talked about and reviewed proper BP reading technique since there was such a significant elevation in her readings from when she was out of town to now. Her son-in-law reported that the patient is not taking her own BP readings. Instead, either himself or his son is taking the patient's BP readings. Encouraged her son-in-law to monitor the way he and his son are taking her readings. Encouraged him to not wrap the cuff too tightly around the patient's arm while taking readings. Reviewed the two finger measurement tool. He was receptive.       INTERVENTION(S)  reviewed monitoring technique and encouragement/support    PLAN  patient verbalizes understanding and continue monitoring      There are no preventive care reminders to display for this patient.    Last 5 Patient Entered Readings                                      Current 30 Day Average: 170/78     Recent Readings 2/19/2020 2/14/2020 2/13/2020 2/13/2020 2/12/2020    SBP (mmHg) 198 130 135 174 190    DBP (mmHg) 79 61 74 81 77    Pulse 68 64 61 59 54                      Medication Adherence Screening   She did not miss a dose this month.  Patient knows purpose of medications.      Patient identified the following reasons for non-compliance: Unable to afford medications    Patient's son in law said that he has been unable to  the 5 mg prescription of Amlodipine from the pharmacy because that dose is not covered by the patient's insurance. He said that he has been cutting the 10 mg pills she has in half to give her only  the 5 mg twice daily. Patient's son-in-law asked if there were any suggestions we would have about this particular issue. I informed Quinton that I will let the patient's clinician know of this situation to see if she can provide any insight.       SDOH

## 2020-02-24 RX ORDER — AMLODIPINE BESYLATE 5 MG/1
10 TABLET ORAL DAILY
Qty: 60 TABLET | Refills: 5 | Status: SHIPPED | OUTPATIENT
Start: 2020-02-24 | End: 2020-07-07 | Stop reason: SDUPTHER

## 2020-02-24 NOTE — PROGRESS NOTES
Received a message from patient's HC that amlodipine 5 mg BID is not covered by patient's insurance. Called insurance and called pharmacy - 2-5mg tablets QD dispensed and covered.

## 2020-02-26 ENCOUNTER — LAB VISIT (OUTPATIENT)
Dept: LAB | Facility: HOSPITAL | Age: 85
End: 2020-02-26
Attending: INTERNAL MEDICINE
Payer: MEDICARE

## 2020-02-26 DIAGNOSIS — I10 ESSENTIAL HYPERTENSION: ICD-10-CM

## 2020-02-26 LAB
ANION GAP SERPL CALC-SCNC: 7 MMOL/L (ref 8–16)
BUN SERPL-MCNC: 16 MG/DL (ref 8–23)
CALCIUM SERPL-MCNC: 9 MG/DL (ref 8.7–10.5)
CHLORIDE SERPL-SCNC: 96 MMOL/L (ref 95–110)
CO2 SERPL-SCNC: 31 MMOL/L (ref 23–29)
CREAT SERPL-MCNC: 0.9 MG/DL (ref 0.5–1.4)
EST. GFR  (AFRICAN AMERICAN): >60 ML/MIN/1.73 M^2
EST. GFR  (NON AFRICAN AMERICAN): 56.9 ML/MIN/1.73 M^2
GLUCOSE SERPL-MCNC: 97 MG/DL (ref 70–110)
POTASSIUM SERPL-SCNC: 4 MMOL/L (ref 3.5–5.1)
SODIUM SERPL-SCNC: 134 MMOL/L (ref 136–145)

## 2020-02-26 PROCEDURE — 36415 COLL VENOUS BLD VENIPUNCTURE: CPT | Mod: HCNC,PO

## 2020-02-26 PROCEDURE — 80048 BASIC METABOLIC PNL TOTAL CA: CPT | Mod: HCNC

## 2020-03-06 ENCOUNTER — OFFICE VISIT (OUTPATIENT)
Dept: INTERNAL MEDICINE | Facility: CLINIC | Age: 85
End: 2020-03-06
Payer: MEDICARE

## 2020-03-06 VITALS
RESPIRATION RATE: 18 BRPM | SYSTOLIC BLOOD PRESSURE: 164 MMHG | HEART RATE: 66 BPM | WEIGHT: 129.63 LBS | DIASTOLIC BLOOD PRESSURE: 62 MMHG | TEMPERATURE: 99 F | BODY MASS INDEX: 25.45 KG/M2 | HEIGHT: 60 IN

## 2020-03-06 DIAGNOSIS — I10 ESSENTIAL HYPERTENSION: Primary | Chronic | ICD-10-CM

## 2020-03-06 DIAGNOSIS — E78.00 PURE HYPERCHOLESTEROLEMIA: Chronic | ICD-10-CM

## 2020-03-06 PROCEDURE — 99214 OFFICE O/P EST MOD 30 MIN: CPT | Mod: HCNC,S$GLB,, | Performed by: INTERNAL MEDICINE

## 2020-03-06 PROCEDURE — 1126F PR PAIN SEVERITY QUANTIFIED, NO PAIN PRESENT: ICD-10-PCS | Mod: HCNC,S$GLB,, | Performed by: INTERNAL MEDICINE

## 2020-03-06 PROCEDURE — 1126F AMNT PAIN NOTED NONE PRSNT: CPT | Mod: HCNC,S$GLB,, | Performed by: INTERNAL MEDICINE

## 2020-03-06 PROCEDURE — 99999 PR PBB SHADOW E&M-EST. PATIENT-LVL III: ICD-10-PCS | Mod: PBBFAC,HCNC,, | Performed by: INTERNAL MEDICINE

## 2020-03-06 PROCEDURE — 99999 PR PBB SHADOW E&M-EST. PATIENT-LVL III: CPT | Mod: PBBFAC,HCNC,, | Performed by: INTERNAL MEDICINE

## 2020-03-06 PROCEDURE — 1101F PT FALLS ASSESS-DOCD LE1/YR: CPT | Mod: HCNC,CPTII,S$GLB, | Performed by: INTERNAL MEDICINE

## 2020-03-06 PROCEDURE — 1159F MED LIST DOCD IN RCRD: CPT | Mod: HCNC,S$GLB,, | Performed by: INTERNAL MEDICINE

## 2020-03-06 PROCEDURE — 1159F PR MEDICATION LIST DOCUMENTED IN MEDICAL RECORD: ICD-10-PCS | Mod: HCNC,S$GLB,, | Performed by: INTERNAL MEDICINE

## 2020-03-06 PROCEDURE — 99214 PR OFFICE/OUTPT VISIT, EST, LEVL IV, 30-39 MIN: ICD-10-PCS | Mod: HCNC,S$GLB,, | Performed by: INTERNAL MEDICINE

## 2020-03-06 PROCEDURE — 1101F PR PT FALLS ASSESS DOC 0-1 FALLS W/OUT INJ PAST YR: ICD-10-PCS | Mod: HCNC,CPTII,S$GLB, | Performed by: INTERNAL MEDICINE

## 2020-03-06 RX ORDER — PROMETHAZINE HYDROCHLORIDE AND CODEINE PHOSPHATE 6.25; 1 MG/5ML; MG/5ML
5 SOLUTION ORAL EVERY 4 HOURS PRN
Qty: 120 ML | Refills: 0 | Status: SHIPPED | OUTPATIENT
Start: 2020-03-06 | End: 2020-03-16

## 2020-03-06 NOTE — PROGRESS NOTES
Subjective:       Patient ID: Nicolasa Jurado is a 89 y.o. female.    Chief Complaint: Follow-up (1 month )    HPI     89-year-old female here for one-month follow-up.    HTN - Patient is currently on HCTZ 25 mg, amlodipine 5 mg, Toprol- mg, Micardis 80 mg. She does check her BP at home, and it runs 160-180 in the am systolic.  Sometimes, it drops into the 120s. Side effects of medications note: none. Denies headaches, blurred vision, chest pain, shortness of breath, nausea.    HLD - Patient is currently on lipitor 80 mg.  Her last lipid panel was   Cholesterol   Date Value Ref Range Status   02/01/2019 131 120 - 199 mg/dL Final     Comment:     The National Cholesterol Education Program (NCEP) has set the  following guidelines (reference ranges) for Cholesterol:  Optimal.....................<200 mg/dL  Borderline High.............200-239 mg/dL  High........................> or = 240 mg/dL       Triglycerides   Date Value Ref Range Status   02/01/2019 132 30 - 150 mg/dL Final     Comment:     The National Cholesterol Education Program (NCEP) has set the  following guidelines (reference values) for triglycerides:  Normal......................<150 mg/dL  Borderline High.............150-199 mg/dL  High........................200-499 mg/dL       HDL   Date Value Ref Range Status   02/01/2019 46 40 - 75 mg/dL Final     Comment:     The National Cholesterol Education Program (NCEP) has set the  following guidelines (reference values) for HDL Cholesterol:  Low...............<40 mg/dL  Optimal...........>60 mg/dL       LDL Cholesterol   Date Value Ref Range Status   02/01/2019 58.6 (L) 63.0 - 159.0 mg/dL Final     Comment:     The National Cholesterol Education Program (NCEP) has set the  following guidelines (reference values) for LDL Cholesterol:  Optimal.......................<130 mg/dL  Borderline High...............130-159 mg/dL  High..........................160-189 mg/dL  Very High.....................>190  mg/dL     .  Side effects of the medication: none.    Review of Systems   Constitutional: Negative for activity change and unexpected weight change.   HENT: Positive for rhinorrhea. Negative for hearing loss and trouble swallowing.    Eyes: Positive for discharge. Negative for visual disturbance.   Respiratory: Positive for wheezing. Negative for chest tightness.    Cardiovascular: Negative for chest pain and palpitations.   Gastrointestinal: Positive for constipation. Negative for blood in stool, diarrhea and vomiting.   Endocrine: Negative for polydipsia and polyuria.   Genitourinary: Negative for difficulty urinating, dysuria, hematuria and menstrual problem.   Musculoskeletal: Negative for arthralgias, joint swelling and neck pain.   Neurological: Positive for weakness and headaches.   Psychiatric/Behavioral: Positive for dysphoric mood. Negative for confusion.       Objective:      Physical Exam   Constitutional: She is oriented to person, place, and time. She appears well-developed and well-nourished.   HENT:   Head: Normocephalic and atraumatic.   Mouth/Throat: No oropharyngeal exudate.   Eyes: Pupils are equal, round, and reactive to light. EOM are normal. Right eye exhibits no discharge. Left eye exhibits no discharge. No scleral icterus.   Neck: Normal range of motion. Neck supple. No tracheal deviation present. No thyromegaly present.   Cardiovascular: Normal rate, regular rhythm and normal heart sounds. Exam reveals no gallop and no friction rub.   No murmur heard.  Pulmonary/Chest: Effort normal and breath sounds normal. No respiratory distress. She has no wheezes. She has no rales. She exhibits no tenderness.   Abdominal: Soft. Bowel sounds are normal. She exhibits no distension and no mass. There is no tenderness. There is no rebound and no guarding.   Musculoskeletal: Normal range of motion. She exhibits no edema or tenderness.   Neurological: She is alert and oriented to person, place, and time.    Skin: Skin is warm and dry. No rash noted. No erythema. No pallor.   Psychiatric: She has a normal mood and affect. Her behavior is normal.   Vitals reviewed.      Assessment:       1. Essential hypertension    2. Pure hypercholesterolemia        Plan:       1.  Continue amlodipine 5 mg, Toprol- mg, Micardis 80 mg.  Check blood pressures daily for the next month.  Return to clinic in a month.  Patient to alee when she does not take blood pressure medication.  Hold HCTZ for now.  2.  Continue Lipitor 80 mg daily.

## 2020-03-08 DIAGNOSIS — I10 ESSENTIAL HYPERTENSION: Chronic | ICD-10-CM

## 2020-03-09 RX ORDER — TELMISARTAN 80 MG/1
TABLET ORAL
Qty: 90 TABLET | Refills: 3 | Status: SHIPPED | OUTPATIENT
Start: 2020-03-09 | End: 2021-02-11 | Stop reason: SDUPTHER

## 2020-03-09 RX ORDER — ATORVASTATIN CALCIUM 80 MG/1
80 TABLET, FILM COATED ORAL DAILY
Qty: 90 TABLET | Refills: 3 | Status: SHIPPED | OUTPATIENT
Start: 2020-03-09 | End: 2021-05-06 | Stop reason: SDUPTHER

## 2020-03-12 ENCOUNTER — TELEPHONE (OUTPATIENT)
Dept: INTERNAL MEDICINE | Facility: CLINIC | Age: 85
End: 2020-03-12

## 2020-03-18 ENCOUNTER — PATIENT OUTREACH (OUTPATIENT)
Dept: OTHER | Facility: OTHER | Age: 85
End: 2020-03-18

## 2020-03-18 NOTE — PROGRESS NOTES
Digital Medicine: Health  Follow-Up    The history is provided by a relative and a caregiver.     Follow Up  Follow-up reason(s): goal follow-up    Called to check in. Patient's son-in-law, Quinton, returned the phone call. He said that everything has been going okay for Mrs. Baldwin. He said that her BP readings are still sporadic but they have recently gone to the doctor. Spoke with Ian while about the importance of Mrs. Baldwin staying indoors during this pandemic and ways to keep her and their household safe.     Patient's son-in-law reported no questions or concerns and required no additional resources for the patient at this time. Will follow up in a few weeks.       INTERVENTION(S)  encouragement/support    PLAN  patient verbalizes understanding and continue monitoring      There are no preventive care reminders to display for this patient.    Last 5 Patient Entered Readings                                      Current 30 Day Average: 167/75     Recent Readings 3/16/2020 3/15/2020 3/14/2020 3/13/2020 3/12/2020    SBP (mmHg) 180 177 205 123 183    DBP (mmHg) 81 77 79 74 82    Pulse 57 58 63 59 58                  Screenings    SDOH

## 2020-04-03 ENCOUNTER — PATIENT OUTREACH (OUTPATIENT)
Dept: OTHER | Facility: OTHER | Age: 85
End: 2020-04-03

## 2020-04-03 DIAGNOSIS — I10 ESSENTIAL HYPERTENSION: Primary | ICD-10-CM

## 2020-04-03 NOTE — PROGRESS NOTES
Opened chart to call patient regarding continued elevated blood pressures.     Last 5 Patient Entered Readings                                      Current 30 Day Average: 173/79     Recent Readings 4/3/2020 4/1/2020 3/29/2020 3/28/2020 3/26/2020    SBP (mmHg) 187 190 171 162 151    DBP (mmHg) 86 83 83 70 72    Pulse 54 60 59 63 62        Noted patient saw PCP last month, and has follow up in 3 days concerning her BP.     PCP has patient holding HCTZ. Will await follow up completion prior to intervening. Consider hydralazine 25 mg BID for BP >140/90.     Will continue to monitor and plan follow up in the coming weeks.

## 2020-04-06 ENCOUNTER — OFFICE VISIT (OUTPATIENT)
Dept: INTERNAL MEDICINE | Facility: CLINIC | Age: 85
End: 2020-04-06
Payer: MEDICARE

## 2020-04-06 VITALS
HEART RATE: 68 BPM | DIASTOLIC BLOOD PRESSURE: 68 MMHG | TEMPERATURE: 98 F | BODY MASS INDEX: 25.79 KG/M2 | WEIGHT: 131.38 LBS | HEIGHT: 60 IN | SYSTOLIC BLOOD PRESSURE: 118 MMHG

## 2020-04-06 DIAGNOSIS — R25.2 LEG CRAMP: ICD-10-CM

## 2020-04-06 DIAGNOSIS — E78.00 PURE HYPERCHOLESTEROLEMIA: Chronic | ICD-10-CM

## 2020-04-06 DIAGNOSIS — I10 ESSENTIAL HYPERTENSION: Primary | Chronic | ICD-10-CM

## 2020-04-06 DIAGNOSIS — I50.32 CHRONIC DIASTOLIC HEART FAILURE: Chronic | ICD-10-CM

## 2020-04-06 PROCEDURE — 90732 PNEUMOCOCCAL POLYSACCHARIDE VACCINE 23-VALENT =>2YO SQ IM: ICD-10-PCS | Mod: HCNC,S$GLB,, | Performed by: INTERNAL MEDICINE

## 2020-04-06 PROCEDURE — 1101F PR PT FALLS ASSESS DOC 0-1 FALLS W/OUT INJ PAST YR: ICD-10-PCS | Mod: HCNC,CPTII,S$GLB, | Performed by: INTERNAL MEDICINE

## 2020-04-06 PROCEDURE — 90732 PPSV23 VACC 2 YRS+ SUBQ/IM: CPT | Mod: HCNC,S$GLB,, | Performed by: INTERNAL MEDICINE

## 2020-04-06 PROCEDURE — 1101F PT FALLS ASSESS-DOCD LE1/YR: CPT | Mod: HCNC,CPTII,S$GLB, | Performed by: INTERNAL MEDICINE

## 2020-04-06 PROCEDURE — 1126F AMNT PAIN NOTED NONE PRSNT: CPT | Mod: HCNC,S$GLB,, | Performed by: INTERNAL MEDICINE

## 2020-04-06 PROCEDURE — 99214 OFFICE O/P EST MOD 30 MIN: CPT | Mod: 25,HCNC,S$GLB, | Performed by: INTERNAL MEDICINE

## 2020-04-06 PROCEDURE — 1126F PR PAIN SEVERITY QUANTIFIED, NO PAIN PRESENT: ICD-10-PCS | Mod: HCNC,S$GLB,, | Performed by: INTERNAL MEDICINE

## 2020-04-06 PROCEDURE — 1159F MED LIST DOCD IN RCRD: CPT | Mod: HCNC,S$GLB,, | Performed by: INTERNAL MEDICINE

## 2020-04-06 PROCEDURE — 99999 PR PBB SHADOW E&M-EST. PATIENT-LVL III: ICD-10-PCS | Mod: PBBFAC,HCNC,, | Performed by: INTERNAL MEDICINE

## 2020-04-06 PROCEDURE — G0009 PNEUMOCOCCAL POLYSACCHARIDE VACCINE 23-VALENT =>2YO SQ IM: ICD-10-PCS | Mod: HCNC,S$GLB,, | Performed by: INTERNAL MEDICINE

## 2020-04-06 PROCEDURE — 99999 PR PBB SHADOW E&M-EST. PATIENT-LVL III: CPT | Mod: PBBFAC,HCNC,, | Performed by: INTERNAL MEDICINE

## 2020-04-06 PROCEDURE — 99214 PR OFFICE/OUTPT VISIT, EST, LEVL IV, 30-39 MIN: ICD-10-PCS | Mod: 25,HCNC,S$GLB, | Performed by: INTERNAL MEDICINE

## 2020-04-06 PROCEDURE — 1159F PR MEDICATION LIST DOCUMENTED IN MEDICAL RECORD: ICD-10-PCS | Mod: HCNC,S$GLB,, | Performed by: INTERNAL MEDICINE

## 2020-04-06 PROCEDURE — G0009 ADMIN PNEUMOCOCCAL VACCINE: HCPCS | Mod: HCNC,S$GLB,, | Performed by: INTERNAL MEDICINE

## 2020-04-06 RX ORDER — TIZANIDINE 2 MG/1
2 TABLET ORAL NIGHTLY PRN
Qty: 30 TABLET | Refills: 3 | Status: SHIPPED | OUTPATIENT
Start: 2020-04-06 | End: 2020-04-16

## 2020-04-06 NOTE — PROGRESS NOTES
Subjective:       Patient ID: Nicolasa Jurado is a 89 y.o. female.    Chief Complaint: Follow-up    HPI     89-year-old female here for one-month follow-up.    HTN - Patient is currently on amlodipine 5 mg (am), Toprol- mg (am), Micardis 80 mg (pm). She does check her BP at home, and it runs 100s-150s (200 last night)/80s. Side effects of medications note: none. Denies headaches, blurred vision, chest pain, shortness of breath, nausea.    Patient has diastolic dysfunction and is on no current medication.    She is concerned about her potassium, because she gets leg cramps.  Her feet get cramps.  She does not sleep well at night.  She is up all night.  Her feet bother her.  She gets tingling in her legs that keeps her from sleeping.  She puts on compression stockings, which helps her to sleep.      She has an occasional pain under her right ribs.  She has to get up sometimes.  This has been present for a long time.  She massages herself and this goes away.  It lasts a few minutes.  She feels the pain once a week - this past week 3 days.  It feels like a runner's cramp.  The pain is a sharp pain.    HLD - Patient is currently on Lipitor 80 mg.  Her last lipid panel was   Cholesterol   Date Value Ref Range Status   02/01/2019 131 120 - 199 mg/dL Final     Comment:     The National Cholesterol Education Program (NCEP) has set the  following guidelines (reference ranges) for Cholesterol:  Optimal.....................<200 mg/dL  Borderline High.............200-239 mg/dL  High........................> or = 240 mg/dL       Triglycerides   Date Value Ref Range Status   02/01/2019 132 30 - 150 mg/dL Final     Comment:     The National Cholesterol Education Program (NCEP) has set the  following guidelines (reference values) for triglycerides:  Normal......................<150 mg/dL  Borderline High.............150-199 mg/dL  High........................200-499 mg/dL       HDL   Date Value Ref Range Status   02/01/2019 46  40 - 75 mg/dL Final     Comment:     The National Cholesterol Education Program (NCEP) has set the  following guidelines (reference values) for HDL Cholesterol:  Low...............<40 mg/dL  Optimal...........>60 mg/dL       LDL Cholesterol   Date Value Ref Range Status   02/01/2019 58.6 (L) 63.0 - 159.0 mg/dL Final     Comment:     The National Cholesterol Education Program (NCEP) has set the  following guidelines (reference values) for LDL Cholesterol:  Optimal.......................<130 mg/dL  Borderline High...............130-159 mg/dL  High..........................160-189 mg/dL  Very High.....................>190 mg/dL     .  Side effects of the medication: none.    Review of Systems   Constitutional: Negative for activity change and unexpected weight change.   HENT: Negative for hearing loss, rhinorrhea and trouble swallowing.    Eyes: Negative for discharge and visual disturbance.   Respiratory: Positive for wheezing. Negative for chest tightness.    Cardiovascular: Negative for chest pain and palpitations.   Gastrointestinal: Negative for blood in stool, constipation, diarrhea and vomiting.   Endocrine: Negative for polydipsia and polyuria.   Genitourinary: Negative for difficulty urinating, dysuria, hematuria and menstrual problem.   Musculoskeletal: Negative for arthralgias, joint swelling and neck pain.   Neurological: Negative for weakness and headaches.   Psychiatric/Behavioral: Negative for confusion and dysphoric mood.       Objective:      Physical Exam   Constitutional: She is oriented to person, place, and time. She appears well-developed and well-nourished.   HENT:   Head: Normocephalic and atraumatic.   Mouth/Throat: No oropharyngeal exudate.   Eyes: Pupils are equal, round, and reactive to light. EOM are normal. Right eye exhibits no discharge. Left eye exhibits no discharge. No scleral icterus.   Neck: Normal range of motion. Neck supple. No tracheal deviation present. No thyromegaly present.    Cardiovascular: Normal rate, regular rhythm and normal heart sounds. Exam reveals no gallop and no friction rub.   No murmur heard.  Pulmonary/Chest: Effort normal and breath sounds normal. No respiratory distress. She has no wheezes. She has no rales. She exhibits no tenderness.   Abdominal: Soft. Bowel sounds are normal. She exhibits no distension and no mass. There is no tenderness. There is no rebound and no guarding.   Musculoskeletal: Normal range of motion. She exhibits no edema or tenderness.   Neurological: She is alert and oriented to person, place, and time.   Skin: Skin is warm and dry. No rash noted. No erythema. No pallor.   Psychiatric: She has a normal mood and affect. Her behavior is normal.   Vitals reviewed.      Assessment:       1. Essential hypertension    2. Pure hypercholesterolemia    3. Chronic diastolic heart failure    4. Leg cramp        Plan:       1.  Continue amlodipine 5 mg (am), Toprol- mg (am), Micardis 80 mg (pm)  2.  Continue Lipitor 80 mg.  3.  Monitor.  Not in exacerbation.  4.  Zanaflex prescribed.  Check CMP, magnesium.    Pneumovax given today.

## 2020-04-06 NOTE — PROGRESS NOTES
Another high alert BP received for patient. 207/91.   Called for follow up, patient just completed follow up with PCP - BP in office = 118/68.    Will plan to review technique again, in an effort to identify cause of large variation between home and in office reading. PCP did not document signs and symptoms of hypertension at home, patient is complaining of leg cramps, labs ordered.

## 2020-04-13 ENCOUNTER — PATIENT OUTREACH (OUTPATIENT)
Dept: OTHER | Facility: OTHER | Age: 85
End: 2020-04-13

## 2020-04-13 RX ORDER — HYDROCHLOROTHIAZIDE 12.5 MG/1
12.5 TABLET ORAL DAILY
Qty: 90 TABLET | Refills: 1 | Status: SHIPPED | OUTPATIENT
Start: 2020-04-13 | End: 2020-10-21 | Stop reason: SDUPTHER

## 2020-04-13 NOTE — PROGRESS NOTES
"Another high alert reading received over the weekend - 204/85.     Patient reports she is staying home through current pandemic - she reports feeling "alright" with no symptoms. She states she was sitting watching TV and isn't sure why it was elevated.     BP remains labile, questioned technique. Patient continues to have high alerts through digital medicine. BP was elevated at cardiology appointment in February and marked as labile, and digital readings reviewed showing inadequate control. HCTZ 25 mg daily  was started. PCP stopped thiazide one month later (BP controlled at clinic follow up) and patient complained of leg cramps. BMP/Mag ordered, but not obtained.  PCP also documents patient is taking amlodipine 5 mg daily (10 mg is prescribed). Patient is unable to clarify dose with me. Spoke to patient's son who directed me to patient's niece for clarification.  LVM for patient's niece.     Message sent to PCP and cardiologist for clarification.     Last 5 Patient Entered Readings                                      Current 30 Day Average: 176/79     Recent Readings 4/13/2020 4/12/2020 4/10/2020 4/9/2020 4/6/2020    SBP (mmHg) 163 204 167 120 162    DBP (mmHg) 65 85 76 80 78    Pulse 58 53 63 80 58        Hypertension Medications             amLODIPine (NORVASC) 5 MG tablet Take 2 tablets (10 mg total) by mouth once daily.    metoprolol succinate (TOPROL-XL) 100 MG 24 hr tablet Take 1 tablet (100 mg total) by mouth once daily.    nitroGLYCERIN (NITROSTAT) 0.4 MG SL tablet 1 Tablet Sublingual  One tablet under tounge as needed for chest pain.    telmisartan (MICARDIS) 80 MG Tab TAKE 1 TABLET(80 MG) BY MOUTH EVERY DAY              "

## 2020-04-13 NOTE — PROGRESS NOTES
"Digital Medicine: Clinician Follow-Up    The history is provided by the patient.     Follow Up  Follow-up reason(s): reading review, medication change and routine education      Alert received.   Care Team received high BP alert.  Patient is not experiencing symptoms.  Medication Change: new med  Patient and granddaughter are unaware/do not remember any hypotensive signs or symptoms from HCTZ 25mg. Discussed starting a lower dose of 12.5 mg daily since it was documented she had response at 25mg dose. Patient agreeable.     Confirmed she is taking amlodipine 10 mg daily.     Patient denies hypertensive signs and symptoms. Reviewed technique with granddaughter and patient. Granddaughter states they had some cuff "issues" but now they don't. She was unable to specify the issue or the remediation.         INTERVENTION(S)  reviewed appropriate dose schedule, reviewed monitoring technique, recommended med change and encouragement/support    PLAN  patient verbalizes understanding, patient amenable to changes, additional monitoring needed, Health  follow up and continue monitoring    Initiate HCTZ 12.5 mg daily.   Will follow up in 4-6 weeks to verify she is tolerating. Will continue to monitor BP.   Patient was informed to obtain labs ordered by Dr. Gaston when able.       There are no preventive care reminders to display for this patient.    Last 5 Patient Entered Readings                                      Current 30 Day Average: 176/79     Recent Readings 4/13/2020 4/12/2020 4/10/2020 4/9/2020 4/6/2020    SBP (mmHg) 163 204 167 120 162    DBP (mmHg) 65 85 76 80 78    Pulse 58 53 63 80 58           Hypertension Medications             amLODIPine (NORVASC) 5 MG tablet Take 2 tablets (10 mg total) by mouth once daily.    hydroCHLOROthiazide (HYDRODIURIL) 12.5 MG Tab Take 1 tablet (12.5 mg total) by mouth once daily.    metoprolol succinate (TOPROL-XL) 100 MG 24 hr tablet Take 1 tablet (100 mg total) by mouth once " daily.    nitroGLYCERIN (NITROSTAT) 0.4 MG SL tablet 1 Tablet Sublingual  One tablet under tounge as needed for chest pain.    telmisartan (MICARDIS) 80 MG Tab TAKE 1 TABLET(80 MG) BY MOUTH EVERY DAY               Screenings

## 2020-04-14 ENCOUNTER — PATIENT OUTREACH (OUTPATIENT)
Dept: OTHER | Facility: OTHER | Age: 85
End: 2020-04-14

## 2020-04-17 RX ORDER — CLONAZEPAM 0.5 MG/1
TABLET ORAL
Qty: 30 TABLET | OUTPATIENT
Start: 2020-04-17

## 2020-05-01 NOTE — PROGRESS NOTES
LVM to follow up on recent medication change and request labs be completed. Need BMP.     BP improved with HCTZ 12.5 mg daily. Some labile excursions.     Last 5 Patient Entered Readings                                      Current 30 Day Average: 160/74     Recent Readings 4/30/2020 4/27/2020 4/26/2020 4/24/2020 4/23/2020    SBP (mmHg) 192 132 130 129 179    DBP (mmHg) 91 60 62 63 79    Pulse 51 55 57 52 52        Hypertension Medications             amLODIPine (NORVASC) 5 MG tablet Take 2 tablets (10 mg total) by mouth once daily.    hydroCHLOROthiazide (HYDRODIURIL) 12.5 MG Tab Take 1 tablet (12.5 mg total) by mouth once daily.    metoprolol succinate (TOPROL-XL) 100 MG 24 hr tablet Take 1 tablet (100 mg total) by mouth once daily.    nitroGLYCERIN (NITROSTAT) 0.4 MG SL tablet 1 Tablet Sublingual  One tablet under tounge as needed for chest pain.    telmisartan (MICARDIS) 80 MG Tab TAKE 1 TABLET(80 MG) BY MOUTH EVERY DAY

## 2020-05-07 NOTE — PROGRESS NOTES
"Digital Medicine: Health  Follow-Up    The history is provided by a caregiver.     Follow Up    Adherence Barriers: adverse drug reaction  Spoke with patient's listed caregiver, Nishi. Nishi said that she has no problem being listed as the patient's caregiver, but may need to change it over to someone else in the future. She said that she works a lot, and does not want to slip up on the patient's care.       INTERVENTION(S)  encouragement/support    PLAN  patient verbalizes understanding, Clinician follow-up and continue monitoring      There are no preventive care reminders to display for this patient.    Last 5 Patient Entered Readings                                      Current 30 Day Average: 156/73     Recent Readings 5/4/2020 5/3/2020 4/30/2020 4/27/2020 4/26/2020    SBP (mmHg) 166 188 192 132 130    DBP (mmHg) 79 85 91 60 62    Pulse 62 59 51 55 57                      Medication Adherence Screening   She missed a dose more than once this week.  Patient wonders if medications are working.    Patient knows purpose of medications.      Patient identified the following reasons for non-compliance: Adverse effects    Patient's caregiver informed me that the patient had started taking her HCTZ again as prescribed, but stopped taking HCTZ on the 23rd of April due to adverse effects. The patient experienced  diarrhea, light-headedness, foggy thoughts, and her BP was "shooting up past 200 at night." The patient's caregiver confirmed that the patient did not complain of any chest pain with her elevated readings. Informed patient's caregiver that I would let her clinician, Rebecca BERNABE, know of the recent stop of HCTZ.       SDOH  "

## 2020-05-08 RX ORDER — AMLODIPINE BESYLATE 10 MG/1
1 TABLET ORAL DAILY
COMMUNITY
Start: 2020-02-13 | End: 2020-05-08 | Stop reason: SDUPTHER

## 2020-05-08 RX ORDER — AMLODIPINE BESYLATE 10 MG/1
10 TABLET ORAL DAILY
Qty: 30 TABLET | Refills: 11 | Status: SHIPPED | OUTPATIENT
Start: 2020-05-08 | End: 2020-10-21 | Stop reason: SDUPTHER

## 2020-05-18 NOTE — PROGRESS NOTES
Digital Medicine: Clinician Follow-Up    Called patient to follow up on stopping HCTZ and to discuss recent high alert BP received.     Spoke to caregiver, Nishi who is patient's granddaughter.     Patient stopped HCTZ due to side effects. She reported diarrhea and lightheadedness.     Patient has asked family if she should resume since her BP did respond so well.     Currently patient denies hypotensive s/sx (lightheadedness, dizziness, nausea, fatigue); patient denies hypertensive s/sx (SOB, CP, severe headaches, changes in vision). Instructed patient to seek medical care if BP > 160/100 and is accompanied by hypertensive s/sx associated, patient confirms understanding.     The history is provided by a caregiver and a relative.     Follow Up  Follow-up reason(s): reading review, medication change follow-up and routine education      Alert received.   Care Team received high BP alert.  Patient is not experiencing symptoms.  Patient started new medication.    Is patient tolerating med change?:  No  Reasons:  Side effects.     Reviewed recent readings. Per 2017 ACC/ AHA HTN guidelines (goal of BP < 130/80), current 30-day average is uncontrolled and is trending upward.          INTERVENTION(S)  reviewed appropriate dose schedule and encouragement/support    PLAN  patient verbalizes understanding, Health  follow up and continue monitoring      Nishi to discuss taking 6.25 mg HCTZ daily with patient and work up to 12.5 mg daily as tolerated.  I will continue to monitor regularly and will follow-up in 2 to 3 weeks, if family has not gotten back to me sooner.      Patient denies having questions or concerns. Patient has my contact information and knows to call with any concerns or clinical changes.        There are no preventive care reminders to display for this patient.    Last 5 Patient Entered Readings                                      Current 30 Day Average: 161/72     Recent Readings 5/18/2020 5/17/2020  5/14/2020 5/13/2020 5/10/2020    SBP (mmHg) 184 158 142 177 173    DBP (mmHg) 67 68 58 81 71    Pulse 54 66 64 59 61             Hypertension Medications             amLODIPine (NORVASC) 10 MG tablet Take 1 tablet (10 mg total) by mouth once daily.    amLODIPine (NORVASC) 5 MG tablet Take 2 tablets (10 mg total) by mouth once daily.    hydroCHLOROthiazide (HYDRODIURIL) 12.5 MG Tab Take 1 tablet (12.5 mg total) by mouth once daily.    metoprolol succinate (TOPROL-XL) 100 MG 24 hr tablet Take 1 tablet (100 mg total) by mouth once daily.    nitroGLYCERIN (NITROSTAT) 0.4 MG SL tablet 1 Tablet Sublingual  One tablet under tounge as needed for chest pain.    telmisartan (MICARDIS) 80 MG Tab TAKE 1 TABLET(80 MG) BY MOUTH EVERY DAY               Screenings

## 2020-05-22 NOTE — PROGRESS NOTES
Missed call from patient's caregiver.   Called back, but got VM. Called patient directly.     Patient states she is taking HCTZ again with no side effects. She states she had diarrhea after starting it previously, but also states she ate pork that night which she knows upsets her stomach. She has since determined it was the pork and not the medication causing the ill effects.     Patient states she is back on HCTZ, taking 12.5 mg daily. She says she feels well. Details her elevated BP yesterday was taken after eating. Denies symptoms of hypertension.     Will attempt to confirm this all with her caretaker/grand daughter, Nishi. .

## 2020-05-31 PROCEDURE — 99454 REM MNTR PHYSIOL PARAM 16-30: CPT | Mod: S$GLB,,, | Performed by: INTERNAL MEDICINE

## 2020-05-31 PROCEDURE — 99454 PR REMOTE MNTR, PHYS PARAM, INITIAL, EA 30 DAYS: ICD-10-PCS | Mod: S$GLB,,, | Performed by: INTERNAL MEDICINE

## 2020-06-15 ENCOUNTER — PATIENT OUTREACH (OUTPATIENT)
Dept: OTHER | Facility: OTHER | Age: 85
End: 2020-06-15

## 2020-06-15 NOTE — PROGRESS NOTES
Digital Medicine: Clinician Follow-Up    Called to complete follow up and discuss blood pressure alerts.   Spoke to caregiver, patient's son in law. He is unable to confirm if she is taking HCTZ at this time. He does state they noticed a downward trend with it in the recent past.     He states her blood pressure is now back to frequent and large swings. No consistent control appreciated. He states she continues to deny denies hypotensive s/sx (lightheadedness, dizziness, nausea, fatigue) as well as hypertensive s/sx (SOB, CP, severe headaches, changes in vision). Reminded him that Ms. Baldwin should seek medical care if BP > 160/100 and is accompanied by hypertensive s/sx associated, patient confirms understanding.     The history is provided by a caregiver and a relative.   Follow Up  Follow-up reason(s): reading review      Alert received.   Care Team received high BP alert.  Patient is not experiencing symptoms.      INTERVENTION(S)  reviewed appropriate dose schedule and encouragement/support    PLAN  patient verbalizes understanding, await MD intervention, Health  follow up and continue monitoring    Reviewed recent readings. Per 2017 ACC/ AHA HTN guidelines (goal of BP < 140/90), current 30-day average is above goal. SBP remains uncontrolled.     Continue current medication regimen. Keep cardiology appointment this week, will await Dr. Maxwell's evaluation and assessment. I will continue to monitor regularly and will follow-up in 6-8 weeks.     Patient denies having questions or concerns. Patient has my contact information and knows to call with any concerns or clinical changes.      There are no preventive care reminders to display for this patient.    Last 5 Patient Entered Readings                                      Current 30 Day Average: 163/72     Recent Readings 6/14/2020 6/13/2020 6/12/2020 6/11/2020 6/10/2020    SBP (mmHg) 190 136 197 200 158    DBP (mmHg) 78 75 81 84 79    Pulse 61 66 61 59 57              Hypertension Medications             amLODIPine (NORVASC) 10 MG tablet Take 1 tablet (10 mg total) by mouth once daily.    amLODIPine (NORVASC) 5 MG tablet Take 2 tablets (10 mg total) by mouth once daily.    hydroCHLOROthiazide (HYDRODIURIL) 12.5 MG Tab Take 1 tablet (12.5 mg total) by mouth once daily.    metoprolol succinate (TOPROL-XL) 100 MG 24 hr tablet Take 1 tablet (100 mg total) by mouth once daily.    nitroGLYCERIN (NITROSTAT) 0.4 MG SL tablet 1 Tablet Sublingual  One tablet under tounge as needed for chest pain.    telmisartan (MICARDIS) 80 MG Tab TAKE 1 TABLET(80 MG) BY MOUTH EVERY DAY                 Screenings

## 2020-06-18 ENCOUNTER — LAB VISIT (OUTPATIENT)
Dept: LAB | Facility: HOSPITAL | Age: 85
End: 2020-06-18
Attending: INTERNAL MEDICINE
Payer: MEDICARE

## 2020-06-18 DIAGNOSIS — R25.2 LEG CRAMP: ICD-10-CM

## 2020-06-18 LAB — MAGNESIUM SERPL-MCNC: 1.7 MG/DL (ref 1.6–2.6)

## 2020-06-18 PROCEDURE — 83735 ASSAY OF MAGNESIUM: CPT | Mod: HCNC

## 2020-06-18 PROCEDURE — 36415 COLL VENOUS BLD VENIPUNCTURE: CPT | Mod: HCNC,PO

## 2020-07-07 ENCOUNTER — OFFICE VISIT (OUTPATIENT)
Dept: CARDIOLOGY | Facility: CLINIC | Age: 85
End: 2020-07-07
Payer: MEDICARE

## 2020-07-07 VITALS
SYSTOLIC BLOOD PRESSURE: 174 MMHG | DIASTOLIC BLOOD PRESSURE: 87 MMHG | HEART RATE: 55 BPM | WEIGHT: 128.63 LBS | BODY MASS INDEX: 25.26 KG/M2 | HEIGHT: 60 IN

## 2020-07-07 DIAGNOSIS — I50.32 CHRONIC DIASTOLIC HEART FAILURE: Chronic | ICD-10-CM

## 2020-07-07 DIAGNOSIS — I49.1 PAC (PREMATURE ATRIAL CONTRACTION): ICD-10-CM

## 2020-07-07 DIAGNOSIS — I10 ESSENTIAL HYPERTENSION: Chronic | ICD-10-CM

## 2020-07-07 DIAGNOSIS — I25.10 CORONARY ARTERY DISEASE INVOLVING NATIVE CORONARY ARTERY OF NATIVE HEART WITHOUT ANGINA PECTORIS: Primary | ICD-10-CM

## 2020-07-07 DIAGNOSIS — E78.00 PURE HYPERCHOLESTEROLEMIA: Chronic | ICD-10-CM

## 2020-07-07 DIAGNOSIS — E66.3 OVERWEIGHT: ICD-10-CM

## 2020-07-07 PROCEDURE — 1100F PR PT FALLS ASSESS DOC 2+ FALLS/FALL W/INJURY/YR: ICD-10-PCS | Mod: HCNC,CPTII,S$GLB, | Performed by: NURSE PRACTITIONER

## 2020-07-07 PROCEDURE — 1100F PTFALLS ASSESS-DOCD GE2>/YR: CPT | Mod: HCNC,CPTII,S$GLB, | Performed by: NURSE PRACTITIONER

## 2020-07-07 PROCEDURE — 1159F PR MEDICATION LIST DOCUMENTED IN MEDICAL RECORD: ICD-10-PCS | Mod: HCNC,S$GLB,, | Performed by: NURSE PRACTITIONER

## 2020-07-07 PROCEDURE — 93000 EKG 12-LEAD: ICD-10-PCS | Mod: HCNC,S$GLB,, | Performed by: INTERNAL MEDICINE

## 2020-07-07 PROCEDURE — 99214 OFFICE O/P EST MOD 30 MIN: CPT | Mod: HCNC,S$GLB,, | Performed by: NURSE PRACTITIONER

## 2020-07-07 PROCEDURE — 3288F FALL RISK ASSESSMENT DOCD: CPT | Mod: HCNC,CPTII,S$GLB, | Performed by: NURSE PRACTITIONER

## 2020-07-07 PROCEDURE — 3288F PR FALLS RISK ASSESSMENT DOCUMENTED: ICD-10-PCS | Mod: HCNC,CPTII,S$GLB, | Performed by: NURSE PRACTITIONER

## 2020-07-07 PROCEDURE — 1159F MED LIST DOCD IN RCRD: CPT | Mod: HCNC,S$GLB,, | Performed by: NURSE PRACTITIONER

## 2020-07-07 PROCEDURE — 99499 RISK ADDL DX/OHS AUDIT: ICD-10-PCS | Mod: HCNC,S$GLB,, | Performed by: NURSE PRACTITIONER

## 2020-07-07 PROCEDURE — 93000 ELECTROCARDIOGRAM COMPLETE: CPT | Mod: HCNC,S$GLB,, | Performed by: INTERNAL MEDICINE

## 2020-07-07 PROCEDURE — 99999 PR PBB SHADOW E&M-EST. PATIENT-LVL V: CPT | Mod: PBBFAC,HCNC,, | Performed by: NURSE PRACTITIONER

## 2020-07-07 PROCEDURE — 99999 PR PBB SHADOW E&M-EST. PATIENT-LVL V: ICD-10-PCS | Mod: PBBFAC,HCNC,, | Performed by: NURSE PRACTITIONER

## 2020-07-07 PROCEDURE — 99499 UNLISTED E&M SERVICE: CPT | Mod: HCNC,S$GLB,, | Performed by: NURSE PRACTITIONER

## 2020-07-07 PROCEDURE — 99214 PR OFFICE/OUTPT VISIT, EST, LEVL IV, 30-39 MIN: ICD-10-PCS | Mod: HCNC,S$GLB,, | Performed by: NURSE PRACTITIONER

## 2020-07-07 PROCEDURE — 1125F PR PAIN SEVERITY QUANTIFIED, PAIN PRESENT: ICD-10-PCS | Mod: HCNC,S$GLB,, | Performed by: NURSE PRACTITIONER

## 2020-07-07 PROCEDURE — 1125F AMNT PAIN NOTED PAIN PRSNT: CPT | Mod: HCNC,S$GLB,, | Performed by: NURSE PRACTITIONER

## 2020-07-07 RX ORDER — CARVEDILOL 12.5 MG/1
12.5 TABLET ORAL 2 TIMES DAILY WITH MEALS
Qty: 60 TABLET | Refills: 11 | Status: SHIPPED | OUTPATIENT
Start: 2020-07-07 | End: 2021-04-05

## 2020-07-07 RX ORDER — TIMOLOL MALEATE 5 MG/ML
1 SOLUTION/ DROPS OPHTHALMIC 2 TIMES DAILY
COMMUNITY
Start: 2020-04-30

## 2020-07-07 NOTE — PROGRESS NOTES
Ms. Jurado is a patient of Dr. Zapien and was last seen in [facility] 2/5/2020.      Subjective:   Patient ID:  Nicolasa Jurado is a 89 y.o. female who presents for follow-up of Hypertension    Problem List:  Labile hypertension    CAD    -LCX coated stent 08/14/2003    Diastolic heart failure, EF 65%    SANA s/p stent on the right side  Hypercholesterolemia    Hypercalcemia and primary hyperparathyroidism    Asthma    HPI:  Ms. Jurado is in clinic today for routine follow up.  Reports BP in the 170-200s systolic.  She takes the telmisartan 80 mg in the morning, the HCTZ 12.5 mg and amlodipine 10 mg at lunch, and the metoprolol 100 mg at night.  Prior to her stent in 2003, she had elevations in her BP but no pain.  She had a single episode of chest pain that woke her up and lasted about 8 minutes.  She is not interested in an angiogram at this time or a stress test.      Review of Systems   Constitution: Negative for decreased appetite, diaphoresis, malaise/fatigue, weight gain and weight loss.   Eyes: Negative for visual disturbance.   Cardiovascular: Positive for chest pain and dyspnea on exertion. Negative for claudication, irregular heartbeat, leg swelling, near-syncope, orthopnea, palpitations, paroxysmal nocturnal dyspnea and syncope.        Denies chest pressure   Respiratory: Negative for cough, hemoptysis, shortness of breath, sleep disturbances due to breathing and snoring.    Endocrine: Negative for cold intolerance and heat intolerance.   Hematologic/Lymphatic: Negative for bleeding problem. Does not bruise/bleed easily.   Musculoskeletal: Negative for myalgias.   Gastrointestinal: Negative for bloating, abdominal pain, anorexia, change in bowel habit, constipation, diarrhea, nausea and vomiting.   Neurological: Negative for difficulty with concentration, disturbances in coordination, excessive daytime sleepiness, dizziness, headaches, light-headedness, loss of balance, numbness and weakness.    Psychiatric/Behavioral: The patient does not have insomnia.        Allergies and current medications updated and reviewed:  Review of patient's allergies indicates:   Allergen Reactions    Venom-wasp     Penicillin      Other reaction(s): Rash    Amoxicillin-pot clavulanate      Other reaction(s): diarrea  Other reaction(s): Vomiting  Other reaction(s): diarrea     Current Outpatient Medications   Medication Sig    acyclovir 5% (ZOVIRAX) 5 % ointment Use tid for buttock rash (Patient taking differently: as needed. Use tid for buttock rash)    albuterol (PROVENTIL/VENTOLIN HFA) 90 mcg/actuation inhaler Inhale 2 puffs into the lungs every 6 (six) hours as needed for Wheezing.    amLODIPine (NORVASC) 10 MG tablet Take 1 tablet (10 mg total) by mouth once daily.    aspirin 81 mg Tab Take 81 mg by mouth every other day.     atorvastatin (LIPITOR) 80 MG tablet Take 1 tablet (80 mg total) by mouth once daily.    brimonidine 0.2% (ALPHAGAN) 0.2 % Drop Place 1 drop into both eyes 3 (three) times daily.     cinacalcet (SENSIPAR) 30 MG Tab TAKE 2 TABLETS BY MOUTH DAILY WITH BREAKFAST    diclofenac sodium (VOLTAREN) 1 % Gel Apply 2 g topically 3 (three) times daily.    dorzolamide (TRUSOPT) 2 % ophthalmic solution Place 1 drop into both eyes 3 (three) times daily.     dorzolamide-timolol 2-0.5% (COSOPT) 22.3-6.8 mg/mL ophthalmic solution 1 drop 2 (two) times daily.    ergocalciferol, vitamin D2, (VITAMIN D ORAL) Take 2,000 Int'l Units by mouth once daily.    fluocinonide (LIDEX) 0.05 % external solution Use hs for scalp prn pruritus    fluticasone propionate (FLONASE) 50 mcg/actuation nasal spray 1 spray by Each Nare route daily as needed.     hydroCHLOROthiazide (HYDRODIURIL) 12.5 MG Tab Take 1 tablet (12.5 mg total) by mouth once daily.    hydrocortisone-pramoxine (ANALPRAM-HC) 2.5-1 % Crea Place rectally 3 (three) times daily.    latanoprost 0.005 % ophthalmic solution Place 1 drop into both eyes every  evening.     levalbuterol (XOPENEX) 0.63 mg/3 mL nebulizer solution TAKE 3 MILLILITERS BY NEBULIZATION EVERY 4 HOURS AS NEEDED FOR WHEEZING(RESCUE)    methazoLAMIDE (NEPTAZANE) 25 mg tablet TK 1 T PO BID UTD    metoprolol succinate (TOPROL-XL) 100 MG 24 hr tablet Take 1 tablet (100 mg total) by mouth once daily.    nitroGLYCERIN (NITROSTAT) 0.4 MG SL tablet 1 Tablet Sublingual  One tablet under tounge as needed for chest pain.    pilocarpine HCL 2% (PILOCAR) 2 % ophthalmic solution INT 1 GTT INTO OU QID    predniSONE (DELTASONE) 20 MG tablet Take 1 tablet (20 mg total) by mouth once daily.    telmisartan (MICARDIS) 80 MG Tab TAKE 1 TABLET(80 MG) BY MOUTH EVERY DAY    vit C-vit E-copper-ZnOx-lutein (PRESERVISION) 226-200-5 mg-unit-mg Cap Take by mouth. 2 Capsule Oral Every day     No current facility-administered medications for this visit.        Objective:        BP (!) 174/87   Pulse (!) 55   Ht 5' (1.524 m)   Wt 58.3 kg (128 lb 10.2 oz)   BMI 25.12 kg/m²       Physical Exam   Constitutional: She is oriented to person, place, and time. Vital signs are normal. She appears well-developed and well-nourished. She is active. No distress.   HENT:   Head: Normocephalic and atraumatic.   Eyes: Conjunctivae and lids are normal. No scleral icterus.   Neck: Neck supple. Normal carotid pulses, no hepatojugular reflux and no JVD present. Carotid bruit is not present.   Cardiovascular: Normal rate, S1 normal, S2 normal and intact distal pulses. A regularly irregular rhythm present. PMI is not displaced. Exam reveals no gallop and no friction rub.   No murmur heard.  Pulses:       Carotid pulses are 2+ on the right side and 2+ on the left side.       Radial pulses are 2+ on the right side and 2+ on the left side.        Dorsalis pedis pulses are 2+ on the right side and 2+ on the left side.        Posterior tibial pulses are 1+ on the right side and 1+ on the left side.   Pulmonary/Chest: Effort normal and breath  sounds normal. No respiratory distress. She has no decreased breath sounds. She has no wheezes. She has no rhonchi. She has no rales. She exhibits no tenderness.   Abdominal: Soft. Normal appearance and bowel sounds are normal. She exhibits no distension, no fluid wave, no abdominal bruit, no ascites and no pulsatile midline mass. There is no hepatosplenomegaly. There is no abdominal tenderness.   Musculoskeletal:         General: No edema.   Neurological: She is alert and oriented to person, place, and time. Gait normal.   Skin: Skin is warm, dry and intact. No rash noted. She is not diaphoretic. Nails show no clubbing.   Psychiatric: She has a normal mood and affect. Her speech is normal and behavior is normal. Judgment and thought content normal. Cognition and memory are normal.   Nursing note and vitals reviewed.      Chemistry        Component Value Date/Time     (L) 02/26/2020 0927    K 4.0 02/26/2020 0927    CL 96 02/26/2020 0927    CO2 31 (H) 02/26/2020 0927    BUN 16 02/26/2020 0927    CREATININE 0.9 02/26/2020 0927    GLU 97 02/26/2020 0927        Component Value Date/Time    CALCIUM 9.0 02/26/2020 0927    ALKPHOS 90 10/10/2019 1136    AST 23 10/10/2019 1136    ALT 23 10/10/2019 1136    BILITOT 0.5 10/10/2019 1136    ESTGFRAFRICA >60.0 02/26/2020 0927    EGFRNONAA 56.9 (A) 02/26/2020 0927            Recent Labs   Lab 08/10/18  1200 02/01/19  1235   WBC 6.33  --    Hemoglobin 12.3  --    Hematocrit 37.8  --    Mean Corpuscular Volume 85  --    Platelets 135 L  --    BNP  --  93   Cholesterol  --  131   HDL  --  46   LDL Cholesterol  --  58.6 L   Triglycerides  --  132   Hdl/Cholesterol Ratio  --  35.1              Test(s) Reviewed  I have reviewed the following in detail:  [] Stress test   [] Angiography   [x] Echocardiogram   [x] Labs   [] Other:         Assessment/Plan:   1. Coronary artery disease involving native coronary artery of native heart without angina pectoris  Symptomatic. Not interested  in stress test or angiogram.  Will medically manage starting with improved BP control.  Taking high intensity statin and ASA.     - carvediloL (COREG) 12.5 MG tablet; Take 1 tablet (12.5 mg total) by mouth 2 (two) times daily with meals.  Dispense: 60 tablet; Refill: 11  - IN OFFICE EKG 12-LEAD (to Muse); Future    2. Chronic diastolic heart failure  euvolemic on exam. No changes.    3. Essential hypertension  BP not at goal <130/80. Change metoprolol to carvedilol 12.5 mg BID. Requested 2 week bp log.     - carvediloL (COREG) 12.5 MG tablet; Take 1 tablet (12.5 mg total) by mouth 2 (two) times daily with meals.  Dispense: 60 tablet; Refill: 11    4. Pure hypercholesterolemia  LDL at goal <70. No changes      5. Overweight  BMI 25.1     6. PAC (premature atrial contraction)  ECG with rhythm strip done to determine rhythm.  Reviewed ECG with Dr. Zapien and is in SR with frequent PACs in a quadrimeny pattern.        Patient was discussed with but not examined by Dr. Zapien    Follow up in about 3 months (around 10/7/2020).

## 2020-07-07 NOTE — PATIENT INSTRUCTIONS
Pepcid (famotadine) is a good replacement for the zantac.     Stop the metoprolol.    Start the carvedilol 12.5 mg by mouth two times a day.

## 2020-07-31 PROCEDURE — 99454 REM MNTR PHYSIOL PARAM 16-30: CPT | Mod: S$GLB,,, | Performed by: INTERNAL MEDICINE

## 2020-07-31 PROCEDURE — 99454 PR REMOTE MNTR, PHYS PARAM, INITIAL, EA 30 DAYS: ICD-10-PCS | Mod: S$GLB,,, | Performed by: INTERNAL MEDICINE

## 2020-08-17 ENCOUNTER — PATIENT OUTREACH (OUTPATIENT)
Dept: OTHER | Facility: OTHER | Age: 85
End: 2020-08-17

## 2020-08-17 NOTE — PROGRESS NOTES
"Digital Medicine: Clinician Follow-Up    High alert reading. Denies hypertensive signs and symptoms.     One high reading this week - yesterday, patient is unsure why.   BP is BEFORE taking medication. Discussed technique and requested she monitor her BP at least 1 hour after medication or if she feels poorly/has symptoms of hypotension or hypertension.     Feels weak and lazy. Feels ok when DBP is >80.     Hydrate. She is working on this.     Has been pleased with her BP trend since interchanging carvedilol for metoprolol.       The history is provided by the patient.   Follow-up reason(s): medication change follow-up.   Care Team received high BP alert.      Hypertension    Patient readings are stable   Patient is experiencing signs/symptoms of hypotension. Weak and "lazy" with DBP <80 mmHg.   Patient is not experiencing signs/symptoms of hypertension.      Patient did make medication change.    Is patient tolerating med change? yes          Last 5 Patient Entered Readings                                      Current 30 Day Average: 160/73     Recent Readings 8/17/2020 8/16/2020 8/15/2020 8/14/2020 8/13/2020    SBP (mmHg) 150 190 150 157 144    DBP (mmHg) 63 78 71 82 75    Pulse 60 72 76 76 66               Depression Screening  Did not address depression screening.    Sleep Apnea Screening    Did not address sleep apnea screening.     Medication Affordability Screening  Did not address medication affordability screening.     Medication Adherence-Medication adherence was assessed.          ASSESSMENT(S)  Patients BP average is 160/73 mmHg, which is above goal. Patient's BP goal is less than or equal to 140/90 per 2017 ACC/AHA Hypertension Guidelines.       Hypertension Plan  Additional monitoring needed. Monitor AFTER medication   Continue current therapy.  Continue current diet/physical activity routine.  Instructed to charge device.  Provided patient education. Technique, sodium restriction       Addressed any " questions or concerns and patient has my contact information if needed prior to next outreach. Patient verbalizes understanding.      Explained the importance of self-monitoring and medication adherence. Encouraged the patient to communicate with their health  for lifestyle modifications to help improve or maintain a healthy lifestyle.        Explained to the patient that the Digital Medicine team is not available for emergencies. Advised patient call [facility] ([phone] or [phone]) or [phone] if needed.         There are no preventive care reminders to display for this patient.      Hypertension Medications             amLODIPine (NORVASC) 10 MG tablet Take 1 tablet (10 mg total) by mouth once daily.    carvediloL (COREG) 12.5 MG tablet Take 1 tablet (12.5 mg total) by mouth 2 (two) times daily with meals.    hydroCHLOROthiazide (HYDRODIURIL) 12.5 MG Tab Take 1 tablet (12.5 mg total) by mouth once daily.    nitroGLYCERIN (NITROSTAT) 0.4 MG SL tablet 1 Tablet Sublingual  One tablet under tounge as needed for chest pain.    telmisartan (MICARDIS) 80 MG Tab TAKE 1 TABLET(80 MG) BY MOUTH EVERY DAY

## 2020-08-20 ENCOUNTER — PATIENT OUTREACH (OUTPATIENT)
Dept: OTHER | Facility: OTHER | Age: 85
End: 2020-08-20

## 2020-08-27 ENCOUNTER — PATIENT OUTREACH (OUTPATIENT)
Dept: OTHER | Facility: OTHER | Age: 85
End: 2020-08-27

## 2020-08-27 NOTE — PROGRESS NOTES
Digital Medicine: Clinician Follow-Up    Called patient in response to another high alert BP reading.       The history is provided by the patient.   Care Team received high BP alert.  Pulse pressure is wide.    States she had a headache at the time, but felt OK.   BP was taken  before medication and was not repeated.   Parisa helps with taking BP so she feels she is stuck to his timing.       Hypertension    Readings are trending up   Patient is not experiencing signs/symptoms of hypotension.  Patient is experiencing signs/symptoms of hypertension. Occassional headache.           Last 5 Patient Entered Readings                                      Current 30 Day Average: 161/74     Recent Readings 8/26/2020 8/25/2020 8/24/2020 8/23/2020 8/22/2020    SBP (mmHg) 191 134 153 167 176    DBP (mmHg) 77 66 80 68 69    Pulse 68 74 63 65 54               Depression Screening  Did not address depression screening.    Sleep Apnea Screening    Did not address sleep apnea screening.     Medication Affordability Screening  Did not address medication affordability screening.     Medication Adherence-Medication adherence was assessed.        Called pharmacy for a compliance check, all medications have been filled within the last 90 days for 90 day prescriptions.       ASSESSMENT(S)  Patients BP average is 161/74 mmHg, which is above goal. Patient's BP goal is less than or equal to 140/90 per 2017 ACC/AHA Hypertension Guidelines.     Remains elevated above goal of <150/90.       Hypertension Plan  Additional monitoring needed. Repeat BP reading when elevated. Follow directions from text message. Take BP >60 minutes after taking medication.   Continue current therapy.  Instructed to charge device.  Provided patient education. Timing and need to repeat.        Addressed any questions or concerns and patient has my contact information if needed prior to next outreach. Patient verbalizes understanding.      Explained to the patient  that the Digital Medicine team is not available for emergencies. Advised patient call [facility] ([phone] or [phone]) or [phone] if needed.         There are no preventive care reminders to display for this patient.      Hypertension Medications             amLODIPine (NORVASC) 10 MG tablet Take 1 tablet (10 mg total) by mouth once daily.    carvediloL (COREG) 12.5 MG tablet Take 1 tablet (12.5 mg total) by mouth 2 (two) times daily with meals.    hydroCHLOROthiazide (HYDRODIURIL) 12.5 MG Tab Take 1 tablet (12.5 mg total) by mouth once daily.    nitroGLYCERIN (NITROSTAT) 0.4 MG SL tablet 1 Tablet Sublingual  One tablet under tounge as needed for chest pain.    telmisartan (MICARDIS) 80 MG Tab TAKE 1 TABLET(80 MG) BY MOUTH EVERY DAY

## 2020-08-31 PROCEDURE — 99454 REM MNTR PHYSIOL PARAM 16-30: CPT | Mod: S$GLB,,, | Performed by: INTERNAL MEDICINE

## 2020-08-31 PROCEDURE — 99454 PR REMOTE MNTR, PHYS PARAM, INITIAL, EA 30 DAYS: ICD-10-PCS | Mod: S$GLB,,, | Performed by: INTERNAL MEDICINE

## 2020-09-21 ENCOUNTER — PATIENT OUTREACH (OUTPATIENT)
Dept: OTHER | Facility: OTHER | Age: 85
End: 2020-09-21

## 2020-09-21 NOTE — PROGRESS NOTES
Remains uncontrolled - high alerting.   Decent readings mixed in.     Patient was not available today to talk.     Will continue to monitor. Cardiology labs and appointment next month.     Last 5 Patient Entered Readings                                      Current 30 Day Average: 163/74     Recent Readings 9/20/2020 9/18/2020 9/17/2020 9/16/2020 9/14/2020    SBP (mmHg) 161 131 166 173 145    DBP (mmHg) 73 63 76 81 65    Pulse 64 64 62 63 66        Hypertension Medications             amLODIPine (NORVASC) 10 MG tablet Take 1 tablet (10 mg total) by mouth once daily.    carvediloL (COREG) 12.5 MG tablet Take 1 tablet (12.5 mg total) by mouth 2 (two) times daily with meals.    hydroCHLOROthiazide (HYDRODIURIL) 12.5 MG Tab Take 1 tablet (12.5 mg total) by mouth once daily.    nitroGLYCERIN (NITROSTAT) 0.4 MG SL tablet 1 Tablet Sublingual  One tablet under tounge as needed for chest pain.    telmisartan (MICARDIS) 80 MG Tab TAKE 1 TABLET(80 MG) BY MOUTH EVERY DAY

## 2020-10-14 ENCOUNTER — LAB VISIT (OUTPATIENT)
Dept: LAB | Facility: HOSPITAL | Age: 85
End: 2020-10-14
Attending: NURSE PRACTITIONER
Payer: MEDICARE

## 2020-10-14 DIAGNOSIS — I25.10 CORONARY ARTERY DISEASE INVOLVING NATIVE CORONARY ARTERY OF NATIVE HEART WITHOUT ANGINA PECTORIS: ICD-10-CM

## 2020-10-14 DIAGNOSIS — E78.00 PURE HYPERCHOLESTEROLEMIA: Chronic | ICD-10-CM

## 2020-10-14 LAB
ALBUMIN SERPL BCP-MCNC: 3.8 G/DL (ref 3.5–5.2)
ALP SERPL-CCNC: 79 U/L (ref 55–135)
ALT SERPL W/O P-5'-P-CCNC: 10 U/L (ref 10–44)
ANION GAP SERPL CALC-SCNC: 6 MMOL/L (ref 8–16)
AST SERPL-CCNC: 19 U/L (ref 10–40)
BILIRUB SERPL-MCNC: 0.5 MG/DL (ref 0.1–1)
BUN SERPL-MCNC: 17 MG/DL (ref 8–23)
CALCIUM SERPL-MCNC: 9.5 MG/DL (ref 8.7–10.5)
CHLORIDE SERPL-SCNC: 99 MMOL/L (ref 95–110)
CHOLEST SERPL-MCNC: 131 MG/DL (ref 120–199)
CHOLEST/HDLC SERPL: 3.4 {RATIO} (ref 2–5)
CO2 SERPL-SCNC: 34 MMOL/L (ref 23–29)
CREAT SERPL-MCNC: 0.8 MG/DL (ref 0.5–1.4)
EST. GFR  (AFRICAN AMERICAN): >60 ML/MIN/1.73 M^2
EST. GFR  (NON AFRICAN AMERICAN): >60 ML/MIN/1.73 M^2
GLUCOSE SERPL-MCNC: 110 MG/DL (ref 70–110)
HDLC SERPL-MCNC: 39 MG/DL (ref 40–75)
HDLC SERPL: 29.8 % (ref 20–50)
LDLC SERPL CALC-MCNC: 59.8 MG/DL (ref 63–159)
NONHDLC SERPL-MCNC: 92 MG/DL
POTASSIUM SERPL-SCNC: 3.7 MMOL/L (ref 3.5–5.1)
PROT SERPL-MCNC: 7 G/DL (ref 6–8.4)
SODIUM SERPL-SCNC: 139 MMOL/L (ref 136–145)
TRIGL SERPL-MCNC: 161 MG/DL (ref 30–150)

## 2020-10-14 PROCEDURE — 36415 COLL VENOUS BLD VENIPUNCTURE: CPT | Mod: HCNC,PO

## 2020-10-14 PROCEDURE — 80053 COMPREHEN METABOLIC PANEL: CPT | Mod: HCNC

## 2020-10-14 PROCEDURE — 80061 LIPID PANEL: CPT | Mod: HCNC

## 2020-10-20 ENCOUNTER — PATIENT OUTREACH (OUTPATIENT)
Dept: ADMINISTRATIVE | Facility: OTHER | Age: 85
End: 2020-10-20

## 2020-10-20 NOTE — PROGRESS NOTES
Care Everywhere: updated  Immunization: updated(delay in links)   Health Maintenance: updated  Media Review:   Legacy Review:   Order placed:   Upcoming appts:

## 2020-10-21 ENCOUNTER — OFFICE VISIT (OUTPATIENT)
Dept: CARDIOLOGY | Facility: CLINIC | Age: 85
End: 2020-10-21
Payer: MEDICARE

## 2020-10-21 VITALS
DIASTOLIC BLOOD PRESSURE: 76 MMHG | HEART RATE: 68 BPM | BODY MASS INDEX: 25.9 KG/M2 | HEIGHT: 60 IN | SYSTOLIC BLOOD PRESSURE: 199 MMHG | WEIGHT: 131.94 LBS

## 2020-10-21 DIAGNOSIS — I10 ESSENTIAL HYPERTENSION: ICD-10-CM

## 2020-10-21 DIAGNOSIS — I51.89 LEFT VENTRICULAR DIASTOLIC DYSFUNCTION WITH PRESERVED SYSTOLIC FUNCTION: Chronic | ICD-10-CM

## 2020-10-21 DIAGNOSIS — I70.0 ATHEROSCLEROSIS OF AORTA: Chronic | ICD-10-CM

## 2020-10-21 DIAGNOSIS — I25.10 CORONARY ARTERY DISEASE INVOLVING NATIVE CORONARY ARTERY OF NATIVE HEART WITHOUT ANGINA PECTORIS: Primary | ICD-10-CM

## 2020-10-21 PROCEDURE — 1126F PR PAIN SEVERITY QUANTIFIED, NO PAIN PRESENT: ICD-10-PCS | Mod: HCNC,S$GLB,, | Performed by: INTERNAL MEDICINE

## 2020-10-21 PROCEDURE — 99999 PR PBB SHADOW E&M-EST. PATIENT-LVL V: ICD-10-PCS | Mod: PBBFAC,HCNC,, | Performed by: INTERNAL MEDICINE

## 2020-10-21 PROCEDURE — 99214 OFFICE O/P EST MOD 30 MIN: CPT | Mod: HCNC,S$GLB,, | Performed by: INTERNAL MEDICINE

## 2020-10-21 PROCEDURE — 1159F MED LIST DOCD IN RCRD: CPT | Mod: HCNC,S$GLB,, | Performed by: INTERNAL MEDICINE

## 2020-10-21 PROCEDURE — 1159F PR MEDICATION LIST DOCUMENTED IN MEDICAL RECORD: ICD-10-PCS | Mod: HCNC,S$GLB,, | Performed by: INTERNAL MEDICINE

## 2020-10-21 PROCEDURE — 99214 PR OFFICE/OUTPT VISIT, EST, LEVL IV, 30-39 MIN: ICD-10-PCS | Mod: HCNC,S$GLB,, | Performed by: INTERNAL MEDICINE

## 2020-10-21 PROCEDURE — 99999 PR PBB SHADOW E&M-EST. PATIENT-LVL V: CPT | Mod: PBBFAC,HCNC,, | Performed by: INTERNAL MEDICINE

## 2020-10-21 PROCEDURE — 1101F PR PT FALLS ASSESS DOC 0-1 FALLS W/OUT INJ PAST YR: ICD-10-PCS | Mod: HCNC,CPTII,S$GLB, | Performed by: INTERNAL MEDICINE

## 2020-10-21 PROCEDURE — 1101F PT FALLS ASSESS-DOCD LE1/YR: CPT | Mod: HCNC,CPTII,S$GLB, | Performed by: INTERNAL MEDICINE

## 2020-10-21 PROCEDURE — 1126F AMNT PAIN NOTED NONE PRSNT: CPT | Mod: HCNC,S$GLB,, | Performed by: INTERNAL MEDICINE

## 2020-10-21 RX ORDER — HYDROCHLOROTHIAZIDE 12.5 MG/1
12.5 TABLET ORAL DAILY
Qty: 90 TABLET | Refills: 3 | Status: SHIPPED | OUTPATIENT
Start: 2020-10-21 | End: 2021-04-15

## 2020-10-21 RX ORDER — TIZANIDINE 2 MG/1
1 TABLET ORAL
COMMUNITY
Start: 2020-10-16 | End: 2020-12-11

## 2020-10-21 RX ORDER — AMLODIPINE BESYLATE 10 MG/1
10 TABLET ORAL DAILY
Qty: 90 TABLET | Refills: 3 | Status: SHIPPED | OUTPATIENT
Start: 2020-10-21 | End: 2021-10-15 | Stop reason: SDUPTHER

## 2020-10-22 NOTE — PROGRESS NOTES
Digital Medicine: Clinician Follow-Up    Patient saw Dr. Maxwell yesterday. Medication non compliance identified, patient not taking HCTZ and taking carvedilol once daily.   Called to discuss regimen and offer pill packing pharmacy service option or speak to family member that will assist.   Patient is home, alone. States her grandson sets her pill box and she reviewed her medications with him.     BP is labile. No hypertensive symptoms endorsed at this time.     The history is provided by the patient.      Review of patient's allergies indicates:   -- Venom-wasp    -- Penicillin     --  Other reaction(s): Rash   -- Amoxicillin-pot clavulanate     --  Other reaction(s): diarrea             Other reaction(s): Vomiting             Other reaction(s): diarrea  Follow-up reason(s): Alert received.   Care Team received high BP alert.      Hypertension    Patient's blood pressure readings are labile.   Patient is not experiencing signs/symptoms of hypotension.  Patient is not experiencing signs/symptoms of hypertension.            Last 5 Patient Entered Readings                                      Current 30 Day Average: 151/73     Recent Readings 10/22/2020 10/20/2020 10/19/2020 10/18/2020 10/17/2020    SBP (mmHg) 189 169 151 184 184    DBP (mmHg) 76 75 70 83 82    Pulse 67 67 64 73 67                 Depression Screening  Did not address depression screening.    Sleep Apnea Screening    Did not address sleep apnea screening.     Medication Affordability Screening  Did not address medication affordability screening.     Medication Adherence-Medication adherence was asssessed.        Patient has not been compliant wqith current regimen due to confusion of regimen. Her grandson sets her pill box. Had run out of HCTZ and was taking carvedilol once daily.       ASSESSMENT(S)  Patients BP average is 151/73 mmHg, which is above goal. Patient's BP goal is less than or equal to 140/90.     Hypertension Plan  Continue current  therapy. No changes made as patient is not compliant with current regimen and just saw cardiology.   Continue current diet/physical activity routine.       Addressed patient questions and patient has my contact information if needed prior to next outreach. Patient verbalizes understanding.      Explained to the patient that the Digital Medicine team is not available for emergencies. Advised patient call [facility] ([phone]) or [phone] if needed.            There are no preventive care reminders to display for this patient.  There are no preventive care reminders to display for this patient.      Hypertension Medications             amLODIPine (NORVASC) 10 MG tablet Take 1 tablet (10 mg total) by mouth once daily.    carvediloL (COREG) 12.5 MG tablet Take 1 tablet (12.5 mg total) by mouth 2 (two) times daily with meals.    hydroCHLOROthiazide (HYDRODIURIL) 12.5 MG Tab Take 1 tablet (12.5 mg total) by mouth once daily.    nitroGLYCERIN (NITROSTAT) 0.4 MG SL tablet 1 Tablet Sublingual  One tablet under tounge as needed for chest pain.    telmisartan (MICARDIS) 80 MG Tab TAKE 1 TABLET(80 MG) BY MOUTH EVERY DAY

## 2020-11-13 ENCOUNTER — PATIENT OUTREACH (OUTPATIENT)
Dept: OTHER | Facility: OTHER | Age: 85
End: 2020-11-13

## 2020-11-13 NOTE — PROGRESS NOTES
LVM to discuss elevated BP readings.   Continues to trend above goal with some readings <150 mixed in.     Want to follow up on medication compliance as she was previously not taking HCTZ and only taking carvedilol once daily.     Last 5 Patient Entered Readings                                      Current 30 Day Average: 163/76     Recent Readings 11/13/2020 11/12/2020 11/11/2020 11/10/2020 11/9/2020    SBP (mmHg) 161 185 187 159 143    DBP (mmHg) 87 77 78 83 71    Pulse 65 55 60 67 63        Hypertension Medications             amLODIPine (NORVASC) 10 MG tablet Take 1 tablet (10 mg total) by mouth once daily.    carvediloL (COREG) 12.5 MG tablet Take 1 tablet (12.5 mg total) by mouth 2 (two) times daily with meals.    hydroCHLOROthiazide (HYDRODIURIL) 12.5 MG Tab Take 1 tablet (12.5 mg total) by mouth once daily.    nitroGLYCERIN (NITROSTAT) 0.4 MG SL tablet 1 Tablet Sublingual  One tablet under tounge as needed for chest pain.    telmisartan (MICARDIS) 80 MG Tab TAKE 1 TABLET(80 MG) BY MOUTH EVERY DAY

## 2020-11-18 ENCOUNTER — OFFICE VISIT (OUTPATIENT)
Dept: INTERNAL MEDICINE | Facility: CLINIC | Age: 85
End: 2020-11-18
Payer: MEDICARE

## 2020-11-18 ENCOUNTER — HOSPITAL ENCOUNTER (OUTPATIENT)
Dept: RADIOLOGY | Facility: HOSPITAL | Age: 85
Discharge: HOME | End: 2020-11-18
Attending: INTERNAL MEDICINE
Payer: MEDICARE

## 2020-11-18 VITALS
HEIGHT: 60 IN | BODY MASS INDEX: 25.71 KG/M2 | OXYGEN SATURATION: 98 % | WEIGHT: 130.94 LBS | DIASTOLIC BLOOD PRESSURE: 78 MMHG | RESPIRATION RATE: 18 BRPM | SYSTOLIC BLOOD PRESSURE: 150 MMHG | TEMPERATURE: 98 F | HEART RATE: 73 BPM

## 2020-11-18 DIAGNOSIS — Z00.00 ANNUAL PHYSICAL EXAM: Primary | ICD-10-CM

## 2020-11-18 DIAGNOSIS — M53.3 SACRAL PAIN: ICD-10-CM

## 2020-11-18 DIAGNOSIS — Z23 NEED FOR SHINGLES VACCINE: ICD-10-CM

## 2020-11-18 DIAGNOSIS — N18.2 CKD (CHRONIC KIDNEY DISEASE) STAGE 2, GFR 60-89 ML/MIN: ICD-10-CM

## 2020-11-18 DIAGNOSIS — I10 ESSENTIAL HYPERTENSION: Chronic | ICD-10-CM

## 2020-11-18 DIAGNOSIS — E78.00 PURE HYPERCHOLESTEROLEMIA: Chronic | ICD-10-CM

## 2020-11-18 DIAGNOSIS — I51.89 LEFT VENTRICULAR DIASTOLIC DYSFUNCTION WITH PRESERVED SYSTOLIC FUNCTION: Chronic | ICD-10-CM

## 2020-11-18 DIAGNOSIS — K64.9 HEMORRHOIDS, UNSPECIFIED HEMORRHOID TYPE: ICD-10-CM

## 2020-11-18 DIAGNOSIS — I50.32 CHRONIC DIASTOLIC HEART FAILURE: Chronic | ICD-10-CM

## 2020-11-18 DIAGNOSIS — I70.0 ATHEROSCLEROSIS OF AORTA: Chronic | ICD-10-CM

## 2020-11-18 PROCEDURE — 72220 XR SACRUM AND COCCYX: ICD-10-PCS | Mod: 26,HCNC,, | Performed by: RADIOLOGY

## 2020-11-18 PROCEDURE — 72220 X-RAY EXAM SACRUM TAILBONE: CPT | Mod: TC,HCNC,PO

## 2020-11-18 PROCEDURE — 99999 PR PBB SHADOW E&M-EST. PATIENT-LVL V: ICD-10-PCS | Mod: PBBFAC,HCNC,, | Performed by: INTERNAL MEDICINE

## 2020-11-18 PROCEDURE — 3288F PR FALLS RISK ASSESSMENT DOCUMENTED: ICD-10-PCS | Mod: HCNC,CPTII,S$GLB, | Performed by: INTERNAL MEDICINE

## 2020-11-18 PROCEDURE — 3288F FALL RISK ASSESSMENT DOCD: CPT | Mod: HCNC,CPTII,S$GLB, | Performed by: INTERNAL MEDICINE

## 2020-11-18 PROCEDURE — 90694 FLU VACCINE - QUADRIVALENT - ADJUVANTED: ICD-10-PCS | Mod: HCNC,S$GLB,, | Performed by: INTERNAL MEDICINE

## 2020-11-18 PROCEDURE — 90694 VACC AIIV4 NO PRSRV 0.5ML IM: CPT | Mod: HCNC,S$GLB,, | Performed by: INTERNAL MEDICINE

## 2020-11-18 PROCEDURE — G0008 FLU VACCINE - QUADRIVALENT - ADJUVANTED: ICD-10-PCS | Mod: HCNC,59,S$GLB, | Performed by: INTERNAL MEDICINE

## 2020-11-18 PROCEDURE — 90471 IMMUNIZATION ADMIN: CPT | Mod: HCNC,S$GLB,, | Performed by: INTERNAL MEDICINE

## 2020-11-18 PROCEDURE — 90714 TD VACCINE GREATER THAN OR EQUAL TO 7YO PRESERVATIVE FREE IM: ICD-10-PCS | Mod: HCNC,S$GLB,, | Performed by: INTERNAL MEDICINE

## 2020-11-18 PROCEDURE — 1101F PT FALLS ASSESS-DOCD LE1/YR: CPT | Mod: HCNC,CPTII,S$GLB, | Performed by: INTERNAL MEDICINE

## 2020-11-18 PROCEDURE — 90714 TD VACC NO PRESV 7 YRS+ IM: CPT | Mod: HCNC,S$GLB,, | Performed by: INTERNAL MEDICINE

## 2020-11-18 PROCEDURE — 1125F PR PAIN SEVERITY QUANTIFIED, PAIN PRESENT: ICD-10-PCS | Mod: HCNC,S$GLB,, | Performed by: INTERNAL MEDICINE

## 2020-11-18 PROCEDURE — 90471 TD VACCINE GREATER THAN OR EQUAL TO 7YO PRESERVATIVE FREE IM: ICD-10-PCS | Mod: HCNC,S$GLB,, | Performed by: INTERNAL MEDICINE

## 2020-11-18 PROCEDURE — 1125F AMNT PAIN NOTED PAIN PRSNT: CPT | Mod: HCNC,S$GLB,, | Performed by: INTERNAL MEDICINE

## 2020-11-18 PROCEDURE — 99397 PR PREVENTIVE VISIT,EST,65 & OVER: ICD-10-PCS | Mod: HCNC,25,S$GLB, | Performed by: INTERNAL MEDICINE

## 2020-11-18 PROCEDURE — G0008 ADMIN INFLUENZA VIRUS VAC: HCPCS | Mod: HCNC,59,S$GLB, | Performed by: INTERNAL MEDICINE

## 2020-11-18 PROCEDURE — 1157F ADVNC CARE PLAN IN RCRD: CPT | Mod: HCNC,S$GLB,, | Performed by: INTERNAL MEDICINE

## 2020-11-18 PROCEDURE — 1101F PR PT FALLS ASSESS DOC 0-1 FALLS W/OUT INJ PAST YR: ICD-10-PCS | Mod: HCNC,CPTII,S$GLB, | Performed by: INTERNAL MEDICINE

## 2020-11-18 PROCEDURE — 99999 PR PBB SHADOW E&M-EST. PATIENT-LVL V: CPT | Mod: PBBFAC,HCNC,, | Performed by: INTERNAL MEDICINE

## 2020-11-18 PROCEDURE — 1157F PR ADVANCE CARE PLAN OR EQUIV PRESENT IN MEDICAL RECORD: ICD-10-PCS | Mod: HCNC,S$GLB,, | Performed by: INTERNAL MEDICINE

## 2020-11-18 PROCEDURE — 72220 X-RAY EXAM SACRUM TAILBONE: CPT | Mod: 26,HCNC,, | Performed by: RADIOLOGY

## 2020-11-18 PROCEDURE — 99397 PER PM REEVAL EST PAT 65+ YR: CPT | Mod: HCNC,25,S$GLB, | Performed by: INTERNAL MEDICINE

## 2020-11-18 RX ORDER — LEVALBUTEROL INHALATION SOLUTION 0.63 MG/3ML
SOLUTION RESPIRATORY (INHALATION)
Qty: 1350 ML | Refills: 3 | Status: SHIPPED | OUTPATIENT
Start: 2020-11-18 | End: 2022-06-29 | Stop reason: SDUPTHER

## 2020-11-18 RX ORDER — HYDROCORTISONE 25 MG/G
CREAM TOPICAL 2 TIMES DAILY
Qty: 28 G | Refills: 11 | Status: SHIPPED | OUTPATIENT
Start: 2020-11-18 | End: 2023-01-05

## 2020-11-18 RX ORDER — VARICELLA-ZOSTER GE VAC,2 OF 2 50 MCG
1 VIAL (EA) INTRAMUSCULAR ONCE
Qty: 1 EACH | Refills: 1 | Status: SHIPPED | OUTPATIENT
Start: 2020-11-18 | End: 2020-11-18

## 2020-11-18 NOTE — PROGRESS NOTES
Subjective:       Patient ID: Nicolasa Jurado is a 89 y.o. female.    Chief Complaint: Follow-up, Flu Vaccine, Rectal Pain (when she sit down.), and Medication Refill (Levalbuterol & hemmroid cream)    HPI     89 y.o. female here for annual exam.     Cholesterol: WNL  Vaccines: Influenza - needs; Tetanus - not sure when done; Pneumovax - 2020; Prevnar - 2016; Zoster - needs  Eye exam: due next month, done three weeks ago; timolol is aggravating her asthma  Mammogram: will think about it  Gyn exam: no longer indicated (pt agrees)  Colonoscopy: no longer indicated (pt agrees)  DEXA: 2019, osteopenia  A1c: needs    Exercise:  No regular exercise.  Diet:  Grand daughter cooks for her here and there.  Eats out some    She has hemorrhoids and is asking for a cream.    She hit her tailbone as a child and hurt herself.  She thinks she broke it.  It is painful now when she sits.    Past Medical History:   Diagnosis Date    Anemia     Asthma, chronic     CAD (coronary artery disease)     Elevated glucose 2/6/2015    GERD (gastroesophageal reflux disease)     protonix 40    Hypercalcemia 10/16/2012    Hyperlipidemia     Hyperparathyroidism     Hypertension     Insomnia 2/11/2016    Renal artery stenosis     Right low back pain 6/2/2016     Past Surgical History:   Procedure Laterality Date    APPENDECTOMY      CHOLECYSTECTOMY      COLONOSCOPY  2006    CORONARY ANGIOPLASTY WITH STENT PLACEMENT      1 heart/1 kidney    HYSTERECTOMY      KIDNEY SURGERY      stent in renal artery     Social History     Socioeconomic History    Marital status:      Spouse name: Not on file    Number of children: Not on file    Years of education: Not on file    Highest education level: Not on file   Occupational History    Not on file   Social Needs    Financial resource strain: Not very hard    Food insecurity     Worry: Never true     Inability: Never true    Transportation needs     Medical: No     Non-medical:  No   Tobacco Use    Smoking status: Never Smoker    Smokeless tobacco: Never Used   Substance and Sexual Activity    Alcohol use: No     Frequency: Never     Drinks per session: 1 or 2     Binge frequency: Never    Drug use: Never    Sexual activity: Never   Lifestyle    Physical activity     Days per week: 0 days     Minutes per session: 0 min    Stress: To some extent   Relationships    Social connections     Talks on phone: Twice a week     Gets together: More than three times a week     Attends Rastafari service: 1 to 4 times per year     Active member of club or organization: No     Attends meetings of clubs or organizations: Never     Relationship status:    Other Topics Concern    Not on file   Social History Narrative    Retired. Daughter       Review of patient's allergies indicates:   Allergen Reactions    Venom-wasp     Penicillin      Other reaction(s): Rash    Amoxicillin-pot clavulanate      Other reaction(s): diarrea  Other reaction(s): Vomiting  Other reaction(s): diarrea     Mrs. Nicolasa Jurado had no medications administered during this visit.    Review of Systems      Objective:      Physical Exam  Vitals signs reviewed.   Constitutional:       Appearance: She is well-developed.   HENT:      Head: Normocephalic and atraumatic.      Mouth/Throat:      Pharynx: No oropharyngeal exudate.   Eyes:      General: No scleral icterus.        Right eye: No discharge.         Left eye: No discharge.      Pupils: Pupils are equal, round, and reactive to light.   Neck:      Musculoskeletal: Normal range of motion and neck supple.      Thyroid: No thyromegaly.      Trachea: No tracheal deviation.   Cardiovascular:      Rate and Rhythm: Normal rate and regular rhythm.      Heart sounds: Normal heart sounds. No murmur. No friction rub. No gallop.    Pulmonary:      Effort: Pulmonary effort is normal. No respiratory distress.      Breath sounds: Normal breath sounds. No wheezing or  rales.   Chest:      Chest wall: No tenderness.   Abdominal:      General: Bowel sounds are normal. There is no distension.      Palpations: Abdomen is soft. There is no mass.      Tenderness: There is no abdominal tenderness. There is no guarding or rebound.   Musculoskeletal: Normal range of motion.         General: No tenderness.   Skin:     General: Skin is warm and dry.      Coloration: Skin is not pale.      Findings: No erythema or rash.   Neurological:      Mental Status: She is alert and oriented to person, place, and time.   Psychiatric:         Behavior: Behavior normal.         Assessment:       1. Annual physical exam    2. Essential hypertension    3. Pure hypercholesterolemia    4. Chronic diastolic heart failure    5. Left ventricular diastolic dysfunction with preserved systolic function    6. CKD (chronic kidney disease) stage 2, GFR 60-89 ml/min    7. Atherosclerosis of aorta    8. Need for shingles vaccine    9. Hemorrhoids, unspecified hemorrhoid type    10. Sacral pain        Plan:       1.  Check CBC, TSH.  Reviewed lipids and CMP.  Discussed diet and exercise.  Vaccine given today.  Tetanus vaccine given today.  2.  Continue amlodipine 10 mg, Coreg 12.5 mg b.i.d., HCTZ 12.5 mg, Micardis 80 mg.  Patient is taking half a tablet of 1 of these medications.  Will call when she gets home.  3.  Continue Lipitor 80 mg.  4/5.  Monitor.  6.  Monitor.  7.  Continue Lipitor 80 mg, aspirin 81 mg  8.  Shingles vaccine sent to pharmacy.  9.  Anusol sent to pharmacy.  10.  Check x-ray.    Return to clinic in 1 month or sooner if needed.

## 2020-12-04 NOTE — PROGRESS NOTES
"Digital Medicine: Health  Follow-Up    The history is provided by caregiver.                     Topics Covered on Call: medication adherence    Additional Follow-up details: Spoke with the patient's caregiver, Quinton. Quinton informed me that the patient is "doing well, despite her blood pressure being up and down." I asked Quinton about the patient's BP medication regimen and if she has been taking her HCTZ and carvedilol. He responded that he believes she is taking that medication because he picks up her prescriptions from the pharmacy. Quinton said that he would double check with his niece Nishi about the patient's medication, to ensure she is taking the proper dosage and all of her prescribed medications. Will follow up in two weeks with Nishi.             Diet-Not assessed          Physical Activity-Not assessed    Medication Adherence-Medication adherence was assessed.      Substance, Sleep, Stress-Not assessed      Additional monitoring needed.       Addressed patient questions and patient has my contact information if needed prior to next outreach. Patient verbalizes understanding.      Explained the importance of self-monitoring and medication adherence. Encouraged the patient to communicate with their health  for lifestyle modifications to help improve or maintain a healthy lifestyle.               There are no preventive care reminders to display for this patient.      Last 5 Patient Entered Readings                                      Current 30 Day Average: 156/72     Recent Readings 12/4/2020 12/3/2020 12/2/2020 12/1/2020 11/30/2020    SBP (mmHg) 150 122 141 151 166    DBP (mmHg) 71 57 65 72 72    Pulse 63 64 71 72 68               "

## 2020-12-07 NOTE — PROGRESS NOTES
LVM - high BP alert.   Sees PCP next week.     Last 5 Patient Entered Readings                                      Current 30 Day Average: 156/72     Recent Readings 12/6/2020 12/5/2020 12/4/2020 12/3/2020 12/2/2020    SBP (mmHg) 183 153 150 122 141    DBP (mmHg) 80 68 71 57 65    Pulse 64 70 63 64 71        Hypertension Medications             amLODIPine (NORVASC) 10 MG tablet Take 1 tablet (10 mg total) by mouth once daily.    carvediloL (COREG) 12.5 MG tablet Take 1 tablet (12.5 mg total) by mouth 2 (two) times daily with meals.    hydroCHLOROthiazide (HYDRODIURIL) 12.5 MG Tab Take 1 tablet (12.5 mg total) by mouth once daily.    nitroGLYCERIN (NITROSTAT) 0.4 MG SL tablet 1 Tablet Sublingual  One tablet under tounge as needed for chest pain.    telmisartan (MICARDIS) 80 MG Tab TAKE 1 TABLET(80 MG) BY MOUTH EVERY DAY

## 2020-12-14 NOTE — PROGRESS NOTES
Digital Medicine: Clinician Follow-Up    BP elevated after working in the kitchen and cleaning the bathroom. Parisa took pressure without patient resting. Denies hypertensive signs and symptoms.     Reviewed technique and need for obtaining proper readings. Reviewed that her readings go directly into her medical record.     BP was low x1 last week - felt weak. Drank water, coffee, and rested. Resumed normal activity.     The history is provided by the patient.      Review of patient's allergies indicates:   -- Venom-wasp    -- Penicillin     --  Other reaction(s): Rash   -- Amoxicillin-pot clavulanate     --  Other reaction(s): diarrea             Other reaction(s): Vomiting             Other reaction(s): diarrea  Follow-up reason(s): Alert received.   Care Team received high BP alert.      Hypertension    Patient's blood pressure is stable.   Patient is experiencing signs/symptoms of hypotension. Had 1 episode of hypotension. Self resolved with rest and hydration.   Patient is not experiencing signs/symptoms of hypertension.            Last 5 Patient Entered Readings                                      Current 30 Day Average: 153/69     Recent Readings 12/14/2020 12/13/2020 12/12/2020 12/10/2020 12/9/2020    SBP (mmHg) 211 176 131 106 143    DBP (mmHg) 80 72 51 55 63    Pulse 79 67 67 71 71                 Depression Screening  Did not address depression screening.    Sleep Apnea Screening    Did not address sleep apnea screening.     Medication Affordability Screening  Did not address medication affordability screening.     Medication Adherence-Medication adherence was assessed.          ASSESSMENT(S)  Patients BP average is 153/69 mmHg, which is above goal. Patient's BP goal is less than or equal to 140/90.     SBP is above 150. Improved overall, high alert today resulted in elevated average above goal.       Hypertension Plan  Continue current therapy.  Continue current diet/physical activity routine.  Use  proper technique to obtain BP. Resting before obtaining.      Addressed patient questions and patient has my contact information if needed prior to next outreach. Patient verbalizes understanding.      Explained the importance of self-monitoring and medication adherence. Encouraged the patient to communicate with their health  for lifestyle modifications to help improve or maintain a healthy lifestyle.               There are no preventive care reminders to display for this patient.  There are no preventive care reminders to display for this patient.      Hypertension Medications             amLODIPine (NORVASC) 10 MG tablet Take 1 tablet (10 mg total) by mouth once daily.    carvediloL (COREG) 12.5 MG tablet Take 1 tablet (12.5 mg total) by mouth 2 (two) times daily with meals.    hydroCHLOROthiazide (HYDRODIURIL) 12.5 MG Tab Take 1 tablet (12.5 mg total) by mouth once daily.    nitroGLYCERIN (NITROSTAT) 0.4 MG SL tablet 1 Tablet Sublingual  One tablet under tounge as needed for chest pain.    telmisartan (MICARDIS) 80 MG Tab TAKE 1 TABLET(80 MG) BY MOUTH EVERY DAY

## 2020-12-16 ENCOUNTER — OFFICE VISIT (OUTPATIENT)
Dept: INTERNAL MEDICINE | Facility: CLINIC | Age: 85
End: 2020-12-16
Payer: MEDICARE

## 2020-12-16 VITALS
WEIGHT: 134.06 LBS | SYSTOLIC BLOOD PRESSURE: 130 MMHG | OXYGEN SATURATION: 99 % | DIASTOLIC BLOOD PRESSURE: 80 MMHG | HEART RATE: 68 BPM | HEIGHT: 55 IN | BODY MASS INDEX: 31.03 KG/M2 | TEMPERATURE: 98 F

## 2020-12-16 DIAGNOSIS — J45.909 ASTHMA, UNSPECIFIED ASTHMA SEVERITY, UNSPECIFIED WHETHER COMPLICATED, UNSPECIFIED WHETHER PERSISTENT: ICD-10-CM

## 2020-12-16 DIAGNOSIS — M53.3 SACRAL PAIN: ICD-10-CM

## 2020-12-16 DIAGNOSIS — I10 ESSENTIAL HYPERTENSION: Primary | Chronic | ICD-10-CM

## 2020-12-16 DIAGNOSIS — E78.00 PURE HYPERCHOLESTEROLEMIA: Chronic | ICD-10-CM

## 2020-12-16 PROCEDURE — 1157F PR ADVANCE CARE PLAN OR EQUIV PRESENT IN MEDICAL RECORD: ICD-10-PCS | Mod: HCNC,S$GLB,, | Performed by: INTERNAL MEDICINE

## 2020-12-16 PROCEDURE — 1159F MED LIST DOCD IN RCRD: CPT | Mod: HCNC,S$GLB,, | Performed by: INTERNAL MEDICINE

## 2020-12-16 PROCEDURE — 99214 OFFICE O/P EST MOD 30 MIN: CPT | Mod: HCNC,S$GLB,, | Performed by: INTERNAL MEDICINE

## 2020-12-16 PROCEDURE — 99499 UNLISTED E&M SERVICE: CPT | Mod: S$GLB,,, | Performed by: INTERNAL MEDICINE

## 2020-12-16 PROCEDURE — 99999 PR PBB SHADOW E&M-EST. PATIENT-LVL V: CPT | Mod: PBBFAC,HCNC,, | Performed by: INTERNAL MEDICINE

## 2020-12-16 PROCEDURE — 1159F PR MEDICATION LIST DOCUMENTED IN MEDICAL RECORD: ICD-10-PCS | Mod: HCNC,S$GLB,, | Performed by: INTERNAL MEDICINE

## 2020-12-16 PROCEDURE — 99499 RISK ADDL DX/OHS AUDIT: ICD-10-PCS | Mod: S$GLB,,, | Performed by: INTERNAL MEDICINE

## 2020-12-16 PROCEDURE — 1126F AMNT PAIN NOTED NONE PRSNT: CPT | Mod: HCNC,S$GLB,, | Performed by: INTERNAL MEDICINE

## 2020-12-16 PROCEDURE — 3288F FALL RISK ASSESSMENT DOCD: CPT | Mod: HCNC,CPTII,S$GLB, | Performed by: INTERNAL MEDICINE

## 2020-12-16 PROCEDURE — 99214 PR OFFICE/OUTPT VISIT, EST, LEVL IV, 30-39 MIN: ICD-10-PCS | Mod: HCNC,S$GLB,, | Performed by: INTERNAL MEDICINE

## 2020-12-16 PROCEDURE — 3288F PR FALLS RISK ASSESSMENT DOCUMENTED: ICD-10-PCS | Mod: HCNC,CPTII,S$GLB, | Performed by: INTERNAL MEDICINE

## 2020-12-16 PROCEDURE — 99999 PR PBB SHADOW E&M-EST. PATIENT-LVL V: ICD-10-PCS | Mod: PBBFAC,HCNC,, | Performed by: INTERNAL MEDICINE

## 2020-12-16 PROCEDURE — 1101F PT FALLS ASSESS-DOCD LE1/YR: CPT | Mod: HCNC,CPTII,S$GLB, | Performed by: INTERNAL MEDICINE

## 2020-12-16 PROCEDURE — 1157F ADVNC CARE PLAN IN RCRD: CPT | Mod: HCNC,S$GLB,, | Performed by: INTERNAL MEDICINE

## 2020-12-16 PROCEDURE — 1101F PR PT FALLS ASSESS DOC 0-1 FALLS W/OUT INJ PAST YR: ICD-10-PCS | Mod: HCNC,CPTII,S$GLB, | Performed by: INTERNAL MEDICINE

## 2020-12-16 PROCEDURE — 1126F PR PAIN SEVERITY QUANTIFIED, NO PAIN PRESENT: ICD-10-PCS | Mod: HCNC,S$GLB,, | Performed by: INTERNAL MEDICINE

## 2020-12-16 RX ORDER — FLUTICASONE FUROATE AND VILANTEROL TRIFENATATE 100; 25 UG/1; UG/1
1 POWDER RESPIRATORY (INHALATION) DAILY
Qty: 30 EACH | Refills: 11 | Status: SHIPPED | OUTPATIENT
Start: 2020-12-16 | End: 2021-04-15

## 2020-12-16 RX ORDER — PREDNISONE 20 MG/1
20 TABLET ORAL DAILY
Qty: 5 TABLET | Refills: 0 | Status: SHIPPED | OUTPATIENT
Start: 2020-12-16 | End: 2021-04-15

## 2020-12-16 RX ORDER — PROMETHAZINE HYDROCHLORIDE AND CODEINE PHOSPHATE 6.25; 1 MG/5ML; MG/5ML
5 SOLUTION ORAL EVERY 6 HOURS PRN
Qty: 120 ML | Refills: 0 | Status: SHIPPED | OUTPATIENT
Start: 2020-12-16 | End: 2020-12-26

## 2020-12-16 NOTE — PROGRESS NOTES
Subjective:       Patient ID: Nicolasa Jurado is a 89 y.o. female.    Chief Complaint: Follow-up (1 mo & Lab results), Medication Refill (Promethazine w/Codeine ), and Shortness of Breath    HPI     89-year-old female here for one-month follow-up.    HTN - Patient is currently on Norvasc 10 mg, Coreg 12.5 mg b.i.d., HCTZ 12.5 mg, Micardis 80 mg. She does check her BP at home, and it runs 120s-130s systolic in the am.  In the evening, she is about 150s systolic. Side effects of medications note: none. Denies headaches, blurred vision, chest pain, shortness of breath, nausea.  She had a reading of 106 and thought she was going to pass out.  She came back to 120 systolic.  This happened many times.  She has tea that lowers her blood pressure.      Her asthma is worse and gets SOB.  She had trouble getting up to the office from the parking garage.  She started with her asthma acting up at the end of November.  She has been ok for most of the year.  She has albuterol inhaler.  She uses albuterol three times a day.  She takes a cough syrup before she goes sleep.    HLD - Patient is currently on lipitor 80 mg.  Her last lipid panel was   Cholesterol   Date Value Ref Range Status   10/14/2020 131 120 - 199 mg/dL Final     Comment:     The National Cholesterol Education Program (NCEP) has set the  following guidelines (reference ranges) for Cholesterol:  Optimal.....................<200 mg/dL  Borderline High.............200-239 mg/dL  High........................> or = 240 mg/dL       Triglycerides   Date Value Ref Range Status   10/14/2020 161 (H) 30 - 150 mg/dL Final     Comment:     The National Cholesterol Education Program (NCEP) has set the  following guidelines (reference values) for triglycerides:  Normal......................<150 mg/dL  Borderline High.............150-199 mg/dL  High........................200-499 mg/dL       HDL   Date Value Ref Range Status   10/14/2020 39 (L) 40 - 75 mg/dL Final     Comment:      The National Cholesterol Education Program (NCEP) has set the  following guidelines (reference values) for HDL Cholesterol:  Low...............<40 mg/dL  Optimal...........>60 mg/dL       LDL Cholesterol   Date Value Ref Range Status   10/14/2020 59.8 (L) 63.0 - 159.0 mg/dL Final     Comment:     The National Cholesterol Education Program (NCEP) has set the  following guidelines (reference values) for LDL Cholesterol:  Optimal.......................<130 mg/dL  Borderline High...............130-159 mg/dL  High..........................160-189 mg/dL  Very High.....................>190 mg/dL     .  Side effects of the medication: none.    Review of Systems      Objective:      Physical Exam  Vitals signs reviewed.   Constitutional:       Appearance: She is well-developed.   HENT:      Head: Normocephalic and atraumatic.      Mouth/Throat:      Pharynx: No oropharyngeal exudate.   Eyes:      General: No scleral icterus.        Right eye: No discharge.         Left eye: No discharge.      Pupils: Pupils are equal, round, and reactive to light.   Neck:      Musculoskeletal: Normal range of motion and neck supple.      Thyroid: No thyromegaly.      Trachea: No tracheal deviation.   Cardiovascular:      Rate and Rhythm: Normal rate and regular rhythm.      Heart sounds: Normal heart sounds. No murmur. No friction rub. No gallop.    Pulmonary:      Effort: Pulmonary effort is normal. No respiratory distress.      Breath sounds: Normal breath sounds. No wheezing or rales.   Chest:      Chest wall: No tenderness.   Abdominal:      General: Bowel sounds are normal. There is no distension.      Palpations: Abdomen is soft. There is no mass.      Tenderness: There is no abdominal tenderness. There is no guarding or rebound.   Musculoskeletal: Normal range of motion.         General: No tenderness.   Skin:     General: Skin is warm and dry.      Coloration: Skin is not pale.      Findings: No erythema or rash.   Neurological:       Mental Status: She is alert and oriented to person, place, and time.   Psychiatric:         Behavior: Behavior normal.         Assessment:       1. Essential hypertension    2. Pure hypercholesterolemia    3. Asthma, unspecified asthma severity, unspecified whether complicated, unspecified whether persistent        Plan:       1.  Continue Norvasc 10 mg, Coreg 12.5 mg b.i.d., HCTZ 12.5 mg, Micardis 80 mg.  2.  Continue Lipitor 80 mg  3.  Start Breo 100-25 mg daily, prednisone 20 mg daily.  Let me know on Monday if she is not improving.

## 2020-12-16 NOTE — PROGRESS NOTES
Patient, Nicolasa Jurado (MRN #[id number]), presented with a recorded BMI of 37.7 kg/m^2 and a documented comorbidity(s):  - Hypertension  - Hyperlipidemia  to which the severe obesity is a contributing factor. This is consistent with the definition of severe obesity (BMI 35.0-39.9) with comorbidity (ICD-10 E66.01, Z68.35). The patient's severe obesity was monitored, evaluated, addressed and/or treated. This addendum to the medical record is made on 12/16/2020.

## 2020-12-23 ENCOUNTER — PATIENT OUTREACH (OUTPATIENT)
Dept: OTHER | Facility: OTHER | Age: 85
End: 2020-12-23

## 2020-12-23 NOTE — PROGRESS NOTES
Digital Medicine: Clinician Follow-Up    Patient high alerted again.   Did not take medication prior to taking blood pressure today. Also maty from bed and had blood pressure checked by grandson just after.     Wished patient a happy 89th birthday!    The history is provided by the patient.   Follow-up reason(s): Alert received.   Care Team received high BP alert.      Hypertension    Readings are trending up   Patient is not experiencing signs/symptoms of hypotension.  Patient is not experiencing signs/symptoms of hypertension.            Last 5 Patient Entered Readings                                      Current 30 Day Average: 159/71     Recent Readings 12/23/2020 12/22/2020 12/21/2020 12/20/2020 12/19/2020    SBP (mmHg) 181 157 178 148 199    DBP (mmHg) 80 68 79 68 88    Pulse 69 79 72 76 74                 Depression Screening  Did not address depression screening.    Sleep Apnea Screening    Did not address sleep apnea screening.     Medication Affordability Screening  Patient did not answer the medication affordability questionnaires. Patient is currently not having problems affording medications    Medication Adherence-Medication adherence was assessed.          ASSESSMENT(S)  Patients BP average is 159/71 mmHg, which is above goal. Patient's BP goal is less than or equal to 140/90.     Difficult to manage through digital medicine as patient is unable to use proper technique/timing of obtaining an ambulatory BP readings at home.     Repeatedly takes BP prior to medications, just after waking, and/or just after activity. BP in clinic remains controlled overall.       Hypertension Plan  Additional monitoring needed. Again discussed technique. Offered to call grandson to discuss with him as well. Patient states she will discuss.   Continue current therapy. No changes can be made based on inaccuracy of home monitoring. Consider increase in HCTZ.,       Addressed patient questions and patient has my contact  information if needed prior to next outreach. Patient verbalizes understanding.             There are no preventive care reminders to display for this patient.  There are no preventive care reminders to display for this patient.      Hypertension Medications             amLODIPine (NORVASC) 10 MG tablet Take 1 tablet (10 mg total) by mouth once daily.    carvediloL (COREG) 12.5 MG tablet Take 1 tablet (12.5 mg total) by mouth 2 (two) times daily with meals.    hydroCHLOROthiazide (HYDRODIURIL) 12.5 MG Tab Take 1 tablet (12.5 mg total) by mouth once daily.    nitroGLYCERIN (NITROSTAT) 0.4 MG SL tablet 1 Tablet Sublingual  One tablet under tounge as needed for chest pain.    telmisartan (MICARDIS) 80 MG Tab TAKE 1 TABLET(80 MG) BY MOUTH EVERY DAY

## 2021-01-07 ENCOUNTER — TELEPHONE (OUTPATIENT)
Dept: INTERNAL MEDICINE | Facility: CLINIC | Age: 86
End: 2021-01-07

## 2021-01-10 ENCOUNTER — IMMUNIZATION (OUTPATIENT)
Dept: INTERNAL MEDICINE | Facility: CLINIC | Age: 86
End: 2021-01-10
Payer: MEDICARE

## 2021-01-10 DIAGNOSIS — Z23 NEED FOR VACCINATION: ICD-10-CM

## 2021-01-10 PROCEDURE — 91300 COVID-19, MRNA, LNP-S, PF, 30 MCG/0.3 ML DOSE VACCINE: CPT | Mod: PBBFAC | Performed by: FAMILY MEDICINE

## 2021-01-11 RX ORDER — TIZANIDINE 2 MG/1
TABLET ORAL
Qty: 30 TABLET | Refills: 0 | Status: SHIPPED | OUTPATIENT
Start: 2021-01-11

## 2021-01-31 ENCOUNTER — IMMUNIZATION (OUTPATIENT)
Dept: INTERNAL MEDICINE | Facility: CLINIC | Age: 86
End: 2021-01-31
Payer: MEDICARE

## 2021-01-31 DIAGNOSIS — Z23 NEED FOR VACCINATION: Primary | ICD-10-CM

## 2021-01-31 PROCEDURE — 0002A COVID-19, MRNA, LNP-S, PF, 30 MCG/0.3 ML DOSE VACCINE: CPT | Mod: PBBFAC | Performed by: FAMILY MEDICINE

## 2021-01-31 PROCEDURE — 91300 COVID-19, MRNA, LNP-S, PF, 30 MCG/0.3 ML DOSE VACCINE: CPT | Mod: PBBFAC | Performed by: FAMILY MEDICINE

## 2021-02-11 DIAGNOSIS — I10 ESSENTIAL HYPERTENSION: Chronic | ICD-10-CM

## 2021-02-11 RX ORDER — TELMISARTAN 80 MG/1
80 TABLET ORAL DAILY
Qty: 90 TABLET | Refills: 3 | Status: SHIPPED | OUTPATIENT
Start: 2021-02-11 | End: 2022-01-19 | Stop reason: SDUPTHER

## 2021-03-15 ENCOUNTER — PATIENT MESSAGE (OUTPATIENT)
Dept: INTERNAL MEDICINE | Facility: CLINIC | Age: 86
End: 2021-03-15

## 2021-03-15 DIAGNOSIS — R05.9 COUGH: ICD-10-CM

## 2021-03-15 RX ORDER — PROMETHAZINE HYDROCHLORIDE AND CODEINE PHOSPHATE 6.25; 1 MG/5ML; MG/5ML
5 SOLUTION ORAL EVERY 6 HOURS PRN
Qty: 240 ML | Refills: 0 | Status: SHIPPED | OUTPATIENT
Start: 2021-03-15 | End: 2021-03-25

## 2021-03-15 RX ORDER — PROMETHAZINE HYDROCHLORIDE AND CODEINE PHOSPHATE 6.25; 1 MG/5ML; MG/5ML
5 SOLUTION ORAL EVERY 4 HOURS PRN
Qty: 180 ML | Refills: 0 | Status: CANCELLED | OUTPATIENT
Start: 2021-03-15 | End: 2021-03-25

## 2021-03-16 ENCOUNTER — PATIENT MESSAGE (OUTPATIENT)
Dept: INTERNAL MEDICINE | Facility: CLINIC | Age: 86
End: 2021-03-16

## 2021-04-15 ENCOUNTER — LAB VISIT (OUTPATIENT)
Dept: LAB | Facility: HOSPITAL | Age: 86
End: 2021-04-15
Attending: NURSE PRACTITIONER
Payer: MEDICARE

## 2021-04-15 ENCOUNTER — OFFICE VISIT (OUTPATIENT)
Dept: CARDIOLOGY | Facility: CLINIC | Age: 86
End: 2021-04-15
Payer: MEDICARE

## 2021-04-15 VITALS
WEIGHT: 135.94 LBS | BODY MASS INDEX: 26.69 KG/M2 | HEART RATE: 75 BPM | HEIGHT: 60 IN | SYSTOLIC BLOOD PRESSURE: 137 MMHG | DIASTOLIC BLOOD PRESSURE: 63 MMHG

## 2021-04-15 DIAGNOSIS — I70.0 ATHEROSCLEROSIS OF AORTA: Chronic | ICD-10-CM

## 2021-04-15 DIAGNOSIS — K55.1 MESENTERIC ARTERY STENOSIS: Chronic | ICD-10-CM

## 2021-04-15 DIAGNOSIS — I25.10 CORONARY ARTERY DISEASE INVOLVING NATIVE CORONARY ARTERY OF NATIVE HEART WITHOUT ANGINA PECTORIS: ICD-10-CM

## 2021-04-15 DIAGNOSIS — I50.32 CHRONIC DIASTOLIC HEART FAILURE: Chronic | ICD-10-CM

## 2021-04-15 DIAGNOSIS — I70.1 RENAL ARTERY STENOSIS: Chronic | ICD-10-CM

## 2021-04-15 DIAGNOSIS — E78.00 PURE HYPERCHOLESTEROLEMIA: Chronic | ICD-10-CM

## 2021-04-15 DIAGNOSIS — I25.10 CORONARY ARTERY DISEASE INVOLVING NATIVE CORONARY ARTERY OF NATIVE HEART WITHOUT ANGINA PECTORIS: Primary | ICD-10-CM

## 2021-04-15 DIAGNOSIS — E66.3 OVERWEIGHT: ICD-10-CM

## 2021-04-15 DIAGNOSIS — I10 ESSENTIAL HYPERTENSION: Chronic | ICD-10-CM

## 2021-04-15 LAB
ALBUMIN SERPL BCP-MCNC: 4 G/DL (ref 3.5–5.2)
ALP SERPL-CCNC: 76 U/L (ref 55–135)
ALT SERPL W/O P-5'-P-CCNC: 8 U/L (ref 10–44)
ANION GAP SERPL CALC-SCNC: 9 MMOL/L (ref 8–16)
AST SERPL-CCNC: 19 U/L (ref 10–40)
BILIRUB SERPL-MCNC: 0.6 MG/DL (ref 0.1–1)
BUN SERPL-MCNC: 19 MG/DL (ref 8–23)
CALCIUM SERPL-MCNC: 9.4 MG/DL (ref 8.7–10.5)
CHLORIDE SERPL-SCNC: 97 MMOL/L (ref 95–110)
CO2 SERPL-SCNC: 32 MMOL/L (ref 23–29)
CREAT SERPL-MCNC: 1.2 MG/DL (ref 0.5–1.4)
ERYTHROCYTE [DISTWIDTH] IN BLOOD BY AUTOMATED COUNT: 13.7 % (ref 11.5–14.5)
EST. GFR  (AFRICAN AMERICAN): 46 ML/MIN/1.73 M^2
EST. GFR  (NON AFRICAN AMERICAN): 39.9 ML/MIN/1.73 M^2
GLUCOSE SERPL-MCNC: 111 MG/DL (ref 70–110)
HCT VFR BLD AUTO: 35.5 % (ref 37–48.5)
HGB BLD-MCNC: 11.1 G/DL (ref 12–16)
MAGNESIUM SERPL-MCNC: 1.7 MG/DL (ref 1.6–2.6)
MCH RBC QN AUTO: 27.4 PG (ref 27–31)
MCHC RBC AUTO-ENTMCNC: 31.3 G/DL (ref 32–36)
MCV RBC AUTO: 88 FL (ref 82–98)
PLATELET # BLD AUTO: 147 K/UL (ref 150–450)
PMV BLD AUTO: 13.5 FL (ref 9.2–12.9)
POTASSIUM SERPL-SCNC: 3.7 MMOL/L (ref 3.5–5.1)
PROT SERPL-MCNC: 7.2 G/DL (ref 6–8.4)
RBC # BLD AUTO: 4.05 M/UL (ref 4–5.4)
SODIUM SERPL-SCNC: 138 MMOL/L (ref 136–145)
WBC # BLD AUTO: 7.92 K/UL (ref 3.9–12.7)

## 2021-04-15 PROCEDURE — 80053 COMPREHEN METABOLIC PANEL: CPT | Performed by: NURSE PRACTITIONER

## 2021-04-15 PROCEDURE — 99214 PR OFFICE/OUTPT VISIT, EST, LEVL IV, 30-39 MIN: ICD-10-PCS | Mod: S$GLB,,, | Performed by: NURSE PRACTITIONER

## 2021-04-15 PROCEDURE — 99999 PR PBB SHADOW E&M-EST. PATIENT-LVL V: CPT | Mod: PBBFAC,,, | Performed by: NURSE PRACTITIONER

## 2021-04-15 PROCEDURE — 99214 OFFICE O/P EST MOD 30 MIN: CPT | Mod: S$GLB,,, | Performed by: NURSE PRACTITIONER

## 2021-04-15 PROCEDURE — 3288F PR FALLS RISK ASSESSMENT DOCUMENTED: ICD-10-PCS | Mod: CPTII,S$GLB,, | Performed by: NURSE PRACTITIONER

## 2021-04-15 PROCEDURE — 1101F PR PT FALLS ASSESS DOC 0-1 FALLS W/OUT INJ PAST YR: ICD-10-PCS | Mod: CPTII,S$GLB,, | Performed by: NURSE PRACTITIONER

## 2021-04-15 PROCEDURE — 1126F PR PAIN SEVERITY QUANTIFIED, NO PAIN PRESENT: ICD-10-PCS | Mod: S$GLB,,, | Performed by: NURSE PRACTITIONER

## 2021-04-15 PROCEDURE — 36415 COLL VENOUS BLD VENIPUNCTURE: CPT | Mod: PO | Performed by: NURSE PRACTITIONER

## 2021-04-15 PROCEDURE — 1126F AMNT PAIN NOTED NONE PRSNT: CPT | Mod: S$GLB,,, | Performed by: NURSE PRACTITIONER

## 2021-04-15 PROCEDURE — 1157F ADVNC CARE PLAN IN RCRD: CPT | Mod: S$GLB,,, | Performed by: NURSE PRACTITIONER

## 2021-04-15 PROCEDURE — 99999 PR PBB SHADOW E&M-EST. PATIENT-LVL V: ICD-10-PCS | Mod: PBBFAC,,, | Performed by: NURSE PRACTITIONER

## 2021-04-15 PROCEDURE — 3288F FALL RISK ASSESSMENT DOCD: CPT | Mod: CPTII,S$GLB,, | Performed by: NURSE PRACTITIONER

## 2021-04-15 PROCEDURE — 85027 COMPLETE CBC AUTOMATED: CPT | Performed by: NURSE PRACTITIONER

## 2021-04-15 PROCEDURE — 83735 ASSAY OF MAGNESIUM: CPT | Performed by: NURSE PRACTITIONER

## 2021-04-15 PROCEDURE — 1157F PR ADVANCE CARE PLAN OR EQUIV PRESENT IN MEDICAL RECORD: ICD-10-PCS | Mod: S$GLB,,, | Performed by: NURSE PRACTITIONER

## 2021-04-15 PROCEDURE — 1159F MED LIST DOCD IN RCRD: CPT | Mod: S$GLB,,, | Performed by: NURSE PRACTITIONER

## 2021-04-15 PROCEDURE — 1101F PT FALLS ASSESS-DOCD LE1/YR: CPT | Mod: CPTII,S$GLB,, | Performed by: NURSE PRACTITIONER

## 2021-04-15 PROCEDURE — 1159F PR MEDICATION LIST DOCUMENTED IN MEDICAL RECORD: ICD-10-PCS | Mod: S$GLB,,, | Performed by: NURSE PRACTITIONER

## 2021-04-15 RX ORDER — NETARSUDIL 0.2 MG/ML
SOLUTION/ DROPS OPHTHALMIC; TOPICAL
COMMUNITY
Start: 2021-04-10

## 2021-04-15 RX ORDER — HYDROCHLOROTHIAZIDE 25 MG/1
25 TABLET ORAL DAILY
Qty: 90 TABLET | Refills: 3 | Status: SHIPPED | OUTPATIENT
Start: 2021-04-15 | End: 2022-06-20

## 2021-04-15 RX ORDER — KETOROLAC TROMETHAMINE 5 MG/ML
1 SOLUTION OPHTHALMIC 3 TIMES DAILY
COMMUNITY
Start: 2021-04-14

## 2021-04-15 RX ORDER — CARVEDILOL 12.5 MG/1
TABLET ORAL
Qty: 180 TABLET | Refills: 3 | Status: SHIPPED | OUTPATIENT
Start: 2021-04-15 | End: 2021-06-25

## 2021-05-06 RX ORDER — ATORVASTATIN CALCIUM 80 MG/1
80 TABLET, FILM COATED ORAL DAILY
Qty: 90 TABLET | Refills: 3 | Status: SHIPPED | OUTPATIENT
Start: 2021-05-06 | End: 2022-06-20

## 2021-06-25 ENCOUNTER — OFFICE VISIT (OUTPATIENT)
Dept: INTERNAL MEDICINE | Facility: CLINIC | Age: 86
End: 2021-06-25
Payer: MEDICARE

## 2021-06-25 ENCOUNTER — HOSPITAL ENCOUNTER (OUTPATIENT)
Dept: RADIOLOGY | Facility: HOSPITAL | Age: 86
Discharge: HOME | End: 2021-06-25
Attending: INTERNAL MEDICINE
Payer: MEDICARE

## 2021-06-25 VITALS
OXYGEN SATURATION: 98 % | HEIGHT: 60 IN | SYSTOLIC BLOOD PRESSURE: 130 MMHG | DIASTOLIC BLOOD PRESSURE: 60 MMHG | HEART RATE: 60 BPM | WEIGHT: 134.25 LBS | RESPIRATION RATE: 16 BRPM | BODY MASS INDEX: 26.35 KG/M2 | TEMPERATURE: 97 F

## 2021-06-25 DIAGNOSIS — G89.29 CHRONIC MIDLINE LOW BACK PAIN WITHOUT SCIATICA: ICD-10-CM

## 2021-06-25 DIAGNOSIS — J45.909 ASTHMA, UNSPECIFIED ASTHMA SEVERITY, UNSPECIFIED WHETHER COMPLICATED, UNSPECIFIED WHETHER PERSISTENT: ICD-10-CM

## 2021-06-25 DIAGNOSIS — G89.29 CHRONIC MIDLINE LOW BACK PAIN WITHOUT SCIATICA: Primary | ICD-10-CM

## 2021-06-25 DIAGNOSIS — M54.50 CHRONIC MIDLINE LOW BACK PAIN WITHOUT SCIATICA: ICD-10-CM
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
## 2021-06-25 DIAGNOSIS — M79.671 RIGHT FOOT PAIN: ICD-10-CM

## 2021-06-25 DIAGNOSIS — I10 ESSENTIAL HYPERTENSION: Chronic | ICD-10-CM

## 2021-06-25 DIAGNOSIS — M54.50 CHRONIC MIDLINE LOW BACK PAIN WITHOUT SCIATICA: Primary | ICD-10-CM

## 2021-06-25 PROCEDURE — 99999 PR PBB SHADOW E&M-EST. PATIENT-LVL IV: CPT | Mod: PBBFAC,,, | Performed by: INTERNAL MEDICINE

## 2021-06-25 PROCEDURE — 99214 PR OFFICE/OUTPT VISIT, EST, LEVL IV, 30-39 MIN: ICD-10-PCS | Mod: S$GLB,,, | Performed by: INTERNAL MEDICINE

## 2021-06-25 PROCEDURE — 1157F ADVNC CARE PLAN IN RCRD: CPT | Mod: S$GLB,,, | Performed by: INTERNAL MEDICINE

## 2021-06-25 PROCEDURE — 1159F MED LIST DOCD IN RCRD: CPT | Mod: S$GLB,,, | Performed by: INTERNAL MEDICINE

## 2021-06-25 PROCEDURE — 99214 OFFICE O/P EST MOD 30 MIN: CPT | Mod: S$GLB,,, | Performed by: INTERNAL MEDICINE

## 2021-06-25 PROCEDURE — 72100 X-RAY EXAM L-S SPINE 2/3 VWS: CPT | Mod: 26,,, | Performed by: RADIOLOGY

## 2021-06-25 PROCEDURE — 1100F PR PT FALLS ASSESS DOC 2+ FALLS/FALL W/INJURY/YR: ICD-10-PCS | Mod: CPTII,S$GLB,, | Performed by: INTERNAL MEDICINE

## 2021-06-25 PROCEDURE — 3288F FALL RISK ASSESSMENT DOCD: CPT | Mod: CPTII,S$GLB,, | Performed by: INTERNAL MEDICINE

## 2021-06-25 PROCEDURE — 72220 XR SACRUM AND COCCYX: ICD-10-PCS | Mod: 26,,, | Performed by: RADIOLOGY

## 2021-06-25 PROCEDURE — 72220 X-RAY EXAM SACRUM TAILBONE: CPT | Mod: TC,PO

## 2021-06-25 PROCEDURE — 1125F AMNT PAIN NOTED PAIN PRSNT: CPT | Mod: S$GLB,,, | Performed by: INTERNAL MEDICINE

## 2021-06-25 PROCEDURE — 1100F PTFALLS ASSESS-DOCD GE2>/YR: CPT | Mod: CPTII,S$GLB,, | Performed by: INTERNAL MEDICINE

## 2021-06-25 PROCEDURE — 72100 XR LUMBAR SPINE AP AND LATERAL: ICD-10-PCS | Mod: 26,,, | Performed by: RADIOLOGY

## 2021-06-25 PROCEDURE — 1125F PR PAIN SEVERITY QUANTIFIED, PAIN PRESENT: ICD-10-PCS | Mod: S$GLB,,, | Performed by: INTERNAL MEDICINE

## 2021-06-25 PROCEDURE — 1157F PR ADVANCE CARE PLAN OR EQUIV PRESENT IN MEDICAL RECORD: ICD-10-PCS | Mod: S$GLB,,, | Performed by: INTERNAL MEDICINE

## 2021-06-25 PROCEDURE — 1159F PR MEDICATION LIST DOCUMENTED IN MEDICAL RECORD: ICD-10-PCS | Mod: S$GLB,,, | Performed by: INTERNAL MEDICINE

## 2021-06-25 PROCEDURE — 99999 PR PBB SHADOW E&M-EST. PATIENT-LVL IV: ICD-10-PCS | Mod: PBBFAC,,, | Performed by: INTERNAL MEDICINE

## 2021-06-25 PROCEDURE — 3288F PR FALLS RISK ASSESSMENT DOCUMENTED: ICD-10-PCS | Mod: CPTII,S$GLB,, | Performed by: INTERNAL MEDICINE

## 2021-06-25 PROCEDURE — 72220 X-RAY EXAM SACRUM TAILBONE: CPT | Mod: 26,,, | Performed by: RADIOLOGY

## 2021-06-25 PROCEDURE — 72100 X-RAY EXAM L-S SPINE 2/3 VWS: CPT | Mod: TC,PO

## 2021-06-25 RX ORDER — FLUTICASONE PROPIONATE AND SALMETEROL 250; 50 UG/1; UG/1
1 POWDER RESPIRATORY (INHALATION) 2 TIMES DAILY
Qty: 180 EACH | Refills: 3 | Status: SHIPPED | OUTPATIENT
Start: 2021-06-25 | End: 2022-06-29 | Stop reason: SDUPTHER

## 2021-06-25 RX ORDER — PREDNISOLONE ACETATE 10 MG/ML
SUSPENSION/ DROPS OPHTHALMIC
COMMUNITY
Start: 2021-01-14

## 2021-06-25 RX ORDER — METOPROLOL SUCCINATE 50 MG/1
50 TABLET, EXTENDED RELEASE ORAL DAILY
Qty: 90 TABLET | Refills: 3 | Status: SHIPPED | OUTPATIENT
Start: 2021-06-25 | End: 2021-07-15

## 2021-06-25 RX ORDER — GABAPENTIN 100 MG/1
200 CAPSULE ORAL NIGHTLY PRN
Qty: 60 CAPSULE | Refills: 0 | Status: SHIPPED | OUTPATIENT
Start: 2021-06-25 | End: 2021-08-08

## 2021-06-27 PROCEDURE — G0180 MD CERTIFICATION HHA PATIENT: HCPCS | Mod: ,,, | Performed by: INTERNAL MEDICINE

## 2021-06-27 PROCEDURE — G0180 PR HOME HEALTH MD CERTIFICATION: ICD-10-PCS | Mod: ,,, | Performed by: INTERNAL MEDICINE

## 2021-07-13 ENCOUNTER — EXTERNAL HOME HEALTH (OUTPATIENT)
Dept: HOME HEALTH SERVICES | Facility: HOSPITAL | Age: 86
End: 2021-07-13
Payer: MEDICARE

## 2021-07-29 ENCOUNTER — OFFICE VISIT (OUTPATIENT)
Dept: INTERNAL MEDICINE | Facility: CLINIC | Age: 86
End: 2021-07-29
Payer: MEDICARE

## 2021-07-29 VITALS
HEIGHT: 60 IN | TEMPERATURE: 98 F | RESPIRATION RATE: 16 BRPM | DIASTOLIC BLOOD PRESSURE: 68 MMHG | BODY MASS INDEX: 26.66 KG/M2 | WEIGHT: 135.81 LBS | HEART RATE: 62 BPM | SYSTOLIC BLOOD PRESSURE: 138 MMHG

## 2021-07-29 DIAGNOSIS — E78.00 PURE HYPERCHOLESTEROLEMIA: ICD-10-CM

## 2021-07-29 DIAGNOSIS — M54.32 SCIATICA OF LEFT SIDE: ICD-10-CM

## 2021-07-29 DIAGNOSIS — I10 ESSENTIAL HYPERTENSION: Primary | ICD-10-CM

## 2021-07-29 DIAGNOSIS — N18.2 CKD (CHRONIC KIDNEY DISEASE) STAGE 2, GFR 60-89 ML/MIN: ICD-10-CM

## 2021-07-29 DIAGNOSIS — O99.810 IMPAIRED GLUCOSE IN PREGNANCY, ANTEPARTUM: ICD-10-CM

## 2021-07-29 DIAGNOSIS — R73.01 IMPAIRED FASTING BLOOD SUGAR: ICD-10-CM

## 2021-07-29 PROCEDURE — 1160F RVW MEDS BY RX/DR IN RCRD: CPT | Mod: CPTII,S$GLB,, | Performed by: INTERNAL MEDICINE

## 2021-07-29 PROCEDURE — 1160F PR REVIEW ALL MEDS BY PRESCRIBER/CLIN PHARMACIST DOCUMENTED: ICD-10-PCS | Mod: CPTII,S$GLB,, | Performed by: INTERNAL MEDICINE

## 2021-07-29 PROCEDURE — 99499 RISK ADDL DX/OHS AUDIT: ICD-10-PCS | Mod: HCNC,S$GLB,, | Performed by: INTERNAL MEDICINE

## 2021-07-29 PROCEDURE — 1101F PT FALLS ASSESS-DOCD LE1/YR: CPT | Mod: CPTII,S$GLB,, | Performed by: INTERNAL MEDICINE

## 2021-07-29 PROCEDURE — 1126F AMNT PAIN NOTED NONE PRSNT: CPT | Mod: CPTII,S$GLB,, | Performed by: INTERNAL MEDICINE

## 2021-07-29 PROCEDURE — 1101F PR PT FALLS ASSESS DOC 0-1 FALLS W/OUT INJ PAST YR: ICD-10-PCS | Mod: CPTII,S$GLB,, | Performed by: INTERNAL MEDICINE

## 2021-07-29 PROCEDURE — 1157F ADVNC CARE PLAN IN RCRD: CPT | Mod: CPTII,S$GLB,, | Performed by: INTERNAL MEDICINE

## 2021-07-29 PROCEDURE — 1159F MED LIST DOCD IN RCRD: CPT | Mod: CPTII,S$GLB,, | Performed by: INTERNAL MEDICINE

## 2021-07-29 PROCEDURE — 1157F PR ADVANCE CARE PLAN OR EQUIV PRESENT IN MEDICAL RECORD: ICD-10-PCS | Mod: CPTII,S$GLB,, | Performed by: INTERNAL MEDICINE

## 2021-07-29 PROCEDURE — 3288F FALL RISK ASSESSMENT DOCD: CPT | Mod: CPTII,S$GLB,, | Performed by: INTERNAL MEDICINE

## 2021-07-29 PROCEDURE — 1126F PR PAIN SEVERITY QUANTIFIED, NO PAIN PRESENT: ICD-10-PCS | Mod: CPTII,S$GLB,, | Performed by: INTERNAL MEDICINE

## 2021-07-29 PROCEDURE — 99999 PR PBB SHADOW E&M-EST. PATIENT-LVL V: CPT | Mod: PBBFAC,,, | Performed by: INTERNAL MEDICINE

## 2021-07-29 PROCEDURE — 3288F PR FALLS RISK ASSESSMENT DOCUMENTED: ICD-10-PCS | Mod: CPTII,S$GLB,, | Performed by: INTERNAL MEDICINE

## 2021-07-29 PROCEDURE — 99499 UNLISTED E&M SERVICE: CPT | Mod: HCNC,S$GLB,, | Performed by: INTERNAL MEDICINE

## 2021-07-29 PROCEDURE — 99214 OFFICE O/P EST MOD 30 MIN: CPT | Mod: S$GLB,,, | Performed by: INTERNAL MEDICINE

## 2021-07-29 PROCEDURE — 99214 PR OFFICE/OUTPT VISIT, EST, LEVL IV, 30-39 MIN: ICD-10-PCS | Mod: S$GLB,,, | Performed by: INTERNAL MEDICINE

## 2021-07-29 PROCEDURE — 1159F PR MEDICATION LIST DOCUMENTED IN MEDICAL RECORD: ICD-10-PCS | Mod: CPTII,S$GLB,, | Performed by: INTERNAL MEDICINE

## 2021-07-29 PROCEDURE — 99999 PR PBB SHADOW E&M-EST. PATIENT-LVL V: ICD-10-PCS | Mod: PBBFAC,,, | Performed by: INTERNAL MEDICINE

## 2021-08-02 ENCOUNTER — DOCUMENT SCAN (OUTPATIENT)
Dept: HOME HEALTH SERVICES | Facility: HOSPITAL | Age: 86
End: 2021-08-02
Payer: MEDICARE

## 2021-08-08 RX ORDER — GABAPENTIN 100 MG/1
CAPSULE ORAL
Qty: 60 CAPSULE | Refills: 0 | Status: SHIPPED | OUTPATIENT
Start: 2021-08-08 | End: 2021-10-16

## 2021-10-07 ENCOUNTER — PES CALL (OUTPATIENT)
Dept: ADMINISTRATIVE | Facility: CLINIC | Age: 86
End: 2021-10-07

## 2021-10-16 RX ORDER — GABAPENTIN 100 MG/1
CAPSULE ORAL
Qty: 60 CAPSULE | Refills: 0 | Status: SHIPPED | OUTPATIENT
Start: 2021-10-16 | End: 2021-11-29

## 2021-10-26 ENCOUNTER — TELEPHONE (OUTPATIENT)
Dept: INTERNAL MEDICINE | Facility: CLINIC | Age: 86
End: 2021-10-26
Payer: MEDICARE

## 2021-11-11 ENCOUNTER — LAB VISIT (OUTPATIENT)
Dept: LAB | Facility: HOSPITAL | Age: 86
End: 2021-11-11
Attending: INTERNAL MEDICINE
Payer: MEDICARE

## 2021-11-11 DIAGNOSIS — R73.01 IMPAIRED FASTING BLOOD SUGAR: ICD-10-CM

## 2021-11-11 DIAGNOSIS — N18.2 CKD (CHRONIC KIDNEY DISEASE) STAGE 2, GFR 60-89 ML/MIN: ICD-10-CM

## 2021-11-11 DIAGNOSIS — E78.00 PURE HYPERCHOLESTEROLEMIA: ICD-10-CM

## 2021-11-11 DIAGNOSIS — I10 ESSENTIAL HYPERTENSION: ICD-10-CM

## 2021-11-11 LAB
ALBUMIN SERPL BCP-MCNC: 3.8 G/DL (ref 3.5–5.2)
ALP SERPL-CCNC: 71 U/L (ref 55–135)
ALT SERPL W/O P-5'-P-CCNC: 9 U/L (ref 10–44)
ANION GAP SERPL CALC-SCNC: 10 MMOL/L (ref 8–16)
AST SERPL-CCNC: 21 U/L (ref 10–40)
BASOPHILS # BLD AUTO: 0.03 K/UL (ref 0–0.2)
BASOPHILS NFR BLD: 0.4 % (ref 0–1.9)
BILIRUB SERPL-MCNC: 0.5 MG/DL (ref 0.1–1)
BUN SERPL-MCNC: 22 MG/DL (ref 8–23)
CALCIUM SERPL-MCNC: 8.9 MG/DL (ref 8.7–10.5)
CHLORIDE SERPL-SCNC: 98 MMOL/L (ref 95–110)
CHOLEST SERPL-MCNC: 110 MG/DL (ref 120–199)
CHOLEST/HDLC SERPL: 3.4 {RATIO} (ref 2–5)
CO2 SERPL-SCNC: 30 MMOL/L (ref 23–29)
CREAT SERPL-MCNC: 1.4 MG/DL (ref 0.5–1.4)
DIFFERENTIAL METHOD: ABNORMAL
EOSINOPHIL # BLD AUTO: 0.2 K/UL (ref 0–0.5)
EOSINOPHIL NFR BLD: 2.6 % (ref 0–8)
ERYTHROCYTE [DISTWIDTH] IN BLOOD BY AUTOMATED COUNT: 14.2 % (ref 11.5–14.5)
EST. GFR  (AFRICAN AMERICAN): 38.2 ML/MIN/1.73 M^2
EST. GFR  (NON AFRICAN AMERICAN): 33.1 ML/MIN/1.73 M^2
ESTIMATED AVG GLUCOSE: 117 MG/DL (ref 68–131)
GLUCOSE SERPL-MCNC: 139 MG/DL (ref 70–110)
HBA1C MFR BLD: 5.7 % (ref 4–5.6)
HCT VFR BLD AUTO: 31.9 % (ref 37–48.5)
HDLC SERPL-MCNC: 32 MG/DL (ref 40–75)
HDLC SERPL: 29.1 % (ref 20–50)
HGB BLD-MCNC: 10.1 G/DL (ref 12–16)
IMM GRANULOCYTES # BLD AUTO: 0.02 K/UL (ref 0–0.04)
IMM GRANULOCYTES NFR BLD AUTO: 0.3 % (ref 0–0.5)
LDLC SERPL CALC-MCNC: 41.8 MG/DL (ref 63–159)
LYMPHOCYTES # BLD AUTO: 1.6 K/UL (ref 1–4.8)
LYMPHOCYTES NFR BLD: 20 % (ref 18–48)
MCH RBC QN AUTO: 26.9 PG (ref 27–31)
MCHC RBC AUTO-ENTMCNC: 31.7 G/DL (ref 32–36)
MCV RBC AUTO: 85 FL (ref 82–98)
MONOCYTES # BLD AUTO: 0.6 K/UL (ref 0.3–1)
MONOCYTES NFR BLD: 7.1 % (ref 4–15)
NEUTROPHILS # BLD AUTO: 5.4 K/UL (ref 1.8–7.7)
NEUTROPHILS NFR BLD: 69.6 % (ref 38–73)
NONHDLC SERPL-MCNC: 78 MG/DL
NRBC BLD-RTO: 0 /100 WBC
PLATELET # BLD AUTO: 164 K/UL (ref 150–450)
PMV BLD AUTO: 13 FL (ref 9.2–12.9)
POTASSIUM SERPL-SCNC: 3.7 MMOL/L (ref 3.5–5.1)
PROT SERPL-MCNC: 6.8 G/DL (ref 6–8.4)
RBC # BLD AUTO: 3.76 M/UL (ref 4–5.4)
SODIUM SERPL-SCNC: 138 MMOL/L (ref 136–145)
TRIGL SERPL-MCNC: 181 MG/DL (ref 30–150)
TSH SERPL DL<=0.005 MIU/L-ACNC: 3.95 UIU/ML (ref 0.4–4)
WBC # BLD AUTO: 7.75 K/UL (ref 3.9–12.7)

## 2021-11-11 PROCEDURE — 84443 ASSAY THYROID STIM HORMONE: CPT | Mod: HCNC | Performed by: INTERNAL MEDICINE

## 2021-11-11 PROCEDURE — 85025 COMPLETE CBC W/AUTO DIFF WBC: CPT | Mod: HCNC | Performed by: INTERNAL MEDICINE

## 2021-11-11 PROCEDURE — 83036 HEMOGLOBIN GLYCOSYLATED A1C: CPT | Mod: HCNC | Performed by: INTERNAL MEDICINE

## 2021-11-11 PROCEDURE — 36415 COLL VENOUS BLD VENIPUNCTURE: CPT | Mod: HCNC,PO | Performed by: INTERNAL MEDICINE

## 2021-11-11 PROCEDURE — 80061 LIPID PANEL: CPT | Mod: HCNC | Performed by: INTERNAL MEDICINE

## 2021-11-11 PROCEDURE — 80053 COMPREHEN METABOLIC PANEL: CPT | Mod: HCNC | Performed by: INTERNAL MEDICINE

## 2021-11-18 ENCOUNTER — OFFICE VISIT (OUTPATIENT)
Dept: INTERNAL MEDICINE | Facility: CLINIC | Age: 86
End: 2021-11-18
Payer: MEDICARE

## 2021-11-18 VITALS
OXYGEN SATURATION: 99 % | WEIGHT: 134.25 LBS | HEART RATE: 60 BPM | BODY MASS INDEX: 26.35 KG/M2 | TEMPERATURE: 98 F | SYSTOLIC BLOOD PRESSURE: 118 MMHG | HEIGHT: 60 IN | DIASTOLIC BLOOD PRESSURE: 68 MMHG

## 2021-11-18 DIAGNOSIS — Z00.00 ANNUAL PHYSICAL EXAM: Primary | ICD-10-CM

## 2021-11-18 DIAGNOSIS — E78.00 PURE HYPERCHOLESTEROLEMIA: Chronic | ICD-10-CM

## 2021-11-18 DIAGNOSIS — K55.1 MESENTERIC ARTERY STENOSIS: Chronic | ICD-10-CM

## 2021-11-18 DIAGNOSIS — I70.0 ATHEROSCLEROSIS OF AORTA: Chronic | ICD-10-CM

## 2021-11-18 DIAGNOSIS — I10 ESSENTIAL HYPERTENSION: Chronic | ICD-10-CM

## 2021-11-18 DIAGNOSIS — E21.0 PRIMARY HYPERPARATHYROIDISM: Chronic | ICD-10-CM

## 2021-11-18 DIAGNOSIS — I50.32 CHRONIC DIASTOLIC HEART FAILURE: Chronic | ICD-10-CM

## 2021-11-18 PROCEDURE — 1126F AMNT PAIN NOTED NONE PRSNT: CPT | Mod: HCNC,CPTII,S$GLB, | Performed by: INTERNAL MEDICINE

## 2021-11-18 PROCEDURE — 1160F RVW MEDS BY RX/DR IN RCRD: CPT | Mod: HCNC,CPTII,S$GLB, | Performed by: INTERNAL MEDICINE

## 2021-11-18 PROCEDURE — 99999 PR PBB SHADOW E&M-EST. PATIENT-LVL V: CPT | Mod: PBBFAC,HCNC,, | Performed by: INTERNAL MEDICINE

## 2021-11-18 PROCEDURE — 90662 IIV NO PRSV INCREASED AG IM: CPT | Mod: HCNC,S$GLB,, | Performed by: INTERNAL MEDICINE

## 2021-11-18 PROCEDURE — 99999 PR PBB SHADOW E&M-EST. PATIENT-LVL V: ICD-10-PCS | Mod: PBBFAC,HCNC,, | Performed by: INTERNAL MEDICINE

## 2021-11-18 PROCEDURE — 99397 PER PM REEVAL EST PAT 65+ YR: CPT | Mod: 25,HCNC,S$GLB, | Performed by: INTERNAL MEDICINE

## 2021-11-18 PROCEDURE — 1126F PR PAIN SEVERITY QUANTIFIED, NO PAIN PRESENT: ICD-10-PCS | Mod: HCNC,CPTII,S$GLB, | Performed by: INTERNAL MEDICINE

## 2021-11-18 PROCEDURE — 90662 FLU VACCINE - QUADRIVALENT - HIGH DOSE (65+) PRESERVATIVE FREE IM: ICD-10-PCS | Mod: HCNC,S$GLB,, | Performed by: INTERNAL MEDICINE

## 2021-11-18 PROCEDURE — 1160F PR REVIEW ALL MEDS BY PRESCRIBER/CLIN PHARMACIST DOCUMENTED: ICD-10-PCS | Mod: HCNC,CPTII,S$GLB, | Performed by: INTERNAL MEDICINE

## 2021-11-18 PROCEDURE — 3288F FALL RISK ASSESSMENT DOCD: CPT | Mod: HCNC,CPTII,S$GLB, | Performed by: INTERNAL MEDICINE

## 2021-11-18 PROCEDURE — 3288F PR FALLS RISK ASSESSMENT DOCUMENTED: ICD-10-PCS | Mod: HCNC,CPTII,S$GLB, | Performed by: INTERNAL MEDICINE

## 2021-11-18 PROCEDURE — G0008 FLU VACCINE - QUADRIVALENT - HIGH DOSE (65+) PRESERVATIVE FREE IM: ICD-10-PCS | Mod: HCNC,S$GLB,, | Performed by: INTERNAL MEDICINE

## 2021-11-18 PROCEDURE — 1157F PR ADVANCE CARE PLAN OR EQUIV PRESENT IN MEDICAL RECORD: ICD-10-PCS | Mod: HCNC,CPTII,S$GLB, | Performed by: INTERNAL MEDICINE

## 2021-11-18 PROCEDURE — 1159F PR MEDICATION LIST DOCUMENTED IN MEDICAL RECORD: ICD-10-PCS | Mod: HCNC,CPTII,S$GLB, | Performed by: INTERNAL MEDICINE

## 2021-11-18 PROCEDURE — 99397 PR PREVENTIVE VISIT,EST,65 & OVER: ICD-10-PCS | Mod: 25,HCNC,S$GLB, | Performed by: INTERNAL MEDICINE

## 2021-11-18 PROCEDURE — G0008 ADMIN INFLUENZA VIRUS VAC: HCPCS | Mod: HCNC,S$GLB,, | Performed by: INTERNAL MEDICINE

## 2021-11-18 PROCEDURE — 99499 RISK ADDL DX/OHS AUDIT: ICD-10-PCS | Mod: S$GLB,,, | Performed by: INTERNAL MEDICINE

## 2021-11-18 PROCEDURE — 1159F MED LIST DOCD IN RCRD: CPT | Mod: HCNC,CPTII,S$GLB, | Performed by: INTERNAL MEDICINE

## 2021-11-18 PROCEDURE — 1100F PR PT FALLS ASSESS DOC 2+ FALLS/FALL W/INJURY/YR: ICD-10-PCS | Mod: HCNC,CPTII,S$GLB, | Performed by: INTERNAL MEDICINE

## 2021-11-18 PROCEDURE — 1100F PTFALLS ASSESS-DOCD GE2>/YR: CPT | Mod: HCNC,CPTII,S$GLB, | Performed by: INTERNAL MEDICINE

## 2021-11-18 PROCEDURE — 99499 UNLISTED E&M SERVICE: CPT | Mod: S$GLB,,, | Performed by: INTERNAL MEDICINE

## 2021-11-18 PROCEDURE — 1157F ADVNC CARE PLAN IN RCRD: CPT | Mod: HCNC,CPTII,S$GLB, | Performed by: INTERNAL MEDICINE

## 2021-11-18 RX ORDER — RAMIPRIL 10 MG/1
10 CAPSULE ORAL 2 TIMES DAILY
COMMUNITY
Start: 2021-07-01 | End: 2022-01-20 | Stop reason: ALTCHOICE

## 2021-11-29 RX ORDER — GABAPENTIN 100 MG/1
CAPSULE ORAL
Qty: 60 CAPSULE | Refills: 0 | Status: SHIPPED | OUTPATIENT
Start: 2021-11-29 | End: 2022-01-31

## 2021-12-16 ENCOUNTER — IMMUNIZATION (OUTPATIENT)
Dept: INTERNAL MEDICINE | Facility: CLINIC | Age: 86
End: 2021-12-16
Payer: MEDICARE

## 2021-12-16 DIAGNOSIS — Z23 NEED FOR VACCINATION: Primary | ICD-10-CM

## 2021-12-16 PROCEDURE — 0004A COVID-19, MRNA, LNP-S, PF, 30 MCG/0.3 ML DOSE VACCINE: CPT | Mod: HCNC,PBBFAC | Performed by: INTERNAL MEDICINE

## 2021-12-28 ENCOUNTER — PES CALL (OUTPATIENT)
Dept: ADMINISTRATIVE | Facility: CLINIC | Age: 86
End: 2021-12-28
Payer: MEDICARE

## 2022-01-31 RX ORDER — GABAPENTIN 100 MG/1
CAPSULE ORAL
Qty: 60 CAPSULE | Refills: 0 | Status: SHIPPED | OUTPATIENT
Start: 2022-01-31 | End: 2022-04-08

## 2022-02-18 DIAGNOSIS — N18.30 CHRONIC KIDNEY DISEASE, STAGE III (MODERATE): ICD-10-CM

## 2022-02-18 RX ORDER — CINACALCET 30 MG/1
TABLET, FILM COATED ORAL
Qty: 180 TABLET | Refills: 2 | Status: SHIPPED | OUTPATIENT
Start: 2022-02-18 | End: 2022-02-23

## 2022-02-23 DIAGNOSIS — N18.30 CHRONIC KIDNEY DISEASE, STAGE III (MODERATE): ICD-10-CM

## 2022-02-23 RX ORDER — CINACALCET 30 MG/1
TABLET, FILM COATED ORAL
Qty: 180 TABLET | Refills: 2 | Status: SHIPPED | OUTPATIENT
Start: 2022-02-23 | End: 2022-02-24 | Stop reason: SDUPTHER

## 2022-02-25 RX ORDER — CINACALCET 30 MG/1
TABLET, FILM COATED ORAL
Qty: 180 TABLET | Refills: 2 | Status: SHIPPED | OUTPATIENT
Start: 2022-02-25 | End: 2022-12-01

## 2022-02-26 ENCOUNTER — NURSE TRIAGE (OUTPATIENT)
Dept: ADMINISTRATIVE | Facility: CLINIC | Age: 87
End: 2022-02-26
Payer: MEDICARE

## 2022-02-26 NOTE — TELEPHONE ENCOUNTER
Linda states pt had a fall x's 2 days ago, fell on dresser & now left ribs hurt. Reports hurts worse when pt breathes, denies hitting head or LOC. Advised linda & pt she needs to see Dr. within 4hrs, instructed on [facility] or ER. Linda hutchison has no ride, advised to call 911. Pt states she doesn't feel like its an emergency since fall happened 2 days ago. Again advised both of them pt needs to be see dr within 4hrs per protocol, said okay.     Reason for Disposition   [1] MODERATE pain (e.g., interferes with normal activities) AND [2] pain is WORSE with breathing    Additional Information   Negative: Sounds like a life-threatening emergency to the triager   Negative: Sounds like a serious injury to the triager   Negative: SEVERE chest or rib pain (e.g., excruciating, unable to do any normal activities)    Protocols used: CHEST INJURY - BENDING, LIFTING, OR [id number]

## 2022-02-28 ENCOUNTER — OFFICE VISIT (OUTPATIENT)
Dept: INTERNAL MEDICINE | Facility: CLINIC | Age: 87
End: 2022-02-28
Payer: MEDICARE

## 2022-02-28 ENCOUNTER — HOSPITAL ENCOUNTER (OUTPATIENT)
Dept: RADIOLOGY | Facility: HOSPITAL | Age: 87
Discharge: HOME | End: 2022-02-28
Attending: INTERNAL MEDICINE
Payer: MEDICARE

## 2022-02-28 VITALS
SYSTOLIC BLOOD PRESSURE: 150 MMHG | OXYGEN SATURATION: 97 % | HEART RATE: 63 BPM | TEMPERATURE: 98 F | DIASTOLIC BLOOD PRESSURE: 60 MMHG | BODY MASS INDEX: 26.22 KG/M2 | HEIGHT: 60 IN

## 2022-02-28 DIAGNOSIS — S20.212A RIB CONTUSION, LEFT, INITIAL ENCOUNTER: ICD-10-CM
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
## 2022-02-28 DIAGNOSIS — R12 HEARTBURN: ICD-10-CM
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
## 2022-02-28 DIAGNOSIS — R07.81 RIB PAIN ON LEFT SIDE: ICD-10-CM

## 2022-02-28 DIAGNOSIS — R07.81 RIB PAIN ON LEFT SIDE: Primary | ICD-10-CM

## 2022-02-28 DIAGNOSIS — I10 ESSENTIAL HYPERTENSION: Chronic | ICD-10-CM

## 2022-02-28 PROCEDURE — 1100F PTFALLS ASSESS-DOCD GE2>/YR: CPT | Mod: CPTII,S$GLB,, | Performed by: INTERNAL MEDICINE

## 2022-02-28 PROCEDURE — 1125F AMNT PAIN NOTED PAIN PRSNT: CPT | Mod: CPTII,S$GLB,, | Performed by: INTERNAL MEDICINE

## 2022-02-28 PROCEDURE — 1159F PR MEDICATION LIST DOCUMENTED IN MEDICAL RECORD: ICD-10-PCS | Mod: CPTII,S$GLB,, | Performed by: INTERNAL MEDICINE

## 2022-02-28 PROCEDURE — 1160F RVW MEDS BY RX/DR IN RCRD: CPT | Mod: CPTII,S$GLB,, | Performed by: INTERNAL MEDICINE

## 2022-02-28 PROCEDURE — 99999 PR PBB SHADOW E&M-EST. PATIENT-LVL V: CPT | Mod: PBBFAC,,, | Performed by: INTERNAL MEDICINE

## 2022-02-28 PROCEDURE — 99214 OFFICE O/P EST MOD 30 MIN: CPT | Mod: S$GLB,,, | Performed by: INTERNAL MEDICINE

## 2022-02-28 PROCEDURE — 99214 PR OFFICE/OUTPT VISIT, EST, LEVL IV, 30-39 MIN: ICD-10-PCS | Mod: S$GLB,,, | Performed by: INTERNAL MEDICINE

## 2022-02-28 PROCEDURE — 1159F MED LIST DOCD IN RCRD: CPT | Mod: CPTII,S$GLB,, | Performed by: INTERNAL MEDICINE

## 2022-02-28 PROCEDURE — 3288F FALL RISK ASSESSMENT DOCD: CPT | Mod: CPTII,S$GLB,, | Performed by: INTERNAL MEDICINE

## 2022-02-28 PROCEDURE — 71046 X-RAY EXAM CHEST 2 VIEWS: CPT | Mod: TC,PO

## 2022-02-28 PROCEDURE — 3288F PR FALLS RISK ASSESSMENT DOCUMENTED: ICD-10-PCS | Mod: CPTII,S$GLB,, | Performed by: INTERNAL MEDICINE

## 2022-02-28 PROCEDURE — 1125F PR PAIN SEVERITY QUANTIFIED, PAIN PRESENT: ICD-10-PCS | Mod: CPTII,S$GLB,, | Performed by: INTERNAL MEDICINE

## 2022-02-28 PROCEDURE — 71100 X-RAY EXAM RIBS UNI 2 VIEWS: CPT | Mod: TC,PO,LT

## 2022-02-28 PROCEDURE — 1160F PR REVIEW ALL MEDS BY PRESCRIBER/CLIN PHARMACIST DOCUMENTED: ICD-10-PCS | Mod: CPTII,S$GLB,, | Performed by: INTERNAL MEDICINE

## 2022-02-28 PROCEDURE — 99999 PR PBB SHADOW E&M-EST. PATIENT-LVL V: ICD-10-PCS | Mod: PBBFAC,,, | Performed by: INTERNAL MEDICINE

## 2022-02-28 PROCEDURE — 71100 XR RIBS 2 VIEW LEFT: ICD-10-PCS | Mod: 26,LT,, | Performed by: RADIOLOGY

## 2022-02-28 PROCEDURE — 71100 X-RAY EXAM RIBS UNI 2 VIEWS: CPT | Mod: 26,LT,, | Performed by: RADIOLOGY

## 2022-02-28 PROCEDURE — 1157F PR ADVANCE CARE PLAN OR EQUIV PRESENT IN MEDICAL RECORD: ICD-10-PCS | Mod: CPTII,S$GLB,, | Performed by: INTERNAL MEDICINE

## 2022-02-28 PROCEDURE — 71046 X-RAY EXAM CHEST 2 VIEWS: CPT | Mod: 26,,, | Performed by: RADIOLOGY

## 2022-02-28 PROCEDURE — 71046 XR CHEST PA AND LATERAL: ICD-10-PCS | Mod: 26,,, | Performed by: RADIOLOGY

## 2022-02-28 PROCEDURE — 1157F ADVNC CARE PLAN IN RCRD: CPT | Mod: CPTII,S$GLB,, | Performed by: INTERNAL MEDICINE

## 2022-02-28 PROCEDURE — 1100F PR PT FALLS ASSESS DOC 2+ FALLS/FALL W/INJURY/YR: ICD-10-PCS | Mod: CPTII,S$GLB,, | Performed by: INTERNAL MEDICINE

## 2022-02-28 RX ORDER — OMEPRAZOLE 40 MG/1
40 CAPSULE, DELAYED RELEASE ORAL DAILY
Qty: 30 CAPSULE | Refills: 11 | Status: SHIPPED | OUTPATIENT
Start: 2022-02-28 | End: 2023-07-26 | Stop reason: SDUPTHER

## 2022-02-28 RX ORDER — TRAMADOL HYDROCHLORIDE 50 MG/1
50 TABLET ORAL EVERY 6 HOURS PRN
Qty: 40 TABLET | Refills: 1 | Status: SHIPPED | OUTPATIENT
Start: 2022-02-28 | End: 2023-02-03

## 2022-02-28 NOTE — PATIENT INSTRUCTIONS
Generic tramadol 50 mgm, 1 tablet every 6 hours as needed for the rib pain.  Generic prilosec 40 mgm daily for the acid heartburn; can stop after 2 weeks if better; then restart as needed.  Continue any regular meds.  Use cold pack to bruised ribs for 15 minutes daily; can follow with the heating pad.  Followup if still with issues with either the ribs or the acid heartburn in 2 weeks.

## 2022-02-28 NOTE — PROGRESS NOTES
Subjective:       Patient ID: Nicolasa Jurado is a 89 y.o. female.    Chief Complaint: Chest Pain (Rib pain Lt side when she laying down & Taking deep breath is very painful. Fell 3 days ago.), Headache, Emesis, and Shortness of Breath (Pt has asthma.)    Patient new to me presents accompanied by her granddaughter for evaluation of rib injury.  The patient had a fall in her house 3 days ago.  She got out of bed quickly to go to the bathroom; felt lightheaded and fell against a dresser in her bedroom hitting her left ribs.  She has been having pain since.  There is a bruising to the skin.  The ribs hurt to move, breath, cough or turn.    No fever/chills since the fall.    Has been using OTC Tylenol to help with the pain with limited relief.    History of HTN, CKD, eye problems.    Also complains of much heartburn for which she uses TUMs frequently.    Review of Systems   Constitutional: Negative for chills and fever.   Respiratory: Negative for cough, chest tightness, shortness of breath and wheezing.         Left rib cage pain.   Cardiovascular: Negative.    Gastrointestinal: Positive for reflux. Negative for abdominal pain, nausea and vomiting.        Heartburn.   Genitourinary: Negative.    Neurological: Negative for dizziness, light-headedness and headaches.         Objective:      Physical Exam  Vitals reviewed.   Constitutional:       General: She is not in acute distress.     Appearance: Normal appearance.   Cardiovascular:      Rate and Rhythm: Normal rate and regular rhythm.      Pulses: Normal pulses.      Heart sounds: Normal heart sounds.   Pulmonary:      Effort: Pulmonary effort is normal. No respiratory distress.      Breath sounds: Normal breath sounds.      Comments: Bruising of the skin laterally in the axillary line over the rib areas of 10-12.    Tender to direct palpation over ribs 10-12 on left lateral aspect of chest.  Abdominal:      General: Bowel sounds are normal. There is no distension.       Palpations: Abdomen is soft.      Tenderness: There is no abdominal tenderness.   Skin:     Comments: Bruising on left ribs as above.   Neurological:      Mental Status: She is alert and oriented to person, place, and time.   Psychiatric:         Mood and Affect: Mood normal.         Behavior: Behavior normal.         Thought Content: Thought content normal.         Judgment: Judgment normal.         Assessment:       Problem List Items Addressed This Visit    None     Visit Diagnoses     Rib pain on left side    -  Primary    Relevant Orders    X-Ray Chest PA And Lateral    X-Ray Ribs 2 View Left    Fall at home, initial encounter        Relevant Orders    X-Ray Chest PA And Lateral    X-Ray Ribs 2 View Left    Rib contusion, left, initial encounter        Relevant Orders    X-Ray Chest PA And Lateral    X-Ray Ribs 2 View Left          Plan:   CXR and ribs done in office and reviewed with patient and her granddaughter.  No rib fractures; CXR clear.    Will treat as rib contusion from the fall.  1. Tramadol 50 mgm every 6 hours prn pain.  2. Cold pack to ribs over clothing for 15 minutes daily followed with heating pad.  3. Continue all regular meds.  4. Omeprazole 40 mgm daily for the acid heartburn for 2 weeks;  Can stop/start as needed.  5. followup with me or Dr. Gastno as needed in 2 weeks.

## 2022-04-08 RX ORDER — GABAPENTIN 100 MG/1
CAPSULE ORAL
Qty: 60 CAPSULE | Refills: 0 | Status: SHIPPED | OUTPATIENT
Start: 2022-04-08 | End: 2022-05-18

## 2022-05-18 ENCOUNTER — OFFICE VISIT (OUTPATIENT)
Dept: INTERNAL MEDICINE | Facility: CLINIC | Age: 87
End: 2022-05-18
Payer: MEDICARE

## 2022-05-18 ENCOUNTER — TELEPHONE (OUTPATIENT)
Dept: INTERNAL MEDICINE | Facility: CLINIC | Age: 87
End: 2022-05-18

## 2022-05-18 ENCOUNTER — HOSPITAL ENCOUNTER (EMERGENCY)
Facility: HOSPITAL | Age: 87
Discharge: HOME | End: 2022-05-18
Attending: EMERGENCY MEDICINE
Payer: MEDICARE

## 2022-05-18 ENCOUNTER — HOSPITAL ENCOUNTER (OUTPATIENT)
Dept: RADIOLOGY | Facility: HOSPITAL | Age: 87
Discharge: HOME | End: 2022-05-18
Attending: INTERNAL MEDICINE
Payer: MEDICARE

## 2022-05-18 VITALS
HEART RATE: 68 BPM | OXYGEN SATURATION: 97 % | DIASTOLIC BLOOD PRESSURE: 68 MMHG | WEIGHT: 135.38 LBS | RESPIRATION RATE: 20 BRPM | BODY MASS INDEX: 26.58 KG/M2 | TEMPERATURE: 98 F | HEIGHT: 60 IN | SYSTOLIC BLOOD PRESSURE: 120 MMHG

## 2022-05-18 VITALS
HEIGHT: 60 IN | BODY MASS INDEX: 26.5 KG/M2 | SYSTOLIC BLOOD PRESSURE: 176 MMHG | WEIGHT: 135 LBS | RESPIRATION RATE: 20 BRPM | DIASTOLIC BLOOD PRESSURE: 76 MMHG | OXYGEN SATURATION: 99 % | HEART RATE: 64 BPM | TEMPERATURE: 98 F

## 2022-05-18 DIAGNOSIS — R53.1 LEFT-SIDED WEAKNESS: ICD-10-CM

## 2022-05-18 DIAGNOSIS — I70.0 ATHEROSCLEROSIS OF AORTA: Chronic | ICD-10-CM

## 2022-05-18 DIAGNOSIS — E78.00 PURE HYPERCHOLESTEROLEMIA: Chronic | ICD-10-CM

## 2022-05-18 DIAGNOSIS — N18.32 STAGE 3B CHRONIC KIDNEY DISEASE: ICD-10-CM

## 2022-05-18 DIAGNOSIS — I50.32 CHRONIC DIASTOLIC HEART FAILURE: Chronic | ICD-10-CM

## 2022-05-18 DIAGNOSIS — N28.9 RENAL INSUFFICIENCY: ICD-10-CM

## 2022-05-18 DIAGNOSIS — R20.0 LEFT SIDED NUMBNESS: ICD-10-CM

## 2022-05-18 DIAGNOSIS — R07.81 ANTERIOR PLEURITIC PAIN: ICD-10-CM

## 2022-05-18 DIAGNOSIS — G45.9 TIA (TRANSIENT ISCHEMIC ATTACK): ICD-10-CM

## 2022-05-18 DIAGNOSIS — I51.89 LEFT VENTRICULAR DIASTOLIC DYSFUNCTION WITH PRESERVED SYSTOLIC FUNCTION: Chronic | ICD-10-CM

## 2022-05-18 DIAGNOSIS — R07.81 RIB PAIN: ICD-10-CM

## 2022-05-18 DIAGNOSIS — H35.3290 EXUDATIVE AGE-RELATED MACULAR DEGENERATION, UNSPECIFIED LATERALITY, UNSPECIFIED STAGE: Chronic | ICD-10-CM

## 2022-05-18 DIAGNOSIS — E21.0 PRIMARY HYPERPARATHYROIDISM: Chronic | ICD-10-CM

## 2022-05-18 DIAGNOSIS — K55.1 MESENTERIC ARTERY STENOSIS: Chronic | ICD-10-CM

## 2022-05-18 DIAGNOSIS — I10 ESSENTIAL HYPERTENSION: Primary | Chronic | ICD-10-CM

## 2022-05-18 DIAGNOSIS — D32.9 MENINGIOMA: Primary | ICD-10-CM

## 2022-05-18 DIAGNOSIS — Z86.69 HISTORY OF GLAUCOMA: ICD-10-CM

## 2022-05-18 DIAGNOSIS — N18.2 CKD (CHRONIC KIDNEY DISEASE) STAGE 2, GFR 60-89 ML/MIN: Chronic | ICD-10-CM

## 2022-05-18 DIAGNOSIS — D69.6 THROMBOCYTOPENIA: Chronic | ICD-10-CM

## 2022-05-18 LAB
ANION GAP SERPL CALC-SCNC: 9 MMOL/L (ref 8–16)
BASOPHILS # BLD AUTO: 0.03 K/UL (ref 0–0.2)
BASOPHILS NFR BLD: 0.4 % (ref 0–1.9)
BUN SERPL-MCNC: 26 MG/DL (ref 10–30)
CALCIUM SERPL-MCNC: 8.4 MG/DL (ref 8.7–10.5)
CHLORIDE SERPL-SCNC: 97 MMOL/L (ref 95–110)
CO2 SERPL-SCNC: 31 MMOL/L (ref 23–29)
CREAT SERPL-MCNC: 1.4 MG/DL (ref 0.5–1.4)
CREAT SERPL-MCNC: 1.6 MG/DL (ref 0.5–1.4)
DIFFERENTIAL METHOD: ABNORMAL
EOSINOPHIL # BLD AUTO: 0.2 K/UL (ref 0–0.5)
EOSINOPHIL NFR BLD: 2.1 % (ref 0–8)
ERYTHROCYTE [DISTWIDTH] IN BLOOD BY AUTOMATED COUNT: 14 % (ref 11.5–14.5)
EST. GFR  (AFRICAN AMERICAN): 32.2 ML/MIN/1.73 M^2
EST. GFR  (NON AFRICAN AMERICAN): 28 ML/MIN/1.73 M^2
GLUCOSE SERPL-MCNC: 98 MG/DL (ref 70–110)
HCT VFR BLD AUTO: 31.7 % (ref 37–48.5)
HGB BLD-MCNC: 10.5 G/DL (ref 12–16)
IMM GRANULOCYTES # BLD AUTO: 0.03 K/UL (ref 0–0.04)
IMM GRANULOCYTES NFR BLD AUTO: 0.4 % (ref 0–0.5)
INR PPP: 1 (ref 0.8–1.2)
LYMPHOCYTES # BLD AUTO: 1.4 K/UL (ref 1–4.8)
LYMPHOCYTES NFR BLD: 17.5 % (ref 18–48)
MCH RBC QN AUTO: 27.5 PG (ref 27–31)
MCHC RBC AUTO-ENTMCNC: 33.1 G/DL (ref 32–36)
MCV RBC AUTO: 83 FL (ref 82–98)
MONOCYTES # BLD AUTO: 0.7 K/UL (ref 0.3–1)
MONOCYTES NFR BLD: 9.4 % (ref 4–15)
NEUTROPHILS # BLD AUTO: 5.5 K/UL (ref 1.8–7.7)
NEUTROPHILS NFR BLD: 70.2 % (ref 38–73)
NRBC BLD-RTO: 0 /100 WBC
PLATELET # BLD AUTO: 156 K/UL (ref 150–450)
PMV BLD AUTO: 12.4 FL (ref 9.2–12.9)
POTASSIUM SERPL-SCNC: 3.4 MMOL/L (ref 3.5–5.1)
PROTHROMBIN TIME: 10.9 SEC (ref 9–12.5)
RBC # BLD AUTO: 3.82 M/UL (ref 4–5.4)
SAMPLE: NORMAL
SODIUM SERPL-SCNC: 137 MMOL/L (ref 136–145)
WBC # BLD AUTO: 7.77 K/UL (ref 3.9–12.7)

## 2022-05-18 PROCEDURE — 1157F PR ADVANCE CARE PLAN OR EQUIV PRESENT IN MEDICAL RECORD: ICD-10-PCS | Mod: CPTII,S$GLB,, | Performed by: INTERNAL MEDICINE

## 2022-05-18 PROCEDURE — 94640 AIRWAY INHALATION TREATMENT: CPT

## 2022-05-18 PROCEDURE — 1101F PR PT FALLS ASSESS DOC 0-1 FALLS W/OUT INJ PAST YR: ICD-10-PCS | Mod: CPTII,S$GLB,, | Performed by: INTERNAL MEDICINE

## 2022-05-18 PROCEDURE — 99285 EMERGENCY DEPT VISIT HI MDM: CPT | Mod: ,,, | Performed by: PHYSICIAN ASSISTANT

## 2022-05-18 PROCEDURE — 99214 OFFICE O/P EST MOD 30 MIN: CPT | Mod: S$GLB,,, | Performed by: INTERNAL MEDICINE

## 2022-05-18 PROCEDURE — 71100 X-RAY EXAM RIBS UNI 2 VIEWS: CPT | Mod: 26,LT,, | Performed by: RADIOLOGY

## 2022-05-18 PROCEDURE — 25000242 PHARM REV CODE 250 ALT 637 W/ HCPCS: Performed by: PHYSICIAN ASSISTANT

## 2022-05-18 PROCEDURE — 71100 XR RIBS 2 VIEW LEFT: ICD-10-PCS | Mod: 26,LT,, | Performed by: RADIOLOGY

## 2022-05-18 PROCEDURE — 99999 PR PBB SHADOW E&M-EST. PATIENT-LVL V: CPT | Mod: PBBFAC,,, | Performed by: INTERNAL MEDICINE

## 2022-05-18 PROCEDURE — 3288F FALL RISK ASSESSMENT DOCD: CPT | Mod: CPTII,S$GLB,, | Performed by: INTERNAL MEDICINE

## 2022-05-18 PROCEDURE — 99214 PR OFFICE/OUTPT VISIT, EST, LEVL IV, 30-39 MIN: ICD-10-PCS | Mod: S$GLB,,, | Performed by: INTERNAL MEDICINE

## 2022-05-18 PROCEDURE — 70553 MRI BRAIN W WO CONTRAST: ICD-10-PCS | Mod: 26,,, | Performed by: RADIOLOGY

## 2022-05-18 PROCEDURE — 25000003 PHARM REV CODE 250: Performed by: PHYSICIAN ASSISTANT

## 2022-05-18 PROCEDURE — 99285 EMERGENCY DEPT VISIT HI MDM: CPT | Mod: 25

## 2022-05-18 PROCEDURE — 70553 MRI BRAIN STEM W/O & W/DYE: CPT | Mod: TC

## 2022-05-18 PROCEDURE — 1160F PR REVIEW ALL MEDS BY PRESCRIBER/CLIN PHARMACIST DOCUMENTED: ICD-10-PCS | Mod: CPTII,S$GLB,, | Performed by: INTERNAL MEDICINE

## 2022-05-18 PROCEDURE — 1101F PT FALLS ASSESS-DOCD LE1/YR: CPT | Mod: CPTII,S$GLB,, | Performed by: INTERNAL MEDICINE

## 2022-05-18 PROCEDURE — 1160F RVW MEDS BY RX/DR IN RCRD: CPT | Mod: CPTII,S$GLB,, | Performed by: INTERNAL MEDICINE

## 2022-05-18 PROCEDURE — 99999 PR PBB SHADOW E&M-EST. PATIENT-LVL V: ICD-10-PCS | Mod: PBBFAC,,, | Performed by: INTERNAL MEDICINE

## 2022-05-18 PROCEDURE — 85025 COMPLETE CBC W/AUTO DIFF WBC: CPT | Performed by: PHYSICIAN ASSISTANT

## 2022-05-18 PROCEDURE — A9585 GADOBUTROL INJECTION: HCPCS | Performed by: INTERNAL MEDICINE

## 2022-05-18 PROCEDURE — 80048 BASIC METABOLIC PNL TOTAL CA: CPT | Performed by: PHYSICIAN ASSISTANT

## 2022-05-18 PROCEDURE — 3288F PR FALLS RISK ASSESSMENT DOCUMENTED: ICD-10-PCS | Mod: CPTII,S$GLB,, | Performed by: INTERNAL MEDICINE

## 2022-05-18 PROCEDURE — 1159F PR MEDICATION LIST DOCUMENTED IN MEDICAL RECORD: ICD-10-PCS | Mod: CPTII,S$GLB,, | Performed by: INTERNAL MEDICINE

## 2022-05-18 PROCEDURE — 99284 PR EMERGENCY DEPT VISIT,LEVEL IV: ICD-10-PCS | Mod: ,,, | Performed by: NEUROLOGICAL SURGERY

## 2022-05-18 PROCEDURE — 85610 PROTHROMBIN TIME: CPT | Performed by: PHYSICIAN ASSISTANT

## 2022-05-18 PROCEDURE — 99499 RISK ADDL DX/OHS AUDIT: ICD-10-PCS | Mod: S$GLB,,, | Performed by: INTERNAL MEDICINE

## 2022-05-18 PROCEDURE — 99285 PR EMERGENCY DEPT VISIT,LEVEL V: ICD-10-PCS | Mod: ,,, | Performed by: PHYSICIAN ASSISTANT

## 2022-05-18 PROCEDURE — 99499 UNLISTED E&M SERVICE: CPT | Mod: S$GLB,,, | Performed by: INTERNAL MEDICINE

## 2022-05-18 PROCEDURE — 1126F AMNT PAIN NOTED NONE PRSNT: CPT | Mod: CPTII,S$GLB,, | Performed by: INTERNAL MEDICINE

## 2022-05-18 PROCEDURE — 1157F ADVNC CARE PLAN IN RCRD: CPT | Mod: CPTII,S$GLB,, | Performed by: INTERNAL MEDICINE

## 2022-05-18 PROCEDURE — 99284 EMERGENCY DEPT VISIT MOD MDM: CPT | Mod: ,,, | Performed by: NEUROLOGICAL SURGERY

## 2022-05-18 PROCEDURE — 70553 MRI BRAIN STEM W/O & W/DYE: CPT | Mod: 26,,, | Performed by: RADIOLOGY

## 2022-05-18 PROCEDURE — 25500020 PHARM REV CODE 255: Performed by: INTERNAL MEDICINE

## 2022-05-18 PROCEDURE — 94761 N-INVAS EAR/PLS OXIMETRY MLT: CPT

## 2022-05-18 PROCEDURE — 1126F PR PAIN SEVERITY QUANTIFIED, NO PAIN PRESENT: ICD-10-PCS | Mod: CPTII,S$GLB,, | Performed by: INTERNAL MEDICINE

## 2022-05-18 PROCEDURE — 1159F MED LIST DOCD IN RCRD: CPT | Mod: CPTII,S$GLB,, | Performed by: INTERNAL MEDICINE

## 2022-05-18 PROCEDURE — 71100 X-RAY EXAM RIBS UNI 2 VIEWS: CPT | Mod: TC,PO,LT

## 2022-05-18 RX ORDER — IPRATROPIUM BROMIDE AND ALBUTEROL SULFATE 2.5; .5 MG/3ML; MG/3ML
3 SOLUTION RESPIRATORY (INHALATION)
Status: COMPLETED | OUTPATIENT
Start: 2022-05-18 | End: 2022-05-18

## 2022-05-18 RX ORDER — PROPARACAINE HYDROCHLORIDE 5 MG/ML
1 SOLUTION/ DROPS OPHTHALMIC
Status: COMPLETED | OUTPATIENT
Start: 2022-05-18 | End: 2022-05-18

## 2022-05-18 RX ORDER — GADOBUTROL 604.72 MG/ML
7 INJECTION INTRAVENOUS
Status: COMPLETED | OUTPATIENT
Start: 2022-05-18 | End: 2022-05-18

## 2022-05-18 RX ADMIN — PROPARACAINE HYDROCHLORIDE 1 DROP: 5 SOLUTION/ DROPS OPHTHALMIC at 08:05

## 2022-05-18 RX ADMIN — IPRATROPIUM BROMIDE AND ALBUTEROL SULFATE 3 ML: 2.5; .5 SOLUTION RESPIRATORY (INHALATION) at 06:05

## 2022-05-18 RX ADMIN — GADOBUTROL 7 ML: 604.72 INJECTION INTRAVENOUS at 01:05

## 2022-05-18 NOTE — ED PROVIDER NOTES
"Encounter Date: 5/18/2022       History     Chief Complaint   Patient presents with    Headache     Pt has been having headaches since Sunday, pt had a scheduled MRI today that showed swelling of the brain. Patient denies any pain right now or shortness of breath. Patient states she is more fatigued then normal, pt is legally blind.      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    Patient saw her PCP and had complaints of left leg paresthesias and weakness on Sunday, approximately 4 days ago. MRI today showed: "Hyperenhancing lesion at the plain and sphenoidale/olfactory groove, as above, consistent with meningioma.  Mild adjacent parenchymal edema.  No midline shift or herniation.  There is no overt compression of the optic chiasm however some encroachment on the superior optic canals and prechiasmatic optic nerves is not excluded." Patient referred to the ED for emergent evaluation.    Patient states on Sunday, she was outside for a [employer].  For 1 minute, she noted left foot paresthesias and weakness.  She has never had any pain in her left lower extremity.  Denies any back pain.  States had that her symptoms resolved completely.  She has not had any weakness or paresthesias.  She followed up with her PCP today who obtained an MRI for further investigation which revealed the above findings.    She denies any change in smell.  Also notes that since 1765-9893, she has had a decline in her vision.  She is completely blind out of her right eye.  Has blurred vision otherwise that has progressively become worse over time, but more since Sunday. Her son states that she has not been able to read for years. Patient states that she has not been able to see someone's face clearly for years.  She has asthma and has baseline shortness of breath which " she uses inhaler and nightly nebulizers.  Has noted feeling dizzy and faint today.  She had decreased sleep last night.  At baseline, she uses a cane.  She has not had any fever, chest pain, nausea, vomiting, diarrhea.        Review of patient's allergies indicates:   Allergen Reactions    Venom-wasp     Penicillin      Other reaction(s): Rash    Amoxicillin-pot clavulanate      Other reaction(s): diarrea  Other reaction(s): Vomiting  Other reaction(s): diarrea     Past Medical History:   Diagnosis Date    Anemia     Asthma, chronic     CAD (coronary artery disease)     Elevated glucose 2015    GERD (gastroesophageal reflux disease)     protonix 40    Hypercalcemia 10/16/2012    Hyperlipidemia     Hyperparathyroidism     Hypertension     Insomnia 2016    Renal artery stenosis     Right low back pain 2016     Past Surgical History:   Procedure Laterality Date    APPENDECTOMY      CHOLECYSTECTOMY      COLONOSCOPY      CORONARY ANGIOPLASTY WITH STENT PLACEMENT      1 heart/1 kidney    HYSTERECTOMY      KIDNEY SURGERY      stent in renal artery     Family History   Problem Relation Age of Onset    Splenomegaly Daughter           from ruptured spleen    Miscarriages / Stillbirths Mother     Liver disease Father     No Known Problems Sister     Hypertension Son     Hypercalcemia Neg Hx     Thyroid cancer Neg Hx      Social History     Tobacco Use    Smoking status: Never Smoker    Smokeless tobacco: Never Used   Substance Use Topics    Alcohol use: No    Drug use: Never     Review of Systems   Constitutional: Negative for chills, diaphoresis and fever.   HENT: Negative for sore throat.    Eyes: Positive for visual disturbance (progressive since 0459-6259).   Respiratory: Negative for shortness of breath.    Cardiovascular: Negative for chest pain.   Gastrointestinal: Negative for abdominal pain, diarrhea, nausea and vomiting.   Genitourinary: Negative for dysuria,  hematuria and urgency.   Musculoskeletal: Negative for back pain.   Skin: Negative for rash.   Neurological: Positive for weakness (resolved) and numbness (resolved). Negative for dizziness and headaches.   Hematological: Does not bruise/bleed easily.       Physical Exam     Initial Vitals [05/18/22 1650]   BP Pulse Resp Temp SpO2   (!) 112/57 (!) 58 18 98.1 °F (36.7 °C) 97 %      MAP       --         Physical Exam    Vitals reviewed.  Constitutional: She appears well-developed and well-nourished. She is not diaphoretic. She is cooperative.  Non-toxic appearance. She does not have a sickly appearance. She does not appear ill. No distress. Face mask in place.   HENT:   Head: Normocephalic and atraumatic.   Nose: Nose normal.   Mouth/Throat: No trismus in the jaw.   Eyes: Conjunctivae and EOM are normal. Right conjunctiva is not injected. Left conjunctiva is not injected. Right pupil is not reactive. Right pupil is round. Left pupil is round. Pupils are equal.   R pupil not reactive to light.   R pupil reactive to contralateral light.   Unable to perform visual acuity test due to blurred vision.    R IOP 38, 39, 42.  L IOP 33, 34, 37.   Patient states that she went to her ophtho 3 months ago and her pressures were 30s in bilateral eyes, nothing that her R is higher than L. She has three of her eye drops at the bedside.   Neck:   Normal range of motion.  Pulmonary/Chest: No accessory muscle usage. No tachypnea. No respiratory distress.   Abdominal: She exhibits no distension.   Musculoskeletal:         General: Normal range of motion.      Cervical back: Normal range of motion.      Comments: Intact range of motion of bilateral upper and lower extremities with good strength and symmetric.     Neurological: She is alert. She has normal strength.   Skin: Skin is warm and dry. No erythema. No pallor.         ED Course   Procedures  Labs Reviewed   CBC W/ AUTO DIFFERENTIAL - Abnormal; Notable for the following components:        Result Value    RBC 3.82 (*)     Hemoglobin 10.5 (*)     Hematocrit 31.7 (*)     Lymph % 17.5 (*)     All other components within normal limits   BASIC METABOLIC PANEL - Abnormal; Notable for the following components:    Potassium 3.4 (*)     CO2 31 (*)     Creatinine 1.6 (*)     Calcium 8.4 (*)     eGFR if  32.2 (*)     eGFR if non  28.0 (*)     All other components within normal limits   PROTIME-INR          Imaging Results    None          Medications   albuterol-ipratropium 2.5 mg-0.5 mg/3 mL nebulizer solution 3 mL (3 mLs Nebulization Given 5/18/22 1851)   proparacaine 0.5 % ophthalmic solution 1 drop (1 drop Both Eyes Given by Other 5/18/22 2002)     Medical Decision Making:   History:   I obtained history from: someone other than patient.       <> Summary of History: Patient and her son.  Old Medical Records: I decided to obtain old medical records.  Old Records Summarized: records from clinic visits and records from previous admission(s).  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  Differential Diagnosis:   Includes but is not limited to meningioma, TIA, less likely bleed given none seen on MRI obtained today, glaucoma.  Clinical Tests:   Lab Tests: Ordered and Reviewed  Radiological Study: Reviewed  ED Management:  Patient referred to the ED by PCP after abnormal MRI results today.  Patient has not had any new weakness or paresthesias since 4 days ago.  She does not need any emergent imaging test at this time.  Will obtain basic labs.  Will discuss case with neurosurgery.  Other:   I have discussed this case with another health care provider.             ED Course as of 05/19/22 1532   Wed May 18, 2022   1746 BP(!): 112/57 [CL]   1746 Temp: 98.1 °F (36.7 °C) [CL]   1746 Pulse(!): 58  [CL]   1746 Resp: 18 [CL]   1746 SpO2: 97 % [CL]   1838 Unable to do visual acuity test.  States that she could us holding up something, but unable to clearly see letters. [CL]   1924 WBC: 7.77 [CL]   1924 Hemoglobin(!): 10.5  History of anemia.  [CL]   1924 Platelets: 156 [CL]   1924 Sodium: 137 [CL]   1924 Potassium(!): 3.4 [CL]   1924 Chloride: 97 [CL]   1924 CO2(!): 31 [CL]   1924 Glucose: 98 [CL]   1924 BUN: 26 [CL]   1925 Creatinine(!): 1.6  Was 1.4 four months ago. [CL]   1947 Case discussed with neurosurgery who will evaluate patient give recommendations. [CL]   2016 R IOP 38, 39, 42.  L IOP 33, 34, 37.   Patient states that she went to her ophtho 3 months ago and her pressures were 30s in bilateral eyes, nothing R higher than L. She has three of her eye drops at the bedside.  [CL]   2132 NSGY at bedside. [CL]      ED Course User Index  [CL] Griselda Barreto PA-C           Neurosurgery has evaluated patient.  Refer to their consult note.  They do not recommend any emergent intervention at this time.  They do not recommend any therapy as well including no steroids.  Likely lesion has been there for a prolonged period of time.  Patient's intra-ocular pressures are consistent with her baseline glaucoma as she notes that her pressures 3 months ago during her exam with her ophthalmologist was in the 30s.  She is compliant with all 3 drops.  She does not need emergent ophthalmology evaluation she has had complete blindness in the right eye and is essentially chronic blurred vision for a prolonged period of time.  She denies eye pain. I discussed importance of staying hydrated. Cr from 1.4. to 1.6. Recommended 2L daily.  Ambulatory referral to neurosurgery placed.  They will schedule close follow-up appointment.  Patient to follow-up.  Return to ED precautions were given, and patient and her son voiced their understanding.  All of their questions were answered.  Patient comfortable with plan and stable for  discharge.    I have reviewed patient's chart and discussed this case with my supervising MD.     Griselda Barreto PA-C  Emergent Department  [facility]  Spectralink #[id number] or #[phone]      Clinical Impression:   Final diagnoses:  [D32.9] Meningioma (Primary)  [N28.9] Renal insufficiency  [Z86.69] History of glaucoma          ED Disposition Condition    Discharge Stable        ED Prescriptions     None        Follow-up Information     Follow up With Specialties Details Why Contact Info Additional Information    Dr. Abhishek Gill MD  Schedule an appointment as soon as possible for a visit   Ophthalmologist in [place], [place]  Address: [address] [place] [address]  Phone: [phone]     [facility] - Neurosurgery [facility] Neurosurgery Schedule an appointment as soon as possible for a visit   [facility] [facility] [address] [phone] 8th Floor Clinic [place] Please park in [facility]. Check in desk is located in the [facility]. Please take the C elevator to 8th floor which opens to the lobby.    [facility] - Emergency Dept Emergency Medicine  If symptoms worsen [address] [facility] [address] [phone]         Future Appointments   Date Time Provider Department Center   5/24/2022 10:00 AM VASCULAR, CARDIOLOGY [facility]   6/30/2022 10:20 AM Houston Gaston MD [facility] Barreto PA-C  05/19/22 1532

## 2022-05-18 NOTE — TELEPHONE ENCOUNTER
Son ajay called back to understand why needs to get to er.    I explained again to him and he expressed understanding.  He is primary care giver and she needs someone to stay with her to help with language etc.

## 2022-05-18 NOTE — PROGRESS NOTES
Subjective:       Patient ID: Nicolasa Jurado is a 89 y.o. female.    Chief Complaint: Follow-up (6 Mo ), Discuss Rib Pain (Rib cage on Lt side pt says when she touch its very painful.), Numbness (Pt grand daughter says on Sunday pt body on Lt side was numb. She says not for to long but her toes on Lt side & legs gets numb.), molds (Generalized body pt grand daughters says her grandmother stays worry about it.), and Fatigue (Since Sunday after  pt Lt side of body was numb she been weak.)    HPI     89-year-old female here for 6 month follow-up.    HTN - Patient is currently on HCTZ 25 mg, Toprol- mg, Micardis 80 mg, amlodipine 10 mg. She does check her BP at home, and it runs 100/50 at the lowest 120/60. Side effects of medications note: none. Denies headaches, blurred vision, chest pain, shortness of breath, nausea.    She had a fall a few months ago and has rib pain of her left rib cage.  She still has pain when she presses on it.  She is using her gabapentin.    Sunday with the BP of 100/50, the left side of her body went numb and could not stand up.  This passed, but she has left sided numbness at this point in both her leg and arm.    HLD - Patient is currently on Lipitor 80 mg.  She last lipid panel was   Cholesterol   Date Value Ref Range Status   11/11/2021 110 (L) 120 - 199 mg/dL Final     Comment:     The National Cholesterol Education Program (NCEP) has set the  following guidelines (reference ranges) for Cholesterol:  Optimal.....................<200 mg/dL  Borderline High.............200-239 mg/dL  High........................> or = 240 mg/dL       Triglycerides   Date Value Ref Range Status   11/11/2021 181 (H) 30 - 150 mg/dL Final     Comment:     The National Cholesterol Education Program (NCEP) has set the  following guidelines (reference values) for triglycerides:  Normal......................<150 mg/dL  Borderline High.............150-199 mg/dL  High........................200-499 mg/dL        HDL   Date Value Ref Range Status   11/11/2021 32 (L) 40 - 75 mg/dL Final     Comment:     The National Cholesterol Education Program (NCEP) has set the  following guidelines (reference values) for HDL Cholesterol:  Low...............<40 mg/dL  Optimal...........>60 mg/dL       LDL Cholesterol   Date Value Ref Range Status   11/11/2021 41.8 (L) 63.0 - 159.0 mg/dL Final     Comment:     The National Cholesterol Education Program (NCEP) has set the  following guidelines (reference values) for LDL Cholesterol:  Optimal.......................<130 mg/dL  Borderline High...............130-159 mg/dL  High..........................160-189 mg/dL  Very High.....................>190 mg/dL     .  Side effects of the medication: none.    Patient has chronic diastolic and systolic heart failure and is on Micardis 80 mg, Toprol- mg.    Review of Systems      Objective:      Physical Exam  Vitals reviewed.   Constitutional:       Appearance: She is well-developed.   HENT:      Head: Normocephalic and atraumatic.      Mouth/Throat:      Pharynx: No oropharyngeal exudate.   Eyes:      General: No scleral icterus.        Right eye: No discharge.         Left eye: No discharge.      Pupils: Pupils are equal, round, and reactive to light.   Neck:      Thyroid: No thyromegaly.      Trachea: No tracheal deviation.   Cardiovascular:      Rate and Rhythm: Normal rate and regular rhythm.      Heart sounds: Normal heart sounds. No murmur heard.    No friction rub. No gallop.   Pulmonary:      Effort: Pulmonary effort is normal. No respiratory distress.      Breath sounds: Normal breath sounds. No wheezing or rales.   Chest:      Chest wall: No tenderness.   Abdominal:      General: Bowel sounds are normal. There is no distension.      Palpations: Abdomen is soft. There is no mass.      Tenderness: There is no abdominal tenderness. There is no guarding or rebound.   Musculoskeletal:         General: No tenderness. Normal range of  motion.      Cervical back: Normal range of motion and neck supple.   Skin:     General: Skin is warm and dry.      Coloration: Skin is not pale.      Findings: No erythema or rash.   Neurological:      Mental Status: She is alert and oriented to person, place, and time.   Psychiatric:         Behavior: Behavior normal.         Assessment:       1. Essential hypertension    2. Pure hypercholesterolemia    3. Rib pain  - X-Ray Ribs 2 View Right; Future    4. TIA (transient ischemic attack)  - MRI Brain W WO Contrast; Future  - Echo; Future  - CV Ultrasound Bilateral Doppler Carotid; Future  - Creatinine, serum; Future    5. Left sided numbness  - MRI Brain W WO Contrast; Future  - Echo; Future  - CV Ultrasound Bilateral Doppler Carotid; Future  - Creatinine, serum; Future    6. Left-sided weakness  - MRI Brain W WO Contrast; Future  - Echo; Future  - CV Ultrasound Bilateral Doppler Carotid; Future  - Creatinine, serum; Future    7. Chronic diastolic heart failure    8. Left ventricular diastolic dysfunction with preserved systolic function    9. Atherosclerosis of aorta    10. CKD (chronic kidney disease) stage 2, GFR 60-89 ml/min    11. Thrombocytopenia    12. Primary hyperparathyroidism    13. Mesenteric artery stenosis    14. Exudative age-related macular degeneration, unspecified laterality, unspecified stage    15. Stage 3b chronic kidney disease      Plan:       1.  Continue Toprol- mg, Micardis 80 mg, amlodipine 10 mg. Hold HCTZ 25 mg for now.  Send message in 2 weeks with blood pressure readings.  2. Continue Lipitor 80 mg.  3. Check x-ray.  Increase gabapentin 200 mg nightly.  May go to 400 mg as necessary.  4/5/6.  Check MRI brain with without contrast, 2D echo with color-flow Doppler, carotid ultrasound.  7/8. Monitor.  9. Continue Lipitor 80 mg.  10. Monitor.  11. Monitor.  12. Monitor.  13. Monitor.  14. Monitor.  15. Monitor.

## 2022-05-18 NOTE — TELEPHONE ENCOUNTER
"" Suzanna, can you call patients granddaughter and ask them to go to the ER.   The swelling of the brain caused by the meningioma (benign tumor usually) could be causing her symptoms. "    I spoke to patient and pinky, granddaughter-   Explained need to get to er. She said they would comply.  "

## 2022-05-18 NOTE — ED TRIAGE NOTES
Pt was sent by PCP for abnormal MRI done today. MRI showed swelling of the brain. Pt had MRI done because of numbness/tingling episode over the weekend that started in foot and traveled up arm. Upon arrival to ED, pt denies numbness/tinglnig in extremities, sob, cp, abdominal pain, n/v/d, fever. Pt aaox3, no slurred speech or facial droop.   
actual/infant

## 2022-05-19 NOTE — ED NOTES
Pt sitting up in bed, respirations even/unlabored. NAD noted. Updated pt and family on poc. Pt denies any needs. Side rails upx2, call bell within reach. Will continue to monitor.

## 2022-05-19 NOTE — DISCHARGE INSTRUCTIONS
Continue all of your eye drops for glaucoma.   Stay hydrated.   Neurosurgery will call you, but please call them next week if you do not hear from there.   Return to the ER for new or worsening symptoms.   Future Appointments   Date Time Provider Department Center   5/24/2022 10:00 AM VASCULAR, CARDIOLOGY [facility]   6/30/2022 10:20 AM Houston Gaston MD [facility]     Our goal in the emergency department is to always give you outstanding care and exceptional service. You may receive a survey by mail or e-mail in the next week regarding your experience in our ED. We would greatly appreciate your completing and returning the survey. Your feedback provides us with a way to recognize our staff who give very good care and it helps us learn how to improve when your experience was below our aspiration of excellence.

## 2022-05-22 RX ORDER — GABAPENTIN 100 MG/1
CAPSULE ORAL
Qty: 60 CAPSULE | Refills: 0 | Status: SHIPPED | OUTPATIENT
Start: 2022-05-22 | End: 2022-07-25

## 2022-05-24 ENCOUNTER — HOSPITAL ENCOUNTER (OUTPATIENT)
Dept: CARDIOLOGY | Facility: HOSPITAL | Age: 87
Discharge: HOME | End: 2022-05-24
Attending: INTERNAL MEDICINE
Payer: MEDICARE

## 2022-05-24 DIAGNOSIS — R53.1 LEFT-SIDED WEAKNESS: ICD-10-CM

## 2022-05-24 DIAGNOSIS — R20.0 LEFT SIDED NUMBNESS: ICD-10-CM

## 2022-05-24 DIAGNOSIS — G45.9 TIA (TRANSIENT ISCHEMIC ATTACK): ICD-10-CM

## 2022-05-24 LAB
LEFT ARM DIASTOLIC BLOOD PRESSURE: 78 MMHG
LEFT ARM SYSTOLIC BLOOD PRESSURE: 181 MMHG
LEFT CBA DIAS: 34 CM/S
LEFT CBA SYS: 154 CM/S
LEFT CCA DIST DIAS: 13 CM/S
LEFT CCA DIST SYS: 60 CM/S
LEFT CCA MID DIAS: 13 CM/S
LEFT CCA MID SYS: 65 CM/S
LEFT CCA PROX DIAS: 12 CM/S
LEFT CCA PROX SYS: 68 CM/S
LEFT ECA DIAS: 9 CM/S
LEFT ECA SYS: 111 CM/S
LEFT ICA DIST DIAS: 27 CM/S
LEFT ICA DIST SYS: 120 CM/S
LEFT ICA MID DIAS: 32 CM/S
LEFT ICA MID SYS: 172 CM/S
LEFT ICA PROX DIAS: 23 CM/S
LEFT ICA PROX SYS: 178 CM/S
LEFT VERTEBRAL DIAS: 13 CM/S
LEFT VERTEBRAL SYS: 74 CM/S
OHS CV CAROTID RIGHT ICA EDV HIGHEST: 23
OHS CV CAROTID ULTRASOUND LEFT ICA/CCA RATIO: 2.97
OHS CV CAROTID ULTRASOUND RIGHT ICA/CCA RATIO: 1.75
OHS CV PV CAROTID LEFT HIGHEST CCA: 68
OHS CV PV CAROTID LEFT HIGHEST ICA: 178
OHS CV PV CAROTID RIGHT HIGHEST CCA: 75
OHS CV PV CAROTID RIGHT HIGHEST ICA: 131
OHS CV US CAROTID LEFT HIGHEST EDV: 32
RIGHT ARM DIASTOLIC BLOOD PRESSURE: 75 MMHG
RIGHT ARM SYSTOLIC BLOOD PRESSURE: 165 MMHG
RIGHT CBA DIAS: 15 CM/S
RIGHT CBA SYS: 92 CM/S
RIGHT CCA DIST DIAS: 8 CM/S
RIGHT CCA DIST SYS: 75 CM/S
RIGHT CCA MID DIAS: 10 CM/S
RIGHT CCA MID SYS: 61 CM/S
RIGHT CCA PROX DIAS: 9 CM/S
RIGHT CCA PROX SYS: 66 CM/S
RIGHT ECA DIAS: 2 CM/S
RIGHT ECA SYS: 86 CM/S
RIGHT ICA DIST DIAS: 20 CM/S
RIGHT ICA DIST SYS: 108 CM/S
RIGHT ICA MID DIAS: 23 CM/S
RIGHT ICA MID SYS: 131 CM/S
RIGHT ICA PROX DIAS: 22 CM/S
RIGHT ICA PROX SYS: 110 CM/S
RIGHT VERTEBRAL DIAS: 11 CM/S
RIGHT VERTEBRAL SYS: 33 CM/S

## 2022-05-24 PROCEDURE — 93880 EXTRACRANIAL BILAT STUDY: CPT | Mod: 26,,, | Performed by: INTERNAL MEDICINE

## 2022-05-24 PROCEDURE — 93880 CV US DOPPLER CAROTID (CUPID ONLY): ICD-10-PCS | Mod: 26,,, | Performed by: INTERNAL MEDICINE

## 2022-05-24 PROCEDURE — 93880 EXTRACRANIAL BILAT STUDY: CPT

## 2022-05-26 ENCOUNTER — OFFICE VISIT (OUTPATIENT)
Dept: NEUROSURGERY | Facility: CLINIC | Age: 87
End: 2022-05-26
Payer: MEDICARE

## 2022-05-26 ENCOUNTER — TELEPHONE (OUTPATIENT)
Dept: NEUROSURGERY | Facility: CLINIC | Age: 87
End: 2022-05-26
Payer: MEDICARE

## 2022-05-26 VITALS — DIASTOLIC BLOOD PRESSURE: 78 MMHG | HEART RATE: 80 BPM | SYSTOLIC BLOOD PRESSURE: 148 MMHG

## 2022-05-26 DIAGNOSIS — D32.9 MENINGIOMA: ICD-10-CM

## 2022-05-26 PROCEDURE — 3288F FALL RISK ASSESSMENT DOCD: CPT | Mod: CPTII,S$GLB,, | Performed by: PHYSICIAN ASSISTANT

## 2022-05-26 PROCEDURE — 1159F PR MEDICATION LIST DOCUMENTED IN MEDICAL RECORD: ICD-10-PCS | Mod: CPTII,S$GLB,, | Performed by: PHYSICIAN ASSISTANT

## 2022-05-26 PROCEDURE — 1100F PTFALLS ASSESS-DOCD GE2>/YR: CPT | Mod: CPTII,S$GLB,, | Performed by: PHYSICIAN ASSISTANT

## 2022-05-26 PROCEDURE — 1157F ADVNC CARE PLAN IN RCRD: CPT | Mod: CPTII,S$GLB,, | Performed by: PHYSICIAN ASSISTANT

## 2022-05-26 PROCEDURE — 1159F MED LIST DOCD IN RCRD: CPT | Mod: CPTII,S$GLB,, | Performed by: PHYSICIAN ASSISTANT

## 2022-05-26 PROCEDURE — 99203 OFFICE O/P NEW LOW 30 MIN: CPT | Mod: S$GLB,,, | Performed by: PHYSICIAN ASSISTANT

## 2022-05-26 PROCEDURE — 99203 PR OFFICE/OUTPT VISIT, NEW, LEVL III, 30-44 MIN: ICD-10-PCS | Mod: S$GLB,,, | Performed by: PHYSICIAN ASSISTANT

## 2022-05-26 PROCEDURE — 99999 PR PBB SHADOW E&M-EST. PATIENT-LVL IV: CPT | Mod: PBBFAC,,, | Performed by: PHYSICIAN ASSISTANT

## 2022-05-26 PROCEDURE — 99999 PR PBB SHADOW E&M-EST. PATIENT-LVL IV: ICD-10-PCS | Mod: PBBFAC,,, | Performed by: PHYSICIAN ASSISTANT

## 2022-05-26 PROCEDURE — 3288F PR FALLS RISK ASSESSMENT DOCUMENTED: ICD-10-PCS | Mod: CPTII,S$GLB,, | Performed by: PHYSICIAN ASSISTANT

## 2022-05-26 PROCEDURE — 1157F PR ADVANCE CARE PLAN OR EQUIV PRESENT IN MEDICAL RECORD: ICD-10-PCS | Mod: CPTII,S$GLB,, | Performed by: PHYSICIAN ASSISTANT

## 2022-05-26 PROCEDURE — 1100F PR PT FALLS ASSESS DOC 2+ FALLS/FALL W/INJURY/YR: ICD-10-PCS | Mod: CPTII,S$GLB,, | Performed by: PHYSICIAN ASSISTANT

## 2022-06-06 RX ORDER — PROMETHAZINE HYDROCHLORIDE AND CODEINE PHOSPHATE 6.25; 1 MG/5ML; MG/5ML
SOLUTION ORAL
Qty: 240 ML | Refills: 0 | OUTPATIENT
Start: 2022-06-06

## 2022-06-06 NOTE — TELEPHONE ENCOUNTER
Patient has not had cough syrup since March of last year.  Please clarify why patient is requesting cough syrup.

## 2022-06-15 NOTE — PROGRESS NOTES
Neurosurgery  Established Patient    SUBJECTIVE:     History of Present Illness: Nicolasa Jurado is a 89 y.o. 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 She orginally presented to the ED on 5/18 for acute onset left leg weakness and paresthesias that last and resolved within a minute. 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 She denies any new or worsening vision changes, denies weakness, n/v, headaches, seizures, or any other neurological symptoms. She sees an ophthalmologist, Dr. Jeffrey Alvarado regularly.          Review of patient's allergies indicates:   Allergen Reactions    Venom-wasp     Penicillin      Other reaction(s): Rash    Amoxicillin-pot clavulanate      Other reaction(s): diarrea  Other reaction(s): Vomiting  Other reaction(s): diarrea       Current Outpatient Medications   Medication Sig Dispense Refill    acyclovir 5% (ZOVIRAX) 5 % ointment Use tid for buttock rash (Patient taking differently: as needed. Use tid for buttock rash) 15 g 6    albuterol (PROVENTIL/VENTOLIN HFA) 90 mcg/actuation inhaler Inhale 2 puffs into the lungs every 6 (six) hours as needed for Wheezing. 18 g 6    amLODIPine (NORVASC) 10 MG tablet TAKE 1 TABLET(10 MG) BY MOUTH EVERY DAY 90 tablet 0    aspirin 81 mg Tab Take 81 mg by mouth every other day.      brimonidine 0.2% (ALPHAGAN) 0.2 % Drop Place 1 drop into both eyes 3 (three) times daily.   3    cinacalcet (SENSIPAR) 30 MG Tab TAKE 2 TABLETS BY MOUTH EVERY DAY WITH BREAKFAST 180 tablet 2    diclofenac sodium (VOLTAREN) 1 % Gel Apply 2 g topically 3 (three) times daily. 200 g 1    dorzolamide (TRUSOPT) 2 % ophthalmic solution Place 1 drop into both eyes 3 (three) times daily.   3    dorzolamide-timolol 2-0.5% (COSOPT) 22.3-6.8 mg/mL ophthalmic solution 1 drop 2 (two) times daily.      ergocalciferol, vitamin D2, (VITAMIN D ORAL) Take 2,000 Int'l Units by mouth  once daily.      fluocinonide (LIDEX) 0.05 % external solution USE FOR SCALP AT BEDTIME AS NEEDED FOR PRURITUS 60 mL 6    fluticasone propionate (FLONASE) 50 mcg/actuation nasal spray 1 spray by Each Nare route daily as needed.   0    fluticasone-salmeterol diskus inhaler 250-50 mcg Inhale 1 puff into the lungs 2 (two) times daily. Controller 180 each 3    gabapentin (NEURONTIN) 100 MG capsule TAKE 2 CAPSULES(200 MG) BY MOUTH EVERY NIGHT AS NEEDED 60 capsule 0    hydroCHLOROthiazide (HYDRODIURIL) 25 MG tablet Take 1 tablet (25 mg total) by mouth once daily. 90 tablet 3    hydrocortisone 2.5 % cream Apply topically 2 (two) times daily. 28 g 11    hydrocortisone-pramoxine (ANALPRAM-HC) 2.5-1 % Crea Place rectally 3 (three) times daily. 1 Tube 1    ketorolac 0.5% (ACULAR) 0.5 % Drop Place 1 drop into both eyes 3 (three) times daily.      latanoprost 0.005 % ophthalmic solution Place 1 drop into both eyes every evening.       levalbuterol (XOPENEX) 0.63 mg/3 mL nebulizer solution TAKE 3 MILLILITERS BY NEBULIZATION EVERY 4 HOURS AS NEEDED FOR WHEEZING(RESCUE) 1350 mL 3    methazoLAMIDE (NEPTAZANE) 25 mg tablet Take 25 mg by mouth 3 (three) times daily.   1    nitroGLYCERIN (NITROSTAT) 0.4 MG SL tablet 1 Tablet Sublingual  One tablet under tounge as needed for chest pain. 100 tablet 0    pilocarpine HCL 2% (PILOCAR) 2 % ophthalmic solution INT 1 GTT INTO OU QID  3    prednisoLONE acetate (PRED FORTE) 1 % DrpS       RHOPRESSA 0.02 % ophthalmic solution       telmisartan (MICARDIS) 80 MG Tab TAKE 1 TABLET(80 MG) BY MOUTH EVERY DAY 90 tablet 0    timolol maleate 0.5% (TIMOPTIC) 0.5 % Drop Place 1 drop into both eyes 2 (two) times a day.      tiZANidine (ZANAFLEX) 2 MG tablet TAKE 1 TABLET(2 MG) BY MOUTH EVERY NIGHT AS NEEDED 30 tablet 0    traMADoL (ULTRAM) 50 mg tablet Take 1 tablet (50 mg total) by mouth every 6 (six) hours as needed for Pain. 40 tablet 1    vit C-vit E-copper-zinc-lutein 226 mg-200  unit -5 mg-0.8 mg Cap Take by mouth. 2 Capsule Oral Every day      atorvastatin (LIPITOR) 80 MG tablet Take 1 tablet (80 mg total) by mouth once daily. 90 tablet 3    metoprolol succinate (TOPROL-XL) 100 MG 24 hr tablet Take 1 tablet (100 mg total) by mouth once daily. 90 tablet 1    omeprazole (PRILOSEC) 40 MG capsule Take 1 capsule (40 mg total) by mouth once daily. For acid heartburn 30 capsule 11     No current facility-administered medications for this visit.       Past Medical History:   Diagnosis Date    Anemia     Asthma, chronic     CAD (coronary artery disease)     Elevated glucose 2015    GERD (gastroesophageal reflux disease)     protonix 40    Hypercalcemia 10/16/2012    Hyperlipidemia     Hyperparathyroidism     Hypertension     Insomnia 2016    Renal artery stenosis     Right low back pain 2016     Past Surgical History:   Procedure Laterality Date    APPENDECTOMY      CHOLECYSTECTOMY      COLONOSCOPY      CORONARY ANGIOPLASTY WITH STENT PLACEMENT      1 heart kidney    HYSTERECTOMY      KIDNEY SURGERY      stent in renal artery     Family History     Problem Relation (Age of Onset)    Hypertension Son    Liver disease Father    Miscarriages / Stillbirths Mother    No Known Problems Sister    Splenomegaly Daughter        Social History     Socioeconomic History    Marital status:    Tobacco Use    Smoking status: Never Smoker    Smokeless tobacco: Never Used   Substance and Sexual Activity    Alcohol use: No    Drug use: Never    Sexual activity: Never   Social History Narrative    Retired. Daughter       Social Determinants of Health     Financial Resource Strain: Low Risk     Difficulty of Paying Living Expenses: Not hard at all   Food Insecurity: No Food Insecurity    Worried About Running Out of Food in the Last Year: Never true    Ran Out of Food in the Last Year: Never true   Transportation Needs: No Transportation Needs    Lack of  Transportation (Medical): No    Lack of Transportation (Non-Medical): No   Physical Activity: Inactive    Days of Exercise per Week: 0 days    Minutes of Exercise per Session: 0 min   Stress: Stress Concern Present    Feeling of Stress : To some extent   Social Connections: Unknown    Frequency of Communication with Friends and Family: Three times a week    Frequency of Social Gatherings with Friends and Family: Never    Active Member of Clubs or Organizations: No    Attends Club or Organization Meetings: Never    Marital Status:    Housing Stability: Unknown    Unable to Pay for Housing in the Last Year: No    Unstable Housing in the Last Year: No       Review of Systems   Constitutional: Negative for activity change and fever.   HENT: Negative for ear pain and trouble swallowing.    Eyes: Positive for visual disturbance (at baseline). Negative for photophobia.   Respiratory: Negative for shortness of breath and wheezing.    Cardiovascular: Negative for chest pain.   Gastrointestinal: Negative for abdominal pain, nausea and vomiting.   Genitourinary: Negative for dysuria and hematuria.   Musculoskeletal: Negative for back pain and neck pain.   Skin: Negative for wound.   Neurological: Negative for dizziness, seizures, weakness, numbness and headaches.   Psychiatric/Behavioral: Negative for confusion.       OBJECTIVE:     Vital Signs  Pulse: 80  BP: (!) 148/78  There is no height or weight on file to calculate BMI.    Neurosurgery Physical Exam  General: well developed, well nourished, no distress.   Head: normocephalic, atraumatic  Neurologic: Alert and oriented. Thought content appropriate.  GCS: Motor: 6/Verbal: 5/Eyes: 4 GCS Total: 15  Mental Status: Awake, Alert, Oriented x 4  Language: No aphasia  Speech: No dysarthria  Cranial nerves: face symmetric, tongue midline, CN II-XII grossly intact.   Eyes: pupils equal, round, reactive to light with accommodation, difficult to assess EOM as patient  with decreased visual acuity at baseline. Right eye complete blindness with left eye visual fields grossly intact on exam.   Pulmonary: normal respirations, no signs of respiratory distress  Abdomen: soft, non-distended, not tender to palpation  Skin: Skin is warm, dry and intact.  Sensory: intact to light touch throughout    Motor Strength:Moves all extremities spontaneously with good tone.  Full strength upper and lower extremities. No abnormal movements seen.     Strength  Deltoids Triceps Biceps Wrist Extension Wrist Flexion Hand    Upper: R 5/5 5/5 5/5 5/5 5/5 5/5    L 5/5 5/5 5/5 5/5 5/5 5/5     Iliopsoas Quadriceps Knee  Flexion Tibialis  anterior Gastro- cnemius EHL   Lower: R 5/5 5/5 5/5 5/5 5/5 5/5    L 5/5 5/5 5/5 5/5 5/5 5/5     Cerebellar:   Finger-to-nose: intact bilaterally   Pronator drift: absent bilaterally    Diagnostic Results:  MRI brain w wo shows a  hyperenhancing lesion at the sphenoidale/olfactory groove, as above, consistent with meningioma.  No midline shift or herniation.  There is no overt compression of the optic chiasm    ASSESSMENT/PLAN:     Nicolasa Jurado is a 89 y.o. female with incidental sphenoidale meningioma. She has no neurological symptoms. She has glaucoma and is legally blind in the right eye with left eye decreased acuity. She follows with her ophthalmologist regularly. I would like for her to see him and have formal visual field testing. We will plan to see her back in a year with an MRI of the brain w wo to monitor the meningioma. She and her granddaughter know they can call the clinic in the meantime with any questions or concerns.

## 2022-06-16 RX ORDER — PROMETHAZINE HYDROCHLORIDE AND CODEINE PHOSPHATE 6.25; 1 MG/5ML; MG/5ML
SOLUTION ORAL
Qty: 240 ML | OUTPATIENT
Start: 2022-06-16

## 2022-06-16 NOTE — TELEPHONE ENCOUNTER
Please clarify reason for request for Phenergan with codeine cough syrup refill.  Not prescribe since March of 2021.

## 2022-06-18 DIAGNOSIS — I10 ESSENTIAL HYPERTENSION: Chronic | ICD-10-CM

## 2022-06-18 NOTE — TELEPHONE ENCOUNTER
Refill Routing Note   Medication(s) are not appropriate for processing by [facility] for the following reason(s):      - Non-participating provider    [facility] action(s):  Route          Medication reconciliation completed: No     Appointments  past 12m or future 3m with PCP    Date Provider   Last Visit   5/18/2022 [place] Gaston MD   Next Visit   6/30/2022 Houston Gaston MD   ED visits in past 90 days: 1        Note composed:2:32 PM 06/18/2022

## 2022-06-19 RX ORDER — AMLODIPINE BESYLATE 10 MG/1
TABLET ORAL
Qty: 90 TABLET | Refills: 3 | Status: SHIPPED | OUTPATIENT
Start: 2022-06-19 | End: 2023-06-09

## 2022-06-20 ENCOUNTER — PATIENT MESSAGE (OUTPATIENT)
Dept: CARDIOLOGY | Facility: CLINIC | Age: 87
End: 2022-06-20
Payer: MEDICARE

## 2022-06-20 RX ORDER — ATORVASTATIN CALCIUM 80 MG/1
TABLET, FILM COATED ORAL
Qty: 90 TABLET | Refills: 0 | Status: SHIPPED | OUTPATIENT
Start: 2022-06-20 | End: 2022-08-31

## 2022-06-20 RX ORDER — HYDROCHLOROTHIAZIDE 25 MG/1
TABLET ORAL
Qty: 90 TABLET | Refills: 0 | Status: SHIPPED | OUTPATIENT
Start: 2022-06-20 | End: 2022-08-31

## 2022-06-29 ENCOUNTER — OFFICE VISIT (OUTPATIENT)
Dept: INTERNAL MEDICINE | Facility: CLINIC | Age: 87
End: 2022-06-29
Payer: MEDICARE

## 2022-06-29 VITALS
HEIGHT: 60 IN | DIASTOLIC BLOOD PRESSURE: 60 MMHG | RESPIRATION RATE: 10 BRPM | WEIGHT: 138.69 LBS | SYSTOLIC BLOOD PRESSURE: 138 MMHG | BODY MASS INDEX: 27.23 KG/M2 | HEART RATE: 70 BPM

## 2022-06-29 DIAGNOSIS — J45.901 EXACERBATION OF ASTHMA, UNSPECIFIED ASTHMA SEVERITY, UNSPECIFIED WHETHER PERSISTENT: Primary | ICD-10-CM

## 2022-06-29 DIAGNOSIS — D32.9 MENINGIOMA: ICD-10-CM

## 2022-06-29 DIAGNOSIS — L82.1 SEBORRHEIC KERATOSIS: ICD-10-CM

## 2022-06-29 DIAGNOSIS — I10 ESSENTIAL HYPERTENSION: ICD-10-CM

## 2022-06-29 PROCEDURE — 1160F PR REVIEW ALL MEDS BY PRESCRIBER/CLIN PHARMACIST DOCUMENTED: ICD-10-PCS | Mod: CPTII,S$GLB,, | Performed by: INTERNAL MEDICINE

## 2022-06-29 PROCEDURE — 99499 RISK ADDL DX/OHS AUDIT: ICD-10-PCS | Mod: HCNC,S$GLB,, | Performed by: INTERNAL MEDICINE

## 2022-06-29 PROCEDURE — 99214 OFFICE O/P EST MOD 30 MIN: CPT | Mod: S$GLB,,, | Performed by: INTERNAL MEDICINE

## 2022-06-29 PROCEDURE — 99999 PR PBB SHADOW E&M-EST. PATIENT-LVL III: ICD-10-PCS | Mod: PBBFAC,,, | Performed by: INTERNAL MEDICINE

## 2022-06-29 PROCEDURE — 99999 PR PBB SHADOW E&M-EST. PATIENT-LVL III: CPT | Mod: PBBFAC,,, | Performed by: INTERNAL MEDICINE

## 2022-06-29 PROCEDURE — 1157F PR ADVANCE CARE PLAN OR EQUIV PRESENT IN MEDICAL RECORD: ICD-10-PCS | Mod: CPTII,S$GLB,, | Performed by: INTERNAL MEDICINE

## 2022-06-29 PROCEDURE — 1159F PR MEDICATION LIST DOCUMENTED IN MEDICAL RECORD: ICD-10-PCS | Mod: CPTII,S$GLB,, | Performed by: INTERNAL MEDICINE

## 2022-06-29 PROCEDURE — 99499 UNLISTED E&M SERVICE: CPT | Mod: HCNC,S$GLB,, | Performed by: INTERNAL MEDICINE

## 2022-06-29 PROCEDURE — 1159F MED LIST DOCD IN RCRD: CPT | Mod: CPTII,S$GLB,, | Performed by: INTERNAL MEDICINE

## 2022-06-29 PROCEDURE — 1157F ADVNC CARE PLAN IN RCRD: CPT | Mod: CPTII,S$GLB,, | Performed by: INTERNAL MEDICINE

## 2022-06-29 PROCEDURE — 1126F AMNT PAIN NOTED NONE PRSNT: CPT | Mod: CPTII,S$GLB,, | Performed by: INTERNAL MEDICINE

## 2022-06-29 PROCEDURE — 1126F PR PAIN SEVERITY QUANTIFIED, NO PAIN PRESENT: ICD-10-PCS | Mod: CPTII,S$GLB,, | Performed by: INTERNAL MEDICINE

## 2022-06-29 PROCEDURE — 99214 PR OFFICE/OUTPT VISIT, EST, LEVL IV, 30-39 MIN: ICD-10-PCS | Mod: S$GLB,,, | Performed by: INTERNAL MEDICINE

## 2022-06-29 PROCEDURE — 1160F RVW MEDS BY RX/DR IN RCRD: CPT | Mod: CPTII,S$GLB,, | Performed by: INTERNAL MEDICINE

## 2022-06-29 RX ORDER — PREDNISONE 20 MG/1
40 TABLET ORAL DAILY
Qty: 10 TABLET | Refills: 0 | Status: SHIPPED | OUTPATIENT
Start: 2022-06-29 | End: 2022-07-04

## 2022-06-29 RX ORDER — FLUTICASONE PROPIONATE AND SALMETEROL 250; 50 UG/1; UG/1
1 POWDER RESPIRATORY (INHALATION) 2 TIMES DAILY
Qty: 180 EACH | Refills: 3 | Status: SHIPPED | OUTPATIENT
Start: 2022-06-29 | End: 2022-07-13

## 2022-06-29 RX ORDER — LEVALBUTEROL INHALATION SOLUTION 0.63 MG/3ML
SOLUTION RESPIRATORY (INHALATION)
Qty: 1350 ML | Refills: 3 | Status: SHIPPED | OUTPATIENT
Start: 2022-06-29

## 2022-06-29 NOTE — PROGRESS NOTES
Subjective:       Patient ID: Nicolasa Jurado is a 89 y.o. female.    Chief Complaint: Follow-up    HPI     89-year-old female here for evaluation of a cough that comes and goes over the last month.  Her asthma is acting up the last month.  She has had SOB.  She has mucus that comes up.  Her chest is full of phlegm.  She uses the nebulizer to clear her throat.    She stopped her fluid pill the last time she was here and has been in the 160s-180s systolic.  She has HTN and is on micardis 80 mg, norvasc 10 mg, toprol  mg, HCTZ 25 mg (she was not taking the last month).    Since the last time she saw me, she has seen neurosurgery.  She was told that they want to watch her and monitor her symptoms.  She is to follow-up with neurosurgery in a year with an MRI prior.    Review of Systems      Objective:      Physical Exam  Vitals reviewed.   Constitutional:       Appearance: She is well-developed.   HENT:      Head: Normocephalic and atraumatic.      Mouth/Throat:      Pharynx: No oropharyngeal exudate.   Eyes:      General: No scleral icterus.        Right eye: No discharge.         Left eye: No discharge.      Pupils: Pupils are equal, round, and reactive to light.   Neck:      Thyroid: No thyromegaly.      Trachea: No tracheal deviation.   Cardiovascular:      Rate and Rhythm: Normal rate and regular rhythm.      Heart sounds: Normal heart sounds. No murmur heard.    No friction rub. No gallop.   Pulmonary:      Effort: Pulmonary effort is normal. No respiratory distress.      Breath sounds: Normal breath sounds. No wheezing or rales.   Chest:      Chest wall: No tenderness.   Abdominal:      General: Bowel sounds are normal. There is no distension.      Palpations: Abdomen is soft. There is no mass.      Tenderness: There is no abdominal tenderness. There is no guarding or rebound.   Musculoskeletal:         General: No tenderness. Normal range of motion.      Cervical back: Normal range of motion and neck  supple.   Skin:     General: Skin is warm and dry.      Coloration: Skin is not pale.      Findings: Lesion (SK) present. No erythema or rash.   Neurological:      Mental Status: She is alert and oriented to person, place, and time.   Psychiatric:         Behavior: Behavior normal.         Assessment:       1. Exacerbation of asthma, unspecified asthma severity, unspecified whether persistent    2. Essential hypertension    3. Meningioma    4. Seborrheic keratosis  - Ambulatory referral/consult to Dermatology; Future      Plan:       1. Granddaughter will ensure that patient is taking Advair twice daily.  Prednisone 40 mg daily for 5 days.  Xopenex refilled.  2. Continue micardis 80 mg, norvasc 10 mg, toprol  mg, restart HCTZ 25 mg.  Send message in 2 weeks with blood pressure readings.  3. Monitor with follow-up to Neurosurgery in a year.  4. Refer to Dermatology.

## 2022-07-13 ENCOUNTER — TELEPHONE (OUTPATIENT)
Dept: INTERNAL MEDICINE | Facility: CLINIC | Age: 87
End: 2022-07-13
Payer: MEDICARE

## 2022-07-13 ENCOUNTER — OFFICE VISIT (OUTPATIENT)
Dept: CARDIOLOGY | Facility: CLINIC | Age: 87
End: 2022-07-13
Payer: MEDICARE

## 2022-07-13 VITALS
WEIGHT: 136.88 LBS | SYSTOLIC BLOOD PRESSURE: 194 MMHG | OXYGEN SATURATION: 97 % | HEART RATE: 68 BPM | BODY MASS INDEX: 25.84 KG/M2 | HEIGHT: 61 IN | DIASTOLIC BLOOD PRESSURE: 79 MMHG

## 2022-07-13 DIAGNOSIS — E78.00 PURE HYPERCHOLESTEROLEMIA: Chronic | ICD-10-CM

## 2022-07-13 DIAGNOSIS — I70.0 ATHEROSCLEROSIS OF AORTA: Chronic | ICD-10-CM

## 2022-07-13 DIAGNOSIS — J45.909 ASTHMA, UNSPECIFIED ASTHMA SEVERITY, UNSPECIFIED WHETHER COMPLICATED, UNSPECIFIED WHETHER PERSISTENT: Chronic | ICD-10-CM

## 2022-07-13 DIAGNOSIS — I10 ESSENTIAL HYPERTENSION: Chronic | ICD-10-CM

## 2022-07-13 DIAGNOSIS — I25.10 CORONARY ARTERY DISEASE INVOLVING NATIVE CORONARY ARTERY OF NATIVE HEART WITHOUT ANGINA PECTORIS: ICD-10-CM

## 2022-07-13 DIAGNOSIS — D63.1 ANEMIA DUE TO STAGE 3B CHRONIC KIDNEY DISEASE: ICD-10-CM

## 2022-07-13 DIAGNOSIS — I50.32 CHRONIC DIASTOLIC HEART FAILURE: Primary | Chronic | ICD-10-CM

## 2022-07-13 DIAGNOSIS — N18.32 ANEMIA DUE TO STAGE 3B CHRONIC KIDNEY DISEASE: ICD-10-CM

## 2022-07-13 PROCEDURE — 99214 PR OFFICE/OUTPT VISIT, EST, LEVL IV, 30-39 MIN: ICD-10-PCS | Mod: S$GLB,,, | Performed by: NURSE PRACTITIONER

## 2022-07-13 PROCEDURE — 1157F ADVNC CARE PLAN IN RCRD: CPT | Mod: CPTII,S$GLB,, | Performed by: NURSE PRACTITIONER

## 2022-07-13 PROCEDURE — 1159F PR MEDICATION LIST DOCUMENTED IN MEDICAL RECORD: ICD-10-PCS | Mod: CPTII,S$GLB,, | Performed by: NURSE PRACTITIONER

## 2022-07-13 PROCEDURE — 1160F PR REVIEW ALL MEDS BY PRESCRIBER/CLIN PHARMACIST DOCUMENTED: ICD-10-PCS | Mod: CPTII,S$GLB,, | Performed by: NURSE PRACTITIONER

## 2022-07-13 PROCEDURE — 1101F PT FALLS ASSESS-DOCD LE1/YR: CPT | Mod: CPTII,S$GLB,, | Performed by: NURSE PRACTITIONER

## 2022-07-13 PROCEDURE — 99214 OFFICE O/P EST MOD 30 MIN: CPT | Mod: S$GLB,,, | Performed by: NURSE PRACTITIONER

## 2022-07-13 PROCEDURE — 1157F PR ADVANCE CARE PLAN OR EQUIV PRESENT IN MEDICAL RECORD: ICD-10-PCS | Mod: CPTII,S$GLB,, | Performed by: NURSE PRACTITIONER

## 2022-07-13 PROCEDURE — 3288F FALL RISK ASSESSMENT DOCD: CPT | Mod: CPTII,S$GLB,, | Performed by: NURSE PRACTITIONER

## 2022-07-13 PROCEDURE — 99999 PR PBB SHADOW E&M-EST. PATIENT-LVL V: ICD-10-PCS | Mod: PBBFAC,,, | Performed by: NURSE PRACTITIONER

## 2022-07-13 PROCEDURE — 1125F AMNT PAIN NOTED PAIN PRSNT: CPT | Mod: CPTII,S$GLB,, | Performed by: NURSE PRACTITIONER

## 2022-07-13 PROCEDURE — 3288F PR FALLS RISK ASSESSMENT DOCUMENTED: ICD-10-PCS | Mod: CPTII,S$GLB,, | Performed by: NURSE PRACTITIONER

## 2022-07-13 PROCEDURE — 1101F PR PT FALLS ASSESS DOC 0-1 FALLS W/OUT INJ PAST YR: ICD-10-PCS | Mod: CPTII,S$GLB,, | Performed by: NURSE PRACTITIONER

## 2022-07-13 PROCEDURE — 1160F RVW MEDS BY RX/DR IN RCRD: CPT | Mod: CPTII,S$GLB,, | Performed by: NURSE PRACTITIONER

## 2022-07-13 PROCEDURE — 1125F PR PAIN SEVERITY QUANTIFIED, PAIN PRESENT: ICD-10-PCS | Mod: CPTII,S$GLB,, | Performed by: NURSE PRACTITIONER

## 2022-07-13 PROCEDURE — 99999 PR PBB SHADOW E&M-EST. PATIENT-LVL V: CPT | Mod: PBBFAC,,, | Performed by: NURSE PRACTITIONER

## 2022-07-13 PROCEDURE — 1159F MED LIST DOCD IN RCRD: CPT | Mod: CPTII,S$GLB,, | Performed by: NURSE PRACTITIONER

## 2022-07-13 RX ORDER — FLUTICASONE PROPIONATE 100 UG/1
1 POWDER, METERED RESPIRATORY (INHALATION) 2 TIMES DAILY
Qty: 60 EACH | Refills: 11 | Status: SHIPPED | OUTPATIENT
Start: 2022-07-13 | End: 2023-07-13

## 2022-07-13 NOTE — TELEPHONE ENCOUNTER
----- Message from Karen Cerrato NP sent at 7/13/2022 11:30 AM CDT -----  Regarding: rash with wixela  Hi Dr. Gaston,    She started the prednisone and the wixela on 6/29.  She completed the prednisone and it helped some but the wixela gave her a rash so she stopped using it.  She's taking the nebulizer rescue BID now and she sounds like she is SOB just at rest.  POX ok at 97% today.  Can you please get her back into see you or send an alternate controller medication?  Thanks,  Karen

## 2022-07-13 NOTE — PROGRESS NOTES
"Ms. Jurado is a patient of Dr. Maxwell and was last seen in [facility] Cardiology Visit 4/15/21.      Subjective:   Patient ID:  Nicolasa Jurado is a 89 y.o. female who presents for follow-up of Annual Exam, Dizziness, Shortness of Breath, and Follow-up (Legs get hot from knees down when going to bed)    Problem List:  Labile hypertension    CAD    -LCX coated stent 08/14/2003    Diastolic heart failure, EF 65%    SANA s/p stent on the right side  Hypercholesterolemia    Hypercalcemia and primary hyperparathyroidism    Asthma    HPI:  Ms. Jurado is in clinic today with her son for routine follow up.  She has stable chronic SOB.  She is using her nebulizer twice a day.  Dr. Gaston re-started her wixela bid on 6/29.  The wixela caused a rash so she stopped it.  She took a 5 day course of the prednisone that she completed.  Reports nocturnal burning and "heat" in her legs that keeps her awake.  The gabapentin does help.  BP mostly runs 140s at home with occasional spiking.  She's taking all her anti-hypertensives in the am except the hctz that she takes late afternoon/evening      Review of Systems   Constitutional: Negative for decreased appetite, diaphoresis, malaise/fatigue, weight gain and weight loss.   Eyes: Negative for visual disturbance.   Cardiovascular: Positive for dyspnea on exertion. Negative for chest pain, claudication, irregular heartbeat, leg swelling, near-syncope, orthopnea, palpitations, paroxysmal nocturnal dyspnea and syncope.        Denies chest pressure   Respiratory: Positive for shortness of breath. Negative for cough, hemoptysis, sleep disturbances due to breathing and snoring.    Endocrine: Negative for cold intolerance and heat intolerance.   Hematologic/Lymphatic: Negative for bleeding problem. Does not bruise/bleed easily.   Musculoskeletal: Negative for myalgias.   Gastrointestinal: Negative for bloating, abdominal pain, anorexia, change in bowel habit, constipation, diarrhea, nausea and " vomiting.   Neurological: Positive for paresthesias (legs). Negative for difficulty with concentration, disturbances in coordination, excessive daytime sleepiness, dizziness, headaches, light-headedness, loss of balance, numbness and weakness.   Psychiatric/Behavioral: The patient does not have insomnia.        Allergies and current medications updated and reviewed:  Review of patient's allergies indicates:   Allergen Reactions    Venom-wasp     Penicillin      Other reaction(s): Rash    Amoxicillin-pot clavulanate      Other reaction(s): diarrea  Other reaction(s): Vomiting  Other reaction(s): diarrea     Current Outpatient Medications   Medication Sig    acyclovir 5% (ZOVIRAX) 5 % ointment Use tid for buttock rash (Patient taking differently: as needed. Use tid for buttock rash)    albuterol (PROVENTIL/VENTOLIN HFA) 90 mcg/actuation inhaler Inhale 2 puffs into the lungs every 6 (six) hours as needed for Wheezing.    amLODIPine (NORVASC) 10 MG tablet TAKE 1 TABLET(10 MG) BY MOUTH EVERY DAY    aspirin 81 mg Tab Take 81 mg by mouth every other day.    atorvastatin (LIPITOR) 80 MG tablet TAKE 1 TABLET(80 MG) BY MOUTH EVERY DAY    brimonidine 0.2% (ALPHAGAN) 0.2 % Drop Place 1 drop into both eyes 3 (three) times daily.     cinacalcet (SENSIPAR) 30 MG Tab TAKE 2 TABLETS BY MOUTH EVERY DAY WITH BREAKFAST    diclofenac sodium (VOLTAREN) 1 % Gel Apply 2 g topically 3 (three) times daily.    dorzolamide (TRUSOPT) 2 % ophthalmic solution Place 1 drop into both eyes 3 (three) times daily.     dorzolamide-timolol 2-0.5% (COSOPT) 22.3-6.8 mg/mL ophthalmic solution 1 drop 2 (two) times daily.    ergocalciferol, vitamin D2, (VITAMIN D ORAL) Take 2,000 Int'l Units by mouth once daily.    fluocinonide (LIDEX) 0.05 % external solution USE FOR SCALP AT BEDTIME AS NEEDED FOR PRURITUS    fluticasone propionate (FLONASE) 50 mcg/actuation nasal spray 1 spray by Each Nare route daily as needed.     fluticasone-salmeterol  diskus inhaler 250-50 mcg Inhale 1 puff into the lungs 2 (two) times daily. Controller    gabapentin (NEURONTIN) 100 MG capsule TAKE 2 CAPSULES(200 MG) BY MOUTH EVERY NIGHT AS NEEDED    hydroCHLOROthiazide (HYDRODIURIL) 25 MG tablet TAKE 1 TABLET(25 MG) BY MOUTH EVERY DAY    hydrocortisone 2.5 % cream Apply topically 2 (two) times daily.    hydrocortisone-pramoxine (ANALPRAM-HC) 2.5-1 % Crea Place rectally 3 (three) times daily.    ketorolac 0.5% (ACULAR) 0.5 % Drop Place 1 drop into both eyes 3 (three) times daily.    latanoprost 0.005 % ophthalmic solution Place 1 drop into both eyes every evening.     levalbuterol (XOPENEX) 0.63 mg/3 mL nebulizer solution TAKE 3 MILLILITERS BY NEBULIZATION EVERY 4 HOURS AS NEEDED FOR WHEEZING(RESCUE)    methazoLAMIDE (NEPTAZANE) 25 mg tablet Take 25 mg by mouth 3 (three) times daily.     metoprolol succinate (TOPROL-XL) 100 MG 24 hr tablet Take 1 tablet (100 mg total) by mouth once daily. Patient needs to schedule an appointment in the consult cardiology clinic.    nitroGLYCERIN (NITROSTAT) 0.4 MG SL tablet 1 Tablet Sublingual  One tablet under tounge as needed for chest pain.    pilocarpine HCL 2% (PILOCAR) 2 % ophthalmic solution INT 1 GTT INTO OU QID    prednisoLONE acetate (PRED FORTE) 1 % DrpS     RHOPRESSA 0.02 % ophthalmic solution     telmisartan (MICARDIS) 80 MG Tab TAKE 1 TABLET(80 MG) BY MOUTH EVERY DAY    timolol maleate 0.5% (TIMOPTIC) 0.5 % Drop Place 1 drop into both eyes 2 (two) times a day.    tiZANidine (ZANAFLEX) 2 MG tablet TAKE 1 TABLET(2 MG) BY MOUTH EVERY NIGHT AS NEEDED    traMADoL (ULTRAM) 50 mg tablet Take 1 tablet (50 mg total) by mouth every 6 (six) hours as needed for Pain.    vit C-vit E-copper-zinc-lutein 226 mg-200 unit -5 mg-0.8 mg Cap Take by mouth. 2 Capsule Oral Every day    omeprazole (PRILOSEC) 40 MG capsule Take 1 capsule (40 mg total) by mouth once daily. For acid heartburn     No current facility-administered medications  "for this visit.       Objective:     Right Arm BP - Sittin/76 (22 1048)  Left Arm BP - Sittin/79 (22 1048)    BP (!) 194/79 (BP Location: Left arm, Patient Position: Sitting, BP Method: Medium (Automatic))   Pulse 68   Ht 5' 1" (1.549 m)   Wt 62.1 kg (136 lb 14.5 oz)   SpO2 97%   BMI 25.87 kg/m²       Physical Exam  Vitals and nursing note reviewed.   Constitutional:       General: She is not in acute distress.     Appearance: Normal appearance. She is well-developed. She is not diaphoretic.   HENT:      Head: Normocephalic and atraumatic.   Eyes:      General: Lids are normal. No scleral icterus.     Conjunctiva/sclera: Conjunctivae normal.   Neck:      Vascular: Normal carotid pulses. No carotid bruit, hepatojugular reflux or JVD.   Cardiovascular:      Rate and Rhythm: Normal rate and regular rhythm.      Chest Wall: PMI is not displaced.      Pulses: Intact distal pulses.           Carotid pulses are 2+ on the right side and 2+ on the left side.       Radial pulses are 2+ on the right side and 2+ on the left side.        Dorsalis pedis pulses are 2+ on the right side and 2+ on the left side.        Posterior tibial pulses are 1+ on the right side and 1+ on the left side.      Heart sounds: S1 normal and S2 normal. No murmur heard.    No friction rub. No gallop.   Pulmonary:      Effort: Pulmonary effort is normal. No respiratory distress.      Breath sounds: Normal breath sounds. No decreased breath sounds, wheezing, rhonchi or rales.   Chest:      Chest wall: No tenderness.   Abdominal:      General: Bowel sounds are normal. There is no distension or abdominal bruit.      Palpations: Abdomen is soft. There is no fluid wave or pulsatile mass.      Tenderness: There is no abdominal tenderness.   Musculoskeletal:      Cervical back: Neck supple.   Skin:     General: Skin is warm and dry.      Findings: No rash.      Nails: There is no clubbing.   Neurological:      Mental Status: She " is alert and oriented to person, place, and time.      Gait: Gait normal.   Psychiatric:         Speech: Speech normal.         Behavior: Behavior normal.         Thought Content: Thought content normal.         Judgment: Judgment normal.         Chemistry        Component Value Date/Time     05/18/2022 1855    K 3.4 (L) 05/18/2022 1855    CL 97 05/18/2022 1855    CO2 31 (H) 05/18/2022 1855    BUN 26 05/18/2022 1855    CREATININE 1.6 (H) 05/18/2022 1855    GLU 98 05/18/2022 1855        Component Value Date/Time    CALCIUM 8.4 (L) 05/18/2022 1855    ALKPHOS 71 11/11/2021 0825    AST 21 11/11/2021 0825    ALT 9 (L) 11/11/2021 0825    BILITOT 0.5 11/11/2021 0825    ESTGFRAFRICA 32.2 (A) 05/18/2022 1855    EGFRNONAA 28.0 (A) 05/18/2022 1855        Lab Results   Component Value Date    HGBA1C 5.7 (H) 11/11/2021     Recent Labs   Lab 11/11/21  0825 05/18/22  1855   WBC 7.75 7.77   Hemoglobin 10.1 L 10.5 L   Hematocrit 31.9 L 31.7 L   MCV 85 83   Platelets 164 156   TSH 3.948  --    Cholesterol 110 L  --    HDL 32 L  --    LDL Cholesterol 41.8 L  --    Triglycerides 181 H  --    HDL/Cholesterol Ratio 29.1  --        Recent Labs   Lab 05/18/22 1855   INR 1.0        Test(s) Reviewed  I have reviewed the following in detail:  [] Stress test   [] Angiography   [] Echocardiogram   [x] Labs   [] Other:         Assessment/Plan:   1. Chronic diastolic heart failure  Well compensated. Stable. Monitor.     2. Essential hypertension  BP not at goal <140/90.  Discussed  her medications for more consistent BP management.  Refilled mediations for better compliance.  Requested 2 week bp log.  If bp remains elevated consider changing metoprolol to carvedilol 25 mg BID.     3. Coronary artery disease involving native coronary artery of native heart without angina pectoris  Asymptomtatic. Stable. Monitor.     4. Atherosclerosis of aorta      5. Pure hypercholesterolemia  LDL at goal <70. Triglycerides not at goal <150.   Discussed increasing CV exercise and mediterranean diet.       6. Anemia due to stage 3b chronic kidney disease  Previously followed by nephrology, Dr. Lange.  Defer to PCP    7. Asthma, unspecified asthma severity, unspecified whether complicated, unspecified whether persistent   Not treated as she associated the wixela with a rash.  Encouraged to f/u with PCP.     A copy of this note will be forwarded to Dr. Gaston and Dr. Maxwell.     Follow up in about 3 months (around 10/13/2022).

## 2022-07-25 RX ORDER — GABAPENTIN 100 MG/1
CAPSULE ORAL
Qty: 60 CAPSULE | Refills: 0 | Status: SHIPPED | OUTPATIENT
Start: 2022-07-25 | End: 2022-09-18

## 2022-09-18 RX ORDER — GABAPENTIN 100 MG/1
CAPSULE ORAL
Qty: 60 CAPSULE | Refills: 0 | Status: SHIPPED | OUTPATIENT
Start: 2022-09-18 | End: 2022-10-30

## 2022-09-18 NOTE — TELEPHONE ENCOUNTER
checked.  No suspicious activity noted.  Refill done.    Last blood pressure as very elevated. Please call to find out if patient has cuff at home and get readings.  If not, please schedule an appointment.

## 2022-09-20 NOTE — TELEPHONE ENCOUNTER
Patient doesn't have b/p cuff, she has appt scheduled with Dr Rosales already on Friday for b/p and lump to leg.

## 2022-09-23 ENCOUNTER — OFFICE VISIT (OUTPATIENT)
Dept: INTERNAL MEDICINE | Facility: CLINIC | Age: 87
End: 2022-09-23
Payer: MEDICARE

## 2022-09-23 VITALS
BODY MASS INDEX: 26.82 KG/M2 | WEIGHT: 136.63 LBS | HEIGHT: 60 IN | SYSTOLIC BLOOD PRESSURE: 144 MMHG | TEMPERATURE: 97 F | OXYGEN SATURATION: 98 % | HEART RATE: 60 BPM | DIASTOLIC BLOOD PRESSURE: 70 MMHG

## 2022-09-23 DIAGNOSIS — D21.9 FIBROMA: Primary | ICD-10-CM

## 2022-09-23 PROCEDURE — 1100F PTFALLS ASSESS-DOCD GE2>/YR: CPT | Mod: CPTII,S$GLB,, | Performed by: INTERNAL MEDICINE

## 2022-09-23 PROCEDURE — 3288F PR FALLS RISK ASSESSMENT DOCUMENTED: ICD-10-PCS | Mod: CPTII,S$GLB,, | Performed by: INTERNAL MEDICINE

## 2022-09-23 PROCEDURE — 3288F FALL RISK ASSESSMENT DOCD: CPT | Mod: CPTII,S$GLB,, | Performed by: INTERNAL MEDICINE

## 2022-09-23 PROCEDURE — 1100F PR PT FALLS ASSESS DOC 2+ FALLS/FALL W/INJURY/YR: ICD-10-PCS | Mod: CPTII,S$GLB,, | Performed by: INTERNAL MEDICINE

## 2022-09-23 PROCEDURE — 99999 PR PBB SHADOW E&M-EST. PATIENT-LVL V: ICD-10-PCS | Mod: PBBFAC,,, | Performed by: INTERNAL MEDICINE

## 2022-09-23 PROCEDURE — 1125F AMNT PAIN NOTED PAIN PRSNT: CPT | Mod: CPTII,S$GLB,, | Performed by: INTERNAL MEDICINE

## 2022-09-23 PROCEDURE — 1159F MED LIST DOCD IN RCRD: CPT | Mod: CPTII,S$GLB,, | Performed by: INTERNAL MEDICINE

## 2022-09-23 PROCEDURE — 99999 PR PBB SHADOW E&M-EST. PATIENT-LVL V: CPT | Mod: PBBFAC,,, | Performed by: INTERNAL MEDICINE

## 2022-09-23 PROCEDURE — 99213 PR OFFICE/OUTPT VISIT, EST, LEVL III, 20-29 MIN: ICD-10-PCS | Mod: S$GLB,,, | Performed by: INTERNAL MEDICINE

## 2022-09-23 PROCEDURE — 1157F ADVNC CARE PLAN IN RCRD: CPT | Mod: CPTII,S$GLB,, | Performed by: INTERNAL MEDICINE

## 2022-09-23 PROCEDURE — 99213 OFFICE O/P EST LOW 20 MIN: CPT | Mod: S$GLB,,, | Performed by: INTERNAL MEDICINE

## 2022-09-23 PROCEDURE — 1159F PR MEDICATION LIST DOCUMENTED IN MEDICAL RECORD: ICD-10-PCS | Mod: CPTII,S$GLB,, | Performed by: INTERNAL MEDICINE

## 2022-09-23 PROCEDURE — 1157F PR ADVANCE CARE PLAN OR EQUIV PRESENT IN MEDICAL RECORD: ICD-10-PCS | Mod: CPTII,S$GLB,, | Performed by: INTERNAL MEDICINE

## 2022-09-23 PROCEDURE — 1125F PR PAIN SEVERITY QUANTIFIED, PAIN PRESENT: ICD-10-PCS | Mod: CPTII,S$GLB,, | Performed by: INTERNAL MEDICINE

## 2022-09-23 NOTE — PROGRESS NOTES
History of present illness:   [age] year lady with history of hypertension CAD dyslipidemia in today with family concern for a lump in her left thigh.  She states she noticed this week or two ago.  Only uncomfortable with direct pressure.  Does not recall having noted before.  No recent trauma.  No change in appearance of her lower extremity    Current medications:  Medications are all noted and reviewed.      Review of systems:  Constitutional:  No fever no chills no generalized body aches  Cardiovascular:  No chest pain palpitations syncope.  No claudication.    Respiratory:  No cough shortness of breath.      Physical examination:  General:  Alert female no acute distress appropriately groomed.    Vital signs:  Blood pressure 144/70 taken by this examiner  Left lower extremity:  No gross deformity .  Full range of motion stability and strength.  Attention to the area of concern is in the left distal medial thigh.  There is a 1 cm nodularity which is freely mobile.  There is no discoloration.  There is no fluctuance.  Slightly tender with direct pressure.  No edema, no calf tenderness no ischemic changes.    Impression:   Peers to be a benign fibroma or perhaps cystic lesion left medial distal thigh.  No intervention indicated    Plan:   Reassurance and discussed follow-up if any discomfort develops worsening change in appearance or other

## 2022-10-11 PROBLEM — I65.23 BILATERAL CAROTID ARTERY STENOSIS: Status: ACTIVE | Noted: 2022-10-11

## 2022-10-30 RX ORDER — GABAPENTIN 100 MG/1
CAPSULE ORAL
Qty: 60 CAPSULE | Refills: 0 | Status: SHIPPED | OUTPATIENT
Start: 2022-10-30 | End: 2023-01-12

## 2022-11-04 ENCOUNTER — OFFICE VISIT (OUTPATIENT)
Dept: INTERNAL MEDICINE | Facility: CLINIC | Age: 87
End: 2022-11-04
Payer: MEDICARE

## 2022-11-04 VITALS
DIASTOLIC BLOOD PRESSURE: 60 MMHG | HEART RATE: 65 BPM | SYSTOLIC BLOOD PRESSURE: 122 MMHG | BODY MASS INDEX: 27.09 KG/M2 | WEIGHT: 138 LBS | HEIGHT: 60 IN | RESPIRATION RATE: 14 BRPM | TEMPERATURE: 98 F

## 2022-11-04 DIAGNOSIS — E78.00 PURE HYPERCHOLESTEROLEMIA: Chronic | ICD-10-CM

## 2022-11-04 DIAGNOSIS — I70.0 ATHEROSCLEROSIS OF AORTA: Chronic | ICD-10-CM

## 2022-11-04 DIAGNOSIS — M81.0 OSTEOPOROSIS, POSTMENOPAUSAL: ICD-10-CM

## 2022-11-04 DIAGNOSIS — M54.2 NECK PAIN: ICD-10-CM

## 2022-11-04 DIAGNOSIS — I25.10 CORONARY ARTERY DISEASE INVOLVING NATIVE CORONARY ARTERY OF NATIVE HEART WITHOUT ANGINA PECTORIS: ICD-10-CM

## 2022-11-04 DIAGNOSIS — L98.9 SKIN LESION: ICD-10-CM

## 2022-11-04 DIAGNOSIS — I10 ESSENTIAL HYPERTENSION: Chronic | ICD-10-CM

## 2022-11-04 DIAGNOSIS — Z00.00 ANNUAL PHYSICAL EXAM: Primary | ICD-10-CM

## 2022-11-04 DIAGNOSIS — M25.511 CHRONIC PAIN OF BOTH SHOULDERS: ICD-10-CM

## 2022-11-04 DIAGNOSIS — Z23 NEED FOR SHINGLES VACCINE: ICD-10-CM

## 2022-11-04 DIAGNOSIS — I50.32 CHRONIC DIASTOLIC HEART FAILURE: Chronic | ICD-10-CM

## 2022-11-04 DIAGNOSIS — R29.6 RECURRENT FALLS: ICD-10-CM

## 2022-11-04 DIAGNOSIS — G89.29 CHRONIC PAIN OF BOTH SHOULDERS: ICD-10-CM

## 2022-11-04 DIAGNOSIS — N18.4 CKD (CHRONIC KIDNEY DISEASE), STAGE IV: ICD-10-CM

## 2022-11-04 DIAGNOSIS — M25.512 CHRONIC PAIN OF BOTH SHOULDERS: ICD-10-CM

## 2022-11-04 PROCEDURE — 1159F PR MEDICATION LIST DOCUMENTED IN MEDICAL RECORD: ICD-10-PCS | Mod: CPTII,S$GLB,, | Performed by: INTERNAL MEDICINE

## 2022-11-04 PROCEDURE — G0008 FLU VACCINE (QUAD) GREATER THAN OR EQUAL TO 3YO PRESERVATIVE FREE IM: ICD-10-PCS | Mod: S$GLB,,, | Performed by: INTERNAL MEDICINE

## 2022-11-04 PROCEDURE — 90686 IIV4 VACC NO PRSV 0.5 ML IM: CPT | Mod: S$GLB,,, | Performed by: INTERNAL MEDICINE

## 2022-11-04 PROCEDURE — 1126F PR PAIN SEVERITY QUANTIFIED, NO PAIN PRESENT: ICD-10-PCS | Mod: CPTII,S$GLB,, | Performed by: INTERNAL MEDICINE

## 2022-11-04 PROCEDURE — 99499 RISK ADDL DX/OHS AUDIT: ICD-10-PCS | Mod: HCNC,S$GLB,, | Performed by: INTERNAL MEDICINE

## 2022-11-04 PROCEDURE — 99397 PR PREVENTIVE VISIT,EST,65 & OVER: ICD-10-PCS | Mod: S$GLB,,, | Performed by: INTERNAL MEDICINE

## 2022-11-04 PROCEDURE — G0008 ADMIN INFLUENZA VIRUS VAC: HCPCS | Mod: S$GLB,,, | Performed by: INTERNAL MEDICINE

## 2022-11-04 PROCEDURE — 1157F ADVNC CARE PLAN IN RCRD: CPT | Mod: CPTII,S$GLB,, | Performed by: INTERNAL MEDICINE

## 2022-11-04 PROCEDURE — 1160F RVW MEDS BY RX/DR IN RCRD: CPT | Mod: CPTII,S$GLB,, | Performed by: INTERNAL MEDICINE

## 2022-11-04 PROCEDURE — 1160F PR REVIEW ALL MEDS BY PRESCRIBER/CLIN PHARMACIST DOCUMENTED: ICD-10-PCS | Mod: CPTII,S$GLB,, | Performed by: INTERNAL MEDICINE

## 2022-11-04 PROCEDURE — 99999 PR PBB SHADOW E&M-EST. PATIENT-LVL IV: ICD-10-PCS | Mod: PBBFAC,,, | Performed by: INTERNAL MEDICINE

## 2022-11-04 PROCEDURE — 1159F MED LIST DOCD IN RCRD: CPT | Mod: CPTII,S$GLB,, | Performed by: INTERNAL MEDICINE

## 2022-11-04 PROCEDURE — 90686 FLU VACCINE (QUAD) GREATER THAN OR EQUAL TO 3YO PRESERVATIVE FREE IM: ICD-10-PCS | Mod: S$GLB,,, | Performed by: INTERNAL MEDICINE

## 2022-11-04 PROCEDURE — 99999 PR PBB SHADOW E&M-EST. PATIENT-LVL IV: CPT | Mod: PBBFAC,,, | Performed by: INTERNAL MEDICINE

## 2022-11-04 PROCEDURE — 99397 PER PM REEVAL EST PAT 65+ YR: CPT | Mod: S$GLB,,, | Performed by: INTERNAL MEDICINE

## 2022-11-04 PROCEDURE — 99499 UNLISTED E&M SERVICE: CPT | Mod: HCNC,S$GLB,, | Performed by: INTERNAL MEDICINE

## 2022-11-04 PROCEDURE — 1157F PR ADVANCE CARE PLAN OR EQUIV PRESENT IN MEDICAL RECORD: ICD-10-PCS | Mod: CPTII,S$GLB,, | Performed by: INTERNAL MEDICINE

## 2022-11-04 PROCEDURE — 1126F AMNT PAIN NOTED NONE PRSNT: CPT | Mod: CPTII,S$GLB,, | Performed by: INTERNAL MEDICINE

## 2022-11-04 RX ORDER — VARICELLA-ZOSTER GE VAC,2 OF 2 50 MCG
1 VIAL (EA) INTRAMUSCULAR ONCE
Qty: 1 EACH | Refills: 1 | Status: SHIPPED | OUTPATIENT
Start: 2022-11-04 | End: 2022-11-04

## 2022-11-04 NOTE — PROGRESS NOTES
Subjective:       Patient ID: Nicolasa Jurado is a 89 y.o. female.    Chief Complaint: Annual Exam    HPI    89 y.o. female here for annual exam.  Offered and declined  - has granddaughter.    Cholesterol: needs  Vaccines: Influenza - needs; Tetanus - 2020; Prevnar 20 - 13 and 23 done; Zoster - needs; COVID - 3 done  Eye exam: due Nov 29th.  Mammogram:  No longer indicated (patient agrees)  Gyn exam:  No longer indicated (patient agrees)  Colonoscopy:  No longer indicated (patient agrees)  DEXA: needs    Exercise: no regular exercise.  She does not move from her room.  She cannot see where to go.  She just sees a little out of her left eye.  Diet: mix of fast food, home cooked, eating out.    She fell in the shower three weeks ago.  She had a big fall.  She still has pain in her left shoulder.  Her right shoulder/arm are still bothering her.  She thinks something happened. She cannot turn to her right side.  She has fallen twice in the last month.  She uses a cane when she leaves the house.      Past Medical History:   Diagnosis Date    Anemia     Asthma, chronic     Bilateral carotid artery stenosis 10/11/2022    CAD (coronary artery disease)     Elevated glucose 2/6/2015    GERD (gastroesophageal reflux disease)     protonix 40    Hypercalcemia 10/16/2012    Hyperlipidemia     Hyperparathyroidism     Hypertension     Insomnia 2/11/2016    Renal artery stenosis     Right low back pain 6/2/2016     Past Surgical History:   Procedure Laterality Date    APPENDECTOMY      CHOLECYSTECTOMY      COLONOSCOPY  2006    CORONARY ANGIOPLASTY WITH STENT PLACEMENT      1 heart/1 kidney    HYSTERECTOMY      KIDNEY SURGERY      stent in renal artery     Social History     Socioeconomic History    Marital status:    Tobacco Use    Smoking status: Never    Smokeless tobacco: Never   Substance and Sexual Activity    Alcohol use: No    Drug use: Never    Sexual activity: Never   Social History Narrative    Retired.  Daughter  2014     Social Determinants of Health     Financial Resource Strain: Low Risk     Difficulty of Paying Living Expenses: Not hard at all   Food Insecurity: No Food Insecurity    Worried About Running Out of Food in the Last Year: Never true    Ran Out of Food in the Last Year: Never true   Transportation Needs: No Transportation Needs    Lack of Transportation (Medical): No    Lack of Transportation (Non-Medical): No   Physical Activity: Inactive    Days of Exercise per Week: 0 days    Minutes of Exercise per Session: 0 min   Stress: Stress Concern Present    Feeling of Stress : To some extent   Social Connections: Unknown    Frequency of Communication with Friends and Family: Three times a week    Frequency of Social Gatherings with Friends and Family: Patient refused    Active Member of Clubs or Organizations: No    Attends Club or Organization Meetings: Never    Marital Status:    Housing Stability: Low Risk     Unable to Pay for Housing in the Last Year: No    Number of Places Lived in the Last Year: 1    Unstable Housing in the Last Year: No     Review of patient's allergies indicates:   Allergen Reactions    Venom-wasp     Penicillin      Other reaction(s): Rash    Amoxicillin-pot clavulanate      Other reaction(s): diarrea  Other reaction(s): Vomiting  Other reaction(s): diarrea     Mrs. Nicolasa Jurado had no medications administered during this visit.      Review of Systems      Objective:      Physical Exam  Vitals reviewed.   Constitutional:       Appearance: She is well-developed.   HENT:      Head: Normocephalic and atraumatic.      Mouth/Throat:      Pharynx: No oropharyngeal exudate.   Eyes:      General: No scleral icterus.        Right eye: No discharge.         Left eye: No discharge.      Pupils: Pupils are equal, round, and reactive to light.   Neck:      Thyroid: No thyromegaly.      Trachea: No tracheal deviation.   Cardiovascular:      Rate and Rhythm: Normal rate and  regular rhythm.      Heart sounds: Normal heart sounds. No murmur heard.    No friction rub. No gallop.   Pulmonary:      Effort: Pulmonary effort is normal. No respiratory distress.      Breath sounds: Normal breath sounds. No wheezing or rales.   Chest:      Chest wall: No tenderness.   Abdominal:      General: Bowel sounds are normal. There is no distension.      Palpations: Abdomen is soft. There is no mass.      Tenderness: There is no abdominal tenderness. There is no guarding or rebound.   Musculoskeletal:      Right shoulder: Tenderness present. Decreased range of motion.      Left shoulder: Tenderness present. Decreased range of motion.        Arms:       Cervical back: Normal range of motion and neck supple.   Skin:     General: Skin is warm and dry.      Coloration: Skin is not pale.      Findings: No erythema or rash.   Neurological:      Mental Status: She is alert and oriented to person, place, and time.   Psychiatric:         Behavior: Behavior normal.           Assessment:       1. Annual physical exam    2. Essential hypertension  - CBC Auto Differential; Future  - Comprehensive Metabolic Panel; Future  - TSH; Future  - Lipid Panel; Future    3. Pure hypercholesterolemia    4. Chronic diastolic heart failure    5. Atherosclerosis of aorta    6. Coronary artery disease involving native coronary artery of native heart without angina pectoris    7. CKD (chronic kidney disease), stage IV    8. Osteoporosis, postmenopausal    9. Need for shingles vaccine  - varicella-zoster gE vac,2 of 2 (SHINGRIX GE ANTIGEN COMPONENT) 50 mcg SusR; Inject 0.5 mLs into the muscle once. for 1 dose  Dispense: 1 each; Refill: 1    10. Skin lesion  - Ambulatory referral/consult to Dermatology; Future    11. Neck pain  - Ambulatory referral/consult to Physical/Occupational Therapy; Future    12. Chronic pain of both shoulders  - Ambulatory referral/consult to Physical/Occupational Therapy; Future    13. Recurrent falls  -  WALKER FOR HOME USE      Plan:       1. Check CBC, CMP, TSH, lipids.  Discussed diet and exercise.  Up-to-date on vaccines.  Flu vaccine given today.    2.  Continue amlodipine 10 mg, HCT 5 mg, Toprol- mg, Micardis 80 mg.  3.  Continue Lipitor 80 mg.    4. Monitor.    5.  Continue Lipitor 80 mg.  6.  Continue aspirin 81 mg, Lipitor 80 mg, blood pressure control.  7.  Monitor.  Discussed fluid intake.  8. Continue vitamin-D and calcium.    9.  Shingles vaccine sent to pharmacy.  10.  Refer to Dermatology.    11/12.  Refer to physical therapy  13.  Walker ordered.

## 2022-11-07 ENCOUNTER — LAB VISIT (OUTPATIENT)
Dept: LAB | Facility: HOSPITAL | Age: 87
End: 2022-11-07
Attending: INTERNAL MEDICINE
Payer: MEDICARE

## 2022-11-07 DIAGNOSIS — I10 ESSENTIAL HYPERTENSION: Chronic | ICD-10-CM

## 2022-11-07 LAB
ALBUMIN SERPL BCP-MCNC: 4.1 G/DL (ref 3.5–5.2)
ALP SERPL-CCNC: 104 U/L (ref 55–135)
ALT SERPL W/O P-5'-P-CCNC: 16 U/L (ref 10–44)
ANION GAP SERPL CALC-SCNC: 13 MMOL/L (ref 8–16)
AST SERPL-CCNC: 19 U/L (ref 10–40)
BASOPHILS # BLD AUTO: 0.02 K/UL (ref 0–0.2)
BASOPHILS NFR BLD: 0.3 % (ref 0–1.9)
BILIRUB SERPL-MCNC: 0.5 MG/DL (ref 0.1–1)
BUN SERPL-MCNC: 18 MG/DL (ref 10–30)
CALCIUM SERPL-MCNC: 9.6 MG/DL (ref 8.7–10.5)
CHLORIDE SERPL-SCNC: 100 MMOL/L (ref 95–110)
CHOLEST SERPL-MCNC: 158 MG/DL (ref 120–199)
CHOLEST/HDLC SERPL: 3.5 {RATIO} (ref 2–5)
CO2 SERPL-SCNC: 28 MMOL/L (ref 23–29)
CREAT SERPL-MCNC: 1.2 MG/DL (ref 0.5–1.4)
DIFFERENTIAL METHOD: ABNORMAL
EOSINOPHIL # BLD AUTO: 0.1 K/UL (ref 0–0.5)
EOSINOPHIL NFR BLD: 1.9 % (ref 0–8)
ERYTHROCYTE [DISTWIDTH] IN BLOOD BY AUTOMATED COUNT: 15.3 % (ref 11.5–14.5)
EST. GFR  (NO RACE VARIABLE): 42.7 ML/MIN/1.73 M^2
GLUCOSE SERPL-MCNC: 109 MG/DL (ref 70–110)
HCT VFR BLD AUTO: 35.1 % (ref 37–48.5)
HDLC SERPL-MCNC: 45 MG/DL (ref 40–75)
HDLC SERPL: 28.5 % (ref 20–50)
HGB BLD-MCNC: 10.9 G/DL (ref 12–16)
IMM GRANULOCYTES # BLD AUTO: 0.03 K/UL (ref 0–0.04)
IMM GRANULOCYTES NFR BLD AUTO: 0.4 % (ref 0–0.5)
LDLC SERPL CALC-MCNC: 79.6 MG/DL (ref 63–159)
LYMPHOCYTES # BLD AUTO: 1.6 K/UL (ref 1–4.8)
LYMPHOCYTES NFR BLD: 21.5 % (ref 18–48)
MCH RBC QN AUTO: 26.4 PG (ref 27–31)
MCHC RBC AUTO-ENTMCNC: 31.1 G/DL (ref 32–36)
MCV RBC AUTO: 85 FL (ref 82–98)
MONOCYTES # BLD AUTO: 0.6 K/UL (ref 0.3–1)
MONOCYTES NFR BLD: 8 % (ref 4–15)
NEUTROPHILS # BLD AUTO: 5 K/UL (ref 1.8–7.7)
NEUTROPHILS NFR BLD: 67.9 % (ref 38–73)
NONHDLC SERPL-MCNC: 113 MG/DL
NRBC BLD-RTO: 0 /100 WBC
PLATELET # BLD AUTO: 167 K/UL (ref 150–450)
PMV BLD AUTO: 12.5 FL (ref 9.2–12.9)
POTASSIUM SERPL-SCNC: 3.7 MMOL/L (ref 3.5–5.1)
PROT SERPL-MCNC: 7.7 G/DL (ref 6–8.4)
RBC # BLD AUTO: 4.13 M/UL (ref 4–5.4)
SODIUM SERPL-SCNC: 141 MMOL/L (ref 136–145)
TRIGL SERPL-MCNC: 167 MG/DL (ref 30–150)
TSH SERPL DL<=0.005 MIU/L-ACNC: 2.89 UIU/ML (ref 0.4–4)
WBC # BLD AUTO: 7.41 K/UL (ref 3.9–12.7)

## 2022-11-07 PROCEDURE — 80061 LIPID PANEL: CPT | Performed by: INTERNAL MEDICINE

## 2022-11-07 PROCEDURE — 85025 COMPLETE CBC W/AUTO DIFF WBC: CPT | Performed by: INTERNAL MEDICINE

## 2022-11-07 PROCEDURE — 36415 COLL VENOUS BLD VENIPUNCTURE: CPT | Mod: PO | Performed by: INTERNAL MEDICINE

## 2022-11-07 PROCEDURE — 84443 ASSAY THYROID STIM HORMONE: CPT | Performed by: INTERNAL MEDICINE

## 2022-11-07 PROCEDURE — 80053 COMPREHEN METABOLIC PANEL: CPT | Performed by: INTERNAL MEDICINE

## 2022-11-09 ENCOUNTER — CLINICAL SUPPORT (OUTPATIENT)
Dept: REHABILITATION | Facility: HOSPITAL | Age: 87
End: 2022-11-09
Attending: INTERNAL MEDICINE
Payer: MEDICARE

## 2022-11-09 DIAGNOSIS — G89.29 CHRONIC PAIN OF BOTH SHOULDERS: ICD-10-CM

## 2022-11-09 DIAGNOSIS — M25.511 BILATERAL SHOULDER PAIN, UNSPECIFIED CHRONICITY: ICD-10-CM

## 2022-11-09 DIAGNOSIS — M54.2 NECK PAIN: ICD-10-CM

## 2022-11-09 DIAGNOSIS — M25.512 BILATERAL SHOULDER PAIN, UNSPECIFIED CHRONICITY: ICD-10-CM

## 2022-11-09 DIAGNOSIS — M25.511 CHRONIC PAIN OF BOTH SHOULDERS: ICD-10-CM

## 2022-11-09 DIAGNOSIS — M25.512 CHRONIC PAIN OF BOTH SHOULDERS: ICD-10-CM

## 2022-11-09 PROCEDURE — 97161 PT EVAL LOW COMPLEX 20 MIN: CPT | Mod: PO

## 2022-11-09 PROCEDURE — 97110 THERAPEUTIC EXERCISES: CPT | Mod: PO

## 2022-11-09 NOTE — PROGRESS NOTES
[facility]   Physical Therapy Initial Evaluation     Date: 11/9/2022   Name: Nciolasa Jurado  [facility] Number: [id number]    Therapy Diagnosis:   Encounter Diagnoses   Name Primary?    Neck pain     Chronic pain of both shoulders     Bilateral shoulder pain, unspecified chronicity      Physician: Houston Gaston MD    Physician Orders: PT Eval and Treat   Medical Diagnosis from Referral:   M54.2 (ICD-10-CM) - Neck pain   M25.511,G89.29,M25.512 (ICD-10-CM) - Chronic pain of both shoulders     Evaluation Date: 11/9/2022  Authorization Period Expiration: 11/4/2022  Plan of Care Expiration: 1/9/2023  Progress Note Due: 12/9/2022  Visit # / Visits authorized: 1/ 1   FOTO: 1/3    Precautions: Standard and CAD   PT HAS VERY LIMITED EYE SIGHT    Time In: 2:45  Time Out: 3:30  Total Appointment Time (timed & untimed codes): 45 minutes      SUBJECTIVE     Date of onset:  Pt reported that she fell two times.  6 weeks ago on her R side and 4 weeks ago on the L.  She reports pain in her neck and B shoulders R = L.  She reports difficulty turning to look to her R.    History of current condition - Nicolasa reports: pt is not able to see out of her R eye and very little (about 2 ft) out of her L eye    Falls: pt has fallen 4 times in the last year.    Imaging, none:     Prior Therapy: no  Social History: Pt lives in a single story home with no steps. She lives with her family.   Occupation: not working  Prior Level of Function: up until her first fall she was independent in all ADL's  Current Level of Function: her grandson helps her with some dailty activities. Pt remains indepedendent in all ADL's    Pain:  Current 0/10, worst 8/10, best 0/10   Location: right neck & head   Description: pulling  Aggravating Factors: Trying to sleep or turning to her right   Easing Factors: heating pad and Tylenol     Patients goals: Return to previous level of function     Medical History:   Past Medical History:   Diagnosis Date     Anemia     Asthma, chronic     Bilateral carotid artery stenosis 10/11/2022    CAD (coronary artery disease)     Elevated glucose 2/6/2015    GERD (gastroesophageal reflux disease)     protonix 40    Hypercalcemia 10/16/2012    Hyperlipidemia     Hyperparathyroidism     Hypertension     Insomnia 2/11/2016    Renal artery stenosis     Right low back pain 6/2/2016       Surgical History:   Nicolasa Jurado  has a past surgical history that includes Colonoscopy (2006); Coronary angioplasty with stent; Hysterectomy; Cholecystectomy; Kidney surgery; and Appendectomy.    Medications:   Nicolasa has a current medication list which includes the following prescription(s): acyclovir 5%, albuterol, amlodipine, aspirin, atorvastatin, brimonidine 0.2%, cinacalcet, diclofenac sodium, dorzolamide, dorzolamide-timolol 2-0.5%, ergocalciferol (vitamin d2), fluocinonide, fluticasone propionate, flovent diskus, gabapentin, hydrochlorothiazide, hydrocortisone, hydrocortisone-pramoxine, ketorolac 0.5%, latanoprost, levalbuterol, methazolamide, metoprolol succinate, nitroglycerin, omeprazole, pilocarpine hcl 2%, prednisolone acetate, rhopressa, telmisartan, timolol maleate 0.5%, tizanidine, tramadol, and vit c-e-cupric-zinc-lutein.    Allergies:   Review of patient's allergies indicates:   Allergen Reactions    Venom-wasp     Penicillin      Other reaction(s): Rash    Amoxicillin-pot clavulanate      Other reaction(s): diarrea  Other reaction(s): Vomiting  Other reaction(s): diarrea          OBJECTIVE     Observation: Pt required a SPC and min assist to enter the clinic secondary to weakness and poor eye sight.    Posture: R side of pelvis elevated    Cervical Range of Motion:    % Pain   Flexion 80 yes     Extension 25 no     Right Rotation 25 Pain R side     Left Rotation 50 Tight on the R     Right Side Bending 25 Yes R   Left Side Bending 25 Yes L      Shoulder Range of Motion:   Shoulder Left Right   Flexion 160 160   Abduction 115 125    ER 80 85   IR 60 60       Upper Extremity Strength  (R) UE  (L) UE    Shoulder flexion: nt Shoulder flexion: nt   Shoulder Abduction: nt Shoulder abduction: nt   Shoulder ER nt Shoulder ER nt   Shoulder IR nt Shoulder IR nt   Elbow flexion: nt Elbow flexion: nt   Elbow extension: nt Elbow extension: nt   Wrist flexion: nt Wrist flexion: nt   Wrist extension: nt Wrist extension: nt    nt : nt   Lower Trap nt Lower Trap nt   Middle Trap nt Middle Trap nt   Rhomboids nt Rhomboids nt         Special Tests:  Distraction nt   Compression nt   Spurlings nt   Sharp-Esau nt   VA test nt   Lateral Flexion Alar Ligament nt   DNF test nt       Joint Mobility: hypomobile,          Thoracic mobility: hypomobile    Palpation: B flank pain      Sensation: intact    Flexibility: limited in Cx spine musculature - difficult to assess secondary to limited Cx spine ROM       Limitation/Restriction for FOTO  Survey    Therapist reviewed FOTO scores for Nicolasa Jurado on 11/9/2022.   FOTO documents entered into [employer] - see Media section.    Limitation Score: %         TREATMENT     Total Treatment time (time-based codes) separate from Evaluation: 15 minutes      Nicolsaa received the treatments listed below:      therapeutic exercises to develop ROM, flexibility, posture, and core stabilization for 15 minutes including:  Scapular retractions  Shoulder shrugs  Seated arm slides into shoulder flexion        PATIENT EDUCATION AND HOME EXERCISES     Education provided:   - yes    Written Home Exercises Provided: yes. Exercises were reviewed and Nicolasa was able to demonstrate them prior to the end of the session.  Nicolasa demonstrated good  understanding of the education provided. See EMR under Patient Instructions for exercises provided during therapy sessions.    ASSESSMENT     Nicolasa is a 89 y.o. female referred to outpatient Physical Therapy with a medical diagnosis of   M54.2 (ICD-10-CM) - Neck pain   M25.511,G89.29,M25.512 (ICD-10-CM) -  Chronic pain of both shoulders   . Patient presents with limited eye sight requiring min assist and a SPC to enter the clinic.  Pt with limited Cx spine ROM and limited joint mobility in the Cx and thoracic spine and rib cage.    Patient prognosis is Good.   Patient will benefit from skilled outpatient Physical Therapy to address the deficits stated above and in the chart below, provide patient /family education, and to maximize patientt's level of independence.     Plan of care discussed with patient: Yes  Patient's spiritual, cultural and educational needs considered and patient is agreeable to the plan of care and goals as stated below:     Anticipated Barriers for therapy: scheduling    Medical Necessity is demonstrated by the following  History  Co-morbidities and personal factors that may impact the plan of care Co-morbidities:   advanced age and poor eye sight - see past medical history    Personal Factors:   age     moderate   Examination  Body Structures and Functions, activity limitations and participation restrictions that may impact the plan of care Body Regions:   neck  back  upper extremities  trunk    Body Systems:    ROM  balance  gait  transfers  sight    Participation Restrictions:   Ambulating without min assist    Activity limitations:   Learning and applying knowledge  watching    General Tasks and Commands  no deficits    Communication  communicating with/receiving non-verbal language    Mobility  lifting and carrying objects  walking  moving around using equipment (WC)  driving (bike, car, motorcycle)    Self care  washing oneself (bathing, drying, washing hands)  caring for body parts (brushing teeth, shaving, grooming)  dressing    Domestic Life  shopping  cooking  doing house work (cleaning house, washing dishes, laundry)    Interactions/Relationships  Limited by poor eye sight    Life Areas  employment  basic economic transactions    Community and Social Life  community life  recreation  and leisure         low   Clinical Presentation evolving clinical presentation with changing clinical characteristics moderate   Decision Making/ Complexity Score: low     Goals:  Short Term Goals: 4 weeks   Pt will be instructed in an exercise program to address functional deficits related to her neck and B shoulder Sx.  Improve Cx spine ROM into R rotation and B side bending by 10 %  Improve joint mobility in the Cx and thoracic spine   Evaluate Pt for balance deficits  Pt will report decreased neck and B shoulder pain to </= 6/10    Long Term Goals: 8 weeks   Pt will be independent in a HEP to assist in managing their Cx spine and B shoulder Sx.  Improve Cx spine ROM into R rotation and B side bending to 50% of normal ROM  Pt will report decrease neck and B shoulder pain to </= 3/10  Improve dynamic balance to reduce fall risk.    PLAN   Plan of care Certification: 11/9/2022 to 1/9/2023.    Outpatient Physical Therapy 2 times weekly for 8 weeks to include the following interventions: Cervical/Lumbar Traction, Electrical Stimulation as indicated , Gait Training, Manual Therapy, Moist Heat/ Ice, Neuromuscular Re-ed, Patient Education, Self Care, Therapeutic Activities, Therapeutic Exercise, and dry needling .     Joe Trevino, PT      I CERTIFY THE NEED FOR THESE SERVICES FURNISHED UNDER THIS PLAN OF TREATMENT AND WHILE UNDER MY CARE   Physician's comments:     Physician's Signature: ___________________________________________________

## 2022-11-14 PROBLEM — M25.511 BILATERAL SHOULDER PAIN: Status: ACTIVE | Noted: 2022-11-14

## 2022-11-14 PROBLEM — M25.512 BILATERAL SHOULDER PAIN: Status: ACTIVE | Noted: 2022-11-14

## 2022-11-14 PROBLEM — M54.2 NECK PAIN: Status: ACTIVE | Noted: 2022-11-14

## 2022-11-15 NOTE — PLAN OF CARE
[facility]   Physical Therapy Initial Evaluation     Date: 11/9/2022   Name: Nicolasa Jurado  [facility] Number: [id number]    Therapy Diagnosis:   Encounter Diagnoses   Name Primary?    Neck pain     Chronic pain of both shoulders     Bilateral shoulder pain, unspecified chronicity      Physician: Houston Gaston MD    Physician Orders: PT Eval and Treat   Medical Diagnosis from Referral:   M54.2 (ICD-10-CM) - Neck pain   M25.511,G89.29,M25.512 (ICD-10-CM) - Chronic pain of both shoulders     Evaluation Date: 11/9/2022  Authorization Period Expiration: 11/4/2022  Plan of Care Expiration: 1/9/2023  Progress Note Due: 12/9/2022  Visit # / Visits authorized: 1/ 1   FOTO: 1/3    Precautions: Standard and CAD  PT HAS VERY LIMITED EYE SIGHT    Time In: 2:45  Time Out: 3:30  Total Appointment Time (timed & untimed codes): 45 minutes      SUBJECTIVE     Date of onset:  Pt reported that she fell two times.  6 weeks ago on her R side and 4 weeks ago on the L.  She reports pain in her neck and B shoulders R = L.  She reports difficulty turning to look to her R.    History of current condition - Nicolasa reports: pt is not able to see out of her R eye and very little (about 2 ft) out of her L eye    Falls: pt has fallen 4 times in the last year.    Imaging, none:     Prior Therapy: no  Social History: Pt lives in a single story home with no steps. She lives with her family.   Occupation: not working  Prior Level of Function: up until her first fall she was independent in all ADL's  Current Level of Function: her grandson helps her with some dailty activities. Pt remains indepedendent in all ADL's    Pain:  Current 0/10, worst 8/10, best 0/10   Location: right neck & head   Description: pulling  Aggravating Factors: Trying to sleep or turning to her right   Easing Factors: heating pad and Tylenol     Patients goals: Return to previous level of function     Medical History:   Past Medical History:   Diagnosis Date     Anemia     Asthma, chronic     Bilateral carotid artery stenosis 10/11/2022    CAD (coronary artery disease)     Elevated glucose 2/6/2015    GERD (gastroesophageal reflux disease)     protonix 40    Hypercalcemia 10/16/2012    Hyperlipidemia     Hyperparathyroidism     Hypertension     Insomnia 2/11/2016    Renal artery stenosis     Right low back pain 6/2/2016       Surgical History:   Nicolasa Jurado  has a past surgical history that includes Colonoscopy (2006); Coronary angioplasty with stent; Hysterectomy; Cholecystectomy; Kidney surgery; and Appendectomy.    Medications:   Nicolasa has a current medication list which includes the following prescription(s): acyclovir 5%, albuterol, amlodipine, aspirin, atorvastatin, brimonidine 0.2%, cinacalcet, diclofenac sodium, dorzolamide, dorzolamide-timolol 2-0.5%, ergocalciferol (vitamin d2), fluocinonide, fluticasone propionate, flovent diskus, gabapentin, hydrochlorothiazide, hydrocortisone, hydrocortisone-pramoxine, ketorolac 0.5%, latanoprost, levalbuterol, methazolamide, metoprolol succinate, nitroglycerin, omeprazole, pilocarpine hcl 2%, prednisolone acetate, rhopressa, telmisartan, timolol maleate 0.5%, tizanidine, tramadol, and vit c-e-cupric-zinc-lutein.    Allergies:   Review of patient's allergies indicates:   Allergen Reactions    Venom-wasp     Penicillin      Other reaction(s): Rash    Amoxicillin-pot clavulanate      Other reaction(s): diarrea  Other reaction(s): Vomiting  Other reaction(s): diarrea          OBJECTIVE     Observation: Pt required a SPC and min assist to enter the clinic secondary to weakness and poor eye sight.    Posture: R side of pelvis elevated    Cervical Range of Motion:    % Pain   Flexion 80 yes     Extension 25 no     Right Rotation 25 Pain R side     Left Rotation 50 Tight on the R     Right Side Bending 25 Yes R   Left Side Bending 25 Yes L      Shoulder Range of Motion:   Shoulder Left Right   Flexion 160 160   Abduction 115 125    ER 80 85   IR 60 60       Upper Extremity Strength  (R) UE  (L) UE    Shoulder flexion: nt Shoulder flexion: nt   Shoulder Abduction: nt Shoulder abduction: nt   Shoulder ER nt Shoulder ER nt   Shoulder IR nt Shoulder IR nt   Elbow flexion: nt Elbow flexion: nt   Elbow extension: nt Elbow extension: nt   Wrist flexion: nt Wrist flexion: nt   Wrist extension: nt Wrist extension: nt    nt : nt   Lower Trap nt Lower Trap nt   Middle Trap nt Middle Trap nt   Rhomboids nt Rhomboids nt         Special Tests:  Distraction nt   Compression nt   Spurlings nt   Sharp-Esau nt   VA test nt   Lateral Flexion Alar Ligament nt   DNF test nt       Joint Mobility: hypomobile,          Thoracic mobility: hypomobile    Palpation: B flank pain      Sensation: intact    Flexibility: limited in Cx spine musculature - difficult to assess secondary to limited Cx spine ROM       Limitation/Restriction for FOTO  Survey    Therapist reviewed FOTO scores for Nicolasa Jurado on 11/9/2022.   FOTO documents entered into [employer] - see Media section.    Limitation Score: %         TREATMENT     Total Treatment time (time-based codes) separate from Evaluation: 15 minutes      Nicolasa received the treatments listed below:      therapeutic exercises to develop ROM, flexibility, posture, and core stabilization for 15 minutes including:  Scapular retractions  Shoulder shrugs  Seated arm slides into shoulder flexion        PATIENT EDUCATION AND HOME EXERCISES     Education provided:   - yes    Written Home Exercises Provided: yes. Exercises were reviewed and Nicolasa was able to demonstrate them prior to the end of the session.  Nicolasa demonstrated good  understanding of the education provided. See EMR under Patient Instructions for exercises provided during therapy sessions.    ASSESSMENT     Nicolasa is a 89 y.o. female referred to outpatient Physical Therapy with a medical diagnosis of   M54.2 (ICD-10-CM) - Neck pain   M25.511,G89.29,M25.512 (ICD-10-CM) -  Chronic pain of both shoulders   . Patient presents with limited eye sight requiring min assist and a SPC to enter the clinic.  Pt with limited Cx spine ROM and limited joint mobility in the Cx and thoracic spine and rib cage.    Patient prognosis is Good.   Patient will benefit from skilled outpatient Physical Therapy to address the deficits stated above and in the chart below, provide patient /family education, and to maximize patientt's level of independence.     Plan of care discussed with patient: Yes  Patient's spiritual, cultural and educational needs considered and patient is agreeable to the plan of care and goals as stated below:     Anticipated Barriers for therapy: scheduling    Medical Necessity is demonstrated by the following  History  Co-morbidities and personal factors that may impact the plan of care Co-morbidities:   advanced age and poor eye sight - see past medical history    Personal Factors:   age     moderate   Examination  Body Structures and Functions, activity limitations and participation restrictions that may impact the plan of care Body Regions:   neck  back  upper extremities  trunk    Body Systems:    ROM  balance  gait  transfers  sight    Participation Restrictions:   Ambulating without min assist    Activity limitations:   Learning and applying knowledge  watching    General Tasks and Commands  no deficits    Communication  communicating with/receiving non-verbal language    Mobility  lifting and carrying objects  walking  moving around using equipment (WC)  driving (bike, car, motorcycle)    Self care  washing oneself (bathing, drying, washing hands)  caring for body parts (brushing teeth, shaving, grooming)  dressing    Domestic Life  shopping  cooking  doing house work (cleaning house, washing dishes, laundry)    Interactions/Relationships  Limited by poor eye sight    Life Areas  employment  basic economic transactions    Community and Social Life  community life  recreation  and leisure         low   Clinical Presentation evolving clinical presentation with changing clinical characteristics moderate   Decision Making/ Complexity Score: low     Goals:  Short Term Goals: 4 weeks   Pt will be instructed in an exercise program to address functional deficits related to her neck and B shoulder Sx.  Improve Cx spine ROM into R rotation and B side bending by 10 %  Improve joint mobility in the Cx and thoracic spine   Evaluate Pt for balance deficits  Pt will report decreased neck and B shoulder pain to </= 6/10    Long Term Goals: 8 weeks   Pt will be independent in a HEP to assist in managing their Cx spine and B shoulder Sx.  Improve Cx spine ROM into R rotation and B side bending to 50% of normal ROM  Pt will report decrease neck and B shoulder pain to </= 3/10  Improve dynamic balance to reduce fall risk.    PLAN   Plan of care Certification: 11/9/2022 to 1/9/2023.    Outpatient Physical Therapy 2 times weekly for 8 weeks to include the following interventions: Cervical/Lumbar Traction, Electrical Stimulation as indicated , Gait Training, Manual Therapy, Moist Heat/ Ice, Neuromuscular Re-ed, Patient Education, Self Care, Therapeutic Activities, Therapeutic Exercise, and dry needling.     Joe Trevino, PT      I CERTIFY THE NEED FOR THESE SERVICES FURNISHED UNDER THIS PLAN OF TREATMENT AND WHILE UNDER MY CARE   Physician's comments:     Physician's Signature: ___________________________________________________

## 2022-11-16 ENCOUNTER — CLINICAL SUPPORT (OUTPATIENT)
Dept: REHABILITATION | Facility: HOSPITAL | Age: 87
End: 2022-11-16
Attending: INTERNAL MEDICINE
Payer: MEDICARE

## 2022-11-16 DIAGNOSIS — M25.512 BILATERAL SHOULDER PAIN, UNSPECIFIED CHRONICITY: ICD-10-CM

## 2022-11-16 DIAGNOSIS — M25.511 BILATERAL SHOULDER PAIN, UNSPECIFIED CHRONICITY: ICD-10-CM

## 2022-11-16 DIAGNOSIS — M54.2 NECK PAIN: Primary | ICD-10-CM

## 2022-11-16 PROCEDURE — 97140 MANUAL THERAPY 1/> REGIONS: CPT | Mod: PO

## 2022-11-16 PROCEDURE — 97110 THERAPEUTIC EXERCISES: CPT | Mod: PO

## 2022-11-16 NOTE — PROGRESS NOTES
[facility]   Physical Therapy Treatment Note     Name: Nicolasa Jurado  [facility] Number: [id number]    Therapy Diagnosis:   Encounter Diagnoses   Name Primary?    Neck pain Yes    Bilateral shoulder pain, unspecified chronicity      Physician: Houston Gaston MD    Visit Date: 11/16/2022      Evaluation Date: 11/9/2022  Authorization Period Expiration: 11/4/2022  Plan of Care Expiration: 1/9/2023  Progress Note Due: 12/9/2022  Visit # / Visits authorized: 1/ 1   FOTO: 1/3     Precautions: Standard and CAD  PT HAS VERY LIMITED EYE SIGHT  PTA Visit #: 0/5     Time In: 11:00  Time Out: 11:45  Total Billable Time: 45 minutes    SUBJECTIVE     Pt reports: that she is feeling better, but continues to have some pain on the L side of her neck and upper back.  She was compliant with home exercise program.  Response to previous treatment: good  Functional change: improved functional Cx spine ROM    Pain: 2/10  Location: left neck      OBJECTIVE     Objective Measures updated at progress report unless specified.   Upper Extremity Strength  (R) UE   (L) UE     Shoulder flexion: 4+/5 Shoulder flexion: 4+/5   Shoulder Abduction: 4/5* Shoulder abduction: 4+/5   Shoulder ER 4+/5 Shoulder ER 4+/5   Shoulder IR 4/5* Shoulder IR 5/5   Elbow flexion: 5/5 Elbow flexion: 5/5   Elbow extension: 5/5 Elbow extension: 5/5   Wrist flexion: 5/5 Wrist flexion: 5/5   Wrist extension: 5/5 Wrist extension: 5/5    4+/5 : 4+/5   Lower Trap nt Lower Trap nt   Middle Trap nt Middle Trap nt   Rhomboids nt Rhomboids nt     Treatment     Nicolasa received the treatments listed below:      therapeutic exercises to develop strength, endurance, ROM, flexibility, and posture for 20 minutes including:  Open book x 10 with 5 sec hold to the L & R  Scapular retractions  Shoulder shrugs    manual therapy techniques: Joint and soft tissue mobilizations were applied to the: cx and thoracic regions for 23 minutes, including:  Manual Cx  traction  Soft tissue mobilization to the Cx and thoracic paraspinals and upper traps.  Passive joint mobilizations to the thoracic spine and rib cage.  .    Patient Education and Home Exercises     Home Exercises Provided and Patient Education Provided     Education provided:   - continue with present HEP    Written Home Exercises Provided: Patient instructed to cont prior HEP. Exercises were reviewed and Nicolasa was able to demonstrate them prior to the end of the session.  Nicolasa demonstrated good  understanding of the education provided. See EMR under Patient Instructions for exercises provided during therapy sessions    ASSESSMENT     Ms. Jurado reported that she is feeling much better after Tx today and said that she does not need to come back.  I discussed this with her Grand daughter and we decided to keep her appointments on the books for now,  Pt with continued hypomobility in her thoracic spine and rib cage - L side greater than the R.  She was able to tolerate all Tx activities with a decrease in her Sx today.  Continue to progress thoracic  and Cx spine stabilization Tx's    Nicolasa Is progressing well towards her goals.   Pt prognosis is Good.     Pt will continue to benefit from skilled outpatient physical therapy to address the deficits listed in the problem list box on initial evaluation, provide pt/family education and to maximize pt's level of independence in the home and community environment.     Pt's spiritual, cultural and educational needs considered and pt agreeable to plan of care and goals.     Anticipated barriers to physical therapy: none     Goals:   Short Term Goals: 4 weeks   Pt will be instructed in an exercise program to address functional deficits related to her neck and B shoulder Sx.  Improve Cx spine ROM into R rotation and B side bending by 10 %  Improve joint mobility in the Cx and thoracic spine   Evaluate Pt for balance deficits  Pt will report decreased neck and B shoulder pain  to </= 6/10     Long Term Goals: 8 weeks   Pt will be independent in a HEP to assist in managing their Cx spine and B shoulder Sx.  Improve Cx spine ROM into R rotation and B side bending to 50% of normal ROM  Pt will report decrease neck and B shoulder pain to </= 3/10  Improve dynamic balance to reduce fall risk.  PLAN     Progress physical therapy program to assist Pt with managing her neck and upper back Sx.    Joe Trevino, PT

## 2022-11-16 NOTE — PROGRESS NOTES
"[facility]   Physical Therapy Treatment Note     Name: Nicolasa Jurado  [facility] Number: [id number]    Therapy Diagnosis: No diagnosis found.  Physician: Houston Gaston MD    Visit Date: 11/16/2022    Physician Orders: PT Eval and Treat   Medical Diagnosis from Referral:   M54.2 (ICD-10-CM) - Neck pain   M25.511,G89.29,M25.512 (ICD-10-CM) - Chronic pain of both shoulders      Evaluation Date: 11/9/2022  Authorization Period Expiration: 12/31/2022  Plan of Care Expiration: 1/9/2023  Progress Note Due: 12/9/2022  Visit # / Visits authorized: 1/ 12  FOTO: 1/3     Precautions: Standard and CAD  PT HAS VERY LIMITED EYE SIGHT    PTA Visit #: 1/5     Time In: ***  Time Out: ***  Total Billable Time: *** minutes    SUBJECTIVE     Pt reports: ***.  She {Actions; was/was not:59401} compliant with home exercise program.  Response to previous treatment: ***  Functional change: ***    Pain: {0-10:47827::"0"}/10  Location: {RIGHT/LEFT/BILATERAL:42012} {LOCATION ON BODY:30582}     OBJECTIVE     Objective Measures updated at progress report unless specified.     Treatment     Nicolasa received the treatments listed below:      therapeutic exercises to develop ROM, flexibility, posture, and core stabilization for *** minutes including:  Scapular retractions  Shoulder shrugs  Seated arm slides into shoulder flexion       manual therapy techniques: {AMB PT PROGRESS MANUAL THERAPY:13281} were applied to the: *** for *** minutes, including:  ***        Patient Education and Home Exercises     Home Exercises Provided and Patient Education Provided     Education provided:   - ***    Written Home Exercises Provided: Patient instructed to cont prior HEP. Exercises were reviewed and Nicolasa was able to demonstrate them prior to the end of the session.  Nicolasa demonstrated good  understanding of the education provided. See EMR under Patient Instructions for exercises provided during therapy sessions    ASSESSMENT     ***    Nicolasa Is " progressing well towards her goals.   Pt prognosis is Good.     Pt will continue to benefit from skilled outpatient physical therapy to address the deficits listed in the problem list box on initial evaluation, provide pt/family education and to maximize pt's level of independence in the home and community environment.     Pt's spiritual, cultural and educational needs considered and pt agreeable to plan of care and goals.     Anticipated Barriers for therapy: scheduling     Goals:  Short Term Goals: 4 weeks   Pt will be instructed in an exercise program to address functional deficits related to her neck and B shoulder Sx.  Improve Cx spine ROM into R rotation and B side bending by 10 %  Improve joint mobility in the Cx and thoracic spine   Evaluate Pt for balance deficits  Pt will report decreased neck and B shoulder pain to </= 6/10     Long Term Goals: 8 weeks   Pt will be independent in a HEP to assist in managing their Cx spine and B shoulder Sx.  Improve Cx spine ROM into R rotation and B side bending to 50% of normal ROM  Pt will report decrease neck and B shoulder pain to </= 3/10  Improve dynamic balance to reduce fall risk.       PLAN     Continue to progress per PT POC    Ryann York, PTA

## 2022-11-22 ENCOUNTER — PES CALL (OUTPATIENT)
Dept: ADMINISTRATIVE | Facility: CLINIC | Age: 87
End: 2022-11-22
Payer: MEDICARE

## 2022-11-30 DIAGNOSIS — N18.30 CHRONIC KIDNEY DISEASE, STAGE III (MODERATE): ICD-10-CM

## 2022-12-01 RX ORDER — CINACALCET 30 MG/1
TABLET, FILM COATED ORAL
Qty: 180 TABLET | Refills: 3 | Status: SHIPPED | OUTPATIENT
Start: 2022-12-01 | End: 2023-08-23

## 2022-12-06 ENCOUNTER — PES CALL (OUTPATIENT)
Dept: ADMINISTRATIVE | Facility: CLINIC | Age: 87
End: 2022-12-06
Payer: MEDICARE

## 2022-12-07 ENCOUNTER — CLINICAL SUPPORT (OUTPATIENT)
Dept: REHABILITATION | Facility: HOSPITAL | Age: 87
End: 2022-12-07
Attending: INTERNAL MEDICINE
Payer: MEDICARE

## 2022-12-07 DIAGNOSIS — M54.2 NECK PAIN: Primary | ICD-10-CM

## 2022-12-07 DIAGNOSIS — M25.512 BILATERAL SHOULDER PAIN, UNSPECIFIED CHRONICITY: ICD-10-CM

## 2022-12-07 DIAGNOSIS — M25.511 BILATERAL SHOULDER PAIN, UNSPECIFIED CHRONICITY: ICD-10-CM

## 2022-12-07 PROCEDURE — 97140 MANUAL THERAPY 1/> REGIONS: CPT | Mod: PO,CQ

## 2022-12-07 PROCEDURE — 97110 THERAPEUTIC EXERCISES: CPT | Mod: PO,CQ

## 2022-12-07 NOTE — PROGRESS NOTES
"[facility]   Physical Therapy Treatment Note     Name: Nicolasa Jurado  [facility] Number: [id number]    Therapy Diagnosis:   Encounter Diagnoses   Name Primary?    Neck pain Yes    Bilateral shoulder pain, unspecified chronicity        Physician: Houston Gaston MD    Visit Date: 12/7/2022      Evaluation Date: 11/9/2022  Authorization Period Expiration: 11/4/2022  Plan of Care Expiration: 1/9/2023  Progress Note Due: 12/9/2022  Visit # / Visits authorized: 1/ 12  FOTO: 1/3     Precautions: Standard and CAD  PT HAS VERY LIMITED EYE SIGHT  PTA Visit #: 1/5     Time In: 12:33 pm  Time Out: 1:15 pm  Total Billable Time: 42 minutes    SUBJECTIVE     Pt reports: she still has the nagging pain that won't go away  She was compliant with home exercise program.  Response to previous treatment: good  Functional change: improved functional Cx spine ROM    Pain: 2/10  Location: left neck      OBJECTIVE     Objective Measures updated at progress report unless specified.     Treatment     Nicolasa received the treatments listed below:      therapeutic exercises to develop strength, endurance, ROM, flexibility, and posture for 27 minutes including:  Cervical rotation in supine 10x5"  Open book x 10 with 5 sec hold to the L & R  Scapular retractions x15  Shoulder shrugs x15  Seated ball rollouts for shoulder flexion 10x5"    manual therapy techniques: Joint and soft tissue mobilizations were applied to the: cx and thoracic regions for 15 minutes, including:  Sub-occipital release  Soft tissue mobilization to the Cx and thoracic paraspinals and upper traps.    Not performed   Passive joint mobilizations to the thoracic spine and rib cage.      Patient Education and Home Exercises     Home Exercises Provided and Patient Education Provided     Education provided:   - continue with present HEP    Written Home Exercises Provided: Patient instructed to cont prior HEP. Exercises were reviewed and Nicolasa was able to demonstrate " them prior to the end of the session.  Nicolasa demonstrated good  understanding of the education provided. See EMR under Patient Instructions for exercises provided during therapy sessions    ASSESSMENT     Ms. Juraod present to treatment with minimal pain today. She reports improving symptoms since starting PT. She reports she continues with falls mostly at night when she gets up to go to the bathroom. Good tolerance to exercises today with verbal and tactile cueing required for completing. Continue to progress as tolerated.     Nicolasa Is progressing well towards her goals.   Pt prognosis is Good.     Pt will continue to benefit from skilled outpatient physical therapy to address the deficits listed in the problem list box on initial evaluation, provide pt/family education and to maximize pt's level of independence in the home and community environment.     Pt's spiritual, cultural and educational needs considered and pt agreeable to plan of care and goals.     Anticipated barriers to physical therapy: none     Goals:   Short Term Goals: 4 weeks   Pt will be instructed in an exercise program to address functional deficits related to her neck and B shoulder Sx.  Improve Cx spine ROM into R rotation and B side bending by 10 %  Improve joint mobility in the Cx and thoracic spine   Evaluate Pt for balance deficits  Pt will report decreased neck and B shoulder pain to </= 6/10     Long Term Goals: 8 weeks   Pt will be independent in a HEP to assist in managing their Cx spine and B shoulder Sx.  Improve Cx spine ROM into R rotation and B side bending to 50% of normal ROM  Pt will report decrease neck and B shoulder pain to </= 3/10  Improve dynamic balance to reduce fall risk.  PLAN     Progress physical therapy program to assist Pt with managing her neck and upper back Sx.    Ryann York, PTA

## 2022-12-09 ENCOUNTER — CLINICAL SUPPORT (OUTPATIENT)
Dept: REHABILITATION | Facility: HOSPITAL | Age: 87
End: 2022-12-09
Attending: INTERNAL MEDICINE
Payer: MEDICARE

## 2022-12-09 DIAGNOSIS — M54.2 NECK PAIN: Primary | ICD-10-CM

## 2022-12-09 DIAGNOSIS — M25.511 BILATERAL SHOULDER PAIN, UNSPECIFIED CHRONICITY: ICD-10-CM

## 2022-12-09 DIAGNOSIS — M25.512 BILATERAL SHOULDER PAIN, UNSPECIFIED CHRONICITY: ICD-10-CM

## 2022-12-09 PROCEDURE — 97140 MANUAL THERAPY 1/> REGIONS: CPT | Mod: PO,CQ

## 2022-12-09 PROCEDURE — 97110 THERAPEUTIC EXERCISES: CPT | Mod: PO,CQ

## 2022-12-09 NOTE — PROGRESS NOTES
"[facility]   Physical Therapy Treatment Note     Name: Nicolasa Jurado  [facility] Number: [id number]    Therapy Diagnosis:   Encounter Diagnoses   Name Primary?    Neck pain Yes    Bilateral shoulder pain, unspecified chronicity          Physician: Houston Gaston MD    Visit Date: 12/9/2022      Evaluation Date: 11/9/2022  Authorization Period Expiration: 11/4/2022  Plan of Care Expiration: 1/9/2023  Progress Note Due: 12/9/2022  Visit # / Visits authorized: 3/ 12   FOTO: 1/3     Precautions: Standard and CAD  PT HAS VERY LIMITED EYE SIGHT  PTA Visit #: 1/5     Time In: 12:21 pm  Time Out: 1:04  Total Billable Time: 43 minutes    SUBJECTIVE     Pt reports: she was really sore after last session and she had trouble sleeping. Today she feels good with no pain  She was compliant with home exercise program.  Response to previous treatment: soreness  Functional change: improved functional Cx spine ROM    Pain: 0/10  Location: left neck      OBJECTIVE     Objective Measures updated at progress report unless specified.     Treatment     Nicolasa received the treatments listed below:      therapeutic exercises to develop strength, endurance, ROM, flexibility, and posture for 28 minutes including:  Cervical rotation in supine 10x5"  Open book x 10 with 5 sec hold to the L & R  Cervical side bending in sitting 10x5"  Scapular retractions x15  Shoulder shrugs x15  Seated ball rollouts for shoulder flexion 15x5"    manual therapy techniques: Joint and soft tissue mobilizations were applied to the: cx and thoracic regions for 15 minutes, including:  Sub-occipital release  Soft tissue mobilization to the Cx and thoracic paraspinals and upper traps.    Not performed   Passive joint mobilizations to the thoracic spine and rib cage.      Patient Education and Home Exercises     Home Exercises Provided and Patient Education Provided     Education provided:   - continue with present HEP    Written Home Exercises " Provided: Patient instructed to cont prior HEP. Exercises were reviewed and Nicolasa was able to demonstrate them prior to the end of the session.  Nicolasa demonstrated good  understanding of the education provided. See EMR under Patient Instructions for exercises provided during therapy sessions    ASSESSMENT     Ms. Jurado presents to treatment with no pain today, but she reports increased soreness following last session. Good tolerance to exercises performed. She was instructed to perform exercises to tolerance and keep in range of motion that does not increase pain. She continues to require cueing for technique. Continue to progress as tolerated.     Nicolasa Is progressing well towards her goals.   Pt prognosis is Good.     Pt will continue to benefit from skilled outpatient physical therapy to address the deficits listed in the problem list box on initial evaluation, provide pt/family education and to maximize pt's level of independence in the home and community environment.     Pt's spiritual, cultural and educational needs considered and pt agreeable to plan of care and goals.     Anticipated barriers to physical therapy: none     Goals:   Short Term Goals: 4 weeks   Pt will be instructed in an exercise program to address functional deficits related to her neck and B shoulder Sx.  Improve Cx spine ROM into R rotation and B side bending by 10 %  Improve joint mobility in the Cx and thoracic spine   Evaluate Pt for balance deficits  Pt will report decreased neck and B shoulder pain to </= 6/10     Long Term Goals: 8 weeks   Pt will be independent in a HEP to assist in managing their Cx spine and B shoulder Sx.  Improve Cx spine ROM into R rotation and B side bending to 50% of normal ROM  Pt will report decrease neck and B shoulder pain to </= 3/10  Improve dynamic balance to reduce fall risk.  PLAN     Progress physical therapy program to assist Pt with managing her neck and upper back Sx.    Ryann York, PTA

## 2022-12-13 ENCOUNTER — PATIENT MESSAGE (OUTPATIENT)
Dept: INTERNAL MEDICINE | Facility: CLINIC | Age: 87
End: 2022-12-13
Payer: MEDICARE

## 2022-12-14 ENCOUNTER — CLINICAL SUPPORT (OUTPATIENT)
Dept: REHABILITATION | Facility: HOSPITAL | Age: 87
End: 2022-12-14
Attending: INTERNAL MEDICINE
Payer: MEDICARE

## 2022-12-14 DIAGNOSIS — M25.512 BILATERAL SHOULDER PAIN, UNSPECIFIED CHRONICITY: ICD-10-CM

## 2022-12-14 DIAGNOSIS — M25.511 BILATERAL SHOULDER PAIN, UNSPECIFIED CHRONICITY: ICD-10-CM

## 2022-12-14 DIAGNOSIS — M54.2 NECK PAIN: Primary | ICD-10-CM

## 2022-12-14 PROCEDURE — 97140 MANUAL THERAPY 1/> REGIONS: CPT | Mod: PO

## 2022-12-14 PROCEDURE — 97530 THERAPEUTIC ACTIVITIES: CPT | Mod: PO

## 2022-12-14 NOTE — PROGRESS NOTES
[facility]   Physical Therapy Treatment Note     Name: Nicolasa Jurado  [facility] Number: [id number]    Therapy Diagnosis:   Encounter Diagnoses   Name Primary?    Neck pain Yes    Bilateral shoulder pain, unspecified chronicity          Physician: [place]on Gaston MD    Visit Date: 12/14/2022      Evaluation Date: 11/9/2022  Authorization Period Expiration: 11/4/2022  Plan of Care Expiration: 1/9/2023  Progress Note Due: 12/9/2022  Visit # / Visits authorized: 3/ 12   FOTO: 1/3     Precautions: Standard and CAD  PT HAS VERY LIMITED EYE SIGHT  PTA Visit #: 1/5     Time In: 11:45m  Time Out: 12  Total Billable Time: 43 minutes    SUBJECTIVE     Pt reports: that she is sleeping better.  Her neck is much better and her shoulders are better, but still hurt.  She was compliant with home exercise program.  Response to previous treatment: soreness  Functional change: improved functional Cx spine ROM    Pain: 0/10  Location: left neck      OBJECTIVE     Objective Measures updated at progress report unless specified.   Observation: Pt required a SPC and min assist to enter the clinic secondary to weakness and poor eye sight.     Posture: R side of pelvis elevated     Cervical Range of Motion:     % Pain   Flexion 100 L upper trap into her l=L shoulder      Extension 25 no      Right Rotation 50 Pulling on the R      Left Rotation 90 OK      Right Side Bending 75 OK   Left Side Bending 50 Tight on the R      Shoulder Range of Motion:   Shoulder Left Right   Flexion 160 160   Abduction 110 115   ER 90 85   IR 55 50         Upper Extremity Strength  (R) UE   (L) UE     Shoulder flexion: 3/5* Shoulder flexion: 4+/5   Shoulder Abduction: nt Shoulder abduction: 4/5   Shoulder ER 4+/5 Shoulder ER 4+/5   Shoulder IR 5/5 Shoulder IR 5/5   Elbow flexion: 5/5 Elbow flexion: 5/5   Elbow extension: 5/5 Elbow extension: 5/5   Wrist flexion: 4+/5 Wrist flexion: 4+/5   Wrist extension: 4+/5 Wrist extension: 4+/5    4+/5  ": 4+/5   Lower Trap nt Lower Trap nt   Middle Trap nt Middle Trap nt   Rhomboids nt Rhomboids nt           Joint Mobility: hypomobile B GH joints         Thoracic mobility: hypomobile     Palpation: B flank pain       Sensation: intact     Treatment   Re-evaluation = 30  Nicolasa received the treatments listed below:      therapeutic exercises to develop strength, endurance, ROM, flexibility, and posture for 02 minutes including:  Cervical rotation in supine 10x5"  Open book x 10 with 5 sec hold to the L & R  Cervical side bending in sitting 10x5"  Scapular retractions x15  Shoulder shrugs x15  Seated ball rollouts for shoulder flexion 15x5"    manual therapy techniques: Joint and soft tissue mobilizations were applied to the: cx and thoracic regions for 10 minutes, including:  Sub-occipital release  Soft tissue mobilization to the Cx and thoracic paraspinals and upper traps.  Passive mobilization R & L GH joints    Not performed   Passive joint mobilizations to the thoracic spine and rib cage.      Patient Education and Home Exercises     Home Exercises Provided and Patient Education Provided     Education provided:   - continue with present HEP    Written Home Exercises Provided: Patient instructed to cont prior HEP. Exercises were reviewed and Nicolasa was able to demonstrate them prior to the end of the session.  Nicolasa demonstrated good  understanding of the education provided. See EMR under Patient Instructions for exercises provided during therapy sessions    ASSESSMENT     Ms. Jurado presents to treatment reporting no pain today. She also stated that her neck and shoulders are better overall, but her shoulders still get sore.  Pt tolerated the re-evaluation and manual therapy well. Continue to progress her exercise program as tolerated.     Nicolasa Is progressing well towards her goals.   Pt prognosis is Good.     Pt will continue to benefit from skilled outpatient physical therapy to address the deficits listed in " the problem list box on initial evaluation, provide pt/family education and to maximize pt's level of independence in the home and community environment.     Pt's spiritual, cultural and educational needs considered and pt agreeable to plan of care and goals.     Anticipated barriers to physical therapy: none     Goals:   Short Term Goals: 4 weeks   Pt will be instructed in an exercise program to address functional deficits related to her neck and B shoulder Sx.  Improve Cx spine ROM into R rotation and B side bending by 10 %  Improve joint mobility in the Cx and thoracic spine   Evaluate Pt for balance deficits  Pt will report decreased neck and B shoulder pain to </= 6/10     Long Term Goals: 8 weeks   Pt will be independent in a HEP to assist in managing their Cx spine and B shoulder Sx.  Improve Cx spine ROM into R rotation and B side bending to 50% of normal ROM  Pt will report decrease neck and B shoulder pain to </= 3/10  Improve dynamic balance to reduce fall risk.  PLAN     Progress physical therapy program to assist Pt with managing her neck and upper back Sx.    Joe Trevino, PT

## 2022-12-21 ENCOUNTER — CLINICAL SUPPORT (OUTPATIENT)
Dept: REHABILITATION | Facility: HOSPITAL | Age: 87
End: 2022-12-21
Attending: INTERNAL MEDICINE
Payer: MEDICARE

## 2022-12-21 DIAGNOSIS — M25.512 BILATERAL SHOULDER PAIN, UNSPECIFIED CHRONICITY: ICD-10-CM

## 2022-12-21 DIAGNOSIS — M25.511 BILATERAL SHOULDER PAIN, UNSPECIFIED CHRONICITY: ICD-10-CM

## 2022-12-21 DIAGNOSIS — M54.2 NECK PAIN: Primary | ICD-10-CM

## 2022-12-21 PROCEDURE — 97110 THERAPEUTIC EXERCISES: CPT | Mod: HCNC,PO

## 2022-12-21 PROCEDURE — 97140 MANUAL THERAPY 1/> REGIONS: CPT | Mod: HCNC,PO

## 2022-12-21 NOTE — PROGRESS NOTES
"[facility]   Physical Therapy Treatment Note     Name: Nicolasa Jurado  [facility] Number: [id number]    Therapy Diagnosis:   Encounter Diagnoses   Name Primary?    Neck pain Yes    Bilateral shoulder pain, unspecified chronicity            Physician: Houston Gaston MD    Visit Date: 12/21/2022      Evaluation Date: 11/9/2022  Authorization Period Expiration: 11/4/2022  Plan of Care Expiration: 1/9/2023  Progress Note Due: 12/9/2022  Visit # / Visits authorized: 3/ 12   FOTO: 1/3     Precautions: Standard and CAD  PT HAS VERY LIMITED EYE SIGHT  PTA Visit #: 1/5     Time In: 11:45m  Time Out: 12  Total Billable Time: 43 minutes    SUBJECTIVE     Pt reports: that she will not be here on Friday as she will be going to [place] for the holidays.  She was compliant with home exercise program.  Response to previous treatment: soreness  Functional change: improved functional Cx spine ROM    Pain: 0/10  Location: left neck      OBJECTIVE     Objective Measures updated at progress report unless specified.        Treatment     Nicolasa received the treatments listed below:      therapeutic exercises to develop strength, endurance, ROM, flexibility, and posture for 35 minutes including:  Cervical rotation in supine 10x5"  Open book x 10 with 5 sec hold to the L & R  Cervical side bending in sitting 10x5"  Scapular retractions x15  Shoulder shrugs x15  Seated ball rollouts for shoulder flexion 15x5"    manual therapy techniques: Joint and soft tissue mobilizations were applied to the: cx and thoracic regions for 08 minutes, including:  Sub-occipital release  Soft tissue mobilization to the Cx and thoracic paraspinals and upper traps.  Passive mobilization R & L GH joints    Not performed   Passive joint mobilizations to the thoracic spine and rib cage.      Patient Education and Home Exercises     Home Exercises Provided and Patient Education Provided     Education provided:   - continue with present HEP    Written " Home Exercises Provided: Patient instructed to cont prior HEP. Exercises were reviewed and Nicolasa was able to demonstrate them prior to the end of the session.  Nicolasa demonstrated good  understanding of the education provided. See EMR under Patient Instructions for exercises provided during therapy sessions    ASSESSMENT     Ms. Jurado presents to treatment reporting less pain today. She continues to c/o intermittent R upper trap stiffness and discomfort into her R posterior upper quarter.   Pt tolerated all exercises and manual therapy without reported discomfort today. Continue to progress her exercise program as tolerated.     Nicolasa Is progressing well towards her goals.   Pt prognosis is Good.     Pt will continue to benefit from skilled outpatient physical therapy to address the deficits listed in the problem list box on initial evaluation, provide pt/family education and to maximize pt's level of independence in the home and community environment.     Pt's spiritual, cultural and educational needs considered and pt agreeable to plan of care and goals.     Anticipated barriers to physical therapy: none     Goals:   Short Term Goals: 4 weeks   Pt will be instructed in an exercise program to address functional deficits related to her neck and B shoulder Sx.  Improve Cx spine ROM into R rotation and B side bending by 10 %  Improve joint mobility in the Cx and thoracic spine   Evaluate Pt for balance deficits  Pt will report decreased neck and B shoulder pain to </= 6/10     Long Term Goals: 8 weeks   Pt will be independent in a HEP to assist in managing their Cx spine and B shoulder Sx.  Improve Cx spine ROM into R rotation and B side bending to 50% of normal ROM  Pt will report decrease neck and B shoulder pain to </= 3/10  Improve dynamic balance to reduce fall risk.  PLAN     Progress physical therapy program to assist Pt with managing her neck and upper back Sx.    Joe Trevino, PT

## 2022-12-28 ENCOUNTER — CLINICAL SUPPORT (OUTPATIENT)
Dept: REHABILITATION | Facility: HOSPITAL | Age: 87
End: 2022-12-28
Attending: INTERNAL MEDICINE
Payer: MEDICARE

## 2022-12-28 DIAGNOSIS — M54.2 NECK PAIN: Primary | ICD-10-CM

## 2022-12-28 DIAGNOSIS — M25.511 BILATERAL SHOULDER PAIN, UNSPECIFIED CHRONICITY: ICD-10-CM

## 2022-12-28 DIAGNOSIS — M25.512 BILATERAL SHOULDER PAIN, UNSPECIFIED CHRONICITY: ICD-10-CM

## 2022-12-28 PROCEDURE — 97140 MANUAL THERAPY 1/> REGIONS: CPT | Mod: HCNC,PO

## 2022-12-28 PROCEDURE — 97110 THERAPEUTIC EXERCISES: CPT | Mod: HCNC,PO

## 2022-12-28 NOTE — PROGRESS NOTES
"[facility]   Physical Therapy Treatment Note     Name: Nicolasa Jurado  Clinic Number: [id number]    Therapy Diagnosis:   Encounter Diagnoses   Name Primary?    Neck pain Yes    Bilateral shoulder pain, unspecified chronicity            Physician: Houston Gaston MD    Visit Date: 12/28/2022      Evaluation Date: 11/9/2022  Authorization Period Expiration: 11/4/2022  Plan of Care Expiration: 1/9/2023  Progress Note Due: 12/9/2022  Visit # / Visits authorized: 6/ 12   FOTO: 1/3     Precautions: Standard and CAD  PT HAS VERY LIMITED EYE SIGHT  PTA Visit #: 1/5     Time In: 11:55 am  Time Out: 12:35 am  Total Billable Time: 40 minutes    SUBJECTIVE     Pt reports: that she is feeling better overall, but her R upper back and neck remain an area of concern for her.  She continues to report "tightness" in her neck and R upper trap region.  She was compliant with home exercise program.  Response to previous treatment: soreness  Functional change: improved functional Cx spine ROM    Pain: 0/10  Location: left neck      OBJECTIVE     Objective Measures updated at progress report unless specified.         Treatment     Nicolasa received the treatments listed below:      therapeutic exercises to develop strength, endurance, ROM, flexibility, and posture for 23 minutes including:  Cervical rotation in supine 10x5"  Open book x 10 with 5 sec hold to the L & R  Cervical side bending in sitting 10x5"  Scapular retractions 2 x 10  Shoulder shrugs 2 x 10  Seated ball rollouts for shoulder flexion 15x5"    manual therapy techniques: Joint and soft tissue mobilizations were applied to the: cx and thoracic regions for 15 minutes, including:  Sub-occipital release  Soft tissue mobilization to the Cx and thoracic paraspinals and upper traps.  Passive mobilization R & L GH joints  Manual Cx spine traction    Not performed   Passive joint mobilizations to the thoracic spine and rib cage.      Patient Education and Home " Exercises     Home Exercises Provided and Patient Education Provided     Education provided:   - continue with present HEP    Written Home Exercises Provided: Patient instructed to cont prior HEP. Exercises were reviewed and Nicolasa was able to demonstrate them prior to the end of the session.  Nicolasa demonstrated good  understanding of the education provided. See EMR under Patient Instructions for exercises provided during therapy sessions    ASSESSMENT     Ms. Jurado [place] to [place] after spending the holidays with family.  She reported no difficulty with travelling by car.  She reported continued tightness in her R upper quarter.  Pt tolerated the exercises and manual therapy well. Continue to progress her exercise program as tolerated.     Nicolasa Is progressing well towards her goals.   Pt prognosis is Good.     Pt will continue to benefit from skilled outpatient physical therapy to address the deficits listed in the problem list box on initial evaluation, provide pt/family education and to maximize pt's level of independence in the home and community environment.     Pt's spiritual, cultural and educational needs considered and pt agreeable to plan of care and goals.     Anticipated barriers to physical therapy: none     Goals:   Short Term Goals: 4 weeks   Pt will be instructed in an exercise program to address functional deficits related to her neck and B shoulder Sx.  Improve Cx spine ROM into R rotation and B side bending by 10 %  Improve joint mobility in the Cx and thoracic spine   Evaluate Pt for balance deficits  Pt will report decreased neck and B shoulder pain to </= 6/10     Long Term Goals: 8 weeks   Pt will be independent in a HEP to assist in managing their Cx spine and B shoulder Sx.  Improve Cx spine ROM into R rotation and B side bending to 50% of normal ROM  Pt will report decrease neck and B shoulder pain to </= 3/10  Improve dynamic balance to reduce fall risk.  PLAN     Progress physical therapy  program to assist Pt with managing her neck and upper back Sx.    Joe Trevino, PT

## 2022-12-30 ENCOUNTER — CLINICAL SUPPORT (OUTPATIENT)
Dept: REHABILITATION | Facility: HOSPITAL | Age: 87
End: 2022-12-30
Attending: INTERNAL MEDICINE
Payer: MEDICARE

## 2022-12-30 DIAGNOSIS — M25.512 BILATERAL SHOULDER PAIN, UNSPECIFIED CHRONICITY: ICD-10-CM

## 2022-12-30 DIAGNOSIS — M54.2 NECK PAIN: Primary | ICD-10-CM

## 2022-12-30 DIAGNOSIS — M25.511 BILATERAL SHOULDER PAIN, UNSPECIFIED CHRONICITY: ICD-10-CM

## 2022-12-30 PROCEDURE — 97110 THERAPEUTIC EXERCISES: CPT | Mod: HCNC,PO

## 2022-12-30 PROCEDURE — 97140 MANUAL THERAPY 1/> REGIONS: CPT | Mod: HCNC,PO

## 2022-12-30 NOTE — PROGRESS NOTES
"[facility]   Physical Therapy Treatment Note     Name: Nicolasa Jurado  [facility] Number: [id number]    Therapy Diagnosis:   Encounter Diagnoses   Name Primary?    Neck pain Yes    Bilateral shoulder pain, unspecified chronicity            Physician: Houston Gaston MD    Visit Date: 12/30/2022      Evaluation Date: 11/9/2022  Authorization Period Expiration: 11/4/2022  Plan of Care Expiration: 1/9/2023  Progress Note Due: 12/9/2022  Visit # / Visits authorized: 6/ 12   FOTO: 1/3     Precautions: Standard and CAD  PT HAS VERY LIMITED EYE SIGHT  PTA Visit #: 1/5     Time In: 11:40 am  Time Out: 12:30 am  Total Billable Time: 40 minutes    SUBJECTIVE     Pt reports: that she is continues to experience pain n her R posterior upper quarter.   She was compliant with home exercise program.  Response to previous treatment: soreness  Functional change: improved functional Cx spine ROM    Pain: 0/10  Location: left neck      OBJECTIVE     Objective Measures updated at progress report unless specified.         Treatment     Nicolasa received the treatments listed below:      therapeutic exercises to develop strength, endurance, ROM, flexibility, and posture for 23 minutes including:  Cervical rotation in supine 10x5"  Standing rows 10 x 3 with orange TB with CGA  Standing B shoulder extension 10 x 3 with orange TB with CGA  Open book x 10 with 5 sec hold to the L & R  Cervical side bending in sitting 10x5"  Scapular retractions 2 x 10  Shoulder shrugs 2 x 10  Seated ball rollouts for shoulder flexion 15x5"    manual therapy techniques: Joint and soft tissue mobilizations were applied to the: cx and thoracic regions for 15 minutes, including:  Sub-occipital release  Soft tissue mobilization to the Cx and thoracic paraspinals and upper traps.  Passive mobilization R & L GH joints  Manual Cx spine traction  Passive joint mobilizations to the thoracic spine and rib cage in prone      Patient Education and Home " Exercises     Home Exercises Provided and Patient Education Provided     Education provided:   - continue with present HEP    Written Home Exercises Provided: Patient instructed to cont prior HEP. Exercises were reviewed and Nicolasa was able to demonstrate them prior to the end of the session.  Nicolasa demonstrated good  understanding of the education provided. See EMR under Patient Instructions for exercises provided during therapy sessions    ASSESSMENT     Ms. Jurado presents to [place] with continued c/o R posterior upper quarter discomfort.  Pt reported that she felt a little better following passive mobilization of the thoracic spine and rib cage.  Pt tolerated the exercises and manual therapy well. Continue to progress her exercise program as tolerated.     Nicolasa Is progressing well towards her goals.   Pt prognosis is Good.     Pt will continue to benefit from skilled outpatient physical therapy to address the deficits listed in the problem list box on initial evaluation, provide pt/family education and to maximize pt's level of independence in the home and community environment.     Pt's spiritual, cultural and educational needs considered and pt agreeable to plan of care and goals.     Anticipated barriers to physical therapy: none     Goals:   Short Term Goals: 4 weeks   Pt will be instructed in an exercise program to address functional deficits related to her neck and B shoulder Sx.  Improve Cx spine ROM into R rotation and B side bending by 10 %  Improve joint mobility in the Cx and thoracic spine   Evaluate Pt for balance deficits  Pt will report decreased neck and B shoulder pain to </= 6/10     Long Term Goals: 8 weeks   Pt will be independent in a HEP to assist in managing their Cx spine and B shoulder Sx.  Improve Cx spine ROM into R rotation and B side bending to 50% of normal ROM  Pt will report decrease neck and B shoulder pain to </= 3/10  Improve dynamic balance to reduce fall risk.  PLAN     Progress  physical therapy program to assist Pt with managing her neck and upper back Sx.    Joe Trevino, PT

## 2023-01-05 ENCOUNTER — OFFICE VISIT (OUTPATIENT)
Dept: HOME HEALTH SERVICES | Facility: CLINIC | Age: 88
End: 2023-01-05
Payer: MEDICARE

## 2023-01-05 ENCOUNTER — CLINICAL SUPPORT (OUTPATIENT)
Dept: REHABILITATION | Facility: HOSPITAL | Age: 88
End: 2023-01-05
Attending: INTERNAL MEDICINE
Payer: MEDICARE

## 2023-01-05 ENCOUNTER — PATIENT MESSAGE (OUTPATIENT)
Dept: INTERNAL MEDICINE | Facility: CLINIC | Age: 88
End: 2023-01-05
Payer: MEDICARE

## 2023-01-05 VITALS
HEART RATE: 79 BPM | OXYGEN SATURATION: 98 % | WEIGHT: 138 LBS | DIASTOLIC BLOOD PRESSURE: 70 MMHG | SYSTOLIC BLOOD PRESSURE: 130 MMHG | BODY MASS INDEX: 27.09 KG/M2 | HEIGHT: 60 IN | RESPIRATION RATE: 16 BRPM | TEMPERATURE: 98 F

## 2023-01-05 DIAGNOSIS — E66.3 OVERWEIGHT: ICD-10-CM

## 2023-01-05 DIAGNOSIS — I70.0 ATHEROSCLEROSIS OF AORTA: Chronic | ICD-10-CM

## 2023-01-05 DIAGNOSIS — M81.0 OSTEOPOROSIS, POSTMENOPAUSAL: Chronic | ICD-10-CM

## 2023-01-05 DIAGNOSIS — I10 ESSENTIAL HYPERTENSION: Chronic | ICD-10-CM

## 2023-01-05 DIAGNOSIS — H54.7 VISION IMPAIRMENT: ICD-10-CM

## 2023-01-05 DIAGNOSIS — H91.93 BILATERAL HEARING LOSS, UNSPECIFIED HEARING LOSS TYPE: ICD-10-CM

## 2023-01-05 DIAGNOSIS — I70.1 RENAL ARTERY STENOSIS: Chronic | ICD-10-CM

## 2023-01-05 DIAGNOSIS — M54.2 NECK PAIN: Primary | ICD-10-CM

## 2023-01-05 DIAGNOSIS — G62.9 NEUROPATHY: ICD-10-CM

## 2023-01-05 DIAGNOSIS — J45.909 ASTHMA, UNSPECIFIED ASTHMA SEVERITY, UNSPECIFIED WHETHER COMPLICATED, UNSPECIFIED WHETHER PERSISTENT: Chronic | ICD-10-CM

## 2023-01-05 DIAGNOSIS — I50.32 CHRONIC DIASTOLIC HEART FAILURE: Chronic | ICD-10-CM

## 2023-01-05 DIAGNOSIS — N18.4 CKD (CHRONIC KIDNEY DISEASE), STAGE IV: Chronic | ICD-10-CM

## 2023-01-05 DIAGNOSIS — M25.512 BILATERAL SHOULDER PAIN, UNSPECIFIED CHRONICITY: ICD-10-CM

## 2023-01-05 DIAGNOSIS — D32.9 MENINGIOMA: ICD-10-CM

## 2023-01-05 DIAGNOSIS — Z00.00 ENCOUNTER FOR PREVENTIVE HEALTH EXAMINATION: Primary | ICD-10-CM

## 2023-01-05 DIAGNOSIS — N18.4 ANEMIA DUE TO STAGE 4 CHRONIC KIDNEY DISEASE: ICD-10-CM

## 2023-01-05 DIAGNOSIS — M25.511 BILATERAL SHOULDER PAIN, UNSPECIFIED CHRONICITY: ICD-10-CM

## 2023-01-05 DIAGNOSIS — E78.00 PURE HYPERCHOLESTEROLEMIA: Chronic | ICD-10-CM

## 2023-01-05 DIAGNOSIS — D63.1 ANEMIA DUE TO STAGE 4 CHRONIC KIDNEY DISEASE: ICD-10-CM

## 2023-01-05 DIAGNOSIS — R26.9 ABNORMALITY OF GAIT AND MOBILITY: ICD-10-CM

## 2023-01-05 DIAGNOSIS — K55.1 MESENTERIC ARTERY STENOSIS: Chronic | ICD-10-CM

## 2023-01-05 PROCEDURE — 1160F RVW MEDS BY RX/DR IN RCRD: CPT | Mod: CPTII,S$GLB,,

## 2023-01-05 PROCEDURE — 99499 RISK ADDL DX/OHS AUDIT: ICD-10-PCS | Mod: S$GLB,,,

## 2023-01-05 PROCEDURE — 1100F PR PT FALLS ASSESS DOC 2+ FALLS/FALL W/INJURY/YR: ICD-10-PCS | Mod: CPTII,S$GLB,,

## 2023-01-05 PROCEDURE — G0439 PR MEDICARE ANNUAL WELLNESS SUBSEQUENT VISIT: ICD-10-PCS | Mod: S$GLB,,,

## 2023-01-05 PROCEDURE — 1159F MED LIST DOCD IN RCRD: CPT | Mod: CPTII,S$GLB,,

## 2023-01-05 PROCEDURE — 1126F PR PAIN SEVERITY QUANTIFIED, NO PAIN PRESENT: ICD-10-PCS | Mod: CPTII,S$GLB,,

## 2023-01-05 PROCEDURE — 1157F PR ADVANCE CARE PLAN OR EQUIV PRESENT IN MEDICAL RECORD: ICD-10-PCS | Mod: CPTII,S$GLB,,

## 2023-01-05 PROCEDURE — 1170F FXNL STATUS ASSESSED: CPT | Mod: CPTII,S$GLB,,

## 2023-01-05 PROCEDURE — 3288F FALL RISK ASSESSMENT DOCD: CPT | Mod: CPTII,S$GLB,,

## 2023-01-05 PROCEDURE — 3288F PR FALLS RISK ASSESSMENT DOCUMENTED: ICD-10-PCS | Mod: CPTII,S$GLB,,

## 2023-01-05 PROCEDURE — 1160F PR REVIEW ALL MEDS BY PRESCRIBER/CLIN PHARMACIST DOCUMENTED: ICD-10-PCS | Mod: CPTII,S$GLB,,

## 2023-01-05 PROCEDURE — 1159F PR MEDICATION LIST DOCUMENTED IN MEDICAL RECORD: ICD-10-PCS | Mod: CPTII,S$GLB,,

## 2023-01-05 PROCEDURE — 97110 THERAPEUTIC EXERCISES: CPT | Mod: HCNC,PO,CQ

## 2023-01-05 PROCEDURE — 1170F PR FUNCTIONAL STATUS ASSESSED: ICD-10-PCS | Mod: CPTII,S$GLB,,

## 2023-01-05 PROCEDURE — G0439 PPPS, SUBSEQ VISIT: HCPCS | Mod: S$GLB,,,

## 2023-01-05 PROCEDURE — 1100F PTFALLS ASSESS-DOCD GE2>/YR: CPT | Mod: CPTII,S$GLB,,

## 2023-01-05 PROCEDURE — 1157F ADVNC CARE PLAN IN RCRD: CPT | Mod: CPTII,S$GLB,,

## 2023-01-05 PROCEDURE — 97140 MANUAL THERAPY 1/> REGIONS: CPT | Mod: HCNC,PO,CQ

## 2023-01-05 PROCEDURE — 1126F AMNT PAIN NOTED NONE PRSNT: CPT | Mod: CPTII,S$GLB,,

## 2023-01-05 PROCEDURE — 99499 UNLISTED E&M SERVICE: CPT | Mod: S$GLB,,,

## 2023-01-05 RX ORDER — HYDROCHLOROTHIAZIDE 25 MG/1
25 TABLET ORAL DAILY
COMMUNITY
End: 2024-01-11

## 2023-01-05 NOTE — Clinical Note
Patient c/o mild chest pain and SOB at night when she gets up to urinate. States is resolves within 1-2 minutes. Please discuss with patient.   --PIPPA Schmidt

## 2023-01-05 NOTE — PATIENT INSTRUCTIONS
Counseling and Referral of Other Preventative  (Italic type indicates deductible and co-insurance are waived)    Patient Name: Nicolasa Jurado  Today's Date: 1/5/2023    Health Maintenance       Date Due Completion Date    DEXA Scan 10/10/2021 10/10/2019    Hemoglobin A1c (Prediabetes) 11/11/2022 11/11/2021    Shingles Vaccine (3 of 3) 01/04/2023 11/9/2022    Aspirin/Antiplatelet Therapy 09/23/2023 9/23/2022    Lipid Panel 11/07/2027 11/7/2022    TETANUS VACCINE 11/18/2030 11/18/2020        Orders Placed This Encounter   Procedures    Ambulatory referral/consult to Audiology     The following information is provided to all patients.  This information is to help you find resources for any of the problems found today that may be affecting your health:                Living healthy guide: www.[facility].[place].[facility]      Understanding Diabetes: www.diabetes.org      Eating healthy: www.cdc.gov/healthyweight      CDC home safety checklist: www.cdc.gov/steadi/patient.html      Agency on Aging: www.goea.[place].[facility]      Alcoholics anonymous (AA): www.aa.org      Physical Activity: www.pierre.nih.gov/ah8bdrq      Tobacco use: www.quitwithusla.org

## 2023-01-05 NOTE — PROGRESS NOTES
Nicolasa Jurado presented for a  Medicare AWV and comprehensive Health Risk Assessment today. The following components were reviewed and updated:    Medical history  Family History  Social history  Allergies and Current Medications  Health Risk Assessment  Health Maintenance  Care Team         ** See Completed Assessments for Annual Wellness Visit within the encounter summary.**         The following assessments were completed:  Living Situation  CAGE  Depression Screening  Timed Get Up and Go: Deferred, mobility impaired due to vision  Whisper Test: 0/3 bilateral  Cognitive Function Screening: Unable to write due to vision, unable to spell WORLD backwards  Nutrition Screening  ADL Screening  PAQ Screening        Vitals:    01/05/23 0956   BP: 130/70   BP Location: Left arm   Patient Position: Sitting   Pulse: 79   Resp: 16   Temp: 97.5 °F (36.4 °C)   SpO2: 98%   Weight: 62.6 kg (138 lb)   Height: 5' (1.524 m)     Body mass index is 26.95 kg/m².    Physical Exam  Vitals reviewed.   Constitutional:       General: She is not in acute distress.     Appearance: Normal appearance.   HENT:      Right Ear: Decreased hearing noted.      Left Ear: Decreased hearing noted.   Eyes:      General: Visual field deficit present.   Cardiovascular:      Rate and Rhythm: Normal rate and regular rhythm.      Pulses: Normal pulses.      Heart sounds: No murmur heard.  Pulmonary:      Effort: Pulmonary effort is normal. No respiratory distress.      Breath sounds: Normal breath sounds.   Abdominal:      General: Bowel sounds are normal.   Musculoskeletal:      Right lower leg: No edema.      Left lower leg: No edema.   Neurological:      General: No focal deficit present.      Mental Status: She is alert and oriented to person, place, and time.      Gait: Gait abnormal (holds on to walls when walking).   Psychiatric:         Mood and Affect: Mood normal.         Behavior: Behavior normal.             Diagnoses and health risks identified  today and associated recommendations/orders:    1. Encounter for preventive health examination  Assessments completed.   recommendations reviewed.  F/u with PCP as instructed.     Patient not on chronic opioids.   Risk factors reviewed for any potential opioid use disorder   Pain evaluated during visit.  Current treatment plan documented.  Will refer to specialist, as appropriate.        2. Anemia due to stage 4 chronic kidney disease  Chronic, stable on current regimen. Followed by PCP.     3. CKD (chronic kidney disease), stage IV  Chronic, stable on current regimen. Followed by PCP.     4. Atherosclerosis of aorta  Chronic, stable on current statin regimen. Followed by PCP.     5. Renal artery stenosis  Chronic, stable on current regimen. Followed by PCP.     6. Mesenteric artery stenosis  Chronic, stable on current regimen. Followed by PCP.     7. Chronic diastolic heart failure  Chronic, stable on current Metoprolol regimen. Followed by Cardiology.     8. Meningioma  Chronic, stable on current regimen. Followed by PCP.     9. Essential hypertension  Chronic, stable on current Amlodipine, HCTZ, Metoprolol, Telmisartan regimen. Followed by PCP.     10. Pure hypercholesterolemia  Chronic, stable on current statin regimen. Followed by PCP.     11. Asthma, unspecified asthma severity, unspecified whether complicated, unspecified whether persistent  Chronic, stable on current Albuterol & Nebulizer regimen. Followed by PCP.     12. Overweight  Chronic, stable on current regimen. Followed by PCP.     13. Osteoporosis, postmenopausal  Chronic, stable on current Vit D regimen. Followed by PCP.     14. Neuropathy  Chronic, stable on current Gabapentin regimen. Followed by PCP.     15. Vision impairment  Chronic, stable on current regimen. Followed by PCP.     16. Bilateral hearing loss, unspecified hearing loss type  Abnormal hearing screening, 0/3 bilateral.   - Ambulatory referral/consult to Audiology; Future    17.  Abnormality of gait and mobility  Holds on to walls when walking due to vision impairment.       Provided Nicolasa with a 5-10 year written screening schedule and personal prevention plan. Recommendations were developed using the USPSTF age appropriate recommendations. Education, counseling, and referrals were provided as needed. After Visit Summary printed and given to patient which includes a list of additional screenings\tests needed.    Follow up in about 1 year (around 1/5/2024) for Medicare AWV.    Roxana Mejia NP      I offered to discuss advanced care planning, including how to pick a person who would make decisions for you if you were unable to make them for yourself, called a health care power of , and what kind of decisions you might make such as use of life sustaining treatments such as ventilators and tube feeding when faced with a life limiting illness recorded on a living will that they will need to know. (How you want to be cared for as you near the end of your natural life)     X  Patient has advanced directives on file, which we reviewed, and they do not wish to make changes.

## 2023-01-05 NOTE — PROGRESS NOTES
"[facility]   Physical Therapy Treatment Note     Name: Nicolasa Jurado  [facility] Number: [id number]    Therapy Diagnosis:   Encounter Diagnoses   Name Primary?    Neck pain Yes    Bilateral shoulder pain, unspecified chronicity              Physician: Houston Gaston MD    Visit Date: 1/5/2023      Evaluation Date: 11/9/2022  Authorization Period Expiration: 2/24/2023  Plan of Care Expiration: 1/9/2023  Progress Note Due: 12/9/2022  Visit # / Visits authorized: 1/ 20   FOTO: 1/3     Precautions: Standard and CAD  PT HAS VERY LIMITED EYE SIGHT  PTA Visit #: 1/5     Time In: 1:15 pm  Time Out: 1:58 pm  Total Billable Time: 43 minutes    SUBJECTIVE     Pt reports: she thinks she's doing better than she was  She was compliant with home exercise program.  Response to previous treatment: soreness  Functional change: improved functional Cx spine ROM    Pain: 0/10  Location: left neck      OBJECTIVE     Objective Measures updated at progress report unless specified.         Treatment     Nicolasa received the treatments listed below:      therapeutic exercises to develop strength, endurance, ROM, flexibility, and posture for 23 minutes including:  Cervical rotation in supine 10x5"  Standing rows 10 x 3 with orange TB with CGA  Standing B shoulder extension 10 x 3 with orange TB with CGA  Open book x 10 with 5 sec hold to the L & R  Cervical side bending in sitting 10x5"  Scapular retractions 2 x 10  Shoulder shrugs 2 x 10  Seated ball rollouts for shoulder flexion 15x5"  Seated thoracic extension with assistance 10x5"    manual therapy techniques: Joint and soft tissue mobilizations were applied to the: cx and thoracic regions for 20 minutes, including:  Sub-occipital release  Soft tissue mobilization to the Cx and thoracic paraspinals and upper traps.  Passive mobilization R & L GH joints  Manual Cx spine traction  Passive joint mobilizations to the thoracic spine and rib cage in prone      Patient Education " and Home Exercises     Home Exercises Provided and Patient Education Provided     Education provided:   - continue with present HEP    Written Home Exercises Provided: Patient instructed to cont prior HEP. Exercises were reviewed and Nicolasa was able to demonstrate them prior to the end of the session.  Nicolasa demonstrated good  understanding of the education provided. See EMR under Patient Instructions for exercises provided during therapy sessions    ASSESSMENT     Ms Jurado presents to treatment with reports of improving symptoms and less pain overall since starting physical therapy. She reported discomfort on the right side of her neck with right cervical rotation. Good tolerance to exercises performed. Added seated thoracic extension with patient requiring assistance to complete. Continue to progress as tolerated.     Nicolasa Is progressing well towards her goals.   Pt prognosis is Good.     Pt will continue to benefit from skilled outpatient physical therapy to address the deficits listed in the problem list box on initial evaluation, provide pt/family education and to maximize pt's level of independence in the home and community environment.     Pt's spiritual, cultural and educational needs considered and pt agreeable to plan of care and goals.     Anticipated barriers to physical therapy: none     Goals:   Short Term Goals: 4 weeks   Pt will be instructed in an exercise program to address functional deficits related to her neck and B shoulder Sx.  Improve Cx spine ROM into R rotation and B side bending by 10 %  Improve joint mobility in the Cx and thoracic spine   Evaluate Pt for balance deficits  Pt will report decreased neck and B shoulder pain to </= 6/10     Long Term Goals: 8 weeks   Pt will be independent in a HEP to assist in managing their Cx spine and B shoulder Sx.  Improve Cx spine ROM into R rotation and B side bending to 50% of normal ROM  Pt will report decrease neck and B shoulder pain to </=  3/10  Improve dynamic balance to reduce fall risk.  PLAN     Progress physical therapy program to assist Pt with managing her neck and upper back Sx.    Ryann York, PTA

## 2023-01-12 RX ORDER — GABAPENTIN 100 MG/1
CAPSULE ORAL
Qty: 60 CAPSULE | Refills: 0 | Status: SHIPPED | OUTPATIENT
Start: 2023-01-12 | End: 2023-03-27

## 2023-02-03 ENCOUNTER — HOSPITAL ENCOUNTER (OUTPATIENT)
Dept: RADIOLOGY | Facility: HOSPITAL | Age: 88
Discharge: HOME | End: 2023-02-03
Attending: INTERNAL MEDICINE
Payer: MEDICARE

## 2023-02-03 ENCOUNTER — OFFICE VISIT (OUTPATIENT)
Dept: INTERNAL MEDICINE | Facility: CLINIC | Age: 88
End: 2023-02-03
Payer: MEDICARE

## 2023-02-03 VITALS
HEIGHT: 60 IN | BODY MASS INDEX: 27.26 KG/M2 | SYSTOLIC BLOOD PRESSURE: 138 MMHG | DIASTOLIC BLOOD PRESSURE: 60 MMHG | HEART RATE: 82 BPM | WEIGHT: 138.88 LBS | TEMPERATURE: 98 F | OXYGEN SATURATION: 97 % | RESPIRATION RATE: 20 BRPM

## 2023-02-03 DIAGNOSIS — M25.512 CHRONIC PAIN OF BOTH SHOULDERS: ICD-10-CM

## 2023-02-03 DIAGNOSIS — R10.9 LEFT FLANK PAIN: ICD-10-CM

## 2023-02-03 DIAGNOSIS — G89.29 CHRONIC PAIN OF BOTH SHOULDERS: ICD-10-CM

## 2023-02-03 DIAGNOSIS — M25.562 CHRONIC PAIN OF LEFT KNEE: ICD-10-CM

## 2023-02-03 DIAGNOSIS — M25.511 CHRONIC PAIN OF BOTH SHOULDERS: ICD-10-CM

## 2023-02-03 DIAGNOSIS — H91.90 HEARING LOSS, UNSPECIFIED HEARING LOSS TYPE, UNSPECIFIED LATERALITY: ICD-10-CM

## 2023-02-03 DIAGNOSIS — G89.29 CHRONIC PAIN OF LEFT KNEE: ICD-10-CM

## 2023-02-03 DIAGNOSIS — I10 ESSENTIAL HYPERTENSION: Primary | ICD-10-CM

## 2023-02-03 PROCEDURE — 1160F PR REVIEW ALL MEDS BY PRESCRIBER/CLIN PHARMACIST DOCUMENTED: ICD-10-PCS | Mod: HCNC,CPTII,S$GLB, | Performed by: INTERNAL MEDICINE

## 2023-02-03 PROCEDURE — 73564 XR KNEE ORTHO LEFT WITH FLEXION: ICD-10-PCS | Mod: 26,HCNC,LT, | Performed by: RADIOLOGY

## 2023-02-03 PROCEDURE — 1159F PR MEDICATION LIST DOCUMENTED IN MEDICAL RECORD: ICD-10-PCS | Mod: HCNC,CPTII,S$GLB, | Performed by: INTERNAL MEDICINE

## 2023-02-03 PROCEDURE — 1157F ADVNC CARE PLAN IN RCRD: CPT | Mod: HCNC,CPTII,S$GLB, | Performed by: INTERNAL MEDICINE

## 2023-02-03 PROCEDURE — 73562 XR KNEE ORTHO LEFT WITH FLEXION: ICD-10-PCS | Mod: 26,HCNC,RT, | Performed by: RADIOLOGY

## 2023-02-03 PROCEDURE — 71100 XR RIBS 2 VIEW LEFT: ICD-10-PCS | Mod: 26,HCNC,LT, | Performed by: RADIOLOGY

## 2023-02-03 PROCEDURE — 1125F AMNT PAIN NOTED PAIN PRSNT: CPT | Mod: HCNC,CPTII,S$GLB, | Performed by: INTERNAL MEDICINE

## 2023-02-03 PROCEDURE — 73564 X-RAY EXAM KNEE 4 OR MORE: CPT | Mod: 26,HCNC,LT, | Performed by: RADIOLOGY

## 2023-02-03 PROCEDURE — 3288F FALL RISK ASSESSMENT DOCD: CPT | Mod: HCNC,CPTII,S$GLB, | Performed by: INTERNAL MEDICINE

## 2023-02-03 PROCEDURE — 99999 PR PBB SHADOW E&M-EST. PATIENT-LVL V: CPT | Mod: PBBFAC,HCNC,, | Performed by: INTERNAL MEDICINE

## 2023-02-03 PROCEDURE — 1159F MED LIST DOCD IN RCRD: CPT | Mod: HCNC,CPTII,S$GLB, | Performed by: INTERNAL MEDICINE

## 2023-02-03 PROCEDURE — 3288F PR FALLS RISK ASSESSMENT DOCUMENTED: ICD-10-PCS | Mod: HCNC,CPTII,S$GLB, | Performed by: INTERNAL MEDICINE

## 2023-02-03 PROCEDURE — 71100 X-RAY EXAM RIBS UNI 2 VIEWS: CPT | Mod: TC,HCNC,PO,LT

## 2023-02-03 PROCEDURE — 1101F PT FALLS ASSESS-DOCD LE1/YR: CPT | Mod: HCNC,CPTII,S$GLB, | Performed by: INTERNAL MEDICINE

## 2023-02-03 PROCEDURE — 71100 X-RAY EXAM RIBS UNI 2 VIEWS: CPT | Mod: 26,HCNC,LT, | Performed by: RADIOLOGY

## 2023-02-03 PROCEDURE — 99214 PR OFFICE/OUTPT VISIT, EST, LEVL IV, 30-39 MIN: ICD-10-PCS | Mod: HCNC,S$GLB,, | Performed by: INTERNAL MEDICINE

## 2023-02-03 PROCEDURE — 1157F PR ADVANCE CARE PLAN OR EQUIV PRESENT IN MEDICAL RECORD: ICD-10-PCS | Mod: HCNC,CPTII,S$GLB, | Performed by: INTERNAL MEDICINE

## 2023-02-03 PROCEDURE — 99999 PR PBB SHADOW E&M-EST. PATIENT-LVL V: ICD-10-PCS | Mod: PBBFAC,HCNC,, | Performed by: INTERNAL MEDICINE

## 2023-02-03 PROCEDURE — 1101F PR PT FALLS ASSESS DOC 0-1 FALLS W/OUT INJ PAST YR: ICD-10-PCS | Mod: HCNC,CPTII,S$GLB, | Performed by: INTERNAL MEDICINE

## 2023-02-03 PROCEDURE — 73564 X-RAY EXAM KNEE 4 OR MORE: CPT | Mod: TC,HCNC,PO,LT

## 2023-02-03 PROCEDURE — 99214 OFFICE O/P EST MOD 30 MIN: CPT | Mod: HCNC,S$GLB,, | Performed by: INTERNAL MEDICINE

## 2023-02-03 PROCEDURE — 1160F RVW MEDS BY RX/DR IN RCRD: CPT | Mod: HCNC,CPTII,S$GLB, | Performed by: INTERNAL MEDICINE

## 2023-02-03 PROCEDURE — 73030 X-RAY EXAM OF SHOULDER: CPT | Mod: 26,HCNC,RT, | Performed by: RADIOLOGY

## 2023-02-03 PROCEDURE — 73562 X-RAY EXAM OF KNEE 3: CPT | Mod: 26,HCNC,RT, | Performed by: RADIOLOGY

## 2023-02-03 PROCEDURE — 73030 XR SHOULDER COMPLETE 2 OR MORE VIEWS RIGHT: ICD-10-PCS | Mod: 26,HCNC,RT, | Performed by: RADIOLOGY

## 2023-02-03 PROCEDURE — 1125F PR PAIN SEVERITY QUANTIFIED, PAIN PRESENT: ICD-10-PCS | Mod: HCNC,CPTII,S$GLB, | Performed by: INTERNAL MEDICINE

## 2023-02-03 PROCEDURE — 73030 X-RAY EXAM OF SHOULDER: CPT | Mod: TC,HCNC,PO,RT

## 2023-02-03 RX ORDER — HYDROCODONE BITARTRATE AND ACETAMINOPHEN 5; 325 MG/1; MG/1
1 TABLET ORAL EVERY 8 HOURS PRN
Qty: 21 TABLET | Refills: 0 | Status: SHIPPED | OUTPATIENT
Start: 2023-02-03

## 2023-02-03 NOTE — PROGRESS NOTES
Subjective:       Patient ID: Nicolasa Jurado is a 89 y.o. female.    Chief Complaint: Generalized Body Aches    HPI    89-year-old female here for evaluation of aches and pains and blood pressure.    HTN - Patient is currently on norvasc 10 mg, HCTZ 25 mg, torpol  mg, micardis 80 mg. She does check her BP at home, and it runs 187 systolic last night, was 199 the other night.  She was 107 yesterday. Side effects of medications note: none. Denies headaches, blurred vision, chest pain, shortness of breath, nausea.  She feels faint about 12-1pm.      She has a pain in her back and feels like fainting.  She reports that her left knee is bothering her.  She reports that this has been going on the last month.  She has pain on the outside of the knee.  The pain is described as sharp.  She has relief with pressure.  She has worked with therapists on her knees.  They press where the pain is and it gets better.  The pain goes from her knee into her foot.  She was feeling better and stopped PT. It helped her well.    She has pain in her shoulder again.  She has trouble moving her shoulder around.      She has something in her stomach.  She sometimes feels like she has a balloon in her stomach that keeps her from moving.  It is not present all the time.  This has been present since she hit the dresser on her left flank.  Since then, she has the pain.  She has some epigastric pain after she goes to the bathroom. She gets a pain when she gets up at night.    Review of Systems      Objective:      Physical Exam  Vitals reviewed.   Constitutional:       Appearance: She is well-developed.   HENT:      Head: Normocephalic and atraumatic.      Mouth/Throat:      Pharynx: No oropharyngeal exudate.   Eyes:      General: No scleral icterus.        Right eye: No discharge.         Left eye: No discharge.      Pupils: Pupils are equal, round, and reactive to light.   Neck:      Thyroid: No thyromegaly.      Trachea: No tracheal  deviation.   Cardiovascular:      Rate and Rhythm: Normal rate and regular rhythm.      Heart sounds: Normal heart sounds. No murmur heard.    No friction rub. No gallop.   Pulmonary:      Effort: Pulmonary effort is normal. No respiratory distress.      Breath sounds: Normal breath sounds. No wheezing or rales.   Chest:      Chest wall: No tenderness.   Abdominal:      General: Bowel sounds are normal. There is no distension.      Palpations: Abdomen is soft. There is no mass.      Tenderness: There is no abdominal tenderness. There is no guarding or rebound.   Musculoskeletal:         General: No tenderness. Normal range of motion.      Cervical back: Normal range of motion and neck supple.   Skin:     General: Skin is warm and dry.      Coloration: Skin is not pale.      Findings: No erythema or rash.   Neurological:      Mental Status: She is alert and oriented to person, place, and time.   Psychiatric:         Behavior: Behavior normal.       Assessment:       1. Essential hypertension    2. Chronic pain of left knee  - X-ray Knee Ortho Left with Flexion; Future  - Ambulatory referral/consult to Physical/Occupational Therapy; Future  - Ambulatory referral/consult to Orthopedics; Future    3. Chronic pain of both shoulders  - Ambulatory referral/consult to Orthopedics; Future  - X-ray Shoulder 2 or More Views Right; Future    4. Left flank pain  - X-Ray Ribs 2 View Left; Future    5. Hearing loss, unspecified hearing loss type, unspecified laterality  - Ambulatory referral/consult to ENT; Future  - Ambulatory referral/consult to Audiology; Future      Plan:       1. Continue norvasc 10 mg, HCTZ 25 mg, torpol  mg, micardis 80 mg.  Pills to take half in the morning and half in the evening.    2/3.  Refer to Orthopedics, Physical therapy.  Check x-ray of knee, shoulder.  4. Check x-ray of ribs and see if patient re-injured ribs with last fall.  Try hydrocodone instead of tramadol.  5.  Refer to ENT,  audiology.

## 2023-02-07 DIAGNOSIS — Z00.00 ENCOUNTER FOR MEDICARE ANNUAL WELLNESS EXAM: ICD-10-CM

## 2023-02-09 DIAGNOSIS — Z00.00 ENCOUNTER FOR MEDICARE ANNUAL WELLNESS EXAM: ICD-10-CM

## 2023-02-13 ENCOUNTER — OFFICE VISIT (OUTPATIENT)
Dept: DERMATOLOGY | Facility: CLINIC | Age: 88
End: 2023-02-13
Payer: MEDICARE

## 2023-02-13 DIAGNOSIS — L29.9 SCALP PRURITUS: ICD-10-CM

## 2023-02-13 DIAGNOSIS — L82.0 SEBORRHEIC KERATOSES, INFLAMED: ICD-10-CM

## 2023-02-13 DIAGNOSIS — L82.1 SK (SEBORRHEIC KERATOSIS): Primary | ICD-10-CM

## 2023-02-13 DIAGNOSIS — L21.9 SEBORRHEIC DERMATITIS, UNSPECIFIED: ICD-10-CM

## 2023-02-13 DIAGNOSIS — B07.8 COMMON WART: ICD-10-CM

## 2023-02-13 PROCEDURE — 99999 PR PBB SHADOW E&M-EST. PATIENT-LVL III: CPT | Mod: PBBFAC,HCNC,, | Performed by: DERMATOLOGY

## 2023-02-13 PROCEDURE — 1157F ADVNC CARE PLAN IN RCRD: CPT | Mod: HCNC,CPTII,S$GLB, | Performed by: DERMATOLOGY

## 2023-02-13 PROCEDURE — 1126F PR PAIN SEVERITY QUANTIFIED, NO PAIN PRESENT: ICD-10-PCS | Mod: HCNC,CPTII,S$GLB, | Performed by: DERMATOLOGY

## 2023-02-13 PROCEDURE — 1157F PR ADVANCE CARE PLAN OR EQUIV PRESENT IN MEDICAL RECORD: ICD-10-PCS | Mod: HCNC,CPTII,S$GLB, | Performed by: DERMATOLOGY

## 2023-02-13 PROCEDURE — 99999 PR PBB SHADOW E&M-EST. PATIENT-LVL III: ICD-10-PCS | Mod: PBBFAC,HCNC,, | Performed by: DERMATOLOGY

## 2023-02-13 PROCEDURE — 1100F PTFALLS ASSESS-DOCD GE2>/YR: CPT | Mod: HCNC,CPTII,S$GLB, | Performed by: DERMATOLOGY

## 2023-02-13 PROCEDURE — 99214 OFFICE O/P EST MOD 30 MIN: CPT | Mod: 25,HCNC,S$GLB, | Performed by: DERMATOLOGY

## 2023-02-13 PROCEDURE — 17110 PR DESTRUCTION BENIGN LESIONS UP TO 14: ICD-10-PCS | Mod: HCNC,S$GLB,, | Performed by: DERMATOLOGY

## 2023-02-13 PROCEDURE — 1159F MED LIST DOCD IN RCRD: CPT | Mod: HCNC,CPTII,S$GLB, | Performed by: DERMATOLOGY

## 2023-02-13 PROCEDURE — 1159F PR MEDICATION LIST DOCUMENTED IN MEDICAL RECORD: ICD-10-PCS | Mod: HCNC,CPTII,S$GLB, | Performed by: DERMATOLOGY

## 2023-02-13 PROCEDURE — 1160F RVW MEDS BY RX/DR IN RCRD: CPT | Mod: HCNC,CPTII,S$GLB, | Performed by: DERMATOLOGY

## 2023-02-13 PROCEDURE — 1100F PR PT FALLS ASSESS DOC 2+ FALLS/FALL W/INJURY/YR: ICD-10-PCS | Mod: HCNC,CPTII,S$GLB, | Performed by: DERMATOLOGY

## 2023-02-13 PROCEDURE — 99214 PR OFFICE/OUTPT VISIT, EST, LEVL IV, 30-39 MIN: ICD-10-PCS | Mod: 25,HCNC,S$GLB, | Performed by: DERMATOLOGY

## 2023-02-13 PROCEDURE — 3288F PR FALLS RISK ASSESSMENT DOCUMENTED: ICD-10-PCS | Mod: HCNC,CPTII,S$GLB, | Performed by: DERMATOLOGY

## 2023-02-13 PROCEDURE — 1126F AMNT PAIN NOTED NONE PRSNT: CPT | Mod: HCNC,CPTII,S$GLB, | Performed by: DERMATOLOGY

## 2023-02-13 PROCEDURE — 1160F PR REVIEW ALL MEDS BY PRESCRIBER/CLIN PHARMACIST DOCUMENTED: ICD-10-PCS | Mod: HCNC,CPTII,S$GLB, | Performed by: DERMATOLOGY

## 2023-02-13 PROCEDURE — 17110 DESTRUCTION B9 LES UP TO 14: CPT | Mod: HCNC,S$GLB,, | Performed by: DERMATOLOGY

## 2023-02-13 PROCEDURE — 3288F FALL RISK ASSESSMENT DOCD: CPT | Mod: HCNC,CPTII,S$GLB, | Performed by: DERMATOLOGY

## 2023-02-13 RX ORDER — FLUOCINONIDE TOPICAL SOLUTION USP, 0.05% 0.5 MG/ML
SOLUTION TOPICAL
Qty: 60 ML | Refills: 3 | Status: SHIPPED | OUTPATIENT
Start: 2023-02-13

## 2023-02-13 RX ORDER — KETOCONAZOLE 20 MG/ML
SHAMPOO, SUSPENSION TOPICAL
Qty: 120 ML | Refills: 6 | Status: SHIPPED | OUTPATIENT
Start: 2023-02-13

## 2023-02-13 NOTE — PROGRESS NOTES
Subjective:       Patient ID:  Nicolasa Jurado is a 89 y.o. female who presents for   Chief Complaint   Patient presents with    Lesion     L arm     Itching     scalp     Pt c/o peeling itching lesion on left upper farm for over 1 year. No tx  C/o irritated lesion on left upper back. Would like it removed  C/o scaling and itching in the scalp for over a year. Uses otc anti dandruff shampoo and still itching   C/o scaly lesions on upper chest    Lesion    Itching    Review of Systems   Skin:  Positive for itching. Negative for rash.   Hematologic/Lymphatic: Bruises/bleeds easily.      Objective:    Physical Exam   Constitutional: She appears well-developed and well-nourished. No distress.   Neurological: She is alert and oriented to person, place, and time. She is not disoriented.   Psychiatric: She has a normal mood and affect.   Skin:   Areas Examined (abnormalities noted in diagram):   Scalp / Hair Palpated and Inspected  Chest / Axilla Inspection Performed  Back Inspection Performed  LUE Inspection Performed                 Diagram Legend     Erythematous scaling macule/papule c/w actinic keratosis       Vascular papule c/w angioma      Pigmented verrucoid papule/plaque c/w seborrheic keratosis      Yellow umbilicated papule c/w sebaceous hyperplasia      Irregularly shaped tan macule c/w lentigo     1-2 mm smooth white papules consistent with Milia      Movable subcutaneous cyst with punctum c/w epidermal inclusion cyst      Subcutaneous movable cyst c/w pilar cyst      Firm pink to brown papule c/w dermatofibroma      Pedunculated fleshy papule(s) c/w skin tag(s)      Evenly pigmented macule c/w junctional nevus     Mildly variegated pigmented, slightly irregular-bordered macule c/w mildly atypical nevus      Flesh colored to evenly pigmented papule c/w intradermal nevus       Pink pearly papule/plaque c/w basal cell carcinoma      Erythematous hyperkeratotic cursted plaque c/w SCC      Surgical scar with no  sign of skin cancer recurrence      Open and closed comedones      Inflammatory papules and pustules      Verrucoid papule consistent consistent with wart     Erythematous eczematous patches and plaques     Dystrophic onycholytic nail with subungual debris c/w onychomycosis     Umbilicated papule    Erythematous-base heme-crusted tan verrucoid plaque consistent with inflamed seborrheic keratosis     Erythematous Silvery Scaling Plaque c/w Psoriasis     See annotation      Assessment / Plan:        SK (seborrheic keratosis)  These are benign inherited growths without a malignant potential. Reassurance given to patient. No treatment is necessary.     Seborrheic keratoses, inflamed  -     Ambulatory referral/consult to Dermatology  Cryosurgery procedure note:    Verbal consent from the patient is obtained including, but not limited to, risk of hypopigmentation/hyperpigmentation, scar, recurrence of lesion. Liquid nitrogen cryosurgery is applied to 3 lesions to produce a freeze injury. The patient is aware that blisters may form and is instructed on wound care with gentle cleansing and use of vaseline ointment to keep moist until healed. The patient is supplied a handout on cryosurgery and is instructed to call if lesions do not completely resolve.    Seborrheic dermatitis, unspecified  -     fluocinonide (LIDEX) 0.05 % external solution; AAA scalp qday prn itching, scaling  Dispense: 60 mL; Refill: 3  -     ketoconazole (NIZORAL) 2 % shampoo; Wash hair with medicated shampoo at least 2x/week - let sit on scalp at least 5 minutes prior to rinsing  Dispense: 120 mL; Refill: 6    Scalp pruritus  -     fluocinonide (LIDEX) 0.05 % external solution; AAA scalp qday prn itching, scaling  Dispense: 60 mL; Refill: 3  -     ketoconazole (NIZORAL) 2 % shampoo; Wash hair with medicated shampoo at least 2x/week - let sit on scalp at least 5 minutes prior to rinsing  Dispense: 120 mL; Refill: 6    Common wart  Cryosurgery procedure  note:    Verbal consent from the patient is obtained including, but not limited to, risk of hypopigmentation/hyperpigmentation, scar, recurrence of lesion. Liquid nitrogen cryosurgery is applied to 1 lesions to produce a freeze injury. The patient is aware that blisters may form and is instructed on wound care with gentle cleansing and use of vaseline ointment to keep moist until healed. The patient is supplied a handout on cryosurgery and is instructed to call if lesions do not completely resolve.    Pts venancio present today , pt partially blind,            Follow up if symptoms worsen or fail to improve.

## 2023-02-13 NOTE — PATIENT INSTRUCTIONS

## 2023-02-27 ENCOUNTER — CLINICAL SUPPORT (OUTPATIENT)
Dept: REHABILITATION | Facility: HOSPITAL | Age: 88
End: 2023-02-27
Attending: INTERNAL MEDICINE
Payer: MEDICARE

## 2023-02-27 DIAGNOSIS — M54.32 SCIATICA OF LEFT SIDE: ICD-10-CM

## 2023-02-27 DIAGNOSIS — M25.562 CHRONIC PAIN OF LEFT KNEE: Primary | ICD-10-CM

## 2023-02-27 DIAGNOSIS — G89.29 CHRONIC PAIN OF LEFT KNEE: Primary | ICD-10-CM

## 2023-02-27 PROCEDURE — 97162 PT EVAL MOD COMPLEX 30 MIN: CPT | Mod: HCNC,PO

## 2023-02-27 PROCEDURE — 97110 THERAPEUTIC EXERCISES: CPT | Mod: HCNC,PO

## 2023-02-27 NOTE — PLAN OF CARE
[facility]   Physical Therapy Initial Evaluation     Date: 2/27/2023   Name: Nicolasa Jurado  [facility] Number: [id number]    Therapy Diagnosis:   Encounter Diagnoses   Name Primary?    Chronic pain of left knee Yes    Sciatica of left side      Physician: Houston Gaston MD    Physician Orders: PT Eval and Treat   Medical Diagnosis from Referral: M25.562,G89.29 (ICD-10-CM) - Chronic pain of left knee  Evaluation Date: 2/27/2023  Authorization Period Expiration: 12/29/23  Plan of Care Expiration: 5/22/2023  Progress Note Due: 3/27/2023  Visit # / Visits authorized: -/ 20   FOTO: 1/ 3     Precautions: Standard    Time In: 1008  Time Out: 1100  Total Appointment Time (timed & untimed codes): 52 minutes      SUBJECTIVE   Date of onset: Worsened pain about a month ago    History of current condition - Nicolasa reports pain in L knee. She says that the pain starts up in the hip, however, has worsened over the last couple months. She states the pain is worse at night and often awakens her at night. She states that this has kept her from walking and standing as she usually does. It should be noted that patient is [place] and also has sight limitations. Pt was accompanied by granddaughter.    Falls: 2 over last year    Imaging, X-ray: Mild DJD and narrowing of the medial tibiofemoral joint spaces bilaterally.  No fracture or bone destruction identified.  Meniscus calcification consistent with chondrocalcinosis.    Prior Therapy: Therapy for shoulder and sciatica last year  Social History: 1SH; None; Walking shower; lives with their family  Occupation: None  Prior Level of Function: IND  Current Level of Function: Assist with ADLs and limited in ambulation distances.    Pain:  Current 0/10, worst 10/10, best 0/10   Location: left knee  right knee(s)   Description: Aching and Sharp  Aggravating Factors: Sitting, Laying, and sleeping  Easing Factors: heating pad and rest    Patients goals: To get rid of the pain      Medical History:   Past Medical History:   Diagnosis Date    Anemia     Asthma, chronic     Bilateral carotid artery stenosis 10/11/2022    CAD (coronary artery disease)     Elevated glucose 2/6/2015    GERD (gastroesophageal reflux disease)     protonix 40    Hypercalcemia 10/16/2012    Hyperlipidemia     Hyperparathyroidism     Hypertension     Insomnia 2/11/2016    Renal artery stenosis     Right low back pain 6/2/2016       Surgical History:   Nicolasa Jurado  has a past surgical history that includes Colonoscopy (2006); Coronary angioplasty with stent; Hysterectomy; Cholecystectomy; Kidney surgery; and Appendectomy.    Medications:   Nicolasa has a current medication list which includes the following prescription(s): acyclovir 5%, albuterol, amlodipine, aspirin, atorvastatin, brimonidine 0.2%, cinacalcet, diclofenac sodium, dorzolamide, dorzolamide-timolol 2-0.5%, ergocalciferol (vitamin d2), fluocinonide, fluocinonide, fluticasone propionate, flovent diskus, gabapentin, hydrochlorothiazide, hydrocodone-acetaminophen, ketoconazole, ketorolac 0.5%, latanoprost, levalbuterol, methazolamide, metoprolol succinate, nitroglycerin, omeprazole, pilocarpine hcl 2%, prednisolone acetate, rhopressa, telmisartan, timolol maleate 0.5%, tizanidine, and vit c-e-cupric-zinc-lutein.    Allergies:   Review of patient's allergies indicates:   Allergen Reactions    Venom-wasp     Penicillin      Other reaction(s): Rash    Amoxicillin-pot clavulanate      Other reaction(s): diarrea  Other reaction(s): Vomiting  Other reaction(s): diarrea    Tramadol Nausea And Vomiting          OBJECTIVE     Observation: Pt ambulates in with HHA     Range of Motion (Passive):   Knee Right  Left    Flexion WNL WNL   Extension WNL WNL         Range of Motion (Active):   Knee Right  Left    Flexion WNL WNL   Extension WNL WNL       Lower Extremity Strength  Right LE  Left LE    Quadriceps: 4-/5 Quadriceps: 3+/5   Hamstrings: 4-/5 Hamstrings: 3+/5   Hip  flexion (seated): 3+/5 Hip flexion (seated): 3+/5   Hip extension:  3+/5 Hip extension: 3/5   PGM: 3/5 PGM:  3/5   Hip ER:  4-/5 Hip ER:  3+/5   Hip IR: 4-/5 Hip IR: 4-/5     Special Tests:   Right Left   Thessaly's Test - -   Patellar Grind Test - -       Special Tests:   FABERs:  +   UNRULY:+  Hip Scour: +  Slump: +    Joint Mobility: Decreased Hip ROM     Palpation: Pain on palpation to superior lateral aspect of L knee and inferior lateral aspect. P    Edema: Present on superior aspect of L knee      Sensation: Numbness and tingling in LLE from toes to  hip    Flexibility:    Hamstrings: R = - ; L = +    Marcia's test: R = - ; L = +   Mati test: R = - ; L = +      Limitation/Restriction for FOTO EVAL Survey    Therapist reviewed FOTO scores for Nicolasa Jurado on 2/27/2023.   FOTO documents entered into [employer] - see Media section.    Limitation Score: 15%         TREATMENT     Total Treatment time (time-based codes) separate from Evaluation: 40 minutes      Nicolasa received the treatments listed below:      THERAPEUTIC EXERCISES to develop strength, endurance, ROM, and core stabilization for 40 minutes including    2x10 Bridging  2x10 SLR BLE  2x10 Supine Hip abduction vs YTB  2x10 S/L Clam shells vs YTB  Seated piriformis Stretch    PATIENT EDUCATION AND HOME EXERCISES     Education provided:   - HEP    Written Home Exercises Provided: Patient instructed to cont prior HEP. Exercises were reviewed and Nicolasa was able to demonstrate them prior to the end of the session.  Nicolasa demonstrated good  understanding of the education provided. See EMR under Patient Instructions for exercises provided during therapy sessions.    ASSESSMENT     Nicolasa is a 89 y.o. female referred to outpatient Physical Therapy with a medical diagnosis of M25.562,G89.29 (ICD-10-CM) - Chronic pain of left knee. Patient presents with   -Decreased function (LEFS)  -Increased pain (NPS)   -Decreased pain free knee AROM  -Decreased LE strength (MMT)    -Decreased LE mobility   -Decreased participation in regular exercise routine  -(+) TTP    Patient prognosis is Good.   Patientt will benefit from skilled outpatient Physical Therapy to address the deficits stated above and in the chart below, provide patient /family education, and to maximize patientt's level of independence.     Plan of care discussed with patient: Yes  Patient's spiritual, cultural and educational needs considered and patient is agreeable to the plan of care and goals as stated below:     Anticipated Barriers for therapy: None    Medical Necessity is demonstrated by the following  History  Co-morbidities and personal factors that may impact the plan of care Co-morbidities:   advanced age and HTN    Personal Factors:   age     moderate   Examination  Body Structures and Functions, activity limitations and participation restrictions that may impact the plan of care Body Regions:   lower extremities  upper extremities    Body Systems:    gross symmetry  ROM  strength  gross coordinated movement  balance  gait  transfers  transitions    Participation Restrictions:   None    Activity limitations:   Learning and applying knowledge  watching  listening    General Tasks and Commands  no deficits    Communication  no deficits    Mobility  lifting and carrying objects  walking  using transportation (bus, train, plane, car)    Self care  no deficits    Domestic Life  shopping  cooking  doing house work (cleaning house, washing dishes, laundry)  assisting others    Interactions/Relationships  no deficits    Life Areas  no deficits    Community and Social Life  no deficits         moderate   Clinical Presentation stable and uncomplicated low   Decision Making/ Complexity Score: moderate     GOALS: Short Term Goals:  4 weeks  1.Report decreased knee pain  < / =  5/10  for improved functional mobility  2. Increase strength by 1/3 MMT grade in hip abduction to increase tolerance for ADL and work  activities.  3. Pt to tolerate HEP to improve ROM and independence with ADL's    Long Term Goals: 6 weeks  1.Report decreased knee pain < / = 2/10  for improved functional mobility  2.Patient goal: to decrease pain during sleep  3.Increase strength to >/= 4+/5 in hip abduction  to increase tolerance for ADL and work activities.  4. Pt will report at CJ level (20-40% impaired) on FOTO knee to demonstrate increase in LE function with every day tasks.       PLAN   Plan of care Certification: 2/27/2023 to 5/22/2023.    Outpatient Physical Therapy 2 times weekly for 10 weeks to include the following interventions: Gait Training, Manual Therapy, Moist Heat/ Ice, Patient Education, Self Care, Therapeutic Activities, and Therapeutic Exercise.     Yessy Nicholson, PT      I CERTIFY THE NEED FOR THESE SERVICES FURNISHED UNDER THIS PLAN OF TREATMENT AND WHILE UNDER MY CARE   Physician's comments:     Physician's Signature: ___________________________________________________

## 2023-03-06 ENCOUNTER — OFFICE VISIT (OUTPATIENT)
Dept: INTERNAL MEDICINE | Facility: CLINIC | Age: 88
End: 2023-03-06
Payer: MEDICARE

## 2023-03-06 VITALS
OXYGEN SATURATION: 97 % | HEIGHT: 60 IN | BODY MASS INDEX: 27.09 KG/M2 | HEART RATE: 71 BPM | DIASTOLIC BLOOD PRESSURE: 70 MMHG | SYSTOLIC BLOOD PRESSURE: 130 MMHG | WEIGHT: 138 LBS | TEMPERATURE: 98 F

## 2023-03-06 DIAGNOSIS — N18.4 CKD (CHRONIC KIDNEY DISEASE), STAGE IV: Chronic | ICD-10-CM

## 2023-03-06 DIAGNOSIS — I25.10 CORONARY ARTERY DISEASE INVOLVING NATIVE CORONARY ARTERY OF NATIVE HEART WITHOUT ANGINA PECTORIS: Chronic | ICD-10-CM

## 2023-03-06 DIAGNOSIS — I50.32 CHRONIC DIASTOLIC HEART FAILURE: Chronic | ICD-10-CM

## 2023-03-06 DIAGNOSIS — I10 ESSENTIAL HYPERTENSION: Primary | Chronic | ICD-10-CM

## 2023-03-06 DIAGNOSIS — G62.9 NEUROPATHY: ICD-10-CM

## 2023-03-06 DIAGNOSIS — I70.0 ATHEROSCLEROSIS OF AORTA: Chronic | ICD-10-CM

## 2023-03-06 DIAGNOSIS — M17.12 ARTHRITIS OF LEFT KNEE: ICD-10-CM

## 2023-03-06 DIAGNOSIS — I65.23 BILATERAL CAROTID ARTERY STENOSIS: ICD-10-CM

## 2023-03-06 DIAGNOSIS — E78.00 PURE HYPERCHOLESTEROLEMIA: Chronic | ICD-10-CM

## 2023-03-06 DIAGNOSIS — K55.1 MESENTERIC ARTERY STENOSIS: Chronic | ICD-10-CM

## 2023-03-06 DIAGNOSIS — H35.3290 EXUDATIVE AGE-RELATED MACULAR DEGENERATION, UNSPECIFIED LATERALITY, UNSPECIFIED STAGE: ICD-10-CM

## 2023-03-06 PROCEDURE — 1157F ADVNC CARE PLAN IN RCRD: CPT | Mod: CPTII,S$GLB,, | Performed by: INTERNAL MEDICINE

## 2023-03-06 PROCEDURE — 1159F MED LIST DOCD IN RCRD: CPT | Mod: CPTII,S$GLB,, | Performed by: INTERNAL MEDICINE

## 2023-03-06 PROCEDURE — 99214 PR OFFICE/OUTPT VISIT, EST, LEVL IV, 30-39 MIN: ICD-10-PCS | Mod: S$GLB,,, | Performed by: INTERNAL MEDICINE

## 2023-03-06 PROCEDURE — 1160F RVW MEDS BY RX/DR IN RCRD: CPT | Mod: CPTII,S$GLB,, | Performed by: INTERNAL MEDICINE

## 2023-03-06 PROCEDURE — 3288F FALL RISK ASSESSMENT DOCD: CPT | Mod: CPTII,S$GLB,, | Performed by: INTERNAL MEDICINE

## 2023-03-06 PROCEDURE — 99214 OFFICE O/P EST MOD 30 MIN: CPT | Mod: S$GLB,,, | Performed by: INTERNAL MEDICINE

## 2023-03-06 PROCEDURE — 1159F PR MEDICATION LIST DOCUMENTED IN MEDICAL RECORD: ICD-10-PCS | Mod: CPTII,S$GLB,, | Performed by: INTERNAL MEDICINE

## 2023-03-06 PROCEDURE — 99999 PR PBB SHADOW E&M-EST. PATIENT-LVL V: CPT | Mod: PBBFAC,,, | Performed by: INTERNAL MEDICINE

## 2023-03-06 PROCEDURE — 99999 PR PBB SHADOW E&M-EST. PATIENT-LVL V: ICD-10-PCS | Mod: PBBFAC,,, | Performed by: INTERNAL MEDICINE

## 2023-03-06 PROCEDURE — 1160F PR REVIEW ALL MEDS BY PRESCRIBER/CLIN PHARMACIST DOCUMENTED: ICD-10-PCS | Mod: CPTII,S$GLB,, | Performed by: INTERNAL MEDICINE

## 2023-03-06 PROCEDURE — 1125F AMNT PAIN NOTED PAIN PRSNT: CPT | Mod: CPTII,S$GLB,, | Performed by: INTERNAL MEDICINE

## 2023-03-06 PROCEDURE — 1157F PR ADVANCE CARE PLAN OR EQUIV PRESENT IN MEDICAL RECORD: ICD-10-PCS | Mod: CPTII,S$GLB,, | Performed by: INTERNAL MEDICINE

## 2023-03-06 PROCEDURE — 1125F PR PAIN SEVERITY QUANTIFIED, PAIN PRESENT: ICD-10-PCS | Mod: CPTII,S$GLB,, | Performed by: INTERNAL MEDICINE

## 2023-03-06 PROCEDURE — 1101F PT FALLS ASSESS-DOCD LE1/YR: CPT | Mod: CPTII,S$GLB,, | Performed by: INTERNAL MEDICINE

## 2023-03-06 PROCEDURE — 1101F PR PT FALLS ASSESS DOC 0-1 FALLS W/OUT INJ PAST YR: ICD-10-PCS | Mod: CPTII,S$GLB,, | Performed by: INTERNAL MEDICINE

## 2023-03-06 PROCEDURE — 3288F PR FALLS RISK ASSESSMENT DOCUMENTED: ICD-10-PCS | Mod: CPTII,S$GLB,, | Performed by: INTERNAL MEDICINE

## 2023-03-06 RX ORDER — ZOSTER VACCINE RECOMBINANT, ADJUVANTED 50 MCG/0.5
KIT INTRAMUSCULAR
COMMUNITY
Start: 2022-11-09 | End: 2023-07-26

## 2023-03-06 RX ORDER — BNT162B2 ORIGINAL AND OMICRON BA.4/BA.5 .1125; .1125 MG/2.25ML; MG/2.25ML
INJECTION, SUSPENSION INTRAMUSCULAR
COMMUNITY
Start: 2022-11-09 | End: 2023-07-26

## 2023-03-06 RX ORDER — FLUTICASONE PROPIONATE AND SALMETEROL 250; 50 UG/1; UG/1
POWDER RESPIRATORY (INHALATION)
COMMUNITY
Start: 2022-10-03

## 2023-03-06 RX ORDER — INFLUENZA VIRUS VACCINE 15; 15; 15; 15 UG/.5ML; UG/.5ML; UG/.5ML; UG/.5ML
SUSPENSION INTRAMUSCULAR
COMMUNITY
Start: 2022-11-04 | End: 2023-07-26

## 2023-03-06 NOTE — PROGRESS NOTES
Subjective:       Patient ID: Nicolasa Jurado is a 89 y.o. female.    Chief Complaint: Follow-up    HPI    89-year-old female with aortic atherosclerosis, coronary artery disease, carotid artery stenosis, mesenteric artery stenosis here for follow-up.    HTN - Patient is currently on norvasc 10 mg, HCTZ 25 mg, Micardis 80 mg, Toprol- mg. She does check her BP at home, and it runs 122/50 - 182/86. Side effects of medications note: none. Denies headaches, blurred vision, chest pain, shortness of breath, nausea.    HLD - Patient is currently on lipitor 80 mg.  Her last lipid panel was   Cholesterol   Date Value Ref Range Status   11/07/2022 158 120 - 199 mg/dL Final     Comment:     The National Cholesterol Education Program (NCEP) has set the  following guidelines (reference ranges) for Cholesterol:  Optimal.....................<200 mg/dL  Borderline High.............200-239 mg/dL  High........................> or = 240 mg/dL       Triglycerides   Date Value Ref Range Status   11/07/2022 167 (H) 30 - 150 mg/dL Final     Comment:     The National Cholesterol Education Program (NCEP) has set the  following guidelines (reference values) for triglycerides:  Normal......................<150 mg/dL  Borderline High.............150-199 mg/dL  High........................200-499 mg/dL       HDL   Date Value Ref Range Status   11/07/2022 45 40 - 75 mg/dL Final     Comment:     The National Cholesterol Education Program (NCEP) has set the  following guidelines (reference values) for HDL Cholesterol:  Low...............<40 mg/dL  Optimal...........>60 mg/dL       LDL Cholesterol   Date Value Ref Range Status   11/07/2022 79.6 63.0 - 159.0 mg/dL Final     Comment:     The National Cholesterol Education Program (NCEP) has set the  following guidelines (reference values) for LDL Cholesterol:  Optimal.......................<130 mg/dL  Borderline High...............130-159 mg/dL  High..........................160-189 mg/dL  Very  High.....................>190 mg/dL     .  Side effects of the medication: none.    She has acid reflux and is on prilosec 40 mg. She tried to stop the medication and had terrible reflux.    She reports that the hydrocodone is helping her pain significantly.  She has neuropathy that this helps with.    She has diastolic heart failure and is on no diuretics currently.    She has pain in her left leg and knee.  This keeps her awake at night.  It is helped by hydrocodone. She has arthritis of the knee and was referred to orthoperdics.    Review of Systems      Objective:      Physical Exam  Vitals reviewed.   Constitutional:       Appearance: She is well-developed.   HENT:      Head: Normocephalic and atraumatic.      Mouth/Throat:      Pharynx: No oropharyngeal exudate.   Eyes:      General: No scleral icterus.        Right eye: No discharge.         Left eye: No discharge.      Pupils: Pupils are equal, round, and reactive to light.   Neck:      Thyroid: No thyromegaly.      Trachea: No tracheal deviation.   Cardiovascular:      Rate and Rhythm: Normal rate and regular rhythm.      Heart sounds: Normal heart sounds. No murmur heard.    No friction rub. No gallop.   Pulmonary:      Effort: Pulmonary effort is normal. No respiratory distress.      Breath sounds: Normal breath sounds. No wheezing or rales.   Chest:      Chest wall: No tenderness.   Abdominal:      General: Bowel sounds are normal. There is no distension.      Palpations: Abdomen is soft. There is no mass.      Tenderness: There is no abdominal tenderness. There is no guarding or rebound.   Musculoskeletal:         General: No swelling or tenderness. Normal range of motion.      Cervical back: Normal range of motion and neck supple.   Skin:     General: Skin is warm and dry.      Coloration: Skin is not pale.      Findings: No erythema or rash.   Neurological:      Mental Status: She is alert and oriented to person, place, and time.   Psychiatric:          Behavior: Behavior normal.       Assessment:       1. Essential hypertension    2. Pure hypercholesterolemia    3. Chronic diastolic heart failure    4. Atherosclerosis of aorta    5. Bilateral carotid artery stenosis    6. Coronary artery disease involving native coronary artery of native heart without angina pectoris    7. Mesenteric artery stenosis    8. CKD (chronic kidney disease), stage IV    9. Neuropathy    10. Exudative age-related macular degeneration, unspecified laterality, unspecified stage    11. Arthritis of left knee      Plan:       1. Continue HCT 5 mg, amlodipine 10 mg, Toprol- mg.  2.  Continue Lipitor 80 mg  3.  Monitor.    4/5/6/7. Continue Lipitor 80 mg.  8. Monitor.  Counseled on hydration and avoidance of NSAIDs.    9. Continue hydrocodone as needed.  10.  Monitor  11.  Follow-up with orthopedics as ordered.

## 2023-03-07 ENCOUNTER — TELEPHONE (OUTPATIENT)
Dept: SPORTS MEDICINE | Facility: CLINIC | Age: 88
End: 2023-03-07
Payer: MEDICARE

## 2023-03-07 NOTE — TELEPHONE ENCOUNTER
----- Message from Wandy Craig sent at 3/7/2023 11:52 AM CST -----  Regarding: PT WANTS TO GO TO THERAPY FOR HER LEGS  Contact: PT'S CARETAKER  Quinton is returning a call from murphy regarding pt..    Confirmed contact info below:  Contact Name: Nicolasa Jurado  Phone Number: [phone]

## 2023-03-07 NOTE — TELEPHONE ENCOUNTER
Called and spoke to son.  He will contact patient to see if she would like to be seen for her shoulders or her left knee on 3/13/23

## 2023-03-07 NOTE — TELEPHONE ENCOUNTER
Called and spoke to Quinton.  He states patient is would like to be seen for her left knee on 3/13/23

## 2023-03-13 ENCOUNTER — OFFICE VISIT (OUTPATIENT)
Dept: SPORTS MEDICINE | Facility: CLINIC | Age: 88
End: 2023-03-13
Payer: MEDICARE

## 2023-03-13 ENCOUNTER — HOSPITAL ENCOUNTER (OUTPATIENT)
Dept: RADIOLOGY | Facility: HOSPITAL | Age: 88
Discharge: HOME | End: 2023-03-13
Attending: STUDENT IN AN ORGANIZED HEALTH CARE EDUCATION/TRAINING PROGRAM
Payer: MEDICARE

## 2023-03-13 VITALS
BODY MASS INDEX: 27.09 KG/M2 | DIASTOLIC BLOOD PRESSURE: 74 MMHG | SYSTOLIC BLOOD PRESSURE: 140 MMHG | HEIGHT: 60 IN | WEIGHT: 138 LBS

## 2023-03-13 DIAGNOSIS — M17.12 PRIMARY OSTEOARTHRITIS OF LEFT KNEE: ICD-10-CM

## 2023-03-13 DIAGNOSIS — M25.512 CHRONIC PAIN OF BOTH SHOULDERS: ICD-10-CM

## 2023-03-13 DIAGNOSIS — M54.16 LUMBAR RADICULOPATHY: ICD-10-CM

## 2023-03-13 DIAGNOSIS — M54.16 LUMBAR RADICULOPATHY: Primary | ICD-10-CM

## 2023-03-13 DIAGNOSIS — M25.562 CHRONIC PAIN OF LEFT KNEE: ICD-10-CM

## 2023-03-13 DIAGNOSIS — M25.511 CHRONIC PAIN OF BOTH SHOULDERS: ICD-10-CM

## 2023-03-13 DIAGNOSIS — G89.29 CHRONIC PAIN OF LEFT KNEE: ICD-10-CM

## 2023-03-13 DIAGNOSIS — G89.29 CHRONIC PAIN OF BOTH SHOULDERS: ICD-10-CM

## 2023-03-13 PROCEDURE — 72114 XR LUMBAR SPINE 5 VIEW WITH FLEX AND EXT: ICD-10-PCS | Mod: 26,,, | Performed by: RADIOLOGY

## 2023-03-13 PROCEDURE — 1159F MED LIST DOCD IN RCRD: CPT | Mod: CPTII,S$GLB,, | Performed by: STUDENT IN AN ORGANIZED HEALTH CARE EDUCATION/TRAINING PROGRAM

## 2023-03-13 PROCEDURE — 99204 PR OFFICE/OUTPT VISIT, NEW, LEVL IV, 45-59 MIN: ICD-10-PCS | Mod: S$GLB,,, | Performed by: STUDENT IN AN ORGANIZED HEALTH CARE EDUCATION/TRAINING PROGRAM

## 2023-03-13 PROCEDURE — 99204 OFFICE O/P NEW MOD 45 MIN: CPT | Mod: S$GLB,,, | Performed by: STUDENT IN AN ORGANIZED HEALTH CARE EDUCATION/TRAINING PROGRAM

## 2023-03-13 PROCEDURE — 1157F ADVNC CARE PLAN IN RCRD: CPT | Mod: CPTII,S$GLB,, | Performed by: STUDENT IN AN ORGANIZED HEALTH CARE EDUCATION/TRAINING PROGRAM

## 2023-03-13 PROCEDURE — 1157F PR ADVANCE CARE PLAN OR EQUIV PRESENT IN MEDICAL RECORD: ICD-10-PCS | Mod: CPTII,S$GLB,, | Performed by: STUDENT IN AN ORGANIZED HEALTH CARE EDUCATION/TRAINING PROGRAM

## 2023-03-13 PROCEDURE — 72114 X-RAY EXAM L-S SPINE BENDING: CPT | Mod: TC,PO

## 2023-03-13 PROCEDURE — 1160F PR REVIEW ALL MEDS BY PRESCRIBER/CLIN PHARMACIST DOCUMENTED: ICD-10-PCS | Mod: CPTII,S$GLB,, | Performed by: STUDENT IN AN ORGANIZED HEALTH CARE EDUCATION/TRAINING PROGRAM

## 2023-03-13 PROCEDURE — 72114 X-RAY EXAM L-S SPINE BENDING: CPT | Mod: 26,,, | Performed by: RADIOLOGY

## 2023-03-13 PROCEDURE — 1160F RVW MEDS BY RX/DR IN RCRD: CPT | Mod: CPTII,S$GLB,, | Performed by: STUDENT IN AN ORGANIZED HEALTH CARE EDUCATION/TRAINING PROGRAM

## 2023-03-13 PROCEDURE — 1159F PR MEDICATION LIST DOCUMENTED IN MEDICAL RECORD: ICD-10-PCS | Mod: CPTII,S$GLB,, | Performed by: STUDENT IN AN ORGANIZED HEALTH CARE EDUCATION/TRAINING PROGRAM

## 2023-03-13 PROCEDURE — 99999 PR PBB SHADOW E&M-EST. PATIENT-LVL V: CPT | Mod: PBBFAC,,, | Performed by: STUDENT IN AN ORGANIZED HEALTH CARE EDUCATION/TRAINING PROGRAM

## 2023-03-13 PROCEDURE — 99999 PR PBB SHADOW E&M-EST. PATIENT-LVL V: ICD-10-PCS | Mod: PBBFAC,,, | Performed by: STUDENT IN AN ORGANIZED HEALTH CARE EDUCATION/TRAINING PROGRAM

## 2023-03-13 NOTE — PROGRESS NOTES
CC: left knee pain    89 y.o. Female presents today for evaluation of her left knee pain. Pt reports gradual onset of knee pain about 1 yr ago. Pt presents today with granddaughter, and with cane for ambulation. Pt notes prev hx of 2 falls recently. Pt reports pain is 0/10 today, and 10/10 at night (especially if she bends her knee while sleeping). Pt localizes pain to anterolateral knee with referred pain down anterolateral lower leg. Pt also stating pain in posterolateral hip and lateral thigh. Pt denies mechanical symptoms. Pt reports numbness/tingling in both legs; taking gabapentin. Prev hx of lumbar spine arthritis; pt denies lower back pain but notes sciatica pain. Pt reports using heat at night provides relief.     SYMPTOMS:   Pain Score: 0/10  Pain location: anterolateral knee with referred pain down anterolateral lower leg  Time of onset: 1 yr  Trauma, injury: gradual onset    Audible pop: no  Clicking: no  Catching: no  Locking: no  Giving out, instability: yes  Swelling: none in knee; has had swelling in left foot  Theater sign: no  Problems with stairs: does not use    INTERVENTIONS:   Medications tried: hydrocodone, gabapentin, tylenol  Braces/devices: cane  Physical therapy: none  Injections: none    RELEVANT HISTORY:   Imaging to date: 02/03/23  Previous significant knee injuries: none  Previous knee surgeries: none    Occupation: retired     REVIEW OF SYSTEMS:   Constitution: Patient denies fever or chills.  Eyes: Patient denies eye pain or vision changes.  HEENT: Patient denies ear pain, sore throat, or nasal discharge.  CVS: Patient denies chest pain.  Lungs: Patient denies shortness of breath or cough.  Abdomen: Patient denies any stomach pain, nausea, vomiting, or diarrhea  Skin: Patient denies skin rash or itching.    Musculoskeletal: Patient reports bilateral shoulder pain (currently in fPT).  Neuro: Patient denies any numbness or tingling in upper or lower extremities.  Psych: Patient denies  any current anxiety or nervousness.    PAST MEDICAL HISTORY:   Past Medical History:   Diagnosis Date    Anemia     Asthma, chronic     Bilateral carotid artery stenosis 10/11/2022    CAD (coronary artery disease)     Elevated glucose 2015    GERD (gastroesophageal reflux disease)     protonix 40    Hypercalcemia 10/16/2012    Hyperlipidemia     Hyperparathyroidism     Hypertension     Insomnia 2016    Renal artery stenosis     Right low back pain 2016       PAST SURGICAL HISTORY:  Past Surgical History:   Procedure Laterality Date    APPENDECTOMY      CHOLECYSTECTOMY      COLONOSCOPY  2006    CORONARY ANGIOPLASTY WITH STENT PLACEMENT      1 heart/1 kidney    HYSTERECTOMY      KIDNEY SURGERY      stent in renal artery       FAMILY HISTORY:  Family History   Problem Relation Age of Onset    Splenomegaly Daughter           from ruptured spleen    Miscarriages / Stillbirths Mother     Liver disease Father     No Known Problems Sister     Hypertension Son     Hypercalcemia Neg Hx     Thyroid cancer Neg Hx        SOCIAL HISTORY:  Social History     Socioeconomic History    Marital status:    Tobacco Use    Smoking status: Never     Passive exposure: Never    Smokeless tobacco: Never   Substance and Sexual Activity    Alcohol use: No    Drug use: Never    Sexual activity: Never   Social History Narrative    Retired. Daughter       Social Determinants of Health     Financial Resource Strain: Low Risk     Difficulty of Paying Living Expenses: Not hard at all   Food Insecurity: No Food Insecurity    Worried About Running Out of Food in the Last Year: Never true    Ran Out of Food in the Last Year: Never true   Transportation Needs: No Transportation Needs    Lack of Transportation (Medical): No    Lack of Transportation (Non-Medical): No   Physical Activity: Inactive    Days of Exercise per Week: 0 days    Minutes of Exercise per Session: 0 min   Stress: No Stress Concern Present     Feeling of Stress : Only a little   Social Connections: Socially Isolated    Frequency of Communication with Friends and Family: Three times a week    Frequency of Social Gatherings with Friends and Family: Never    Attends Nondenominational Services: Never    Active Member of Clubs or Organizations: No    Attends Club or Organization Meetings: Never    Marital Status:    Housing Stability: Low Risk     Unable to Pay for Housing in the Last Year: No    Number of Places Lived in the Last Year: 1    Unstable Housing in the Last Year: No       MEDICATIONS:     Current Outpatient Medications:     acyclovir 5% (ZOVIRAX) 5 % ointment, Use tid for buttock rash (Patient taking differently: as needed. Use tid for buttock rash), Disp: 15 g, Rfl: 6    albuterol (PROVENTIL/VENTOLIN HFA) 90 mcg/actuation inhaler, Inhale 2 puffs into the lungs every 6 (six) hours as needed for Wheezing., Disp: 18 g, Rfl: 6    amLODIPine (NORVASC) 10 MG tablet, TAKE 1 TABLET(10 MG) BY MOUTH EVERY DAY, Disp: 90 tablet, Rfl: 3    aspirin 81 mg Tab, Take 81 mg by mouth every other day., Disp: , Rfl:     atorvastatin (LIPITOR) 80 MG tablet, TAKE 1 TABLET(80 MG) BY MOUTH EVERY DAY, Disp: 90 tablet, Rfl: 3    brimonidine 0.2% (ALPHAGAN) 0.2 % Drop, Place 1 drop into both eyes 3 (three) times daily. , Disp: , Rfl: 3    cinacalcet (SENSIPAR) 30 MG Tab, TAKE 2 TABLETS BY MOUTH ONCE DAILY WITH BREAKFAST, Disp: 180 tablet, Rfl: 3    diclofenac sodium (VOLTAREN) 1 % Gel, Apply 2 g topically 3 (three) times daily., Disp: 200 g, Rfl: 1    dorzolamide (TRUSOPT) 2 % ophthalmic solution, Place 1 drop into both eyes 3 (three) times daily. , Disp: , Rfl: 3    dorzolamide-timolol 2-0.5% (COSOPT) 22.3-6.8 mg/mL ophthalmic solution, 1 drop 2 (two) times daily., Disp: , Rfl:     ergocalciferol, vitamin D2, (VITAMIN D ORAL), Take 2,000 Int'l Units by mouth once daily., Disp: , Rfl:     fluocinonide (LIDEX) 0.05 % external solution, USE FOR SCALP AT BEDTIME AS NEEDED FOR  PRURITUS, Disp: 60 mL, Rfl: 6    fluocinonide (LIDEX) 0.05 % external solution, AAA scalp qday prn itching, scaling, Disp: 60 mL, Rfl: 3    fluticasone propionate (FLONASE) 50 mcg/actuation nasal spray, 1 spray by Each Nare route daily as needed. , Disp: , Rfl: 0    fluticasone propionate (FLOVENT DISKUS) 100 mcg/actuation inhaler, Inhale 1 puff (100 mcg total) into the lungs 2 (two) times daily. Controller, Disp: 60 each, Rfl: 11    gabapentin (NEURONTIN) 100 MG capsule, TAKE 2 CAPSULES(200 MG) BY MOUTH EVERY NIGHT AS NEEDED, Disp: 60 capsule, Rfl: 0    hydroCHLOROthiazide (HYDRODIURIL) 25 MG tablet, Take 25 mg by mouth once daily., Disp: , Rfl:     HYDROcodone-acetaminophen (NORCO) 5-325 mg per tablet, Take 1 tablet by mouth every 8 (eight) hours as needed for Pain., Disp: 21 tablet, Rfl: 0    ketoconazole (NIZORAL) 2 % shampoo, Wash hair with medicated shampoo at least 2x/week - let sit on scalp at least 5 minutes prior to rinsing, Disp: 120 mL, Rfl: 6    ketorolac 0.5% (ACULAR) 0.5 % Drop, Place 1 drop into both eyes 3 (three) times daily., Disp: , Rfl:     latanoprost 0.005 % ophthalmic solution, Place 1 drop into both eyes every evening. , Disp: , Rfl:     levalbuterol (XOPENEX) 0.63 mg/3 mL nebulizer solution, TAKE 3 MILLILITERS BY NEBULIZATION EVERY 4 HOURS AS NEEDED FOR WHEEZING(RESCUE), Disp: 1350 mL, Rfl: 3    methazoLAMIDE (NEPTAZANE) 25 mg tablet, Take 25 mg by mouth 3 (three) times daily. , Disp: , Rfl: 1    metoprolol succinate (TOPROL-XL) 100 MG 24 hr tablet, TAKE 1 TABLET(100 MG) BY MOUTH EVERY DAY, Disp: 90 tablet, Rfl: 3    nitroGLYCERIN (NITROSTAT) 0.4 MG SL tablet, 1 Tablet Sublingual  One tablet under tounge as needed for chest pain., Disp: 100 tablet, Rfl: 0    PFIZER COVID BIVAL,12Y UP,,PF, 30 mcg/0.3 mL injection, , Disp: , Rfl:     pilocarpine HCL 2% (PILOCAR) 2 % ophthalmic solution, INT 1 GTT INTO OU QID, Disp: , Rfl: 3    prednisoLONE acetate (PRED FORTE) 1 % DrpS, , Disp: , Rfl:      RHOPRESSA 0.02 % ophthalmic solution, , Disp: , Rfl:     SHINGRIX, PF, 50 mcg/0.5 mL injection, , Disp: , Rfl:     telmisartan (MICARDIS) 80 MG Tab, TAKE 1 TABLET(80 MG) BY MOUTH EVERY DAY, Disp: 90 tablet, Rfl: 2    timolol maleate 0.5% (TIMOPTIC) 0.5 % Drop, Place 1 drop into both eyes 2 (two) times a day., Disp: , Rfl:     tiZANidine (ZANAFLEX) 2 MG tablet, TAKE 1 TABLET(2 MG) BY MOUTH EVERY NIGHT AS NEEDED, Disp: 30 tablet, Rfl: 0    vit C-vit E-copper-zinc-lutein 226 mg-200 unit -5 mg-0.8 mg Cap, Take by mouth. 2 Capsule Oral Every day, Disp: , Rfl:     WIXELA INHUB 250-50 mcg/dose diskus inhaler, , Disp: , Rfl:     FLUARIX QUAD 0191-8770, PF, 60 mcg (15 mcg x 4)/0.5 mL Syrg, , Disp: , Rfl:     omeprazole (PRILOSEC) 40 MG capsule, Take 1 capsule (40 mg total) by mouth once daily. For acid heartburn, Disp: 30 capsule, Rfl: 11    ALLERGIES:   Review of patient's allergies indicates:   Allergen Reactions    Venom-wasp     Penicillin      Other reaction(s): Rash    Amoxicillin-pot clavulanate      Other reaction(s): diarrea  Other reaction(s): Vomiting  Other reaction(s): diarrea    Tramadol Nausea And Vomiting      IMAGIN. Knee X-ray ordered due to left knee pain. 4 views taken 23.   2. X-ray images were reviewed personally by me and then directly with patient.  3. FINDINGS: Mild DJD and narrowing of the medial tibiofemoral joint spaces bilaterally.  No fracture or bone destruction identified.  Meniscus calcification consistent with chondrocalcinosis.    4. IMPRESSION:  See above    PHYSICAL EXAMINATION:  BP (!) 140/74   Ht 5' (1.524 m)   Wt 62.6 kg (138 lb 0.1 oz)   BMI 26.95 kg/m²   Vitals signs and nursing note have been reviewed.    General: In no acute distress, well developed, well nourished, no diaphoresis  Eyes: EOM full and smooth, no eye redness or discharge  HEENT: normocephalic and atraumatic, neck supple, trachea midline, no nasal discharge  Cardiovascular: no LE edema  Lungs:  respirations non-labored, no conversational dyspnea   Neuro: AAOx3, CN2-12 grossly intact  Skin: No rashes, warm and dry  Psychiatric: cooperative, pleasant, mood and affect appropriate for age    Left Knee:   Gait: slightly antalgic, appropriate for age    Inspection/Palpation:   -Rubor   -Calor  -Effusion   -Patella ballotable   -Patellar apprehension  -Retinacular tenderness   +Patellar crepitus   Patellar tilt grossly normal     TTP at:  -Joint line   -MCL   -LCL   -Popliteal region   -Quad tendon   -Patella  -Pat tendon  -Pat border  -Med condyle   -Lat condyle   -Pes   -Prox fibula   -Tib tub  -Gerdy's tubercle  -Distal Hamstring tendons  -Proximal Hamstrings/Ischial tuberosity  -ITB    ROM:   Ext: 0°   Flex:145°   Popliteal Angle: 20°   -Discomfort w/ full flex   -Bounce-home discomfort     Ligamentous:   -Ant drawer   -Post drawer   -Lachman's   Good endpoints & no pain w/ valgus & varus stress    Meniscal:  -Black's   -Jacobo   -Thessaly   -Pain w/ squat     Other:  -Patellar apprehension  -Patellar grind  -Cummings's   -J sign  -Marcia's  Abductors 5/5  +SLR  + Lasegue's    ASSESSMENT:      ICD-10-CM ICD-9-CM   1. Lumbar radiculopathy  M54.16 724.4   2. Chronic pain of left knee  M25.562 719.46    G89.29 338.29   3. Primary osteoarthritis of left knee  M17.12 715.16   4. Chronic pain of both shoulders  M25.511 719.41    G89.29 338.29    M25.512          PLAN:  Based on patient history, physical exam findings, and imaging I do not believe patient's primary osteoarthritis to her left knee is her pain generator.  Patient with a history of sciatica.  Patient has signs symptoms consistent with lumbar radiculopathy on physical exam and per history.  Patient will be referred to [facility].  Patient can return to us to her left knee or shoulders start to hurt again.    Future planning includes - up-to-date lumbar x-rays, referral to Back and Spine    All questions were answered to the best of my  ability and all concerns were addressed at this time.    Follow up for above, or sooner if needed.      This note is dictated using the M*Modal Fluency Direct word recognition program. There are word recognition mistakes that are occasionally missed on review.

## 2023-03-24 ENCOUNTER — TELEPHONE (OUTPATIENT)
Dept: ORTHOPEDICS | Facility: CLINIC | Age: 88
End: 2023-03-24
Payer: MEDICARE

## 2023-03-26 NOTE — TELEPHONE ENCOUNTER
No new care gaps identified.  [facility] Embedded Care Gaps. Reference number: [id number]. 3/26/2023   12:31:20 PM CDT

## 2023-03-27 ENCOUNTER — OFFICE VISIT (OUTPATIENT)
Dept: ORTHOPEDICS | Facility: CLINIC | Age: 88
End: 2023-03-27
Payer: MEDICARE

## 2023-03-27 VITALS — BODY MASS INDEX: 26.66 KG/M2 | WEIGHT: 135.81 LBS | HEIGHT: 60 IN

## 2023-03-27 DIAGNOSIS — M43.10 DEGENERATIVE SPONDYLOLISTHESIS: Primary | ICD-10-CM

## 2023-03-27 DIAGNOSIS — M51.36 DDD (DEGENERATIVE DISC DISEASE), LUMBAR: ICD-10-CM

## 2023-03-27 DIAGNOSIS — M54.16 LUMBAR RADICULOPATHY: ICD-10-CM

## 2023-03-27 DIAGNOSIS — M47.816 LUMBAR SPONDYLOSIS: ICD-10-CM

## 2023-03-27 PROCEDURE — 1160F RVW MEDS BY RX/DR IN RCRD: CPT | Mod: HCNC,CPTII,S$GLB, | Performed by: ORTHOPAEDIC SURGERY

## 2023-03-27 PROCEDURE — 3288F PR FALLS RISK ASSESSMENT DOCUMENTED: ICD-10-PCS | Mod: HCNC,CPTII,S$GLB, | Performed by: ORTHOPAEDIC SURGERY

## 2023-03-27 PROCEDURE — 1157F ADVNC CARE PLAN IN RCRD: CPT | Mod: HCNC,CPTII,S$GLB, | Performed by: ORTHOPAEDIC SURGERY

## 2023-03-27 PROCEDURE — 1157F PR ADVANCE CARE PLAN OR EQUIV PRESENT IN MEDICAL RECORD: ICD-10-PCS | Mod: HCNC,CPTII,S$GLB, | Performed by: ORTHOPAEDIC SURGERY

## 2023-03-27 PROCEDURE — 99999 PR PBB SHADOW E&M-EST. PATIENT-LVL V: ICD-10-PCS | Mod: PBBFAC,HCNC,, | Performed by: ORTHOPAEDIC SURGERY

## 2023-03-27 PROCEDURE — 1159F MED LIST DOCD IN RCRD: CPT | Mod: HCNC,CPTII,S$GLB, | Performed by: ORTHOPAEDIC SURGERY

## 2023-03-27 PROCEDURE — 3288F FALL RISK ASSESSMENT DOCD: CPT | Mod: HCNC,CPTII,S$GLB, | Performed by: ORTHOPAEDIC SURGERY

## 2023-03-27 PROCEDURE — 99204 OFFICE O/P NEW MOD 45 MIN: CPT | Mod: HCNC,GC,S$GLB, | Performed by: ORTHOPAEDIC SURGERY

## 2023-03-27 PROCEDURE — 99999 PR PBB SHADOW E&M-EST. PATIENT-LVL V: CPT | Mod: PBBFAC,HCNC,, | Performed by: ORTHOPAEDIC SURGERY

## 2023-03-27 PROCEDURE — 99204 PR OFFICE/OUTPT VISIT, NEW, LEVL IV, 45-59 MIN: ICD-10-PCS | Mod: HCNC,GC,S$GLB, | Performed by: ORTHOPAEDIC SURGERY

## 2023-03-27 PROCEDURE — 1126F PR PAIN SEVERITY QUANTIFIED, NO PAIN PRESENT: ICD-10-PCS | Mod: HCNC,CPTII,S$GLB, | Performed by: ORTHOPAEDIC SURGERY

## 2023-03-27 PROCEDURE — 1160F PR REVIEW ALL MEDS BY PRESCRIBER/CLIN PHARMACIST DOCUMENTED: ICD-10-PCS | Mod: HCNC,CPTII,S$GLB, | Performed by: ORTHOPAEDIC SURGERY

## 2023-03-27 PROCEDURE — 1101F PR PT FALLS ASSESS DOC 0-1 FALLS W/OUT INJ PAST YR: ICD-10-PCS | Mod: HCNC,CPTII,S$GLB, | Performed by: ORTHOPAEDIC SURGERY

## 2023-03-27 PROCEDURE — 1101F PT FALLS ASSESS-DOCD LE1/YR: CPT | Mod: HCNC,CPTII,S$GLB, | Performed by: ORTHOPAEDIC SURGERY

## 2023-03-27 PROCEDURE — 1159F PR MEDICATION LIST DOCUMENTED IN MEDICAL RECORD: ICD-10-PCS | Mod: HCNC,CPTII,S$GLB, | Performed by: ORTHOPAEDIC SURGERY

## 2023-03-27 PROCEDURE — 1126F AMNT PAIN NOTED NONE PRSNT: CPT | Mod: HCNC,CPTII,S$GLB, | Performed by: ORTHOPAEDIC SURGERY

## 2023-03-27 RX ORDER — GABAPENTIN 100 MG/1
CAPSULE ORAL
Qty: 180 CAPSULE | Refills: 0 | Status: SHIPPED | OUTPATIENT
Start: 2023-03-27 | End: 2023-08-21

## 2023-03-27 RX ORDER — METHOCARBAMOL 500 MG/1
500 TABLET, FILM COATED ORAL 3 TIMES DAILY
Qty: 60 TABLET | Refills: 1 | Status: SHIPPED | OUTPATIENT
Start: 2023-03-27

## 2023-03-27 NOTE — PROGRESS NOTES
DATE: 3/27/2023  PATIENT: Nicolasa Jurado    Attending Physician: Axel Rivera M.D.    CHIEF COMPLAINT: LBP and LLE pain    HISTORY:  Nicolasa Jurado is a 89 y.o. female who is blind presents for initial evaluation of low back and left leg pain (Back - 0, Leg - 9). She does not have any pain particularly in the back but does endorse pain down the left buttock and lateral leg to the foot. The pain has been present for 2-3 years and it is worsening. The patient describes the pain as shooting.  The pain is worse with lying down and flexing hip/knee and improved by straightening the leg. There is mild associated numbness and tingling. There is no subjective weakness. Prior treatments have included gabapentin, rest, activity modifications, but no PT/MIKE/surgery.    The Patient denies myelopathic symptoms such as handwriting changes or difficulty with buttons/coins/keys. Denies perineal paresthesias, bowel/bladder dysfunction.    The patient does not smoke, have DM or endorse IVDU. The patient is not on any blood thinners and does not take chronic narcotics. She is a retired care-taker. She was accompanied by her grand-daughter today.    PAST MEDICAL/SURGICAL HISTORY:  Past Medical History:   Diagnosis Date    Anemia     Asthma, chronic     Bilateral carotid artery stenosis 10/11/2022    CAD (coronary artery disease)     Elevated glucose 2/6/2015    GERD (gastroesophageal reflux disease)     protonix 40    Hypercalcemia 10/16/2012    Hyperlipidemia     Hyperparathyroidism     Hypertension     Insomnia 2/11/2016    Renal artery stenosis     Right low back pain 6/2/2016     Past Surgical History:   Procedure Laterality Date    APPENDECTOMY      CHOLECYSTECTOMY      COLONOSCOPY  2006    CORONARY ANGIOPLASTY WITH STENT PLACEMENT      1 heart/1 kidney    HYSTERECTOMY      KIDNEY SURGERY      stent in renal artery       Current Medications:   Current Outpatient Medications:     acyclovir 5% (ZOVIRAX) 5 % ointment, Use tid for  buttock rash (Patient taking differently: as needed. Use tid for buttock rash), Disp: 15 g, Rfl: 6    albuterol (PROVENTIL/VENTOLIN HFA) 90 mcg/actuation inhaler, Inhale 2 puffs into the lungs every 6 (six) hours as needed for Wheezing., Disp: 18 g, Rfl: 6    amLODIPine (NORVASC) 10 MG tablet, TAKE 1 TABLET(10 MG) BY MOUTH EVERY DAY, Disp: 90 tablet, Rfl: 3    aspirin 81 mg Tab, Take 81 mg by mouth every other day., Disp: , Rfl:     atorvastatin (LIPITOR) 80 MG tablet, TAKE 1 TABLET(80 MG) BY MOUTH EVERY DAY, Disp: 90 tablet, Rfl: 3    brimonidine 0.2% (ALPHAGAN) 0.2 % Drop, Place 1 drop into both eyes 3 (three) times daily. , Disp: , Rfl: 3    cinacalcet (SENSIPAR) 30 MG Tab, TAKE 2 TABLETS BY MOUTH ONCE DAILY WITH BREAKFAST, Disp: 180 tablet, Rfl: 3    diclofenac sodium (VOLTAREN) 1 % Gel, Apply 2 g topically 3 (three) times daily., Disp: 200 g, Rfl: 1    dorzolamide (TRUSOPT) 2 % ophthalmic solution, Place 1 drop into both eyes 3 (three) times daily. , Disp: , Rfl: 3    dorzolamide-timolol 2-0.5% (COSOPT) 22.3-6.8 mg/mL ophthalmic solution, 1 drop 2 (two) times daily., Disp: , Rfl:     ergocalciferol, vitamin D2, (VITAMIN D ORAL), Take 2,000 Int'l Units by mouth once daily., Disp: , Rfl:     FLUARIX QUAD 1417-3361, PF, 60 mcg (15 mcg x 4)/0.5 mL Syrg, , Disp: , Rfl:     fluocinonide (LIDEX) 0.05 % external solution, USE FOR SCALP AT BEDTIME AS NEEDED FOR PRURITUS, Disp: 60 mL, Rfl: 6    fluocinonide (LIDEX) 0.05 % external solution, AAA scalp qday prn itching, scaling, Disp: 60 mL, Rfl: 3    fluticasone propionate (FLONASE) 50 mcg/actuation nasal spray, 1 spray by Each Nare route daily as needed. , Disp: , Rfl: 0    fluticasone propionate (FLOVENT DISKUS) 100 mcg/actuation inhaler, Inhale 1 puff (100 mcg total) into the lungs 2 (two) times daily. Controller, Disp: 60 each, Rfl: 11    gabapentin (NEURONTIN) 100 MG capsule, TAKE 2 CAPSULES(200 MG) BY MOUTH EVERY NIGHT AS NEEDED, Disp: 180 capsule, Rfl: 0     hydroCHLOROthiazide (HYDRODIURIL) 25 MG tablet, Take 25 mg by mouth once daily., Disp: , Rfl:     HYDROcodone-acetaminophen (NORCO) 5-325 mg per tablet, Take 1 tablet by mouth every 8 (eight) hours as needed for Pain., Disp: 21 tablet, Rfl: 0    ketoconazole (NIZORAL) 2 % shampoo, Wash hair with medicated shampoo at least 2x/week - let sit on scalp at least 5 minutes prior to rinsing, Disp: 120 mL, Rfl: 6    ketorolac 0.5% (ACULAR) 0.5 % Drop, Place 1 drop into both eyes 3 (three) times daily., Disp: , Rfl:     latanoprost 0.005 % ophthalmic solution, Place 1 drop into both eyes every evening. , Disp: , Rfl:     levalbuterol (XOPENEX) 0.63 mg/3 mL nebulizer solution, TAKE 3 MILLILITERS BY NEBULIZATION EVERY 4 HOURS AS NEEDED FOR WHEEZING(RESCUE), Disp: 1350 mL, Rfl: 3    methazoLAMIDE (NEPTAZANE) 25 mg tablet, Take 25 mg by mouth 3 (three) times daily. , Disp: , Rfl: 1    metoprolol succinate (TOPROL-XL) 100 MG 24 hr tablet, TAKE 1 TABLET(100 MG) BY MOUTH EVERY DAY, Disp: 90 tablet, Rfl: 3    nitroGLYCERIN (NITROSTAT) 0.4 MG SL tablet, 1 Tablet Sublingual  One tablet under tounge as needed for chest pain., Disp: 100 tablet, Rfl: 0    PFIZER COVID BIVAL,12Y UP,,PF, 30 mcg/0.3 mL injection, , Disp: , Rfl:     pilocarpine HCL 2% (PILOCAR) 2 % ophthalmic solution, INT 1 GTT INTO OU QID, Disp: , Rfl: 3    prednisoLONE acetate (PRED FORTE) 1 % DrpS, , Disp: , Rfl:     RHOPRESSA 0.02 % ophthalmic solution, , Disp: , Rfl:     SHINGRIX, PF, 50 mcg/0.5 mL injection, , Disp: , Rfl:     telmisartan (MICARDIS) 80 MG Tab, TAKE 1 TABLET(80 MG) BY MOUTH EVERY DAY, Disp: 90 tablet, Rfl: 2    timolol maleate 0.5% (TIMOPTIC) 0.5 % Drop, Place 1 drop into both eyes 2 (two) times a day., Disp: , Rfl:     tiZANidine (ZANAFLEX) 2 MG tablet, TAKE 1 TABLET(2 MG) BY MOUTH EVERY NIGHT AS NEEDED, Disp: 30 tablet, Rfl: 0    vit C-vit E-copper-zinc-lutein 226 mg-200 unit -5 mg-0.8 mg Cap, Take by mouth. 2 Capsule Oral Every day, Disp: , Rfl:      WIXELA INHUB 250-50 mcg/dose diskus inhaler, , Disp: , Rfl:     methocarbamoL (ROBAXIN) 500 MG Tab, Take 1 tablet (500 mg total) by mouth 3 (three) times daily., Disp: 60 tablet, Rfl: 1    omeprazole (PRILOSEC) 40 MG capsule, Take 1 capsule (40 mg total) by mouth once daily. 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       Social Determinants of Health     Financial Resource Strain: Low Risk     Difficulty of Paying Living Expenses: Not hard at all   Food Insecurity: No Food Insecurity    Worried About Running Out of Food in the Last Year: Never true    Ran Out of Food in the Last Year: Never true   Transportation Needs: No Transportation Needs    Lack of Transportation (Medical): No    Lack of Transportation (Non-Medical): No   Physical Activity: Inactive    Days of Exercise per Week: 0 days    Minutes of Exercise per Session: 0 min   Stress: No Stress Concern Present    Feeling of Stress : Only a little   Social Connections: Socially Isolated    Frequency of Communication with Friends and Family: Three times a week    Frequency of Social Gatherings with Friends and Family: Never    Attends Anglican Services: Never    Active Member of Clubs or Organizations: No    Attends Club or Organization Meetings: Never    Marital Status:    Housing Stability: Low Risk     Unable to Pay for Housing in the Last Year: No    Number of Places Lived in the Last Year: 1    Unstable Housing in the Last Year: No       REVIEW OF SYSTEMS:  Constitution: Negative. Negative for chills, fever and night sweats.   Cardiovascular: Negative for chest pain and syncope.   Respiratory: Negative for cough  and shortness of breath.   Gastrointestinal: See HPI. Negative for nausea/vomiting. Negative for abdominal pain.  Genitourinary: See HPI. Negative for discoloration or dysuria.  Hematologic/Lymphatic: neg for bleeding/clotting disorders.   Musculoskeletal: Negative for falls and muscle weakness.   Neurological: See HPI. no history of seizures. no history of cranial surgery or shunts.  Neurological: See HPI. No seizures.   Endocrine: Negative for polydipsia, polyphagia and polyuria.   Allergic/Immunologic: Negative for hives and persistent infections.     EXAM:  Ht 5' (1.524 m)   Wt 61.6 kg (135 lb 12.9 oz)   BMI 26.52 kg/m²     PHYSICAL EXAMINATION:    General: The patient is a  89 y.o. female in no apparent distress, the patient is orientatied to person, place and time.  Psych: Normal mood and affect  HEENT: Vision grossly intact, hearing intact to the spoken word.  Lungs: Respirations unlabored.  Gait: Normal station and gait, no difficulty with toe or heel walk.   Skin: Dorsal lumbar skin negative for rashes, lesions, hairy patches and surgical scars. There is no lumbar tenderness to palpation.  Range of motion: Lumbar range of motion is acceptable.  Spinal Balance: Global saggital and coronal spinal balance acceptable, no significant for scoliosis and kyphosis.  Musculoskeletal: No pain with the range of motion of the bilateral hips. No trochanteric tenderness to palpation.  Vascular: Bilateral lower extremities warm and well perfused, Dorsalis pedis pulses 2+ bilaterally.  Neurological: Normal strength and tone in all major motor groups in the bilateral lower extremities. Normal sensation to light touch in the L2-S1 dermatomes bilaterally.  Deep tendon reflexes symmetric in the bilateral lower extremities.  Negative Babinski bilaterally. Straight leg raise negative bilaterally.    IMAGING:   Today I independently reviewed the following images and my interpretations are as follows:    AP, Lat and Flex/Ex   upright L-spine demonstrate spondylosis and DDD. L5-S1 anterolisthesis measuring 5mm.    DEXA in 2019 showed lumbar T-score of -2.0.    Body mass index is 26.52 kg/m².  Hemoglobin A1C   Date Value Ref Range Status   11/11/2021 5.7 (H) 4.0 - 5.6 % Final     Comment:     ADA Screening Guidelines:  5.7-6.4%  Consistent with prediabetes  >or=6.5%  Consistent with diabetes    High levels of fetal hemoglobin interfere with the HbA1C  assay. Heterozygous hemoglobin variants (HbS, HgC, etc)do  not significantly interfere with this assay.   However, presence of multiple variants may affect accuracy.     01/22/2015 5.9 4.5 - 6.2 % Final   11/01/2014 6.0 4.5 - 6.2 % Final       ASSESSMENT/PLAN:    Nicolasa was seen today for establish care and pain.    Diagnoses and all orders for this visit:    Degenerative spondylolisthesis  -     methocarbamoL (ROBAXIN) 500 MG Tab; Take 1 tablet (500 mg total) by mouth 3 (three) times daily.  -     Ambulatory referral/consult to Physical/Occupational Therapy; Future    Lumbar radiculopathy  -     Ambulatory referral/consult to [facility]    Lumbar spondylosis    DDD (degenerative disc disease), lumbar      Follow up if symptoms worsen or fail to improve.    Patient has lumbar degenerative spondylolisthesis and LLE radiculopathy. I discussed the natural history of their diagnoses as well as surgical and nonsurgical treatment options. I educated the patient on the importance of core/back strengthening, correct posture, bending/lifting ergonomics, and low-impact aerobic exercises (walking, elliptical, and aquatherapy). I prescribed robaxin. Continue medications. I will refer the patient to PT for core/back strengthening. Patient will follow up PRN. Next step is a lumbar MRI.    Axel Rivera MD  Orthopaedic Spine Surgeon  Department of Orthopaedic Surgery  [phone]

## 2023-03-30 ENCOUNTER — CLINICAL SUPPORT (OUTPATIENT)
Dept: REHABILITATION | Facility: HOSPITAL | Age: 88
End: 2023-03-30
Attending: ORTHOPAEDIC SURGERY
Payer: MEDICARE

## 2023-03-30 DIAGNOSIS — M43.10 DEGENERATIVE SPONDYLOLISTHESIS: Primary | ICD-10-CM

## 2023-03-30 DIAGNOSIS — R53.1 WEAKNESS: ICD-10-CM

## 2023-03-30 PROCEDURE — 97530 THERAPEUTIC ACTIVITIES: CPT | Mod: HCNC,PO

## 2023-03-30 PROCEDURE — 97162 PT EVAL MOD COMPLEX 30 MIN: CPT | Mod: HCNC,PO

## 2023-03-30 PROCEDURE — 97110 THERAPEUTIC EXERCISES: CPT | Mod: HCNC,PO

## 2023-03-30 NOTE — PROGRESS NOTES
[facility]   Physical Therapy Initial Evaluation     Date: 3/30/2023   Name: Nicolasa Jurado  [facility] Number: [id number]    Therapy Diagnosis:   Encounter Diagnosis   Name Primary?    Degenerative spondylolisthesis      Physician: Axel Rivera MD    Physician Orders: PT Eval and Treat  Medical Diagnosis from Referral: M43.10 (ICD-10-CM) - Degenerative spondylolisthesis  Evaluation Date: 3/30/2023  Authorization Period Expiration: 04/30/2023  Plan of Care Expiration: 05/30/2023  Progress Note Due: 04/30/2023  Visit # / Visits authorized: 1/1 eval  FOTO: 0/3    Precautions: Standard, fall risk, blind, HLD, HTN    Time In: 4:00 pm  Time Out: 4:55 pm  Total Appointment Time (timed & untimed codes): 55 minutes    SUBJECTIVE     Date of onset: 2 years ago    History of current condition - Nicolasa reports that approximately 2 years ago she began noticing left sided low back pain that radiates along the back of her left leg and into her foot. She reports that she has no pain during the day, and only experiences symptoms when she is going to bed at night. The patient reports that she is normally a right side sleeper, though this has been causing her pain at night. When she sleeps on her left side she reports that she has no pain. Patient reports that at night when she flexes her left knee her symptoms are worsened, and when she extends her knee it decreases her symptoms. On average she reports that she gets between 1 and 2 hours of sleep per night due to her symptoms.    Falls: 3 falls, occurring when she loses her balance or trips.    Imaging:  X-Ray of Lumbar Spine (03/13/2023):  FINDINGS:  Bones are demineralized.  Vertebral bodies and disc spaces are intact the L5-S1 disc space is narrowed but no instability is flexion seen in flexion and extension.  There is some forward subluxation of L5 on S1.  No collapse or destruction is noted. Vascular stent is seen in the right renal artery.    Prior  Therapy: Yes, PT for neck pain.  Social History: Lives with grandson and granddaughter in single story home with single threshold step to enter.  Occupation: Retired  Prior Level of Function: 1 year ago able to sleep through the night.  Current Level of Function: Severely disturbed sleep, inability to get comfortable and fall asleep.    Pain:  Current 0/10, worst 8/10, best 0/10   Location: left lower back, buttock, and lower extremity  Aggravating Factors: Right side lying, sleeping  Easing Factors: Medication, heating pad    Patients Goals: Get rid of pain.     Medical History:   Past Medical History:   Diagnosis Date    Anemia     Asthma, chronic     Bilateral carotid artery stenosis 10/11/2022    CAD (coronary artery disease)     Elevated glucose 2/6/2015    GERD (gastroesophageal reflux disease)     protonix 40    Hypercalcemia 10/16/2012    Hyperlipidemia     Hyperparathyroidism     Hypertension     Insomnia 2/11/2016    Renal artery stenosis     Right low back pain 6/2/2016     Surgical History:   Nicolasa Jurado  has a past surgical history that includes Colonoscopy (2006); Coronary angioplasty with stent; Hysterectomy; Cholecystectomy; Kidney surgery; and Appendectomy.    Medications:   Nicolasa has a current medication list which includes the following prescription(s): acyclovir 5%, albuterol, amlodipine, aspirin, atorvastatin, brimonidine 0.2%, cinacalcet, diclofenac sodium, dorzolamide, dorzolamide-timolol 2-0.5%, ergocalciferol (vitamin d2), fluarix quad 1018-4648 (pf), fluocinonide, fluocinonide, fluticasone propionate, flovent diskus, gabapentin, hydrochlorothiazide, hydrocodone-acetaminophen, ketoconazole, ketorolac 0.5%, latanoprost, levalbuterol, methazolamide, methocarbamol, metoprolol succinate, nitroglycerin, omeprazole, pfizer covid bival(12y up)(pf), pilocarpine hcl 2%, prednisolone acetate, rhopressa, shingrix (pf), telmisartan, timolol maleate 0.5%, tizanidine, vit c-e-cupric-zinc-lutein, and  "wixela inhub.    Allergies:   Review of patient's allergies indicates:   Allergen Reactions    Venom-wasp     Penicillin      Other reaction(s): Rash    Amoxicillin-pot clavulanate      Other reaction(s): diarrea  Other reaction(s): Vomiting  Other reaction(s): diarrea    Tramadol Nausea And Vomiting     OBJECTIVE     Observation: Ambulates with mild unsteadiness using single point straight cane, requires hand hold assist for balance and direction due to being legally blind.    Lumbar Range of Motion:    Limitation Overpressure   Flexion 0%* Not Tested   Extension 25% No Effect   Left Rotation 0% No Effect   Right Rotation 0% No Effect   * = concordant pain    Lower Extremity Range of Motion:   Right PROM Left PROM   Hip Flexion 110 110   Hip Abduction 40 30   Hip Internal Rotation 30 0   Hip External Rotation 50 50   * = pain    Lower Extremity Strength:   Right Lower Extremity Left Lower Extremity   Hip Flexion 3+/5 3+/5   Hip Abduction 3-/5 3-/5   Knee Extension 4/5 3+/5   Knee Flexion 4-/5 3+/5      Right  Left    UNRULY Positive Positive   SANDIE Positive Positive     Neural Tension Testing:   Slump: Negative  SLR: Negative    TREATMENT     Total Treatment time (time-based codes) separate from Evaluation: 25 minutes      Nicolasa received the treatments listed below:      Therapeutic Exercisesto develop strength, endurance, ROM, and core stabilization for 15 minutes including:  Open Books: 1x10, bilateral, modified arm position due to shoulder pain  Clam Shells: 2x15, YTB, hooklying  Posterior Pelvic Tilts: 1x15 with 3" hold    therapeutic activities to improve functional performance for 10  minutes, including:  Patient Education (See Below)      PATIENT EDUCATION AND HOME EXERCISES     Education provided:   - Initial HEP  - Exercise rationale  - Sciatica pathophysiology    Written Home Exercises Provided: yes. Exercises were reviewed and Nicolasa was able to demonstrate them prior to the end of the session.  Nicolasa " demonstrated fair  understanding of the education provided. See EMR under Patient Instructions for exercises provided during therapy sessions.    ASSESSMENT     Nicolasa is a 89 y.o. female referred to outpatient Physical Therapy with a medical diagnosis of:  M43.10 (ICD-10-CM) - Degenerative spondylolisthesis    Patient presents with the following impairments at physical therapy initial evaluation:  Increased left lower extremity pain when going to sleep at night  Decreased lumbar spine extension AROM  Decreased bilateral lower extremity hip strength  Impaired functional status with severely disturbed sleep    Patient prognosis is Fair.   Patient will benefit from skilled outpatient Physical Therapy to address the deficits stated above and in the chart below, provide patient /family education, and to maximize patientt's level of independence.     Plan of care discussed with patient: Yes  Patient's spiritual, cultural and educational needs considered and patient is agreeable to the plan of care and goals as stated below:     Anticipated Barriers for therapy: scheduling, age, compliance    Medical Necessity is demonstrated by the following  History  Co-morbidities and personal factors that may impact the plan of care Co-morbidities:   Past Medical History:   Diagnosis Date    Anemia     Asthma, chronic     Bilateral carotid artery stenosis 10/11/2022    CAD (coronary artery disease)     Elevated glucose 2/6/2015    GERD (gastroesophageal reflux disease)     protonix 40    Hypercalcemia 10/16/2012    Hyperlipidemia     Hyperparathyroidism     Hypertension     Insomnia 2/11/2016    Renal artery stenosis     Right low back pain 6/2/2016     Personal Factors:   age  lifestyle  Visual impairment     high   Examination  Body Structures and Functions, activity limitations and participation restrictions that may impact the plan of care Body Regions:   back  lower extremities  trunk    Body Systems:    gross  symmetry  ROM  strength  gross coordinated movement  balance  gait  transfers  motor control    Participation Restrictions:   Visual impairment    Activity limitations:   Learning and applying knowledge  Watching - visual impairment    General Tasks and Commands  no deficits    Communication  no deficits    Mobility  lifting and carrying objects  walking  driving (bike, car, motorcycle)    Self care  no deficits    Domestic Life  Disturbed sleep    Interactions/Relationships  no deficits    Life Areas  no deficits    Community and Social Life  no deficits         moderate   Clinical Presentation stable and uncomplicated low   Decision Making/ Complexity Score: moderate     Goals:  Short Term Goals: 4 weeks  Patient will be independent with initial HEP to improve therapy outcomes.  Patient will report a 50% decrease in frequency of symptoms waking her at night to demonstrate improved quality of life.  Patient will demonstrate full lumbar spine flexion/extension AROM without production of pain at end-range.    Long Term Goals: 8 weeks   Patient will be independent with final HEP to maintain gains achieved physical therapy.  Patient will report a 75% decrease in frequency of symptoms waking her at night to demonstrate improved quality of life.  Patient will improve gross bilateral lower extremity hip strength to >/=4-/5 to improve walking tolerance.    PLAN   Plan of care Certification: 3/30/2023 to 05/30/2023.    Outpatient Physical Therapy 1-2 times weekly for 8 weeks to include the following interventions: Cervical/Lumbar Traction, Electrical Stimulation  , Gait Training, Manual Therapy, Moist Heat/ Ice, Neuromuscular Re-ed, Patient Education, Self Care, Therapeutic Activities, Therapeutic Exercise, and Ultrasound.     Scott Wong, PT, DPT    I CERTIFY THE NEED FOR THESE SERVICES FURNISHED UNDER THIS PLAN OF TREATMENT AND WHILE UNDER MY CARE   Physician's comments:     Physician's Signature:  ___________________________________________________

## 2023-03-31 ENCOUNTER — CLINICAL SUPPORT (OUTPATIENT)
Dept: REHABILITATION | Facility: HOSPITAL | Age: 88
End: 2023-03-31
Attending: ORTHOPAEDIC SURGERY
Payer: MEDICARE

## 2023-03-31 DIAGNOSIS — M43.10 DEGENERATIVE SPONDYLOLISTHESIS: Primary | ICD-10-CM

## 2023-03-31 DIAGNOSIS — M54.32 SCIATICA OF LEFT SIDE: ICD-10-CM

## 2023-03-31 PROCEDURE — 97530 THERAPEUTIC ACTIVITIES: CPT | Mod: HCNC,PO

## 2023-03-31 PROCEDURE — 97140 MANUAL THERAPY 1/> REGIONS: CPT | Mod: HCNC,PO

## 2023-03-31 PROCEDURE — 97010 HOT OR COLD PACKS THERAPY: CPT | Mod: HCNC,PO

## 2023-03-31 PROCEDURE — 97110 THERAPEUTIC EXERCISES: CPT | Mod: HCNC,PO

## 2023-03-31 NOTE — PROGRESS NOTES
"  Physical Therapy Daily Treatment Note     Name: Nicolasa Jurado  [facility] Number: [id number]    Therapy Diagnosis:   Encounter Diagnoses   Name Primary?    Degenerative spondylolisthesis Yes    Sciatica of left side      Physician: Houston Gaston MD    Visit Date: 3/31/2023  Physician Orders: PT Eval and Treat  Medical Diagnosis from Referral: M43.10 (ICD-10-CM) - Degenerative spondylolisthesis  Evaluation Date: 3/30/2023  Authorization Period Expiration: 04/30/2023  Plan of Care Expiration: 05/30/2023  Progress Note Due: 04/30/2023  Visit # / Visits authorized: 1/20   FOTO: 0/3    Time In: 1401  Time Out: 1505  Total Billable Time: 64 minutes    Precautions: Standard    Subjective     Pt reports: "My leg is the only thing hurting me today..  She was compliant with home exercise program.  Response to previous treatment: Improved sleep last night to 5 house  Functional change: improves sleep and less pain    Pain: 2/10  Location: left back  and Hip      Objective     Pt requires [place] assist for all exercises 2/2 to pt is has limited sight. Also CGA/HHA for ambulation and t/f    Nicolasa received therapeutic exercises/Activities to develop strength, endurance, ROM, flexibility, posture, and core stabilization for 40 minutes including:   NuStep x6 minutes level 1   Seated thoracic extension over foam roll    Lumbar flexion 2x15 on PB   Seated marches 2x20   Sit<>Stand 2X5   Seated piriformis stretch on L 3x20"    Open Books: 1x10, bilateral, modified arm position due to shoulder pain  Clam Shells: 2x15, YTB, hooklying  Posterior Pelvic Tilts: 1x15 with 3" hold  SLR 2x10 each side   Supine Hip ABD 2x10 each side      Nicolasa received the following manual therapy techniques: Myofacial release and Soft tissue Mobilization were applied to the: piriformis and glutes for 13 minutes, including:  STM and trigger point release.     Nicolasa received hot pack for 10 minutes to to decrease circulation, pain, and swelling.      Home Exercises " Provided and Patient Education Provided     Education provided:   - HEP    Written Home Exercises Provided: Patient instructed to cont prior HEP.  Exercises were reviewed and Nicolasa was able to demonstrate them prior to the end of the session.  Nicolasa demonstrated good  understanding of the education provided.     See EMR under Patient Instructions for exercises provided prior visit.    Assessment     Pt tolerates progression of therex and core stabilization well. Pt limited today by decreased functional mobility, decreased activity tolerance, and decreased strength. Pt states some pain during SLR; however this resolves with posterior pelvic tilt.     Nicolasa Is progressing well towards her goals.   Pt prognosis is Good.     Pt will continue to benefit from skilled outpatient physical therapy to address the deficits listed in the problem list box on initial evaluation, provide pt/family education and to maximize pt's level of independence in the home and community environment.     Pt's spiritual, cultural and educational needs considered and pt agreeable to plan of care and goals.    Anticipated barriers to physical therapy: advance age, blind    Goals:  Short Term Goals: 4 weeks  Patient will be independent with initial HEP to improve therapy outcomes.  Patient will report a 50% decrease in frequency of symptoms waking her at night to demonstrate improved quality of life.  Patient will demonstrate full lumbar spine flexion/extension AROM without production of pain at end-range.     Long Term Goals: 8 weeks   Patient will be independent with final HEP to maintain gains achieved physical therapy.  Patient will report a 75% decrease in frequency of symptoms waking her at night to demonstrate improved quality of life.  Patient will improve gross bilateral lower extremity hip strength to >/=4-/5 to improve walking tolerance.    Plan     Plan of care Certification: 3/30/2023 to 05/30/2023.     Outpatient Physical Therapy 1-2  times weekly for 8 weeks to include the following interventions: Cervical/Lumbar Traction, Electrical Stimulation  , Gait Training, Manual Therapy, Moist Heat/ Ice, Neuromuscular Re-ed, Patient Education, Self Care, Therapeutic Activities, Therapeutic Exercise, and Ultrasound.     Yessy Nicholson, PT

## 2023-04-03 ENCOUNTER — CLINICAL SUPPORT (OUTPATIENT)
Dept: REHABILITATION | Facility: HOSPITAL | Age: 88
End: 2023-04-03
Payer: MEDICARE

## 2023-04-03 DIAGNOSIS — M54.32 SCIATICA OF LEFT SIDE: ICD-10-CM

## 2023-04-03 DIAGNOSIS — M43.10 DEGENERATIVE SPONDYLOLISTHESIS: Primary | ICD-10-CM

## 2023-04-03 PROCEDURE — 97110 THERAPEUTIC EXERCISES: CPT | Mod: HCNC,PO

## 2023-04-03 PROCEDURE — 97530 THERAPEUTIC ACTIVITIES: CPT | Mod: HCNC,PO

## 2023-04-03 NOTE — PROGRESS NOTES
"  Physical Therapy Daily Treatment Note     Name: Nicolasa Jurado  [facility] Number: [id number]    Therapy Diagnosis:   Encounter Diagnoses   Name Primary?    Degenerative spondylolisthesis Yes    Sciatica of left side      Physician: Houston Gaston MD    Visit Date: 4/3/2023  Physician Orders: PT Eval and Treat  Medical Diagnosis from Referral: M43.10 (ICD-10-CM) - Degenerative spondylolisthesis  Evaluation Date: 3/30/2023  Authorization Period Expiration: 04/30/2023  Plan of Care Expiration: 05/30/2023  Progress Note Due: 04/30/2023  Visit # / Visits authorized: 1/1 eval, 2/20   FOTO: 0/3    Time In: 1:00 pm  Time Out: 2:00 pm  Total Billable Time: 60 minutes    Precautions: Standard, Fall Risk, Legally Blind    Subjective     Patient reports she was able to sleep through the night for 3 nights in a row following the initial evaluation, though states that she was awakened last night due to her concordant pain. States she was having some left knee pain this morning which was alleviated with use of heating pad.    She was compliant with home exercise program.  Response to previous treatment: Slept through night for multiple nights afterwards.  Functional change: Improved sleep, less pain.    Pain: 0/10  Location: Left knee    Objective     Patient requires handhold/CGA assist for ambulation and transfers due to visual impairment    Therapeutic Activities to improve activity tolerance for 15 minutes, including:  Nu Step, 10 minutes, level 1.0  Sit to Stands, 2x5, handhold assistance with verbal/tactile cues for hip hinge pattern    Therapeutic Exercise to develop strength, endurance, ROM, flexibility, posture, and core stabilization for 45 minutes including:  Seated Lumbar Flexion with PB, 10x10", forwards  Open Books, 2x10 each side, modified arm position due to shoulder pain  Posterior Pelvic Tilts, 1x15 with 5" hold (progressed to bridges)  Bridges, 2x10 with 3" hold  Straight Leg Raises, 2x10, bilateral  Clam Shells, 2x15, " "RTB  Supine Piriformis Stretch, 3x30", bilateral    Home Exercises Provided and Patient Education Provided   Education provided:   - HEP review    Written Home Exercises Provided: Patient instructed to cont prior HEP. Exercises were reviewed and Nicolasa was able to demonstrate them prior to the end of the session.  Nicolasa demonstrated good  understanding of the education provided.     See EMR under Patient Instructions for exercises provided prior visit.    Assessment     Patient with good response to PT plan of care to this point, reporting improved sleep and compliance with HEP. Session focused on progression of lumbar spine mobility and core stabilization exercises which she tolerated well without c/o adverse effects. Patient demonstrates impaired hip hinging mechanics which she was able to partially improve with practice and with verbal/tactile cues. Instructed patient to continue with current HEP which she verbalized understanding of.    Nicolasa Is progressing well towards her goals.   Pt prognosis is Good.     Pt will continue to benefit from skilled outpatient physical therapy to address the deficits listed in the problem list box on initial evaluation, provide pt/family education and to maximize pt's level of independence in the home and community environment.     Pt's spiritual, cultural and educational needs considered and pt agreeable to plan of care and goals.    Anticipated barriers to physical therapy: advance age, blind    Goals:  Short Term Goals: 4 weeks  Patient will be independent with initial HEP to improve therapy outcomes.  Patient will report a 50% decrease in frequency of symptoms waking her at night to demonstrate improved quality of life.  Patient will demonstrate full lumbar spine flexion/extension AROM without production of pain at end-range.     Long Term Goals: 8 weeks   Patient will be independent with final HEP to maintain gains achieved physical therapy.  Patient will report a 75% decrease " in frequency of symptoms waking her at night to demonstrate improved quality of life.  Patient will improve gross bilateral lower extremity hip strength to >/=4-/5 to improve walking tolerance.    Plan   Plan of care Certification: 3/30/2023 to 05/30/2023.     Continue progressing per patient tolerance and POC.    Scott Wong, PT

## 2023-04-05 ENCOUNTER — CLINICAL SUPPORT (OUTPATIENT)
Dept: REHABILITATION | Facility: HOSPITAL | Age: 88
End: 2023-04-05
Attending: INTERNAL MEDICINE
Payer: MEDICARE

## 2023-04-05 DIAGNOSIS — M43.10 DEGENERATIVE SPONDYLOLISTHESIS: Primary | ICD-10-CM

## 2023-04-05 DIAGNOSIS — M54.32 SCIATICA OF LEFT SIDE: ICD-10-CM

## 2023-04-05 PROCEDURE — 97530 THERAPEUTIC ACTIVITIES: CPT | Mod: HCNC,PO

## 2023-04-05 PROCEDURE — 97110 THERAPEUTIC EXERCISES: CPT | Mod: HCNC,PO

## 2023-04-05 NOTE — PROGRESS NOTES
"  Physical Therapy Daily Treatment Note     Name: Nicolasa MUNOZ Jurado  [facility] Number: [id number]    Therapy Diagnosis:   Encounter Diagnoses   Name Primary?    Degenerative spondylolisthesis Yes    Sciatica of left side      Physician: Houston Gaston MD    Visit Date: 4/5/2023  Physician Orders: PT Eval and Treat  Medical Diagnosis from Referral: M43.10 (ICD-10-CM) - Degenerative spondylolisthesis  Evaluation Date: 3/30/2023  Authorization Period Expiration: 04/30/2023  Plan of Care Expiration: 05/30/2023  Progress Note Due: 04/30/2023  Visit # / Visits authorized: 1/1 eval, 3/20   FOTO: 0/3    Time In: 1:00 pm  Time Out: 1:55 pm  Total Billable Time: 55 minutes    Precautions: Standard, Fall Risk, Legally Blind    Subjective     Patient reports her shoulders are bothering her today, and states they are constantly stiff in the morning. Reports no pain in her leg or lower back today, and states she is sleeping "so much better" since starting physical therapy.     She was compliant with home exercise program.  Response to previous treatment: Slept through night for multiple nights afterwards.  Functional change: Improved sleep, less pain.    Pain: 3/10  Location: Bilateral shoulders.    Objective     Patient requires handhold/CGA assist for ambulation and transfers due to visual impairment    Therapeutic Activities to improve activity tolerance for 15 minutes, including:  Nu Step, 10 minutes, level 1.0, to improve activity tolerance  Sit to Stands, 3x10, handhold assistance with verbal/tactile cues for hip hinge pattern    Therapeutic Exercise to develop strength, endurance, ROM, flexibility, posture, and core stabilization for 40 minutes including:  Seated Lumbar Flexion with PB, 10x10", forwards  Lower Trunk Rotations, 1x30, bilateral  Single Knee to Chest, 3x30", bilateral  Bridges, 3x10 with 3" hold  Straight Leg Raises, 3x10, bilateral  Clam Shells, 2x15, RTB  Supine Piriformis Stretch, 3x30", bilateral    Home Exercises " Provided and Patient Education Provided   Education provided:   - HEP review  - Daily hydration requirements    Written Home Exercises Provided: Patient instructed to cont prior HEP. Exercises were reviewed and Nicolasa was able to demonstrate them prior to the end of the session. Nicolasa demonstrated good  understanding of the education provided.     See EMR under Patient Instructions for exercises provided prior visit.    Assessment     Patient reporting significantly improved sleep since starting physical therapy, and is no longer reporting pain in her right lower extremity. Session focused on progression of lumbar spine mobility exercises followed lumbopelvic strengthening. Patient with good tolerance of session reporting no adverse effects during treatment. Provided patient's son who is also her primary caregiver and updated HEP which he and the patient both verbalized understanding of.    Nicolasa Is progressing well towards her goals.   Pt prognosis is Good.     Pt will continue to benefit from skilled outpatient physical therapy to address the deficits listed in the problem list box on initial evaluation, provide pt/family education and to maximize pt's level of independence in the home and community environment.     Pt's spiritual, cultural and educational needs considered and pt agreeable to plan of care and goals.  Anticipated barriers to physical therapy: advanced age, legally blind    Goals:  Short Term Goals: 4 weeks  Patient will be independent with initial HEP to improve therapy outcomes.  Patient will report a 50% decrease in frequency of symptoms waking her at night to demonstrate improved quality of life.  Patient will demonstrate full lumbar spine flexion/extension AROM without production of pain at end-range.     Long Term Goals: 8 weeks   Patient will be independent with final HEP to maintain gains achieved physical therapy.  Patient will report a 75% decrease in frequency of symptoms waking her at night  to demonstrate improved quality of life.  Patient will improve gross bilateral lower extremity hip strength to >/=4-/5 to improve walking tolerance.    Plan   Plan of care Certification: 3/30/2023 to 05/30/2023.     Continue progressing per patient tolerance and POC.    Scott Wong, PT

## 2023-04-10 ENCOUNTER — CLINICAL SUPPORT (OUTPATIENT)
Dept: REHABILITATION | Facility: HOSPITAL | Age: 88
End: 2023-04-10
Payer: MEDICARE

## 2023-04-10 DIAGNOSIS — M54.32 SCIATICA OF LEFT SIDE: ICD-10-CM

## 2023-04-10 DIAGNOSIS — M43.10 DEGENERATIVE SPONDYLOLISTHESIS: Primary | ICD-10-CM

## 2023-04-10 PROCEDURE — 97110 THERAPEUTIC EXERCISES: CPT | Mod: HCNC,PO

## 2023-04-10 PROCEDURE — 97530 THERAPEUTIC ACTIVITIES: CPT | Mod: HCNC,PO

## 2023-04-10 NOTE — PROGRESS NOTES
"  Physical Therapy Daily Treatment Note     Name: Nicolasa MUNOZ Jurado  [facility] Number: [id number]    Therapy Diagnosis:   Encounter Diagnoses   Name Primary?    Degenerative spondylolisthesis Yes    Sciatica of left side      Physician: Houston Gaston MD    Visit Date: 4/10/2023  Physician Orders: PT Eval and Treat  Medical Diagnosis from Referral: M43.10 (ICD-10-CM) - Degenerative spondylolisthesis  Evaluation Date: 3/30/2023  Authorization Period Expiration: 04/30/2023  Plan of Care Expiration: 05/30/2023  Progress Note Due: 04/30/2023  Visit # / Visits authorized: 1/1 eval, 4/20   FOTO: 0/3    Time In: 3:00 pm  Time Out: 4:00 pm  Total Billable Time: 60 minutes    Precautions: Standard, Fall Risk, Legally Blind    Subjective     Patient reports she is continuing to sleep much better at night, though did wake up "around 3am last night with left lateral thigh/knee pain."    She was compliant with home exercise program.  Response to previous treatment: No low back pain.  Functional change: Significantly improved sleep at night.    Pain: 0/10  Location: n/a    Objective     Patient requires handhold/CGA assist for ambulation and transfers due to visual impairment    Therapeutic Activities to improve activity tolerance for 15 minutes, including:  Nu Step, 10 minutes, level 2.0, to improve activity tolerance  Sit to Stands, 3x10, handhold assistance with verbal/tactile cues for hip hinge pattern    Therapeutic Exercise to develop strength, endurance, ROM, flexibility, posture, and core stabilization for 45 minutes including:  Seated Lumbar Flexion with PB, 10x10", forwards  Lower Trunk Rotations, 1x30, bilateral  Single Knee to Chest, 3x30", bilateral  Supine Piriformis Stretch, 3x30", bilateral  Bridges, 3x10 with 3" hold  Straight Leg Raises, 3x10, 1#, bilateral  Clam Shells, 2x15, GTB, bilateral    Home Exercises Provided and Patient Education Provided   Education provided:   - HEP review    Written Home Exercises Provided: " Patient instructed to cont prior HEP. Exercises were reviewed and Nicolasa was able to demonstrate them prior to the end of the session. Nicolasa demonstrated good  understanding of the education provided.     See EMR under Patient Instructions for exercises provided prior visit.    Assessment     Session focused on progression of lumbopelvic mobility and strengthening. Patient with good tolerance of session reporting no adverse effects during treatment. Instructed patient to continue with current HEP which she verbalized understanding of. Overall good response to initial POC reporting decrease left lower extremity radicular pain and improved sleep quality since starting physical therapy.    Nicolasa Is progressing well towards her goals.   Pt prognosis is Good.     Pt will continue to benefit from skilled outpatient physical therapy to address the deficits listed in the problem list box on initial evaluation, provide pt/family education and to maximize pt's level of independence in the home and community environment.     Pt's spiritual, cultural and educational needs considered and pt agreeable to plan of care and goals.  Anticipated barriers to physical therapy: advanced age, legally blind    Goals:  Short Term Goals: 4 weeks  Patient will be independent with initial HEP to improve therapy outcomes. (Progressing)  Patient will report a 50% decrease in frequency of symptoms waking her at night to demonstrate improved quality of life. (Progressing)  Patient will demonstrate full lumbar spine flexion/extension AROM without production of pain at end-range. (Progressing)     Long Term Goals: 8 weeks   Patient will be independent with final HEP to maintain gains achieved physical therapy.  Patient will report a 75% decrease in frequency of symptoms waking her at night to demonstrate improved quality of life.  Patient will improve gross bilateral lower extremity hip strength to >/=4-/5 to improve walking tolerance.    Plan   Plan of  care Certification: 3/30/2023 to 05/30/2023.     Continue progressing per patient tolerance and POC.    Scott Wong, PT, DPT

## 2023-04-12 ENCOUNTER — CLINICAL SUPPORT (OUTPATIENT)
Dept: REHABILITATION | Facility: HOSPITAL | Age: 88
End: 2023-04-12
Payer: MEDICARE

## 2023-04-12 DIAGNOSIS — M43.10 DEGENERATIVE SPONDYLOLISTHESIS: Primary | ICD-10-CM

## 2023-04-12 DIAGNOSIS — M54.32 SCIATICA OF LEFT SIDE: ICD-10-CM

## 2023-04-12 PROCEDURE — 97140 MANUAL THERAPY 1/> REGIONS: CPT | Mod: HCNC,PO

## 2023-04-12 PROCEDURE — 97530 THERAPEUTIC ACTIVITIES: CPT | Mod: HCNC,PO

## 2023-04-12 PROCEDURE — 97110 THERAPEUTIC EXERCISES: CPT | Mod: HCNC,PO

## 2023-04-12 NOTE — PROGRESS NOTES
"  Physical Therapy Daily Treatment Note     Name: Nicolasa Jurado  [facility] Number: [id number]    Therapy Diagnosis:   Encounter Diagnoses   Name Primary?    Degenerative spondylolisthesis Yes    Sciatica of left side      Physician: Houston Gaston MD  Visit Date: 4/12/2023    Physician Orders: PT Eval and Treat  Medical Diagnosis from Referral: M43.10 (ICD-10-CM) - Degenerative spondylolisthesis  Evaluation Date: 3/30/2023  Authorization Period Expiration: 04/30/2023  Plan of Care Expiration: 05/30/2023  Progress Note Due: 04/30/2023  Visit # / Visits authorized: 1/1 eval, 5/20   FOTO: 0/3    Time In: 2:05 pm  Time Out: 3:00 pm  Total Billable Time: 55 minutes    Precautions: Standard, Fall Risk, Legally Blind    Subjective     Patient reports she slept for 6 hours last night "which is a lot for me" and states her left knee is bothering her today though she is unsure why.    She was compliant with home exercise program.  Response to previous treatment: No low back pain.  Functional change: Significantly improved sleep at night.    Pain: 0/10  Location: n/a    Objective     Patient requires handhold/CGA assist for ambulation and transfers due to visual impairment    Therapeutic Activities to improve activity tolerance for 15 minutes, including:  Nu Step, 10 minutes, level 2.0, to improve activity tolerance  Sit to Stands, 3x10, handhold assistance with verbal/tactile cues for hip hinge pattern    Manual Therapy Techniques: Soft tissue mobilization was applied to the left piriformis, glutes, and hamstrings for 10 minutes, including:  Theragun, 10 minutes    Therapeutic Exercise to develop strength, endurance, ROM, flexibility, posture, and core stabilization for 30 minutes including:  Seated Lumbar Flexion with PB, 10x10", forwards  Prone on Elbows, 1x3 minutes  Lower Trunk Rotations, 1x30, bilateral  Single Knee to Chest, 3x30", bilateral  Supine Piriformis Stretch, 3x30", bilateral  Bridges, 3x10 with 5" hold    Not " "Performed:  Straight Leg Raises, 3x10, 1#, bilateral  Clam Shells, 2x15, GTB, bilateral    Home Exercises Provided and Patient Education Provided   Education provided:   - Updated HEP: Prone on Elbows Position    Written Home Exercises Provided: Patient instructed to cont prior HEP. Exercises were reviewed and Nicolasa was able to demonstrate them prior to the end of the session. Nicolasa demonstrated good  understanding of the education provided.     See EMR under Patient Instructions for exercises provided prior visit.    Assessment     Session focused on manual interventions to decrease soft tissue restrictions in patient's left piriformis, glutes, and hamstrings, followed by progressions in lumbopelvic strengthening and lumbar mobility with addition of prone on elbows position. Patient with good tolerance of session reporting "feeling better" after soft tissue mobilization. Provided patient and her grand daughter with updated HEP for prone on elbows exercise to be performed once daily, which they both verbalized understanding of.    Nicolasa Is progressing well towards her goals.   Pt prognosis is Good.     Pt will continue to benefit from skilled outpatient physical therapy to address the deficits listed in the problem list box on initial evaluation, provide pt/family education and to maximize pt's level of independence in the home and community environment.     Pt's spiritual, cultural and educational needs considered and pt agreeable to plan of care and goals.  Anticipated barriers to physical therapy: advanced age, legally blind    Goals:  Short Term Goals: 4 weeks  Patient will be independent with initial HEP to improve therapy outcomes. (Progressing)  Patient will report a 50% decrease in frequency of symptoms waking her at night to demonstrate improved quality of life. (Progressing)  Patient will demonstrate full lumbar spine flexion/extension AROM without production of pain at end-range. (Progressing)     Long Term " Goals: 8 weeks   Patient will be independent with final HEP to maintain gains achieved physical therapy.  Patient will report a 75% decrease in frequency of symptoms waking her at night to demonstrate improved quality of life.  Patient will improve gross bilateral lower extremity hip strength to >/=4-/5 to improve walking tolerance.    Plan   Plan of care Certification: 3/30/2023 to 05/30/2023.     Continue progressing per patient tolerance and POC.    Scott Wong, PT, DPT

## 2023-04-24 ENCOUNTER — CLINICAL SUPPORT (OUTPATIENT)
Dept: REHABILITATION | Facility: HOSPITAL | Age: 88
End: 2023-04-24
Payer: MEDICARE

## 2023-04-24 DIAGNOSIS — M54.32 SCIATICA OF LEFT SIDE: ICD-10-CM

## 2023-04-24 DIAGNOSIS — M43.10 DEGENERATIVE SPONDYLOLISTHESIS: Primary | ICD-10-CM

## 2023-04-24 PROCEDURE — 97110 THERAPEUTIC EXERCISES: CPT | Mod: HCNC,PO

## 2023-04-24 PROCEDURE — 97140 MANUAL THERAPY 1/> REGIONS: CPT | Mod: HCNC,PO

## 2023-04-24 PROCEDURE — 97530 THERAPEUTIC ACTIVITIES: CPT | Mod: HCNC,PO

## 2023-04-24 NOTE — PROGRESS NOTES
"  Physical Therapy Daily Treatment Note     Name: Nicolasa MUNOZ [facility] Number: [id number]    Therapy Diagnosis:   Encounter Diagnoses   Name Primary?    Degenerative spondylolisthesis Yes    Sciatica of left side      Physician: Houston Gaston MD  Visit Date: 4/24/2023    Physician Orders: PT Eval and Treat  Medical Diagnosis from Referral: M43.10 (ICD-10-CM) - Degenerative spondylolisthesis  Evaluation Date: 3/30/2023  Authorization Period Expiration: 04/30/2023  Plan of Care Expiration: 05/30/2023  Progress Note Due: 04/30/2023  Visit # / Visits authorized: 1/1 eval, 6/20   FOTO: 0/3    Time In: 2:00 pm  Time Out: 3:00 pm  Total Billable Time: 60 minutes    Precautions: Standard, Fall Risk, Legally Blind    Subjective     Patient reports she is continuing to sleep well at night, and is performing her home exercise program when she has enough energy, which is most days. She reports she is independent with HEP and is "hardly waking up" at night after falling asleep. Nicolasa does report intermittent left knee pain which she states makes it difficult for her to fall asleep at night sometimes.    She was compliant with home exercise program.  Response to previous treatment: No low back pain.  Functional change: Consistently improved sleep at night.    Pain: 0/10  Location: n/a    Objective     Observation: Favors left lower extremity with contralateral weight shift during sit to stand transfers.    Patient requires handhold/CGA assist for ambulation and transfers due to visual impairment    Therapeutic Activities to improve activity tolerance for 15 minutes, including:  Nu Step, 10 minutes, level 2.0, to improve activity tolerance  Sit to Stands, 3x10, handhold assistance with verbal/tactile cues for hip hinge pattern    Manual Therapy Techniques: Soft tissue mobilization was applied to the left piriformis, glutes, and hamstrings for 15 minutes, including:  Theragun, 10 minutes  Long Axis Distraction - RLE, grades " "II/III    Therapeutic Exercise to develop strength, endurance, ROM, flexibility, posture, and core stabilization for 30 minutes including:  Seated Lumbar Flexion with PB, 10x10", forwards  Prone on Elbows, 1x3 minutes  Lower Trunk Rotations, 1x30, bilateral  Single Knee to Chest, 3x30", bilateral  Bridges, 3x10 with 5" hold  Long Arc Quads, 3x15, left lower extremity    Not Performed:  Supine Piriformis Stretch, 3x30", bilateral    Home Exercises Provided and Patient Education Provided   Education provided:   - HEP review    Written Home Exercises Provided: Patient instructed to cont prior HEP. Exercises were reviewed and Nicolasa was able to demonstrate them prior to the end of the session. Nicolasa demonstrated good  understanding of the education provided.     See EMR under Patient Instructions for exercises provided prior visit.    Assessment     Session focused on application of manual interventions to improve hip soft tissue flexibility/mobility, followed by supervised performance of lumbopelvic mobility and strength exercises with verbal/tactile cues provided as needed, and ending with addition of long arc quad exercise to improve left knee strength. Patient with good tolerance of session reporting no adverse effects during treatment, and was instructed to continue with current home exercise program which she verbalized understanding of.    Nicolasa Is progressing well towards her goals.   Pt prognosis is Good.     Pt will continue to benefit from skilled outpatient physical therapy to address the deficits listed in the problem list box on initial evaluation, provide pt/family education and to maximize pt's level of independence in the home and community environment.     Pt's spiritual, cultural and educational needs considered and pt agreeable to plan of care and goals.  Anticipated barriers to physical therapy: advanced age, legally blind    Goals:  Short Term Goals: 4 weeks  Patient will be independent with initial " HEP to improve therapy outcomes. (Met 04/24)  Patient will report a 50% decrease in frequency of symptoms waking her at night to demonstrate improved quality of life. (Met 04/24)  Patient will demonstrate full lumbar spine flexion/extension AROM without production of pain at end-range. (Progressing)     Long Term Goals: 8 weeks   Patient will be independent with final HEP to maintain gains achieved physical therapy   Patient will report a 75% decrease in frequency of symptoms waking her at night to demonstrate improved quality of life (Progressing)  Patient will improve gross bilateral lower extremity hip strength to >/=4-/5 to improve walking tolerance.    Plan   Plan of care Certification: 3/30/2023 to 05/30/2023.     Continue progressing per patient tolerance and POC.    Scott Wong, PT, DPT

## 2023-04-26 ENCOUNTER — CLINICAL SUPPORT (OUTPATIENT)
Dept: REHABILITATION | Facility: HOSPITAL | Age: 88
End: 2023-04-26
Payer: MEDICARE

## 2023-04-26 DIAGNOSIS — M54.32 SCIATICA OF LEFT SIDE: ICD-10-CM

## 2023-04-26 DIAGNOSIS — M43.10 DEGENERATIVE SPONDYLOLISTHESIS: Primary | ICD-10-CM

## 2023-04-26 PROCEDURE — 97110 THERAPEUTIC EXERCISES: CPT | Mod: HCNC,PO

## 2023-04-26 NOTE — PROGRESS NOTES
"  Physical Therapy Daily Treatment Note     Name: Nicolasa Jurado  [facility] Number: [id number]    Therapy Diagnosis:   Encounter Diagnoses   Name Primary?    Degenerative spondylolisthesis Yes    Sciatica of left side      Physician: Houston Gaston MD  Visit Date: 4/26/2023    Physician Orders: PT Eval and Treat  Medical Diagnosis from Referral: M43.10 (ICD-10-CM) - Degenerative spondylolisthesis  Evaluation Date: 3/30/2023  Authorization Period Expiration: 04/30/2023  Plan of Care Expiration: 05/30/2023  Progress Note Due: 04/30/2023  Visit # / Visits authorized: 1/1 eval, 7/20   FOTO: 0/3    Time In: 3:10 pm  Time Out: 4:00 pm  Total Billable Time: 50 minutes, 30 minutes 1:1 with PT    Precautions: Standard, Fall Risk, Legally Blind    Subjective     Patient reports she has slept through the night the last three nights, and is not having any pain in her low back or leg since last visit.    She was compliant with home exercise program.  Response to previous treatment: No low back pain.  Functional change: Consistently improved sleep at night.    Pain: 0/10  Location: n/a    Objective     Patient requires handhold/CGA assist for ambulation and transfers due to visual impairment    Therapeutic Activities to improve activity tolerance for 20 minutes, including:  Nu Step, 10 minutes, level 2.0, to improve activity tolerance  Sit to Stands, 3x10, handhold assistance with verbal/tactile cues for hip hinge pattern    Therapeutic Exercise to develop strength, endurance, ROM, flexibility, posture, and core stabilization for 30 minutes including:  Seated Lumbar Flexion with PB, 10x10", forwards  Prone on Elbows, 1x3 minutes  Lower Trunk Rotations, 1x30, bilateral  Single Knee to Chest, 3x30", bilateral  Bridges, 3x10 with 5" hold  Long Arc Quads, 3x15, left lower extremity  SLRs, 2x10, bilateral    Manual Therapy Techniques: Soft tissue mobilization was applied to the left piriformis, glutes, and hamstrings for 00 minutes, " including:  Theragun, 10 minutes  Long Axis Distraction - RLE, grades II/III    Home Exercises Provided and Patient Education Provided   Education provided:   - HEP review    Written Home Exercises Provided: Patient instructed to cont prior HEP. Exercises were reviewed and Nicolasa was able to demonstrate them prior to the end of the session. Nicolasa demonstrated good  understanding of the education provided.     See EMR under Patient Instructions for exercises provided prior visit.    Assessment     Patient with continued excellent response to physical therapy interventions and is reporting no pain for multiple nights in a row since her last visit. She is performing her exercises as instructed, though does still demonstrate a lack of understanding that her lower extremity are related to her spine. Session focused on progression of lumbar spine mobility and core stabilization exercises with addition of SLRs. Patient with good tolerance of session reporting no adverse effects, and was instructed to continue with current home exercise program which she verbalized understanding of.    Nicolasa Is progressing well towards her goals.   Pt prognosis is Good.     Pt will continue to benefit from skilled outpatient physical therapy to address the deficits listed in the problem list box on initial evaluation, provide pt/family education and to maximize pt's level of independence in the home and community environment.     Pt's spiritual, cultural and educational needs considered and pt agreeable to plan of care and goals.  Anticipated barriers to physical therapy: advanced age, legally blind    Goals:  Short Term Goals: 4 weeks  Patient will be independent with initial HEP to improve therapy outcomes. (Met 04/24)  Patient will report a 50% decrease in frequency of symptoms waking her at night to demonstrate improved quality of life. (Met 04/24)  Patient will demonstrate full lumbar spine flexion/extension AROM without production of pain  at end-range. (Progressing)     Long Term Goals: 8 weeks   Patient will be independent with final HEP to maintain gains achieved physical therapy (Progressing)  Patient will report a 75% decrease in frequency of symptoms waking her at night to demonstrate improved quality of life (Progressing)  Patient will improve gross bilateral lower extremity hip strength to >/=4-/5 to improve walking tolerance (Progressing)    Plan   Plan of care Certification: 3/30/2023 to 05/30/2023.     Plan for discharge to home in next 1-2 visits pending continued report of no pain and compliance with home exercise program.    Scott Wong, PT, DPT

## 2023-05-03 ENCOUNTER — CLINICAL SUPPORT (OUTPATIENT)
Dept: AUDIOLOGY | Facility: CLINIC | Age: 88
End: 2023-05-03
Payer: MEDICARE

## 2023-05-03 DIAGNOSIS — H90.3 SENSORINEURAL HEARING LOSS (SNHL) OF BOTH EARS: Primary | ICD-10-CM

## 2023-05-03 DIAGNOSIS — H93.13 TINNITUS, BILATERAL: ICD-10-CM

## 2023-05-03 PROCEDURE — 92567 PR TYMPA2METRY: ICD-10-PCS | Mod: HCNC,S$GLB,,

## 2023-05-03 PROCEDURE — 99999 PR PBB SHADOW E&M-EST. PATIENT-LVL I: ICD-10-PCS | Mod: PBBFAC,HCNC,,

## 2023-05-03 PROCEDURE — 92557 PR COMPREHENSIVE HEARING TEST: ICD-10-PCS | Mod: HCNC,S$GLB,,

## 2023-05-03 PROCEDURE — 92567 TYMPANOMETRY: CPT | Mod: HCNC,S$GLB,,

## 2023-05-03 PROCEDURE — 99999 PR PBB SHADOW E&M-EST. PATIENT-LVL I: CPT | Mod: PBBFAC,HCNC,,

## 2023-05-03 PROCEDURE — 92557 COMPREHENSIVE HEARING TEST: CPT | Mod: HCNC,S$GLB,,

## 2023-05-03 NOTE — PROGRESS NOTES
Nicolasa Jurado, a 89 y.o. female, was seen today in the clinic for an audiologic evaluation.  The patient's main complaint was hearing loss.  Ms. Jurado reported having difficulty understanding speech in most situations; specifically when watching television or trying to talk to her family. She also reported non-bothersome tinnitus in both ears. Ms. Jurado denied otalgia, aural pressure and vertigo.     Tympanometry revealed Type A in the right ear and Type A in the left ear. Audiogram results revealed a mild sloping to severe sensorineural hearing loss in the right ear and a moderate sloping to severe sensorineural hearing loss in the left ear.  Speech reception thresholds were noted at 45 dB in the right ear and 45 dB in the left ear.  Speech discrimination scores were 64% in the right ear and 64% in the left ear.    Recommendations:  Otologic evaluation  Hearing aid consultation   Annual audiogram  Hearing protection when in noise

## 2023-05-04 ENCOUNTER — CLINICAL SUPPORT (OUTPATIENT)
Dept: REHABILITATION | Facility: HOSPITAL | Age: 88
End: 2023-05-04
Payer: MEDICARE

## 2023-05-04 DIAGNOSIS — M43.10 DEGENERATIVE SPONDYLOLISTHESIS: Primary | ICD-10-CM

## 2023-05-04 DIAGNOSIS — M54.32 SCIATICA OF LEFT SIDE: ICD-10-CM

## 2023-05-04 PROCEDURE — 97110 THERAPEUTIC EXERCISES: CPT | Mod: HCNC,PO

## 2023-05-04 PROCEDURE — 97530 THERAPEUTIC ACTIVITIES: CPT | Mod: HCNC,PO

## 2023-05-04 NOTE — PROGRESS NOTES
"  Physical Therapy Daily Treatment Note     Name: Nicolasa Jurado  Clinic Number: [id number]    Therapy Diagnosis:   No diagnosis found.    Physician: Houston Gaston MD  Visit Date: 5/4/2023    Physician Orders: PT Eval and Treat  Medical Diagnosis from Referral: M43.10 (ICD-10-CM) - Degenerative spondylolisthesis  Evaluation Date: 3/30/2023  Authorization Period Expiration: 04/30/2023  Plan of Care Expiration: 05/30/2023  Progress Note Due: 04/30/2023  Visit # / Visits authorized: 1/1 eval, 7/20   FOTO: 0/3    Time In: 1:35 pm  Time Out: 2:15 pm  Total Billable Time: 40 minutes     Precautions: Standard, Fall Risk, Legally Blind    Subjective     Patient reports that her back has not been hurting.  She also reported that the decrease in her back pain has allowed her to sleep through the night for the past few days.    She was compliant with home exercise program.  Response to previous treatment: No low back pain.  Functional change: Consistently improved sleep at night.    Pain: 0/10  Location: n/a    Objective   Observation: Pt ambulates in with HHA and SPC     Range of Motion (Passive):   Knee Right  Left    Flexion WNL WNL   Extension WNL WNL                                                     Range of Motion (Active):   Knee Right  Left    Flexion WNL WNL   Extension WNL WNL         Lower Extremity Strength  Right LE   Left LE     Quadriceps: 5/5 Quadriceps: 4+/5   Hamstrings: 5/5 Hamstrings: 4+/5   Hip flexion (seated): 4/5 Hip flexion (seated): 2/5*   Hip extension:  3+/5 Hip extension: 3/5   PGM: 4/5* PGM:  4/5   Hip ER:  nt Hip ER:  nt   Hip IR: nt Hip IR: nt   *Denotes pain with activity  Special Tests:    Right Left   Thessaly's Test nt nt   Patellar Grind Test - -         Special Tests:   FABERs:  -   UNRULY:nt  Hip Scour: -  Slump: -     Joint Mobility: B knee joint mobility WFL     Palpation: Pain on palpation of the L PSIS and L fibular head    Edema: slight L knee     Sensation: intact - Pt reports "tingling" " "in L lateral 3 toes     Flexibility:               Hamstrings: R = - ; L = +               Marcia's test: R = - ; L = +              Mati test: R = - ; L = +        Limitation/Restriction for FOTO EVAL Survey     Therapist reviewed FOTO scores for Nicolasa Jurado on 5/4/2023.   FOTO documents entered into EP[employer] - see Media section.     Limitation Score: 44%     Patient requires handhold/CGA assist for ambulation and transfers due to visual impairment    Therapeutic Activities to improve activity tolerance for 00 minutes, including:  Nu Step, 10 minutes, level 2.0, to improve activity tolerance  Sit to Stands, 3x10, handhold assistance with verbal/tactile cues for hip hinge pattern    Therapeutic Exercise to develop strength, endurance, ROM, flexibility, posture, and core stabilization for 17 minutes including:  Seated Lumbar Flexion with PB, 10x10", forwards  Prone on Elbows, 1x3 minutes  Lower Trunk Rotations, 1x30, bilateral  Single Knee to Chest, 3x30", bilateral  Bridges, 3x10 with 5" hold  Long Arc Quads, 3x15, left lower extremity  SLRs, 2x10 L LE    Manual Therapy Techniques: Soft tissue mobilization was applied to the left piriformis, glutes, and hamstrings for 00 minutes, including:  Theragun, 10 minutes  Long Axis Distraction - RLE, grades II/III    Home Exercises Provided and Patient Education Provided   Education provided:   - continue with HEP    Written Home Exercises Provided: Patient instructed to cont prior HEP. Exercises were reviewed and Nicolasa was able to demonstrate them prior to the end of the session. Nicolasa demonstrated good  understanding of the education provided.     See EMR under Patient Instructions for exercises provided prior visit.    Re-evaluation = 23  Assessment     Patient continues to report a decrease in her lower back pain allowing her to sleep through the night. Pt with B knee ROM WFL and improved strength in B LE's.  She reported pain in her L hip with resisted L hip flexion and R " hip abduction. She tolerated today's Tx with some discomfort in the L hip with MMT as noted. Her lower back pain remains under control.  Proceed with discharge from outpatient physical therapy after next visits if Sx remain under control. Pt's difference in FOTO limitation score may be related to individual assisting Pt, due to vision limitations, being different than during the initial FOTO support. Score changed from 15% to 44%.    Nicolasa Is progressing well towards her goals.   Pt prognosis is Good.     Pt will continue to benefit from skilled outpatient physical therapy to address the deficits listed in the problem list box on initial evaluation, provide pt/family education and to maximize pt's level of independence in the home and community environment.     Pt's spiritual, cultural and educational needs considered and pt agreeable to plan of care and goals.  Anticipated barriers to physical therapy: advanced age, legally blind    Goals:  Short Term Goals: 4 weeks  Patient will be independent with initial HEP to improve therapy outcomes. (Met 04/24)  Patient will report a 50% decrease in frequency of symptoms waking her at night to demonstrate improved quality of life. (Met 04/24)  Patient will demonstrate full lumbar spine flexion/extension AROM without production of pain at end-range. (Progressing)     Long Term Goals: 8 weeks   Patient will be independent with final HEP to maintain gains achieved physical therapy (Progressing)  Patient will report a 75% decrease in frequency of symptoms waking her at night to demonstrate improved quality of life (Met 05/04)  Patient will improve gross bilateral lower extremity hip strength to >/=4-/5 to improve walking tolerance (Progressing)    Plan   Plan of care Certification: 3/30/2023 to 05/30/2023.     Plan for discharge to home in next 1-2 visits pending continued report of no pain and compliance with home exercise program.    Joe Trevino, PT, DPT

## 2023-05-08 ENCOUNTER — OFFICE VISIT (OUTPATIENT)
Dept: INTERNAL MEDICINE | Facility: CLINIC | Age: 88
End: 2023-05-08
Payer: MEDICARE

## 2023-05-08 VITALS
HEART RATE: 74 BPM | OXYGEN SATURATION: 94 % | WEIGHT: 134.94 LBS | BODY MASS INDEX: 26.49 KG/M2 | TEMPERATURE: 98 F | HEIGHT: 60 IN | RESPIRATION RATE: 20 BRPM | SYSTOLIC BLOOD PRESSURE: 144 MMHG | DIASTOLIC BLOOD PRESSURE: 58 MMHG

## 2023-05-08 DIAGNOSIS — I10 ESSENTIAL HYPERTENSION: Primary | Chronic | ICD-10-CM

## 2023-05-08 DIAGNOSIS — R59.0 CERVICAL LYMPHADENOPATHY: ICD-10-CM

## 2023-05-08 DIAGNOSIS — I65.23 BILATERAL CAROTID ARTERY STENOSIS: Chronic | ICD-10-CM

## 2023-05-08 DIAGNOSIS — H35.3290 EXUDATIVE AGE-RELATED MACULAR DEGENERATION, UNSPECIFIED LATERALITY, UNSPECIFIED STAGE: Chronic | ICD-10-CM

## 2023-05-08 DIAGNOSIS — H91.93 DECREASED HEARING OF BOTH EARS: ICD-10-CM

## 2023-05-08 DIAGNOSIS — E78.00 PURE HYPERCHOLESTEROLEMIA: Chronic | ICD-10-CM

## 2023-05-08 DIAGNOSIS — N18.4 CKD (CHRONIC KIDNEY DISEASE), STAGE IV: Chronic | ICD-10-CM

## 2023-05-08 DIAGNOSIS — I70.0 ATHEROSCLEROSIS OF AORTA: Chronic | ICD-10-CM

## 2023-05-08 DIAGNOSIS — K55.1 MESENTERIC ARTERY STENOSIS: Chronic | ICD-10-CM

## 2023-05-08 PROCEDURE — 3288F PR FALLS RISK ASSESSMENT DOCUMENTED: ICD-10-PCS | Mod: HCNC,CPTII,S$GLB, | Performed by: INTERNAL MEDICINE

## 2023-05-08 PROCEDURE — 1126F PR PAIN SEVERITY QUANTIFIED, NO PAIN PRESENT: ICD-10-PCS | Mod: HCNC,CPTII,S$GLB, | Performed by: INTERNAL MEDICINE

## 2023-05-08 PROCEDURE — 1101F PT FALLS ASSESS-DOCD LE1/YR: CPT | Mod: HCNC,CPTII,S$GLB, | Performed by: INTERNAL MEDICINE

## 2023-05-08 PROCEDURE — 1159F PR MEDICATION LIST DOCUMENTED IN MEDICAL RECORD: ICD-10-PCS | Mod: HCNC,CPTII,S$GLB, | Performed by: INTERNAL MEDICINE

## 2023-05-08 PROCEDURE — 1157F PR ADVANCE CARE PLAN OR EQUIV PRESENT IN MEDICAL RECORD: ICD-10-PCS | Mod: HCNC,CPTII,S$GLB, | Performed by: INTERNAL MEDICINE

## 2023-05-08 PROCEDURE — 1160F PR REVIEW ALL MEDS BY PRESCRIBER/CLIN PHARMACIST DOCUMENTED: ICD-10-PCS | Mod: HCNC,CPTII,S$GLB, | Performed by: INTERNAL MEDICINE

## 2023-05-08 PROCEDURE — 99214 PR OFFICE/OUTPT VISIT, EST, LEVL IV, 30-39 MIN: ICD-10-PCS | Mod: HCNC,S$GLB,, | Performed by: INTERNAL MEDICINE

## 2023-05-08 PROCEDURE — 1101F PR PT FALLS ASSESS DOC 0-1 FALLS W/OUT INJ PAST YR: ICD-10-PCS | Mod: HCNC,CPTII,S$GLB, | Performed by: INTERNAL MEDICINE

## 2023-05-08 PROCEDURE — 3288F FALL RISK ASSESSMENT DOCD: CPT | Mod: HCNC,CPTII,S$GLB, | Performed by: INTERNAL MEDICINE

## 2023-05-08 PROCEDURE — 1126F AMNT PAIN NOTED NONE PRSNT: CPT | Mod: HCNC,CPTII,S$GLB, | Performed by: INTERNAL MEDICINE

## 2023-05-08 PROCEDURE — 99999 PR PBB SHADOW E&M-EST. PATIENT-LVL V: CPT | Mod: PBBFAC,HCNC,, | Performed by: INTERNAL MEDICINE

## 2023-05-08 PROCEDURE — 1159F MED LIST DOCD IN RCRD: CPT | Mod: HCNC,CPTII,S$GLB, | Performed by: INTERNAL MEDICINE

## 2023-05-08 PROCEDURE — 1160F RVW MEDS BY RX/DR IN RCRD: CPT | Mod: HCNC,CPTII,S$GLB, | Performed by: INTERNAL MEDICINE

## 2023-05-08 PROCEDURE — 99214 OFFICE O/P EST MOD 30 MIN: CPT | Mod: HCNC,S$GLB,, | Performed by: INTERNAL MEDICINE

## 2023-05-08 PROCEDURE — 1157F ADVNC CARE PLAN IN RCRD: CPT | Mod: HCNC,CPTII,S$GLB, | Performed by: INTERNAL MEDICINE

## 2023-05-08 PROCEDURE — 99999 PR PBB SHADOW E&M-EST. PATIENT-LVL V: ICD-10-PCS | Mod: PBBFAC,HCNC,, | Performed by: INTERNAL MEDICINE

## 2023-05-08 NOTE — PROGRESS NOTES
Subjective:       Patient ID: Nicolasa Jurado is a 89 y.o. female.    Chief Complaint: Follow-up    HPI    89-year-old female here for follow-up.    HTN - Patient is currently on Micardis 80 mg, Toprol  mg. She does check her BP at home, and it runs 100s-120s systolic.  About 6 pm, she gets in the 180s. Side effects of medications note: none. Denies headaches, blurred vision, chest pain, shortness of breath, nausea.  She gets high readings when she just wakes up.      She has aches and pains in her legs.  She is going to PT for her knee and this is helping her pain.  She has a headache today.     She has had diarrhea yesterday and today.  She took a tums to help her stomach.  She feels fine.    HLD - Patient is currently on Lipitor 80 mg.  Her last lipid panel was   Cholesterol   Date Value Ref Range Status   11/07/2022 158 120 - 199 mg/dL Final     Comment:     The National Cholesterol Education Program (NCEP) has set the  following guidelines (reference ranges) for Cholesterol:  Optimal.....................<200 mg/dL  Borderline High.............200-239 mg/dL  High........................> or = 240 mg/dL       Triglycerides   Date Value Ref Range Status   11/07/2022 167 (H) 30 - 150 mg/dL Final     Comment:     The National Cholesterol Education Program (NCEP) has set the  following guidelines (reference values) for triglycerides:  Normal......................<150 mg/dL  Borderline High.............150-199 mg/dL  High........................200-499 mg/dL       HDL   Date Value Ref Range Status   11/07/2022 45 40 - 75 mg/dL Final     Comment:     The National Cholesterol Education Program (NCEP) has set the  following guidelines (reference values) for HDL Cholesterol:  Low...............<40 mg/dL  Optimal...........>60 mg/dL       LDL Cholesterol   Date Value Ref Range Status   11/07/2022 79.6 63.0 - 159.0 mg/dL Final     Comment:     The National Cholesterol Education Program (NCEP) has set the  following  guidelines (reference values) for LDL Cholesterol:  Optimal.......................<130 mg/dL  Borderline High...............130-159 mg/dL  High..........................160-189 mg/dL  Very High.....................>190 mg/dL     .  Side effects of the medication: none.    Review of Systems      Objective:      Physical Exam  Vitals reviewed.   Constitutional:       Appearance: She is well-developed.   HENT:      Head: Normocephalic and atraumatic.      Right Ear: Tympanic membrane and ear canal normal.      Left Ear: Tympanic membrane and ear canal normal.      Mouth/Throat:      Pharynx: No oropharyngeal exudate.   Eyes:      General: No scleral icterus.        Right eye: No discharge.         Left eye: No discharge.      Pupils: Pupils are equal, round, and reactive to light.   Neck:      Thyroid: No thyromegaly.      Trachea: No tracheal deviation.   Cardiovascular:      Rate and Rhythm: Normal rate and regular rhythm.      Heart sounds: Normal heart sounds. No murmur heard.    No friction rub. No gallop.   Pulmonary:      Effort: Pulmonary effort is normal. No respiratory distress.      Breath sounds: Normal breath sounds. No wheezing or rales.   Chest:      Chest wall: No tenderness.   Abdominal:      General: Bowel sounds are normal. There is no distension.      Palpations: Abdomen is soft. There is no mass.      Tenderness: There is no abdominal tenderness. There is no guarding or rebound.   Musculoskeletal:         General: No tenderness. Normal range of motion.      Cervical back: Normal range of motion and neck supple.   Lymphadenopathy:      Cervical: Cervical adenopathy present.      Left cervical: Superficial cervical adenopathy present.   Skin:     General: Skin is warm and dry.      Coloration: Skin is not pale.      Findings: No erythema or rash.   Neurological:      Mental Status: She is alert and oriented to person, place, and time.   Psychiatric:         Behavior: Behavior normal.        Assessment:       1. Essential hypertension    2. Pure hypercholesterolemia    3. Atherosclerosis of aorta    4. Bilateral carotid artery stenosis    5. CKD (chronic kidney disease), stage IV    6. Exudative age-related macular degeneration, unspecified laterality, unspecified stage    7. Mesenteric artery stenosis    8. Decreased hearing of both ears  - Ambulatory referral/consult to ENT; Future  - Ambulatory referral/consult to Audiology; Future    9. Cervical lymphadenopathy      Plan:       1. Continue amlodipine 10 mg, Toprol- mg, HCTZ 25 mg, Micardis 80 mg.    2/3/4.  Continue Lipitor 80 mg  5. Monitor.  6.  Monitor  7.  Continue Lipitor.    8.  Refer to ENT, audiology.    9. Counseled patient that she may be getting in soon.

## 2023-05-10 ENCOUNTER — CLINICAL SUPPORT (OUTPATIENT)
Dept: REHABILITATION | Facility: HOSPITAL | Age: 88
End: 2023-05-10
Payer: MEDICARE

## 2023-05-10 DIAGNOSIS — M54.32 SCIATICA OF LEFT SIDE: ICD-10-CM

## 2023-05-10 DIAGNOSIS — M43.10 DEGENERATIVE SPONDYLOLISTHESIS: Primary | ICD-10-CM

## 2023-05-10 PROCEDURE — 97110 THERAPEUTIC EXERCISES: CPT | Mod: HCNC,PO

## 2023-05-10 NOTE — PROGRESS NOTES
"  Physical Therapy Daily Treatment Note     Name: Nicolasa Jurado  [facility] Number: [id number]    Therapy Diagnosis:   Encounter Diagnoses   Name Primary?    Degenerative spondylolisthesis Yes    Sciatica of left side        Physician: Houston Gaston MD  Visit Date: 5/10/2023    Physician Orders: PT Eval and Treat  Medical Diagnosis from Referral: M43.10 (ICD-10-CM) - Degenerative spondylolisthesis  Evaluation Date: 3/30/2023  Authorization Period Expiration: 04/30/2023  Plan of Care Expiration: 05/30/2023  Progress Note Due: 06/04/2023  Visit # / Visits authorized: 1/1 eval, 9/20   FOTO: 0/3    Time In: 1:15  Time Out: 1:55  Total Billable Time: 40 minutes     Precautions: Standard, Fall Risk, Legally Blind    Subjective     Patient reports: that her back has not been hurting.  She also reported that the decrease in her back pain has allowed her to sleep through the night for the past few days.    She was compliant with home exercise program.  Response to previous treatment: No low back pain.  Functional change: Consistently improved sleep at night.    Pain: 0/10  Location: n/a    Objective   Objective measures taken at progress report unless specified otherwise.       Treatment     Patient requires handhold/CGA assist for ambulation and transfers due to visual impairment    Therapeutic Activities to improve activity tolerance for 02 minutes, including:  Nu Step, 10 minutes, level 2.0, to improve activity tolerance  Sit to Stands, 3x10, handhold assistance with verbal/tactile cues for hip hinge pattern    Therapeutic Exercise to develop strength, endurance, ROM, flexibility, posture, and core stabilization for 38 minutes including:  Seated Lumbar Flexion with PB, 10x10", forwards  Prone on Elbows, 1x3 minutes  Lower Trunk Rotations, 1x30, bilateral  Single Knee to Chest, 3x30", bilateral  Bridges, 3x10 with 5" hold  Long Arc Quads, 3x15, bilateral  SLRs, 2x10 L LE    Manual Therapy Techniques: Soft tissue mobilization was " applied to the left piriformis, glutes, and hamstrings for 00 minutes, including:  Theragun, 10 minutes  Long Axis Distraction - RLE, grades II/III    Home Exercises Provided and Patient Education Provided   Education provided:   - continue with HEP    Written Home Exercises Provided: Patient instructed to cont prior HEP. Exercises were reviewed and Nicolasa was able to demonstrate them prior to the end of the session. Nicolasa demonstrated good  understanding of the education provided.     See EMR under Patient Instructions for exercises provided prior visit.    Re-evaluation = 23  Assessment     Nicolasa has met the majority of her PT goals at this time and is not experiencing low back pain any longer. She is also reporting improved sleep without interruption and compliance with home exercise program. She is reporting shoulder pain however this will require new referral and new eval which I discussed with the patient today. She is ready for discharge to [facility] today.     Nicolasa Is progressing well towards her goals.   Pt prognosis is Good.     Pt will continue to benefit from skilled outpatient physical therapy to address the deficits listed in the problem list box on initial evaluation, provide pt/family education and to maximize pt's level of independence in the home and community environment.     Pt's spiritual, cultural and educational needs considered and pt agreeable to plan of care and goals.  Anticipated barriers to physical therapy: advanced age, legally blind    Goals:  Short Term Goals: 4 weeks  Patient will be independent with initial HEP to improve therapy outcomes. (Met 04/24)  Patient will report a 50% decrease in frequency of symptoms waking her at night to demonstrate improved quality of life. (Met 04/24)  Patient will demonstrate full lumbar spine flexion/extension AROM without production of pain at end-range. (Met 5/10/23)     Long Term Goals: 8 weeks   Patient will be independent with final HEP to maintain  gains achieved physical therapy (Met 5/10/23)  Patient will report a 75% decrease in frequency of symptoms waking her at night to demonstrate improved quality of life (Met 05/04)  Patient will improve gross bilateral lower extremity hip strength to >/=4-/5 to improve walking tolerance (Progressing)    Plan   Plan of care Certification: 3/30/2023 to 05/30/2023.     Pt is discharged from skilled PT today.     Cathy Escobar, PT, DPT

## 2023-05-12 ENCOUNTER — CLINICAL SUPPORT (OUTPATIENT)
Dept: REHABILITATION | Facility: HOSPITAL | Age: 88
End: 2023-05-12
Payer: MEDICARE

## 2023-05-12 DIAGNOSIS — M43.10 DEGENERATIVE SPONDYLOLISTHESIS: Primary | ICD-10-CM

## 2023-05-12 DIAGNOSIS — M54.32 SCIATICA OF LEFT SIDE: ICD-10-CM

## 2023-05-12 PROCEDURE — 97110 THERAPEUTIC EXERCISES: CPT | Mod: HCNC,PO

## 2023-05-12 NOTE — PROGRESS NOTES
"  Physical Therapy Daily Treatment Note/ Discharge Visit     Name: Nicolasa Jurado  [facility] Number: [id number]    Therapy Diagnosis:   Encounter Diagnoses   Name Primary?    Degenerative spondylolisthesis Yes    Sciatica of left side        Physician: Houston Gaston MD  Visit Date: 5/12/2023    Physician Orders: PT Eval and Treat  Medical Diagnosis from Referral: M43.10 (ICD-10-CM) - Degenerative spondylolisthesis  Evaluation Date: 3/30/2023  Authorization Period Expiration: 04/30/2023  Plan of Care Expiration: 05/30/2023  Progress Note Due: 06/04/2023  Visit # / Visits authorized: 1/1 eval, 10/20   FOTO: 0/3    Time In: 1:33  Time Out: 2:14  Total Billable Time: 41 minutes     Precautions: Standard, Fall Risk, Legally Blind    Subjective     Patient reports: feeling fine today    She was compliant with home exercise program.  Response to previous treatment: No low back pain.  Functional change: Consistently improved sleep at night.    Pain: 0/10  Location: n/a    Objective   Objective measures taken at progress report unless specified otherwise.       Treatment     Patient requires handhold/CGA assist for ambulation and transfers due to visual impairment  Bold=performed  Therapeutic Activities to improve activity tolerance for 3 minutes, including:  Nu Step, 10 minutes, level 2.0, to improve activity tolerance  Sit to Stands, 3x10, handhold assistance with verbal/tactile cues for hip hinge pattern    Therapeutic Exercise to develop strength, endurance, ROM, flexibility, posture, and core stabilization for 38 minutes including:  Seated Lumbar Flexion with PB, 10x10", forwards  Prone on Elbows, 1x3 minutes  Lower Trunk Rotations, 1x30, bilateral  Single Knee to Chest, 3x30", bilateral  Bridges, 3x10 with 5" hold  +sidelying clamshells no resistance 3x10 B   Long Arc Quads, 3x15, bilateral  SLRs, 2x10 L LE    Manual Therapy Techniques: Soft tissue mobilization was applied to the left piriformis, glutes, and hamstrings for 00 " minutes, including:  Theragun, 0 minutes  Long [place] Distraction - RLE, grades II/III    Home Exercises Provided and Patient Education Provided   Education provided:   - continue with HEP    Written Home Exercises Provided: Patient instructed to cont prior HEP. Exercises were reviewed and Nicolasa was able to demonstrate them prior to the end of the session. Nicolasa demonstrated good  understanding of the education provided.     See EMR under Patient Instructions for exercises provided prior visit.    Re-evaluation = 23    Assessment     Good tolerance to session. Will require new referral for shoulder pain treatment if desired. She is discharged to [facility] today.     Nicolasa Is progressing well towards her goals.   Pt prognosis is Good.     Pt will continue to benefit from skilled outpatient physical therapy to address the deficits listed in the problem list box on initial evaluation, provide pt/family education and to maximize pt's level of independence in the home and community environment.     Pt's spiritual, cultural and educational needs considered and pt agreeable to plan of care and goals.  Anticipated barriers to physical therapy: advanced age, legally blind    Goals:  Short Term Goals: 4 weeks  Patient will be independent with initial HEP to improve therapy outcomes. (Met 04/24)  Patient will report a 50% decrease in frequency of symptoms waking her at night to demonstrate improved quality of life. (Met 04/24)  Patient will demonstrate full lumbar spine flexion/extension AROM without production of pain at end-range. (Met 5/10/23)     Long Term Goals: 8 weeks   Patient will be independent with final HEP to maintain gains achieved physical therapy (Met 5/10/23)  Patient will report a 75% decrease in frequency of symptoms waking her at night to demonstrate improved quality of life (Met 05/04)  Patient will improve gross bilateral lower extremity hip strength to >/=4-/5 to improve walking tolerance (Progressing)    Plan    Plan of care Certification: 3/30/2023 to 05/30/2023.     Pt is discharged from skilled PT today.     Cathy Escobar, PT, DPT

## 2023-06-09 RX ORDER — AMLODIPINE BESYLATE 10 MG/1
TABLET ORAL
Qty: 90 TABLET | Refills: 3 | Status: SHIPPED | OUTPATIENT
Start: 2023-06-09 | End: 2023-08-28

## 2023-06-09 NOTE — TELEPHONE ENCOUNTER
No care due was identified.  [facility] Embedded Care Due Messages. Reference number: [id number].   6/09/2023 12:58:07 PM CDT

## 2023-06-09 NOTE — TELEPHONE ENCOUNTER
Refill Routing Note   Medication(s) are not appropriate for processing by [facility] for the following reason(s):      Required vitals abnormal    [facility] action(s):  Defer None identified          Appointments  past 12m or future 3m with PCP    Date Provider   Last Visit   5/8/2023 Houston Gaston MD   Next Visit   11/9/2023 Houston Gaston MD   ED visits in past 90 days: 0        Note composed:1:08 PM 06/09/2023

## 2023-07-26 ENCOUNTER — OFFICE VISIT (OUTPATIENT)
Dept: INTERNAL MEDICINE | Facility: CLINIC | Age: 88
End: 2023-07-26
Payer: MEDICARE

## 2023-07-26 VITALS
TEMPERATURE: 98 F | BODY MASS INDEX: 25.97 KG/M2 | HEART RATE: 68 BPM | WEIGHT: 132.25 LBS | SYSTOLIC BLOOD PRESSURE: 152 MMHG | OXYGEN SATURATION: 98 % | DIASTOLIC BLOOD PRESSURE: 50 MMHG | RESPIRATION RATE: 16 BRPM | HEIGHT: 60 IN

## 2023-07-26 DIAGNOSIS — E78.00 PURE HYPERCHOLESTEROLEMIA: Chronic | ICD-10-CM

## 2023-07-26 DIAGNOSIS — K21.9 GASTROESOPHAGEAL REFLUX DISEASE, UNSPECIFIED WHETHER ESOPHAGITIS PRESENT: ICD-10-CM

## 2023-07-26 DIAGNOSIS — I70.0 ATHEROSCLEROSIS OF AORTA: Chronic | ICD-10-CM

## 2023-07-26 DIAGNOSIS — I10 ESSENTIAL HYPERTENSION: Primary | Chronic | ICD-10-CM

## 2023-07-26 DIAGNOSIS — I65.23 BILATERAL CAROTID ARTERY STENOSIS: Chronic | ICD-10-CM

## 2023-07-26 DIAGNOSIS — N18.4 CKD (CHRONIC KIDNEY DISEASE), STAGE IV: Chronic | ICD-10-CM

## 2023-07-26 DIAGNOSIS — I50.32 CHRONIC DIASTOLIC HEART FAILURE: Chronic | ICD-10-CM

## 2023-07-26 DIAGNOSIS — R12 HEARTBURN: ICD-10-CM

## 2023-07-26 PROCEDURE — 1157F ADVNC CARE PLAN IN RCRD: CPT | Mod: HCNC,CPTII,S$GLB, | Performed by: INTERNAL MEDICINE

## 2023-07-26 PROCEDURE — 1157F PR ADVANCE CARE PLAN OR EQUIV PRESENT IN MEDICAL RECORD: ICD-10-PCS | Mod: HCNC,CPTII,S$GLB, | Performed by: INTERNAL MEDICINE

## 2023-07-26 PROCEDURE — 1159F PR MEDICATION LIST DOCUMENTED IN MEDICAL RECORD: ICD-10-PCS | Mod: HCNC,CPTII,S$GLB, | Performed by: INTERNAL MEDICINE

## 2023-07-26 PROCEDURE — 1126F AMNT PAIN NOTED NONE PRSNT: CPT | Mod: HCNC,CPTII,S$GLB, | Performed by: INTERNAL MEDICINE

## 2023-07-26 PROCEDURE — 1126F PR PAIN SEVERITY QUANTIFIED, NO PAIN PRESENT: ICD-10-PCS | Mod: HCNC,CPTII,S$GLB, | Performed by: INTERNAL MEDICINE

## 2023-07-26 PROCEDURE — 1160F PR REVIEW ALL MEDS BY PRESCRIBER/CLIN PHARMACIST DOCUMENTED: ICD-10-PCS | Mod: HCNC,CPTII,S$GLB, | Performed by: INTERNAL MEDICINE

## 2023-07-26 PROCEDURE — 1160F RVW MEDS BY RX/DR IN RCRD: CPT | Mod: HCNC,CPTII,S$GLB, | Performed by: INTERNAL MEDICINE

## 2023-07-26 PROCEDURE — 99214 OFFICE O/P EST MOD 30 MIN: CPT | Mod: HCNC,S$GLB,, | Performed by: INTERNAL MEDICINE

## 2023-07-26 PROCEDURE — 99999 PR PBB SHADOW E&M-EST. PATIENT-LVL V: CPT | Mod: PBBFAC,HCNC,, | Performed by: INTERNAL MEDICINE

## 2023-07-26 PROCEDURE — 1159F MED LIST DOCD IN RCRD: CPT | Mod: HCNC,CPTII,S$GLB, | Performed by: INTERNAL MEDICINE

## 2023-07-26 PROCEDURE — 99214 PR OFFICE/OUTPT VISIT, EST, LEVL IV, 30-39 MIN: ICD-10-PCS | Mod: HCNC,S$GLB,, | Performed by: INTERNAL MEDICINE

## 2023-07-26 PROCEDURE — 99999 PR PBB SHADOW E&M-EST. PATIENT-LVL V: ICD-10-PCS | Mod: PBBFAC,HCNC,, | Performed by: INTERNAL MEDICINE

## 2023-07-26 RX ORDER — SPIRONOLACTONE 25 MG/1
25 TABLET ORAL DAILY
Qty: 30 TABLET | Refills: 11 | Status: SHIPPED | OUTPATIENT
Start: 2023-07-26 | End: 2024-01-11

## 2023-07-26 RX ORDER — OMEPRAZOLE 40 MG/1
40 CAPSULE, DELAYED RELEASE ORAL DAILY
Qty: 90 CAPSULE | Refills: 3 | Status: ON HOLD | OUTPATIENT
Start: 2023-07-26 | End: 2024-04-01

## 2023-07-26 NOTE — PROGRESS NOTES
Subjective:       Patient ID: Nicolasa Jurado is a 89 y.o. female.    Chief Complaint: Medication Problem (Omeprazole)    HPI    89-year-old female here for follow-up.    HTN - Patient is currently on Norvasc 10 mg, HCTZ 25 mg, Toprol- mg, Micardis 80 mg. She does check her BP at home, and it runs 122/76 this am - usually 140s in the evenings.  Mornings are 120s-140s.  She saw 240 the other day - this was the first time. Side effects of medications note: none. Denies headaches, blurred vision, chest pain, shortness of breath, nausea.    HLD - Patient is currently on lipitor 80 mg.  Her last lipid panel was   Cholesterol   Date Value Ref Range Status   11/07/2022 158 120 - 199 mg/dL Final     Comment:     The National Cholesterol Education Program (NCEP) has set the  following guidelines (reference ranges) for Cholesterol:  Optimal.....................<200 mg/dL  Borderline High.............200-239 mg/dL  High........................> or = 240 mg/dL       Triglycerides   Date Value Ref Range Status   11/07/2022 167 (H) 30 - 150 mg/dL Final     Comment:     The National Cholesterol Education Program (NCEP) has set the  following guidelines (reference values) for triglycerides:  Normal......................<150 mg/dL  Borderline High.............150-199 mg/dL  High........................200-499 mg/dL       HDL   Date Value Ref Range Status   11/07/2022 45 40 - 75 mg/dL Final     Comment:     The National Cholesterol Education Program (NCEP) has set the  following guidelines (reference values) for HDL Cholesterol:  Low...............<40 mg/dL  Optimal...........>60 mg/dL       LDL Cholesterol   Date Value Ref Range Status   11/07/2022 79.6 63.0 - 159.0 mg/dL Final     Comment:     The National Cholesterol Education Program (NCEP) has set the  following guidelines (reference values) for LDL Cholesterol:  Optimal.......................<130 mg/dL  Borderline High...............130-159  mg/dL  High..........................160-189 mg/dL  Very High.....................>190 mg/dL     .  Side effects of the medication: none.    Patient has heart failure and is on no current medication.    Patient has carotid artery stenosis, atherosclerosis of the aorta and is on Lipitor 80 mg.    Review of Systems      Objective:      Physical Exam  Vitals reviewed.   Constitutional:       Appearance: She is well-developed.   HENT:      Head: Normocephalic and atraumatic.      Mouth/Throat:      Pharynx: No oropharyngeal exudate.   Eyes:      General: No scleral icterus.        Right eye: No discharge.         Left eye: No discharge.      Pupils: Pupils are equal, round, and reactive to light.   Neck:      Thyroid: No thyromegaly.      Trachea: No tracheal deviation.   Cardiovascular:      Rate and Rhythm: Normal rate and regular rhythm.      Heart sounds: Normal heart sounds. No murmur heard.    No friction rub. No gallop.   Pulmonary:      Effort: Pulmonary effort is normal. No respiratory distress.      Breath sounds: Normal breath sounds. No wheezing or rales.   Chest:      Chest wall: No tenderness.   Abdominal:      General: Bowel sounds are normal. There is no distension.      Palpations: Abdomen is soft. There is no mass.      Tenderness: There is no abdominal tenderness. There is no guarding or rebound.   Musculoskeletal:         General: No tenderness. Normal range of motion.      Cervical back: Normal range of motion and neck supple.   Skin:     General: Skin is warm and dry.      Coloration: Skin is not pale.      Findings: No erythema or rash.   Neurological:      Mental Status: She is alert and oriented to person, place, and time.   Psychiatric:         Behavior: Behavior normal.       Assessment:       1. Essential hypertension    2. Pure hypercholesterolemia    3. Chronic diastolic heart failure    4. Bilateral carotid artery stenosis    5. Atherosclerosis of aorta    6. CKD (chronic kidney disease),  stage IV    7. Heartburn  - omeprazole (PRILOSEC) 40 MG capsule; Take 1 capsule (40 mg total) by mouth once daily. For acid heartburn  Dispense: 90 capsule; Refill: 3    8. Gastroesophageal reflux disease, unspecified whether esophagitis present      Plan:       1. Continue Norvasc 10 mg, HCTZ 25 mg, Toprol- mg, Micardis 80 mg.  Start Aldactone 25 mg.  Send blood pressure readings in 2 weeks.  Return to clinic in a month reassess.  2. Continue Lipitor 80 mg.    3. Continue to monitor.    4/5.  Continue Lipitor 80 mg.  6.  Monitor  7/8.  Prilosec 40 mg.

## 2023-08-21 RX ORDER — GABAPENTIN 100 MG/1
CAPSULE ORAL
Qty: 180 CAPSULE | Refills: 0 | Status: SHIPPED | OUTPATIENT
Start: 2023-08-21 | End: 2023-08-28

## 2023-08-21 NOTE — TELEPHONE ENCOUNTER
Care Due:                  Date            Visit Type   Department     Provider  --------------------------------------------------------------------------------                                EP -                              PRIMARY      [facility] INTERNAL  Last Visit: 07-      CARE ([facility])   RAMEZ Gaston                              EP -                              PRIMARY      [facility] INTERNAL  Next Visit: 09-      CARE ([facility])   RAMEZ Gaston                                                            Last  Test          Frequency    Reason                     Performed    Due Date  --------------------------------------------------------------------------------    CMP.........  12 months..  spironolactone...........  11- 11-    [facility] Embedded Care Due Messages. Reference number: [id number].   8/21/2023 1:27:45 PM CDT

## 2023-08-21 NOTE — TELEPHONE ENCOUNTER
Refill Routing Note   Medication(s) are not appropriate for processing by [facility] for the following reason(s):      Medication outside of protocol    [facility] action(s):  Route Care Due:  Labs due            Appointments  past 12m or future 3m with PCP    Date Provider   Last Visit   7/26/2023 [place] Gaston MD   Next Visit   9/8/2023 [place] Gaston MD   ED visits in past 90 days: 0        Note composed:1:46 PM 08/21/2023

## 2023-08-23 ENCOUNTER — TELEPHONE (OUTPATIENT)
Dept: INTERNAL MEDICINE | Facility: CLINIC | Age: 88
End: 2023-08-23
Payer: MEDICARE

## 2023-08-23 DIAGNOSIS — N18.30 CHRONIC KIDNEY DISEASE, STAGE III (MODERATE): ICD-10-CM

## 2023-08-23 RX ORDER — CINACALCET 30 MG/1
TABLET, FILM COATED ORAL
Qty: 180 TABLET | Refills: 3 | Status: SHIPPED | OUTPATIENT
Start: 2023-08-23 | End: 2023-08-26

## 2023-08-23 NOTE — TELEPHONE ENCOUNTER
Message sent to pt via portal Received blood pressure readings from patient.  Blood pressures range between 104/52 - 161/72.  Blood pressures are 120s to 130s over 60s to 70s.  No need to change medication

## 2023-08-23 NOTE — TELEPHONE ENCOUNTER
Refill Routing Note   Medication(s) are not appropriate for processing by [facility] for the following reason(s):      Medication outside of protocol: non-delegated    [facility] action(s):  Route Care Due:  None identified          Appointments  past 12m or future 3m with PCP    Date Provider   Last Visit   7/26/2023 [place] Gaston MD   Next Visit   8/28/2023 [place] Gaston MD   ED visits in past 90 days: 0        Note composed:4:34 PM 08/23/2023

## 2023-08-23 NOTE — TELEPHONE ENCOUNTER
Received blood pressure readings from patient.  Blood pressures range between 104/52 - 161/72.  Blood pressures are 120s to 130s over 60s to 70s.  No need to change medication.

## 2023-08-26 DIAGNOSIS — N18.30 CHRONIC KIDNEY DISEASE, STAGE III (MODERATE): ICD-10-CM

## 2023-08-26 RX ORDER — CINACALCET 30 MG/1
TABLET, FILM COATED ORAL
Qty: 180 TABLET | Refills: 3 | Status: SHIPPED | OUTPATIENT
Start: 2023-08-26

## 2023-08-28 ENCOUNTER — OFFICE VISIT (OUTPATIENT)
Dept: INTERNAL MEDICINE | Facility: CLINIC | Age: 88
End: 2023-08-28
Payer: MEDICARE

## 2023-08-28 VITALS
TEMPERATURE: 96 F | HEART RATE: 62 BPM | OXYGEN SATURATION: 94 % | HEIGHT: 60 IN | DIASTOLIC BLOOD PRESSURE: 64 MMHG | BODY MASS INDEX: 26.05 KG/M2 | WEIGHT: 132.69 LBS | SYSTOLIC BLOOD PRESSURE: 130 MMHG

## 2023-08-28 DIAGNOSIS — K21.9 GASTROESOPHAGEAL REFLUX DISEASE, UNSPECIFIED WHETHER ESOPHAGITIS PRESENT: ICD-10-CM

## 2023-08-28 DIAGNOSIS — E78.00 PURE HYPERCHOLESTEROLEMIA: Chronic | ICD-10-CM

## 2023-08-28 DIAGNOSIS — M54.16 LUMBAR RADICULOPATHY: ICD-10-CM

## 2023-08-28 DIAGNOSIS — M25.512 CHRONIC PAIN OF BOTH SHOULDERS: ICD-10-CM

## 2023-08-28 DIAGNOSIS — G89.29 CHRONIC PAIN OF BOTH SHOULDERS: ICD-10-CM

## 2023-08-28 DIAGNOSIS — M25.511 CHRONIC PAIN OF BOTH SHOULDERS: ICD-10-CM

## 2023-08-28 DIAGNOSIS — I10 ESSENTIAL HYPERTENSION: Primary | Chronic | ICD-10-CM

## 2023-08-28 PROCEDURE — 1101F PR PT FALLS ASSESS DOC 0-1 FALLS W/OUT INJ PAST YR: ICD-10-PCS | Mod: HCNC,CPTII,S$GLB, | Performed by: INTERNAL MEDICINE

## 2023-08-28 PROCEDURE — 1126F AMNT PAIN NOTED NONE PRSNT: CPT | Mod: HCNC,CPTII,S$GLB, | Performed by: INTERNAL MEDICINE

## 2023-08-28 PROCEDURE — 3288F PR FALLS RISK ASSESSMENT DOCUMENTED: ICD-10-PCS | Mod: HCNC,CPTII,S$GLB, | Performed by: INTERNAL MEDICINE

## 2023-08-28 PROCEDURE — 99999 PR PBB SHADOW E&M-EST. PATIENT-LVL V: ICD-10-PCS | Mod: PBBFAC,HCNC,, | Performed by: INTERNAL MEDICINE

## 2023-08-28 PROCEDURE — 99214 OFFICE O/P EST MOD 30 MIN: CPT | Mod: HCNC,S$GLB,, | Performed by: INTERNAL MEDICINE

## 2023-08-28 PROCEDURE — 1159F PR MEDICATION LIST DOCUMENTED IN MEDICAL RECORD: ICD-10-PCS | Mod: HCNC,CPTII,S$GLB, | Performed by: INTERNAL MEDICINE

## 2023-08-28 PROCEDURE — 1157F ADVNC CARE PLAN IN RCRD: CPT | Mod: HCNC,CPTII,S$GLB, | Performed by: INTERNAL MEDICINE

## 2023-08-28 PROCEDURE — 1126F PR PAIN SEVERITY QUANTIFIED, NO PAIN PRESENT: ICD-10-PCS | Mod: HCNC,CPTII,S$GLB, | Performed by: INTERNAL MEDICINE

## 2023-08-28 PROCEDURE — 1101F PT FALLS ASSESS-DOCD LE1/YR: CPT | Mod: HCNC,CPTII,S$GLB, | Performed by: INTERNAL MEDICINE

## 2023-08-28 PROCEDURE — 3288F FALL RISK ASSESSMENT DOCD: CPT | Mod: HCNC,CPTII,S$GLB, | Performed by: INTERNAL MEDICINE

## 2023-08-28 PROCEDURE — 1157F PR ADVANCE CARE PLAN OR EQUIV PRESENT IN MEDICAL RECORD: ICD-10-PCS | Mod: HCNC,CPTII,S$GLB, | Performed by: INTERNAL MEDICINE

## 2023-08-28 PROCEDURE — 99214 PR OFFICE/OUTPT VISIT, EST, LEVL IV, 30-39 MIN: ICD-10-PCS | Mod: HCNC,S$GLB,, | Performed by: INTERNAL MEDICINE

## 2023-08-28 PROCEDURE — 1160F RVW MEDS BY RX/DR IN RCRD: CPT | Mod: HCNC,CPTII,S$GLB, | Performed by: INTERNAL MEDICINE

## 2023-08-28 PROCEDURE — 1159F MED LIST DOCD IN RCRD: CPT | Mod: HCNC,CPTII,S$GLB, | Performed by: INTERNAL MEDICINE

## 2023-08-28 PROCEDURE — 1160F PR REVIEW ALL MEDS BY PRESCRIBER/CLIN PHARMACIST DOCUMENTED: ICD-10-PCS | Mod: HCNC,CPTII,S$GLB, | Performed by: INTERNAL MEDICINE

## 2023-08-28 PROCEDURE — 99999 PR PBB SHADOW E&M-EST. PATIENT-LVL V: CPT | Mod: PBBFAC,HCNC,, | Performed by: INTERNAL MEDICINE

## 2023-08-28 RX ORDER — GABAPENTIN 400 MG/1
400 CAPSULE ORAL NIGHTLY
Qty: 30 CAPSULE | Refills: 0 | Status: SHIPPED | OUTPATIENT
Start: 2023-08-28 | End: 2023-11-09

## 2023-08-28 NOTE — PROGRESS NOTES
Subjective:       Patient ID: Nicolasa Jurado is a 89 y.o. female.    Chief Complaint: Follow-up (1 Mos Follow up)    HPI    89-year-old female here for follow-up.    HTN - Patient is currently on amlodipine 10 mg (3pm), HCTZ 25 mg (am), Micardis 80 mg (am), Toprol- mg (pm). She does check her BP at home, and it runs 92/53 - 139/64. Side effects of medications note: none. Denies headaches, blurred vision, chest pain, shortness of breath, nausea.  She checks her BP, then takes her medication.    HLD - Patient is currently on Lipitor 80 mg.  Her last lipid panel was   Cholesterol   Date Value Ref Range Status   11/07/2022 158 120 - 199 mg/dL Final     Comment:     The National Cholesterol Education Program (NCEP) has set the  following guidelines (reference ranges) for Cholesterol:  Optimal.....................<200 mg/dL  Borderline High.............200-239 mg/dL  High........................> or = 240 mg/dL       Triglycerides   Date Value Ref Range Status   11/07/2022 167 (H) 30 - 150 mg/dL Final     Comment:     The National Cholesterol Education Program (NCEP) has set the  following guidelines (reference values) for triglycerides:  Normal......................<150 mg/dL  Borderline High.............150-199 mg/dL  High........................200-499 mg/dL       HDL   Date Value Ref Range Status   11/07/2022 45 40 - 75 mg/dL Final     Comment:     The National Cholesterol Education Program (NCEP) has set the  following guidelines (reference values) for HDL Cholesterol:  Low...............<40 mg/dL  Optimal...........>60 mg/dL       LDL Cholesterol   Date Value Ref Range Status   11/07/2022 79.6 63.0 - 159.0 mg/dL Final     Comment:     The National Cholesterol Education Program (NCEP) has set the  following guidelines (reference values) for LDL Cholesterol:  Optimal.......................<130 mg/dL  Borderline High...............130-159 mg/dL  High..........................160-189 mg/dL  Very  High.....................>190 mg/dL     .  Side effects of the medication: none.    She is on prilosec 20 mg for her stomach. She throws up when she takes her pm pills.  She took one OTC prilosec 4 days ago and her stomach is ok.  She gets sick with the prilosec 40 mg.  She drinks her coffee and vomits.    She had a sensation go up her legs to her midsection that caused her to shake really badly.  It was not pain.  It felt like a nerve maybe.  She wanted to scream, because her leg got so hot. S he has a fire in her legs from her knee to her feet.  She has tried cold water on this.    She has pain in both of her shoulders.  Her bones got hurt.  She has a terrible pain that comes and goes and went to PT after her fall.  She did not get improvement in her shoulders from this.    Review of Systems      Objective:      Physical Exam  Vitals reviewed.   Constitutional:       Appearance: She is well-developed.   HENT:      Head: Normocephalic and atraumatic.      Mouth/Throat:      Pharynx: No oropharyngeal exudate.   Eyes:      General: No scleral icterus.        Right eye: No discharge.         Left eye: No discharge.      Pupils: Pupils are equal, round, and reactive to light.   Neck:      Thyroid: No thyromegaly.      Trachea: No tracheal deviation.   Cardiovascular:      Rate and Rhythm: Normal rate and regular rhythm.      Heart sounds: Normal heart sounds. No murmur heard.     No friction rub. No gallop.   Pulmonary:      Effort: Pulmonary effort is normal. No respiratory distress.      Breath sounds: Normal breath sounds. No wheezing or rales.   Chest:      Chest wall: No tenderness.   Abdominal:      General: Bowel sounds are normal. There is no distension.      Palpations: Abdomen is soft. There is no mass.      Tenderness: There is no abdominal tenderness. There is no guarding or rebound.   Musculoskeletal:         General: No tenderness. Normal range of motion.      Cervical back: Normal range of motion and  neck supple.   Skin:     General: Skin is warm and dry.      Coloration: Skin is not pale.      Findings: No erythema or rash.   Neurological:      Mental Status: She is alert and oriented to person, place, and time.   Psychiatric:         Behavior: Behavior normal.         Assessment:       1. Essential hypertension    2. Pure hypercholesterolemia    3. Gastroesophageal reflux disease, unspecified whether esophagitis present    4. Lumbar radiculopathy  - Ambulatory referral/consult to [facility]; Future    5. Chronic pain of both shoulders  - Ambulatory referral/consult to Orthopedics; Future      Plan:       1. Continue Toprol- mg, HCTZ 25 mg, Micardis 80 mg p.o.  2. Continue Lipitor 80 mg p.o.  3.  Continue Prilosec 40 mg p.o..  If no from changing how she is taking the medication in relation to her coffee, refer to GI.  May need scope   4. Refer to healthy back program.    6.  Refer to orthopedics

## 2023-08-30 DIAGNOSIS — M25.512 BILATERAL SHOULDER PAIN, UNSPECIFIED CHRONICITY: Primary | ICD-10-CM

## 2023-08-30 DIAGNOSIS — M25.511 BILATERAL SHOULDER PAIN, UNSPECIFIED CHRONICITY: Primary | ICD-10-CM

## 2023-09-25 ENCOUNTER — HOSPITAL ENCOUNTER (OUTPATIENT)
Dept: RADIOLOGY | Facility: HOSPITAL | Age: 88
Discharge: HOME | End: 2023-09-25
Attending: STUDENT IN AN ORGANIZED HEALTH CARE EDUCATION/TRAINING PROGRAM
Payer: MEDICARE

## 2023-09-25 ENCOUNTER — OFFICE VISIT (OUTPATIENT)
Dept: SPORTS MEDICINE | Facility: CLINIC | Age: 88
End: 2023-09-25
Payer: MEDICARE

## 2023-09-25 VITALS — HEART RATE: 69 BPM | DIASTOLIC BLOOD PRESSURE: 88 MMHG | SYSTOLIC BLOOD PRESSURE: 169 MMHG

## 2023-09-25 DIAGNOSIS — M75.41 SUBACROMIAL IMPINGEMENT, RIGHT: ICD-10-CM

## 2023-09-25 DIAGNOSIS — M25.511 CHRONIC PAIN OF BOTH SHOULDERS: Primary | ICD-10-CM

## 2023-09-25 DIAGNOSIS — M25.511 BILATERAL SHOULDER PAIN, UNSPECIFIED CHRONICITY: ICD-10-CM

## 2023-09-25 DIAGNOSIS — G89.29 CHRONIC PAIN OF BOTH SHOULDERS: Primary | ICD-10-CM

## 2023-09-25 DIAGNOSIS — M75.42 SUBACROMIAL IMPINGEMENT, LEFT: ICD-10-CM

## 2023-09-25 DIAGNOSIS — M25.512 BILATERAL SHOULDER PAIN, UNSPECIFIED CHRONICITY: ICD-10-CM

## 2023-09-25 DIAGNOSIS — R03.0 ELEVATED BLOOD PRESSURE READING: ICD-10-CM

## 2023-09-25 DIAGNOSIS — M25.512 CHRONIC PAIN OF BOTH SHOULDERS: Primary | ICD-10-CM

## 2023-09-25 PROCEDURE — 1160F PR REVIEW ALL MEDS BY PRESCRIBER/CLIN PHARMACIST DOCUMENTED: ICD-10-PCS | Mod: HCNC,CPTII,S$GLB, | Performed by: STUDENT IN AN ORGANIZED HEALTH CARE EDUCATION/TRAINING PROGRAM

## 2023-09-25 PROCEDURE — 1157F ADVNC CARE PLAN IN RCRD: CPT | Mod: HCNC,CPTII,S$GLB, | Performed by: STUDENT IN AN ORGANIZED HEALTH CARE EDUCATION/TRAINING PROGRAM

## 2023-09-25 PROCEDURE — 1126F AMNT PAIN NOTED NONE PRSNT: CPT | Mod: HCNC,CPTII,S$GLB, | Performed by: STUDENT IN AN ORGANIZED HEALTH CARE EDUCATION/TRAINING PROGRAM

## 2023-09-25 PROCEDURE — 1160F RVW MEDS BY RX/DR IN RCRD: CPT | Mod: HCNC,CPTII,S$GLB, | Performed by: STUDENT IN AN ORGANIZED HEALTH CARE EDUCATION/TRAINING PROGRAM

## 2023-09-25 PROCEDURE — 73030 XR SHOULDER COMPLETE 2 OR MORE VIEWS BILATERAL: ICD-10-PCS | Mod: 26,50,HCNC, | Performed by: RADIOLOGY

## 2023-09-25 PROCEDURE — 99214 OFFICE O/P EST MOD 30 MIN: CPT | Mod: HCNC,S$GLB,, | Performed by: STUDENT IN AN ORGANIZED HEALTH CARE EDUCATION/TRAINING PROGRAM

## 2023-09-25 PROCEDURE — 3288F FALL RISK ASSESSMENT DOCD: CPT | Mod: HCNC,CPTII,S$GLB, | Performed by: STUDENT IN AN ORGANIZED HEALTH CARE EDUCATION/TRAINING PROGRAM

## 2023-09-25 PROCEDURE — 73030 X-RAY EXAM OF SHOULDER: CPT | Mod: 26,50,HCNC, | Performed by: RADIOLOGY

## 2023-09-25 PROCEDURE — 99999 PR PBB SHADOW E&M-EST. PATIENT-LVL V: ICD-10-PCS | Mod: PBBFAC,HCNC,, | Performed by: STUDENT IN AN ORGANIZED HEALTH CARE EDUCATION/TRAINING PROGRAM

## 2023-09-25 PROCEDURE — 3288F PR FALLS RISK ASSESSMENT DOCUMENTED: ICD-10-PCS | Mod: HCNC,CPTII,S$GLB, | Performed by: STUDENT IN AN ORGANIZED HEALTH CARE EDUCATION/TRAINING PROGRAM

## 2023-09-25 PROCEDURE — 1157F PR ADVANCE CARE PLAN OR EQUIV PRESENT IN MEDICAL RECORD: ICD-10-PCS | Mod: HCNC,CPTII,S$GLB, | Performed by: STUDENT IN AN ORGANIZED HEALTH CARE EDUCATION/TRAINING PROGRAM

## 2023-09-25 PROCEDURE — 1101F PR PT FALLS ASSESS DOC 0-1 FALLS W/OUT INJ PAST YR: ICD-10-PCS | Mod: HCNC,CPTII,S$GLB, | Performed by: STUDENT IN AN ORGANIZED HEALTH CARE EDUCATION/TRAINING PROGRAM

## 2023-09-25 PROCEDURE — 1159F MED LIST DOCD IN RCRD: CPT | Mod: HCNC,CPTII,S$GLB, | Performed by: STUDENT IN AN ORGANIZED HEALTH CARE EDUCATION/TRAINING PROGRAM

## 2023-09-25 PROCEDURE — 1101F PT FALLS ASSESS-DOCD LE1/YR: CPT | Mod: HCNC,CPTII,S$GLB, | Performed by: STUDENT IN AN ORGANIZED HEALTH CARE EDUCATION/TRAINING PROGRAM

## 2023-09-25 PROCEDURE — 73030 X-RAY EXAM OF SHOULDER: CPT | Mod: TC,50,HCNC,PO

## 2023-09-25 PROCEDURE — 99999 PR PBB SHADOW E&M-EST. PATIENT-LVL V: CPT | Mod: PBBFAC,HCNC,, | Performed by: STUDENT IN AN ORGANIZED HEALTH CARE EDUCATION/TRAINING PROGRAM

## 2023-09-25 PROCEDURE — 1159F PR MEDICATION LIST DOCUMENTED IN MEDICAL RECORD: ICD-10-PCS | Mod: HCNC,CPTII,S$GLB, | Performed by: STUDENT IN AN ORGANIZED HEALTH CARE EDUCATION/TRAINING PROGRAM

## 2023-09-25 PROCEDURE — 1126F PR PAIN SEVERITY QUANTIFIED, NO PAIN PRESENT: ICD-10-PCS | Mod: HCNC,CPTII,S$GLB, | Performed by: STUDENT IN AN ORGANIZED HEALTH CARE EDUCATION/TRAINING PROGRAM

## 2023-09-25 PROCEDURE — 99214 PR OFFICE/OUTPT VISIT, EST, LEVL IV, 30-39 MIN: ICD-10-PCS | Mod: HCNC,S$GLB,, | Performed by: STUDENT IN AN ORGANIZED HEALTH CARE EDUCATION/TRAINING PROGRAM

## 2023-09-25 NOTE — PROCEDURES
Large Joint Aspiration/Injection: bilateral subacromial bursa    Date/Time: 9/25/2023 1:30 PM    Performed by: Terri Adam MD  Authorized by: Terri Adam MD    Consent Done?:  Yes (Verbal)  Indications:  Pain  Site marked: the procedure site was marked    Timeout: prior to procedure the correct patient, procedure, and site was verified    Prep: patient was prepped and draped in usual sterile fashion      Local anesthesia used?: Yes    Local anesthetic:  Co-phenylcaine spray    Details:  Needle Size:  21 G  Ultrasonic Guidance for needle placement?: Yes (Ultrasound guidance used to avoid neurovascular injury.)    Images are saved and documented.  Approach:  Lateral  Location:  Shoulder  Laterality:  Bilateral  Site:  Bilateral subacromial bursa  Medications (Right):  40 mg triamcinolone acetonide 40 mg/mL  Medications (Right) comment:  Ropivacaine 0.2% 2mL  Medications (Left):  40 mg triamcinolone acetonide 40 mg/mL  Medications (Left) comment:  Ropivacaine 0.2% 2mL  Patient tolerance:  Patient tolerated the procedure well with no immediate complications     TECHNIQUE: Real time ultrasound examination of the bilateral subacromial bursa(e) was performed with [employer] Edge 2, 9-L MHz linear probe(s). Ultrasound guidance was used for needle localization. Images were saved and stored for documentation.  Dynamic visualization of the needle was continuous throughout the procedures and maintained in good position.

## 2023-09-25 NOTE — PROGRESS NOTES
CC: bilateral shoulder pain    89 y.o. Female presents today for evaluation of her bilateral shoulder pain. Pt reports she has had multiple falls over the past year involving hitting both shoulders.Pt localizes pain to superior shoulder with movement. Pt reports pain is 8/10 with activity today. Pt reports cracking in her shoulders. Pt reports pain is better with using heat at night. Pt notes numbness/tingling into left arm and into 4th/5th fingers. Pt denies prev hx of neck injuries.    Hand dominance: Right     SYMPTOMS:   Pain Score: 8/10  Pain location: superior  Time of onset: ~1 year  Trauma, injury: multiple falls    Nocturnal pain: yes  Weakness: yes  Clicking, catching:   Neck problems:     INTERVENTIONS:   Medications tried: heat, chinese topical cream  Physical therapy: fPT Jan '23 (relief after appt, then pain would return)  Injections: none    RELEVANT HISTORY:   Imaging to date: 2/3/23, 9/25/23  Previous significant shoulder/arm injuries: none  Previous shoulder surgeries: none     REVIEW OF SYSTEMS:   Constitution: Patient denies fever or chills.  Eyes: Patient denies eye pain or vision changes.  HEENT: Patient denies ear pain, sore throat, or nasal discharge.  CVS: Patient denies chest pain.  Lungs: Patient denies shortness of breath or cough.  Abdomen: Patient denies any stomach pain, nausea, vomiting, or diarrhea  Skin: Patient reports itching on head after taking daily pill.   Musculoskeletal: Patient denies recent injuries or trauma.  Neuro: Patient reports numbness/tingling in upper and lower extremities.  Psych: Patient denies any current anxiety or nervousness.    PAST MEDICAL HISTORY:   Past Medical History:   Diagnosis Date    Anemia     Asthma, chronic     Bilateral carotid artery stenosis 10/11/2022    CAD (coronary artery disease)     Elevated glucose 2/6/2015    GERD (gastroesophageal reflux disease)     protonix 40    Hypercalcemia 10/16/2012    Hyperlipidemia     Hyperparathyroidism      Hypertension     Insomnia 2016    Renal artery stenosis     Right low back pain 2016       PAST SURGICAL HISTORY:  Past Surgical History:   Procedure Laterality Date    APPENDECTOMY      CHOLECYSTECTOMY      COLONOSCOPY  2006    CORONARY ANGIOPLASTY WITH STENT PLACEMENT      1 heart/1 kidney    HYSTERECTOMY      KIDNEY SURGERY      stent in renal artery       FAMILY HISTORY:  Family History   Problem Relation Age of Onset    Splenomegaly Daughter           from ruptured spleen    Miscarriages / Stillbirths Mother     Liver disease Father     No Known Problems Sister     Hypertension Son     Hypercalcemia Neg Hx     Thyroid cancer Neg Hx        SOCIAL HISTORY:  Social History     Socioeconomic History    Marital status:    Tobacco Use    Smoking status: Never     Passive exposure: Never    Smokeless tobacco: Never   Substance and Sexual Activity    Alcohol use: No    Drug use: Never    Sexual activity: Never   Social History Narrative    Retired. Daughter       Social Determinants of Health     Financial Resource Strain: Low Risk  (2023)    Overall Financial Resource Strain (CARDIA)     Difficulty of Paying Living Expenses: Not hard at all   Food Insecurity: No Food Insecurity (2023)    Hunger Vital Sign     Worried About Running Out of Food in the Last Year: Never true     Ran Out of Food in the Last Year: Never true   Transportation Needs: No Transportation Needs (2023)    PRAPARE - Transportation     Lack of Transportation (Medical): No     Lack of Transportation (Non-Medical): No   Physical Activity: Inactive (2023)    Exercise Vital Sign     Days of Exercise per Week: 0 days     Minutes of Exercise per Session: 0 min   Stress: No Stress Concern Present (2023)    [place] [facility] [employer] [facility] - Occupational Stress Questionnaire     Feeling of Stress : Only a little   Recent Concern: Stress - Stress Concern Present (11/3/2022)    [place]  [facility] [employer] - Occupational Stress Questionnaire     Feeling of Stress : To some extent   Social Connections: Socially Isolated (1/5/2023)    Social Connection and Isolation Panel [NHANES]     Frequency of Communication with Friends and Family: Three times a week     Frequency of Social Gatherings with Friends and Family: Never     Attends Restorationist Services: Never     Active Member of Clubs or Organizations: No     Attends Club or Organization Meetings: Never     Marital Status:    Housing Stability: Low Risk  (1/5/2023)    Housing Stability Vital Sign     Unable to Pay for Housing in the Last Year: No     Number of Places Lived in the Last Year: 1     Unstable Housing in the Last Year: No       MEDICATIONS:     Current Outpatient Medications:     albuterol (PROVENTIL/VENTOLIN HFA) 90 mcg/actuation inhaler, Inhale 2 puffs into the lungs every 6 (six) hours as needed for Wheezing., Disp: 18 g, Rfl: 6    aspirin 81 mg Tab, Take 81 mg by mouth every other day., Disp: , Rfl:     atorvastatin (LIPITOR) 80 MG tablet, TAKE 1 TABLET(80 MG) BY MOUTH EVERY DAY, Disp: 90 tablet, Rfl: 3    brimonidine 0.2% (ALPHAGAN) 0.2 % Drop, Place 1 drop into both eyes 3 (three) times daily. , Disp: , Rfl: 3    cinacalcet (SENSIPAR) 30 MG Tab, TAKE 2 TABLETS BY MOUTH EVERY DAY WITH BREAKFAST, Disp: 180 tablet, Rfl: 3    dorzolamide (TRUSOPT) 2 % ophthalmic solution, Place 1 drop into both eyes 3 (three) times daily. , Disp: , Rfl: 3    ergocalciferol, vitamin D2, (VITAMIN D ORAL), Take 2,000 Int'l Units by mouth once daily., Disp: , Rfl:     gabapentin (NEURONTIN) 400 MG capsule, Take 1 capsule (400 mg total) by mouth every evening., Disp: 30 capsule, Rfl: 0    hydroCHLOROthiazide (HYDRODIURIL) 25 MG tablet, Take 25 mg by mouth once daily., Disp: , Rfl:     latanoprost 0.005 % ophthalmic solution, Place 1 drop into both eyes every evening. , Disp: , Rfl:     levalbuterol (XOPENEX) 0.63 mg/3 mL nebulizer solution, TAKE 3  MILLILITERS BY NEBULIZATION EVERY 4 HOURS AS NEEDED FOR WHEEZING(RESCUE), Disp: 1350 mL, Rfl: 3    metoprolol succinate (TOPROL-XL) 100 MG 24 hr tablet, TAKE 1 TABLET(100 MG) BY MOUTH EVERY DAY, Disp: 90 tablet, Rfl: 3    nitroGLYCERIN (NITROSTAT) 0.4 MG SL tablet, 1 Tablet Sublingual  One tablet under tounge as needed for chest pain., Disp: 100 tablet, Rfl: 0    omeprazole (PRILOSEC) 40 MG capsule, Take 1 capsule (40 mg total) by mouth once daily. For acid heartburn, Disp: 90 capsule, Rfl: 3    pilocarpine HCL 2% (PILOCAR) 2 % ophthalmic solution, INT 1 GTT INTO OU QID, Disp: , Rfl: 3    spironolactone (ALDACTONE) 25 MG tablet, Take 1 tablet (25 mg total) by mouth once daily., Disp: 30 tablet, Rfl: 11    telmisartan (MICARDIS) 80 MG Tab, Take 1 tablet (80 mg total) by mouth once daily., Disp: 90 tablet, Rfl: 2    vit C-vit E-copper-zinc-lutein 226 mg-200 unit -5 mg-0.8 mg Cap, Take by mouth. 2 Capsule Oral Every day, Disp: , Rfl:     WIXELA INHUB 250-50 mcg/dose diskus inhaler, , Disp: , Rfl:     acyclovir 5% (ZOVIRAX) 5 % ointment, Use tid for buttock rash (Patient taking differently: as needed. Use tid for buttock rash), Disp: 15 g, Rfl: 6    diclofenac sodium (VOLTAREN) 1 % Gel, Apply 2 g topically 3 (three) times daily. (Patient not taking: Reported on 9/25/2023), Disp: 200 g, Rfl: 1    dorzolamide-timolol 2-0.5% (COSOPT) 22.3-6.8 mg/mL ophthalmic solution, 1 drop 2 (two) times daily., Disp: , Rfl:     fluocinonide (LIDEX) 0.05 % external solution, USE FOR SCALP AT BEDTIME AS NEEDED FOR PRURITUS (Patient not taking: Reported on 9/25/2023), Disp: 60 mL, Rfl: 6    fluocinonide (LIDEX) 0.05 % external solution, AAA scalp qday prn itching, scaling (Patient not taking: Reported on 9/25/2023), Disp: 60 mL, Rfl: 3    fluticasone propionate (FLONASE) 50 mcg/actuation nasal spray, 1 spray by Each Nare route daily as needed. , Disp: , Rfl: 0    HYDROcodone-acetaminophen (NORCO) 5-325 mg per tablet, Take 1 tablet by mouth  every 8 (eight) hours as needed for Pain. (Patient not taking: Reported on 2023), Disp: 21 tablet, Rfl: 0    ketoconazole (NIZORAL) 2 % shampoo, Wash hair with medicated shampoo at least 2x/week - let sit on scalp at least 5 minutes prior to rinsing (Patient not taking: Reported on 2023), Disp: 120 mL, Rfl: 6    ketorolac 0.5% (ACULAR) 0.5 % Drop, Place 1 drop into both eyes 3 (three) times daily., Disp: , Rfl:     methazoLAMIDE (NEPTAZANE) 25 mg tablet, Take 25 mg by mouth 3 (three) times daily. , Disp: , Rfl: 1    methocarbamoL (ROBAXIN) 500 MG Tab, Take 1 tablet (500 mg total) by mouth 3 (three) times daily. (Patient not taking: Reported on 2023), Disp: 60 tablet, Rfl: 1    prednisoLONE acetate (PRED FORTE) 1 % DrpS, , Disp: , Rfl:     RHOPRESSA 0.02 % ophthalmic solution, , Disp: , Rfl:     timolol maleate 0.5% (TIMOPTIC) 0.5 % Drop, Place 1 drop into both eyes 2 (two) times a day., Disp: , Rfl:     tiZANidine (ZANAFLEX) 2 MG tablet, TAKE 1 TABLET(2 MG) BY MOUTH EVERY NIGHT AS NEEDED (Patient not taking: Reported on 2023), Disp: 30 tablet, Rfl: 0    ALLERGIES:   Review of patient's allergies indicates:   Allergen Reactions    Venom-wasp     Penicillin      Other reaction(s): Rash    Amoxicillin-pot clavulanate      Other reaction(s): diarrea  Other reaction(s): Vomiting  Other reaction(s): diarrea    Tramadol Nausea And Vomiting      IMAGIN. Shoulder X-ray ordered due to right shoulder. 3 views taken 2/3/23.   2. X-ray images were reviewed personally by me and then directly with patient.  3. FINDINGS: AP, axillary and scapular Y radiographs of the right shoulder demonstrate no evidence of acute fracture or dislocation.  There is evidence for degenerative disease with glenohumeral joint space narrowing in AC joint hypertrophy.  Visualized right hemithorax is clear.    4. IMPRESSION:  Degenerative disease without evidence for acute injury    PHYSICAL EXAMINATION:  BP (!) 169/88   Pulse  69   Vitals signs and nursing note have been reviewed.    General: In no acute distress, well developed, well nourished, no diaphoresis  Eyes: EOM full and smooth, no eye redness or discharge  HEENT: normocephalic and atraumatic, neck supple, trachea midline, no nasal discharge  Cardiovascular: no LE edema  Lungs: respirations non-labored, no conversational dyspnea   Neuro: AAOx3, CN2-12 grossly intact  Skin: No rashes, warm and dry  Psychiatric: cooperative, pleasant, mood and affect appropriate for age    Bilateral Shoulder:  INSPECTION / PALPATION:  -Deformity   -Ecchymosis   -Atrophy   -Sulcus sign   -No TTP over anatomy including clavicle, scapular spine, ACJ, acromion, supraspinatus, infraspinatus, bicipital groove     ROM (* = with pain):    Flex to 180° vs 180° contra   Abduct to 160° vs 160° contra   Int rot to T10 vs T10 contra   Ext rot to 50° vs 50° contra  -Scapular dyskinesis     STRENGTH:    Scaption 4+/5   Flexion 4+/5   Ext rot 5/5   Int rot 5/5    OTHER:   +Painful arc   -Drop arm   -Int lag  -Ext lag  +Neer's   +Markham-Kieran   +Royal active compression   -Empty Can  +Full Can  -Speed's   -Yergason's  -Apprehension    NECK:   Grossly FROM & painless in neck flex, ext, B/L torsion, B/L lat flexion   -Spurling B/L    Hands NVI B/L      IMAGIN. Shoulder X-ray ordered due to left shoulder pain. 3 views taken today.   2. X-ray images were reviewed personally by me and then directly with patient.  3. FINDINGS: Mild DJD.  No fracture or dislocation.  No bone destruction identified.  Ill-defined soft tissue calcification noted above the humeral heads bilaterally more marked on the left side consistent with calcified tendinopathy.    4. IMPRESSION:  No acute osseous abnormalities appreciated.    ASSESSMENT:      ICD-10-CM ICD-9-CM   1. Chronic pain of both shoulders  M25.511 719.41    G89.29 338.29    M25.512          PLAN:  Based on patient history, physical exam findings, and imaging my  working diagnosis is subacromial impingement to the patient's bilateral shoulders.  I believe patient would benefit greatly from formal physical therapy, thus we will refer her to formal physical therapy.  Follow up in 6 weeks.    Future planning includes - formal PT    All questions were answered to the best of my ability and all concerns were addressed at this time.    Follow up in 6 week(s) for above, or sooner if needed.      This note is dictated using the M*Modal Fluency Direct word recognition program. There are word recognition mistakes that are occasionally missed on review.

## 2023-09-29 NOTE — TELEPHONE ENCOUNTER
----- Message from Chikis Barron sent at 7/20/2017  4:34 PM CDT -----  Contact: pt [phone]  Pt would like a call in regards to cinacalcet (SENSIPAR) 30 MG Tab medication she said when she take it 30 mins later she feel dizzy,faint and her heart beats fast pt want to know if she should like to stop taking the medication,please advise , Geri took the call   [facility] sent patient information to [facility]  via [employer] system for resumption of care.       09/29/23 [id number]   Post-Acute Status   Post-Acute Authorization Home Health   Home Health Status Referrals Sent   Patient choice form signed by patient/caregiver List with quality metrics by geographic area provided

## 2023-10-04 ENCOUNTER — CLINICAL SUPPORT (OUTPATIENT)
Dept: REHABILITATION | Facility: HOSPITAL | Age: 88
End: 2023-10-04
Payer: MEDICARE

## 2023-10-04 DIAGNOSIS — M75.42 SUBACROMIAL IMPINGEMENT, LEFT: ICD-10-CM

## 2023-10-04 DIAGNOSIS — G89.29 CHRONIC PAIN OF BOTH SHOULDERS: ICD-10-CM

## 2023-10-04 DIAGNOSIS — M25.511 CHRONIC PAIN OF BOTH SHOULDERS: ICD-10-CM

## 2023-10-04 DIAGNOSIS — M75.41 SUBACROMIAL IMPINGEMENT, RIGHT: ICD-10-CM

## 2023-10-04 DIAGNOSIS — Z78.9 DIFFICULTY WITH ACTIVITIES OF DAILY LIVING: ICD-10-CM

## 2023-10-04 DIAGNOSIS — M25.512 CHRONIC PAIN OF BOTH SHOULDERS: ICD-10-CM

## 2023-10-04 PROCEDURE — 97530 THERAPEUTIC ACTIVITIES: CPT | Mod: HCNC,PO

## 2023-10-04 PROCEDURE — 97165 OT EVAL LOW COMPLEX 30 MIN: CPT | Mod: HCNC,PO

## 2023-10-04 NOTE — PLAN OF CARE
[facility] initial Evaluation         Date: 10/4/2023  Name: Nicolasa Jurado  [facility] Number: [id number]    Therapy Diagnosis:   Encounter Diagnoses   Name Primary?    Chronic pain of both shoulders     Subacromial impingement, left     Subacromial impingement, right     Difficulty with activities of daily living      Physician: Terri Adam MD    Physician Orders: Evaluate and treat ; 2x/wk x 6 wks , Modalities prn  Medical Diagnosis:   M25.511,G89.29,M25.512 (ICD-10-CM) - Chronic pain of both shoulders   M75.42 (ICD-10-CM) - Subacromial impingement, left   M75.41 (ICD-10-CM) - Subacromial impingement, right     Evaluation Date: 10/4/2023  Insurance Authorization Expiration date : 9/25/2023 - 9/24/2024  Plan of Care Certification Period: 12/27/2023  Date of Return to MD: 11/6/2023    Imaging: X-ray ordered due to right shoulder. 3 views taken 2/3/23.   2. X-ray images were reviewed personally by me and then directly with patient.  3. FINDINGS: AP, axillary and scapular Y radiographs of the right shoulder demonstrate no evidence of acute fracture or dislocation.  There is evidence for degenerative disease with glenohumeral joint space narrowing in AC joint hypertrophy.  Visualized right hemithorax is clear.    4. IMPRESSION:  Degenerative disease without evidence for acute injury     Visit # / Visits authorized: 1 / 1  Time In:9:45  Time Out: 10:30  Total Billable Time: 45 minutes    Precautions:  Standard and Weightbearing      Subjective       Involved Side: Bilateral shoulder  Dominant Side: Right  Date of Onset: ~ a year  History of Current Condition/Mechanism of Injury:  Pt reports hx of shoulder pain and has had multiple falls over the past year involving hitting both shoulders. She visited an urgent care / ED where x-ray revealed P, axillary and scapular Y radiographs of the right shoulder demonstrate no evidence of acute fracture or dislocation.   There is evidence for degenerative disease with glenohumeral joint space narrowing in AC joint hypertrophy. Follow up visit at MD office on 11/6/2023. Pt presents to OT today for initial evaluation with main c/o of bilateral pain in both shoulders with localized pain to superior shoulder with movement. Pt reports cracking in her shoulders. Pt reports numbness/tingling into left arm and into ring/small fingers.     Surgical Procedure and Date: none    Date of onset:  right shoulder pain onset after falling onto right side twice in 2022.      History of current condition - Nicolasa reports: pt is not able to see out of her R eye and very little (about 2 ft) out of her L eye     Falls: fell onto left arm twice, once on her dresser once on her bathroom counter      Imaging, none:      Prior Therapy: no  Social History: Pt lives in a single story home with no steps. She lives with her family.   Occupation: not working  Prior Level of Function: up until her first fall she was independent in all ADL's  Current Level of Function: her grandson helps her with some dailty activities. Pt remains indepedendent in all ADL's     Pain:  Current 0/10, worst 8/10, best 0/10   Location: anterior/superior shoulders (right worse then left)  Description: pulling  Aggravating Factors: Trying to sleep or turning to her right   Easing Factors: heating pad and Tylenol, chinese cream      Patients goals: Return to previous level of function         Past Medical History/Physical Systems Review:   Nicolasa Jurado  has a past medical history of Anemia, Asthma, chronic, Bilateral carotid artery stenosis, CAD (coronary artery disease), Elevated glucose, GERD (gastroesophageal reflux disease), Hypercalcemia, Hyperlipidemia, Hyperparathyroidism, Hypertension, Insomnia, Renal artery stenosis, and Right low back pain.    Nicolasa Jurado  has a past surgical history that includes Colonoscopy (2006); Coronary angioplasty with stent; Hysterectomy;  Cholecystectomy; Kidney surgery; and Appendectomy.    Nicolasa has a current medication list which includes the following prescription(s): acyclovir 5%, albuterol, aspirin, atorvastatin, brimonidine 0.2%, cinacalcet, diclofenac sodium, dorzolamide, dorzolamide-timolol 2-0.5%, ergocalciferol (vitamin d2), fluocinonide, fluocinonide, fluticasone propionate, gabapentin, hydrochlorothiazide, hydrocodone-acetaminophen, ketoconazole, ketorolac 0.5%, latanoprost, levalbuterol, methazolamide, methocarbamol, metoprolol succinate, nitroglycerin, omeprazole, pilocarpine hcl 2%, prednisolone acetate, rhopressa, spironolactone, telmisartan, timolol maleate 0.5%, tizanidine, vit c-e-cupric-zinc-lutein, and wixela inhub.    Review of patient's allergies indicates:   Allergen Reactions    Venom-wasp     Penicillin      Other reaction(s): Rash    Amoxicillin-pot clavulanate      Other reaction(s): diarrea  Other reaction(s): Vomiting  Other reaction(s): diarrea    Tramadol Nausea And Vomiting      Patient's Goals for Therapy:  to increase motion, reduce pain, return to normal function of hand     Occupation:  retired       Functional Limitations/Social History:    Previous functional status includes: Independent with all ADLs.     Current FunctionalStatus   Home/Living environment : lives with their family      Limitation of Functional Status as follows:   ADLs/IADLs:     - patient is independent with self care needs        Objective       Pain:  During no work: n/a /10  While working: n/a /10  Sleepin/10  Location of pain:    Nicolasa's goals for therapy are: to increase motion with reaching and be pain free with daily task       Objective:  Sensation Test: experiences tingling experiences numbness      Observation/Inspection:  neither rounded shoulders nor forward head      Range of Motion:   Right: WNL  Left: WNL       (L) UE (R) UE     AROM AROM Norm   Shoulder Flexion 150 150 180   Shoulder Abduction 117 121 0-180   Shoulder  Extension nt nt 0-50   Shoulder Internal Rotation 20  *pain  50 0-90   Shoulder External Rotation 40  *pain  48 0-90   Horz Shoulder Adduction nt nt 0-40       ROM Comments:    Pain at end range        Treatment     Treatment Time In/out  (separate from total time) : 10 minutes at the end of the hour     Home Exercise Program/Education:  Issued HEP (see patient instructions in EMR) and educated on modality use for pain management . Exercises were reviewed and Nicolasa was able to demonstrate them prior to the end of the session.   Pt received a written copy of exercises to perform at home. Nicolasa demonstrated fair  understanding of the education provided.  Pt was advised to perform these exercises free of pain, and to stop performing them if pain occurs.    Patient/Family Education: role of OT, goals for OT, double booking , scheduling/cancellations - pt verbalized understanding. Discussed insurance limitations with patient.    Additional Education provided: role of CHT/OT, goals for CHT/OT, discussed insurance limitation with patient- patient verbalized understanding         Intake Outcome Measure for FOTO Survey    Therapist reviewed FOTO scores for Nicolasa Jurado on 10/4/2023.   FOTO documents entered into [employer] - see Media section.    Intake Score: TBD           Medical Necessity is demonstrated by the following  Occupational Profile/History  Co-morbidities and personal factors that may impact the plan of care [x] LOW: Brief chart review  [] MODERATE: Expanded chart review   [] HIGH: Extensive chart review    Moderate / High Support Documentation:      Examination  Performance deficits relating to physical, cognitive or psychosocial skills that result in activity limitations and/or participation restrictions  [x] LOW: addressing 1-3 Performance deficits  [] MODERATE: 3-5 Performance deficits  [] HIGH: 5+ Performance deficits (please support below)    Moderate / High Support Documentation:    Physical:  Joint  Mobility  Joint Stability  Muscle Power/Strength  Muscle Endurance   Strength  Pinch Strength  Postural Control  Pain  Poor eyesight    Cognitive:  No Deficits    Psychosocial:    Habits  Routines  Rituals     Treatment Options [x] LOW: Limited options  [] MODERATE: Several options  [] HIGH: Multiple options      Decision Making/ Complexity Score: low         Assessment  This 89 y.o. female referred to Outpatient Occupational Therapy with diagnosis of   Encounter Diagnoses   Name Primary?    Chronic pain of both shoulders     Subacromial impingement, left     Subacromial impingement, right     Difficulty with activities of daily living     presents with limitations as described in problem list. Patient can benefit from Occupational Therapy services for Iontophoresis, Ultrasound, moist heat, AROM, Theraputic exercises, joint mobs, home exercise program provied with written instructions, ice, strengthening, Theraband Ex, UBE, and pulley ex in order to maximize painfree functional use of  bilateral UE. . The following goals were discussed with the patient and she is in agreement with them as to be addressed in the treatment plan.     Problem List:   Decreased ROM, Increased pain, and Decreased strength    Goals to be met in 6-8 weeks:  1) Pt will be independent and report performing HEP to maximize (R) shoulder functional capacity.  2) Pt will increase shoulder AROM in all limited planes of motion by 10-15 degrees to A with daily task.  3) Pt will report use of home modalities for pain management.  4) Pt will report one degree less of pain with nonuse and with use.  5) Pt will report interrupted sleep no more than twice a night.      Plan  Nicolasa to be treated by Occupational Therapy  1-2  times per week for 6-8 weeks to achieve the established goals.   Treatment to include Iontophoresis w/ 1.2 cc's of dexamethasone, Ultrasoud @ 3mHz, Phonophoresis with 5% Myoflex/hydrocortisone, AAROM Mobilization of GH joint, PROM  Home program, Ice, Moist heat, Strengthening Theraband Ex, UBE , and E- stim as well as any other treatments deemed necessary based on the patient's needs or progress.            Beulah Vance, OT MOT, OTR/L  Occupational therapist

## 2023-10-04 NOTE — PATIENT INSTRUCTIONS
[facility] - Home Exercises         Do these exercises 10 reps x 2.   3 times / day.                  Beulah Vance, OTR/L

## 2023-10-24 RX ORDER — METOPROLOL SUCCINATE 100 MG/1
TABLET, EXTENDED RELEASE ORAL
Qty: 90 TABLET | Refills: 3 | Status: SHIPPED | OUTPATIENT
Start: 2023-10-24

## 2023-10-24 NOTE — TELEPHONE ENCOUNTER
Care Due:                  Date            Visit Type   Department     Provider  --------------------------------------------------------------------------------                                EP -                              PRIMARY      MET INTERNAL  Last Visit: 08-      CARE ([facility])   RAMEZ Gaston                              EP -                              PRIMARY      [facility] INTERNAL  Next Visit: 11-      CARE ([facility])   RAMEZ Gaston                                                            Last  Test          Frequency    Reason                     Performed    Due Date  --------------------------------------------------------------------------------    CMP.........  12 months..  spironolactone...........  11- 11-    [facility] Embedded Care Due Messages. Reference number: [id number].   10/24/2023 10:15:15 AM CDT

## 2023-10-24 NOTE — TELEPHONE ENCOUNTER
Refill Routing Note   Medication(s) are not appropriate for processing by [facility] for the following reason(s):      Required vitals abnormal  No active prescription written by provider    [facility] action(s):  Defer Care Due:  Labs due              Appointments  past 12m or future 3m with PCP    Date Provider   Last Visit   8/28/2023 [place] Gaston MD   Next Visit   11/9/2023 [place] Gaston MD   ED visits in past 90 days: 0        Note composed:10:37 AM 10/24/2023

## 2023-11-09 ENCOUNTER — OFFICE VISIT (OUTPATIENT)
Dept: INTERNAL MEDICINE | Facility: CLINIC | Age: 88
End: 2023-11-09
Payer: MEDICARE

## 2023-11-09 ENCOUNTER — LAB VISIT (OUTPATIENT)
Dept: LAB | Facility: HOSPITAL | Age: 88
End: 2023-11-09
Attending: INTERNAL MEDICINE
Payer: MEDICARE

## 2023-11-09 VITALS
SYSTOLIC BLOOD PRESSURE: 180 MMHG | HEART RATE: 77 BPM | BODY MASS INDEX: 25.19 KG/M2 | WEIGHT: 128.31 LBS | RESPIRATION RATE: 20 BRPM | DIASTOLIC BLOOD PRESSURE: 80 MMHG | HEIGHT: 60 IN | OXYGEN SATURATION: 97 % | TEMPERATURE: 98 F

## 2023-11-09 DIAGNOSIS — I50.32 CHRONIC DIASTOLIC HEART FAILURE: Chronic | ICD-10-CM

## 2023-11-09 DIAGNOSIS — Z00.00 ANNUAL PHYSICAL EXAM: Primary | ICD-10-CM

## 2023-11-09 DIAGNOSIS — I70.1 RENAL ARTERY STENOSIS: Chronic | ICD-10-CM

## 2023-11-09 DIAGNOSIS — E78.00 PURE HYPERCHOLESTEROLEMIA: Chronic | ICD-10-CM

## 2023-11-09 DIAGNOSIS — R13.10 DYSPHAGIA, UNSPECIFIED TYPE: ICD-10-CM

## 2023-11-09 DIAGNOSIS — I70.0 ATHEROSCLEROSIS OF AORTA: Chronic | ICD-10-CM

## 2023-11-09 DIAGNOSIS — M81.0 OSTEOPOROSIS, POSTMENOPAUSAL: Chronic | ICD-10-CM

## 2023-11-09 DIAGNOSIS — I25.10 CORONARY ARTERY DISEASE INVOLVING NATIVE CORONARY ARTERY OF NATIVE HEART WITHOUT ANGINA PECTORIS: Chronic | ICD-10-CM

## 2023-11-09 DIAGNOSIS — G47.00 INSOMNIA, UNSPECIFIED TYPE: ICD-10-CM

## 2023-11-09 DIAGNOSIS — I65.23 BILATERAL CAROTID ARTERY STENOSIS: Chronic | ICD-10-CM

## 2023-11-09 DIAGNOSIS — I10 ESSENTIAL HYPERTENSION: Chronic | ICD-10-CM

## 2023-11-09 DIAGNOSIS — K55.1 MESENTERIC ARTERY STENOSIS: Chronic | ICD-10-CM

## 2023-11-09 DIAGNOSIS — G62.9 NEUROPATHY: ICD-10-CM

## 2023-11-09 LAB
ALBUMIN SERPL BCP-MCNC: 4 G/DL (ref 3.5–5.2)
ALP SERPL-CCNC: 72 U/L (ref 55–135)
ALT SERPL W/O P-5'-P-CCNC: 12 U/L (ref 10–44)
ANION GAP SERPL CALC-SCNC: 11 MMOL/L (ref 8–16)
AST SERPL-CCNC: 21 U/L (ref 10–40)
BASOPHILS # BLD AUTO: 0.04 K/UL (ref 0–0.2)
BASOPHILS NFR BLD: 0.5 % (ref 0–1.9)
BILIRUB SERPL-MCNC: 0.3 MG/DL (ref 0.1–1)
BUN SERPL-MCNC: 25 MG/DL (ref 10–30)
CALCIUM SERPL-MCNC: 9.8 MG/DL (ref 8.7–10.5)
CHLORIDE SERPL-SCNC: 96 MMOL/L (ref 95–110)
CHOLEST SERPL-MCNC: 139 MG/DL (ref 120–199)
CHOLEST/HDLC SERPL: 4 {RATIO} (ref 2–5)
CO2 SERPL-SCNC: 28 MMOL/L (ref 23–29)
CREAT SERPL-MCNC: 1.5 MG/DL (ref 0.5–1.4)
DIFFERENTIAL METHOD: ABNORMAL
EOSINOPHIL # BLD AUTO: 0.2 K/UL (ref 0–0.5)
EOSINOPHIL NFR BLD: 2.5 % (ref 0–8)
ERYTHROCYTE [DISTWIDTH] IN BLOOD BY AUTOMATED COUNT: 13.7 % (ref 11.5–14.5)
EST. GFR  (NO RACE VARIABLE): 32.5 ML/MIN/1.73 M^2
GLUCOSE SERPL-MCNC: 98 MG/DL (ref 70–110)
HCT VFR BLD AUTO: 32.5 % (ref 37–48.5)
HDLC SERPL-MCNC: 35 MG/DL (ref 40–75)
HDLC SERPL: 25.2 % (ref 20–50)
HGB BLD-MCNC: 10.5 G/DL (ref 12–16)
IMM GRANULOCYTES # BLD AUTO: 0.02 K/UL (ref 0–0.04)
IMM GRANULOCYTES NFR BLD AUTO: 0.3 % (ref 0–0.5)
LDLC SERPL CALC-MCNC: 64.8 MG/DL (ref 63–159)
LYMPHOCYTES # BLD AUTO: 2.1 K/UL (ref 1–4.8)
LYMPHOCYTES NFR BLD: 28.1 % (ref 18–48)
MCH RBC QN AUTO: 27.7 PG (ref 27–31)
MCHC RBC AUTO-ENTMCNC: 32.3 G/DL (ref 32–36)
MCV RBC AUTO: 86 FL (ref 82–98)
MONOCYTES # BLD AUTO: 0.6 K/UL (ref 0.3–1)
MONOCYTES NFR BLD: 8 % (ref 4–15)
NEUTROPHILS # BLD AUTO: 4.4 K/UL (ref 1.8–7.7)
NEUTROPHILS NFR BLD: 60.6 % (ref 38–73)
NONHDLC SERPL-MCNC: 104 MG/DL
NRBC BLD-RTO: 0 /100 WBC
PLATELET # BLD AUTO: 170 K/UL (ref 150–450)
PMV BLD AUTO: 13.1 FL (ref 9.2–12.9)
POTASSIUM SERPL-SCNC: 4.9 MMOL/L (ref 3.5–5.1)
PROT SERPL-MCNC: 7.4 G/DL (ref 6–8.4)
RBC # BLD AUTO: 3.79 M/UL (ref 4–5.4)
SODIUM SERPL-SCNC: 135 MMOL/L (ref 136–145)
TRIGL SERPL-MCNC: 196 MG/DL (ref 30–150)
TSH SERPL DL<=0.005 MIU/L-ACNC: 2.45 UIU/ML (ref 0.4–4)
WBC # BLD AUTO: 7.33 K/UL (ref 3.9–12.7)

## 2023-11-09 PROCEDURE — G0008 FLU VACCINE - QUADRIVALENT - ADJUVANTED: ICD-10-PCS | Mod: HCNC,S$GLB,, | Performed by: INTERNAL MEDICINE

## 2023-11-09 PROCEDURE — 1157F PR ADVANCE CARE PLAN OR EQUIV PRESENT IN MEDICAL RECORD: ICD-10-PCS | Mod: HCNC,CPTII,S$GLB, | Performed by: INTERNAL MEDICINE

## 2023-11-09 PROCEDURE — 85025 COMPLETE CBC W/AUTO DIFF WBC: CPT | Mod: HCNC | Performed by: INTERNAL MEDICINE

## 2023-11-09 PROCEDURE — 3288F PR FALLS RISK ASSESSMENT DOCUMENTED: ICD-10-PCS | Mod: HCNC,CPTII,S$GLB, | Performed by: INTERNAL MEDICINE

## 2023-11-09 PROCEDURE — G0008 ADMIN INFLUENZA VIRUS VAC: HCPCS | Mod: HCNC,S$GLB,, | Performed by: INTERNAL MEDICINE

## 2023-11-09 PROCEDURE — 1101F PT FALLS ASSESS-DOCD LE1/YR: CPT | Mod: HCNC,CPTII,S$GLB, | Performed by: INTERNAL MEDICINE

## 2023-11-09 PROCEDURE — 99397 PR PREVENTIVE VISIT,EST,65 & OVER: ICD-10-PCS | Mod: HCNC,S$GLB,, | Performed by: INTERNAL MEDICINE

## 2023-11-09 PROCEDURE — 1157F ADVNC CARE PLAN IN RCRD: CPT | Mod: HCNC,CPTII,S$GLB, | Performed by: INTERNAL MEDICINE

## 2023-11-09 PROCEDURE — 1126F AMNT PAIN NOTED NONE PRSNT: CPT | Mod: HCNC,CPTII,S$GLB, | Performed by: INTERNAL MEDICINE

## 2023-11-09 PROCEDURE — 1159F MED LIST DOCD IN RCRD: CPT | Mod: HCNC,CPTII,S$GLB, | Performed by: INTERNAL MEDICINE

## 2023-11-09 PROCEDURE — 90694 VACC AIIV4 NO PRSRV 0.5ML IM: CPT | Mod: HCNC,S$GLB,, | Performed by: INTERNAL MEDICINE

## 2023-11-09 PROCEDURE — 84443 ASSAY THYROID STIM HORMONE: CPT | Mod: HCNC | Performed by: INTERNAL MEDICINE

## 2023-11-09 PROCEDURE — 1160F RVW MEDS BY RX/DR IN RCRD: CPT | Mod: HCNC,CPTII,S$GLB, | Performed by: INTERNAL MEDICINE

## 2023-11-09 PROCEDURE — 36415 COLL VENOUS BLD VENIPUNCTURE: CPT | Mod: HCNC,PO | Performed by: INTERNAL MEDICINE

## 2023-11-09 PROCEDURE — 80053 COMPREHEN METABOLIC PANEL: CPT | Mod: HCNC | Performed by: INTERNAL MEDICINE

## 2023-11-09 PROCEDURE — 90694 FLU VACCINE - QUADRIVALENT - ADJUVANTED: ICD-10-PCS | Mod: HCNC,S$GLB,, | Performed by: INTERNAL MEDICINE

## 2023-11-09 PROCEDURE — 99999 PR PBB SHADOW E&M-EST. PATIENT-LVL V: ICD-10-PCS | Mod: PBBFAC,HCNC,, | Performed by: INTERNAL MEDICINE

## 2023-11-09 PROCEDURE — 1126F PR PAIN SEVERITY QUANTIFIED, NO PAIN PRESENT: ICD-10-PCS | Mod: HCNC,CPTII,S$GLB, | Performed by: INTERNAL MEDICINE

## 2023-11-09 PROCEDURE — 1159F PR MEDICATION LIST DOCUMENTED IN MEDICAL RECORD: ICD-10-PCS | Mod: HCNC,CPTII,S$GLB, | Performed by: INTERNAL MEDICINE

## 2023-11-09 PROCEDURE — 99999 PR PBB SHADOW E&M-EST. PATIENT-LVL V: CPT | Mod: PBBFAC,HCNC,, | Performed by: INTERNAL MEDICINE

## 2023-11-09 PROCEDURE — 1160F PR REVIEW ALL MEDS BY PRESCRIBER/CLIN PHARMACIST DOCUMENTED: ICD-10-PCS | Mod: HCNC,CPTII,S$GLB, | Performed by: INTERNAL MEDICINE

## 2023-11-09 PROCEDURE — 99397 PER PM REEVAL EST PAT 65+ YR: CPT | Mod: HCNC,S$GLB,, | Performed by: INTERNAL MEDICINE

## 2023-11-09 PROCEDURE — 80061 LIPID PANEL: CPT | Mod: HCNC | Performed by: INTERNAL MEDICINE

## 2023-11-09 PROCEDURE — 3288F FALL RISK ASSESSMENT DOCD: CPT | Mod: HCNC,CPTII,S$GLB, | Performed by: INTERNAL MEDICINE

## 2023-11-09 PROCEDURE — 1101F PR PT FALLS ASSESS DOC 0-1 FALLS W/OUT INJ PAST YR: ICD-10-PCS | Mod: HCNC,CPTII,S$GLB, | Performed by: INTERNAL MEDICINE

## 2023-11-09 RX ORDER — CLONIDINE 0.1 MG/24H
1 PATCH, EXTENDED RELEASE TRANSDERMAL
Qty: 4 PATCH | Refills: 11 | Status: SHIPPED | OUTPATIENT
Start: 2023-11-09 | End: 2024-01-11

## 2023-11-09 RX ORDER — AMLODIPINE BESYLATE 10 MG/1
10 TABLET ORAL DAILY
Qty: 90 TABLET | Refills: 3 | Status: SHIPPED | OUTPATIENT
Start: 2023-11-09 | End: 2024-02-15 | Stop reason: SDUPTHER

## 2023-11-09 RX ORDER — GABAPENTIN 400 MG/1
400 CAPSULE ORAL 2 TIMES DAILY
Qty: 60 CAPSULE | Refills: 0 | Status: SHIPPED | OUTPATIENT
Start: 2023-11-09 | End: 2024-11-08

## 2023-11-09 NOTE — PROGRESS NOTES
Subjective:       Patient ID: Nicolasa Jurado is a 89 y.o. female.    Chief Complaint: Annual Exam    HPI    89 y.o. female here for annual exam.     Cholesterol: needs  Vaccines: Influenza - 2022; Tetanus - 2020; Prevnar 20 - 13 and 23 done; Zoster - ; COVID - 4 done  Eye exam: UTD - done last month.  Mammogram:  No longer indicated (patient agrees)  Gyn exam: No longer indicated (patient agrees)  Colonoscopy: No longer indicated (patient agrees)  DEXA:  2019, osteopenia    Exercise: she exercises in the bed.    Diet: eats regular; she eats twice a day.    She has noted high blood pressures of 180s-200s the last week - evenings.  She is on HCTZ 25 mg (am), toprol  mg (1/5 tablet twice daily), micardis 80 mg (pm), aldactone 25 mg (pm), norvasc 10 mg (am).  In the am, her BPs are 100s-120s.  Her BP goes up after lunch time (2pmish).  She has done clonidine patches in the past and had this work well for her.    She still has left sided chest pain that started after she fell into a dresser last year.  She has to wait to swallow water or pills, because they get stuck in her throat.    She gets heat in her feet and calves in the evening and into the night.    Past Medical History:   Diagnosis Date    Anemia     Asthma, chronic     Bilateral carotid artery stenosis 10/11/2022    CAD (coronary artery disease)     Elevated glucose 2/6/2015    GERD (gastroesophageal reflux disease)     protonix 40    Hypercalcemia 10/16/2012    Hyperlipidemia     Hyperparathyroidism     Hypertension     Insomnia 2/11/2016    Renal artery stenosis     Right low back pain 6/2/2016     Past Surgical History:   Procedure Laterality Date    APPENDECTOMY      CHOLECYSTECTOMY      COLONOSCOPY  2006    CORONARY ANGIOPLASTY WITH STENT PLACEMENT      1 heart/1 kidney    HYSTERECTOMY      KIDNEY SURGERY      stent in renal artery     Social History     Socioeconomic History    Marital status:    Tobacco Use    Smoking status: Never      Passive exposure: Never    Smokeless tobacco: Never   Substance and Sexual Activity    Alcohol use: No    Drug use: Never    Sexual activity: Never   Social History Narrative    Retired. Daughter  2014     Social Determinants of Health     Financial Resource Strain: Low Risk  (2023)    Overall Financial Resource Strain (CARDIA)     Difficulty of Paying Living Expenses: Not hard at all   Food Insecurity: No Food Insecurity (2023)    Hunger Vital Sign     Worried About Running Out of Food in the Last Year: Never true     Ran Out of Food in the Last Year: Never true   Transportation Needs: No Transportation Needs (2023)    PRAPARE - Transportation     Lack of Transportation (Medical): No     Lack of Transportation (Non-Medical): No   Physical Activity: Inactive (2023)    Exercise Vital Sign     Days of Exercise per Week: 0 days     Minutes of Exercise per Session: 0 min   Stress: No Stress Concern Present (2023)    [employer] [place] [employer] - Occupational Stress Questionnaire     Feeling of Stress : Only a little   Recent Concern: Stress - Stress Concern Present (11/3/2022)    [employer] [place] [employer] - Occupational Stress Questionnaire     Feeling of Stress : To some extent   Social Connections: Socially Isolated (2023)    Social Connection and Isolation Panel [NHANES]     Frequency of Communication with Friends and Family: Three times a week     Frequency of Social Gatherings with Friends and Family: Never     Attends Catholic Services: Never     Active Member of Clubs or Organizations: No     Attends Club or Organization Meetings: Never     Marital Status:    Housing Stability: Low Risk  (2023)    Housing Stability Vital Sign     Unable to Pay for Housing in the Last Year: No     Number of Places Lived in the Last Year: 1     Unstable Housing in the Last Year: No     Review of patient's allergies indicates:   Allergen Reactions    Venom-wasp      Penicillin      Other reaction(s): Rash    Amoxicillin-pot clavulanate      Other reaction(s): diarrea  Other reaction(s): Vomiting  Other reaction(s): diarrea    Tramadol Nausea And Vomiting     Mrs. Nicolasa Jurado had no medications administered during this visit.    Review of Systems      Objective:      Physical Exam  Vitals reviewed.   Constitutional:       Appearance: She is well-developed.   HENT:      Head: Normocephalic and atraumatic.      Mouth/Throat:      Pharynx: No oropharyngeal exudate.   Eyes:      General: No scleral icterus.        Right eye: No discharge.         Left eye: No discharge.      Pupils: Pupils are equal, round, and reactive to light.   Neck:      Thyroid: No thyromegaly.      Trachea: No tracheal deviation.   Cardiovascular:      Rate and Rhythm: Normal rate and regular rhythm.      Heart sounds: Normal heart sounds. No murmur heard.     No friction rub. No gallop.   Pulmonary:      Effort: Pulmonary effort is normal. No respiratory distress.      Breath sounds: Normal breath sounds. No wheezing or rales.   Chest:      Chest wall: No tenderness.   Abdominal:      General: Bowel sounds are normal. There is no distension.      Palpations: Abdomen is soft. There is no mass.      Tenderness: There is no abdominal tenderness. There is no guarding or rebound.   Musculoskeletal:         General: No tenderness. Normal range of motion.      Cervical back: Normal range of motion and neck supple.   Skin:     General: Skin is warm and dry.      Coloration: Skin is not pale.      Findings: No erythema or rash.   Neurological:      Mental Status: She is alert and oriented to person, place, and time.   Psychiatric:         Behavior: Behavior normal.         Assessment:       1. Annual physical exam    2. Essential hypertension  - CBC Auto Differential; Future  - Comprehensive Metabolic Panel; Future  - TSH; Future  - Lipid Panel; Future    3. Pure hypercholesterolemia    4. Chronic diastolic  heart failure    5. Coronary artery disease involving native coronary artery of native heart without angina pectoris    6. Bilateral carotid artery stenosis    7. Atherosclerosis of aorta    8. Renal artery stenosis    9. Mesenteric artery stenosis    10. Osteoporosis, postmenopausal    11. Insomnia, unspecified type    12. Dysphagia, unspecified type  - Ambulatory referral/consult to Gastroenterology; Future    13. Neuropathy      Plan:       1. Check CBC, CMP, TSH, lipids.  Discussed diet and exercise.  Discussed vaccines.    2.  Continue HCTZ 25 mg (am), toprol  mg (1/5 tablet twice daily), micardis 80 mg (pm), aldactone 25 mg (pm), norvasc 10 mg (am).  Start clonidine 0.1 mg.  If blood pressure starts going to about 100 systolic, advised to stop HCTZ, then Aldactone, then amlodipine.  Return to clinic in 6 weeks.  3.  Continue Lipitor 80 mg p.o.   4. Continue to monitor.    5/6/7/8/9.  Continue Lipitor 80 mg.  10. Monitor.    11. Monitor.  12.  Refer to GI.    13. Please gabapentin 400 mg b.i.d..

## 2023-11-16 ENCOUNTER — PATIENT MESSAGE (OUTPATIENT)
Dept: ADMINISTRATIVE | Facility: HOSPITAL | Age: 88
End: 2023-11-16
Payer: MEDICARE

## 2023-11-16 ENCOUNTER — PATIENT OUTREACH (OUTPATIENT)
Dept: ADMINISTRATIVE | Facility: HOSPITAL | Age: 88
End: 2023-11-16
Payer: MEDICARE

## 2023-11-20 RX ORDER — NITROGLYCERIN 0.4 MG/1
TABLET SUBLINGUAL
Qty: 100 TABLET | Refills: 0 | Status: SHIPPED | OUTPATIENT
Start: 2023-11-20 | End: 2024-01-11 | Stop reason: SDUPTHER

## 2023-11-20 NOTE — TELEPHONE ENCOUNTER
Called pt's granddaughter, al, and she wanted to know the information about the pt's BP if the systolic gets to 100.   I told her the following:  If blood pressure starts going to about 100 systolic, advised to stop HCTZ, then Aldactone, then amlodipine.  Return to clinic in 6 weeks.   She then asked when the pt should take her meds am or pm  I told her, per Cinthia, that she should take all meds in the am but if there are any that she takes twice a day take in the am and in the pm  She VU      She also asked for a refill of nitroglycerin tablets bc the ones she has are

## 2023-11-20 NOTE — TELEPHONE ENCOUNTER
No care due was identified.  Health [facility] Embedded Care Due Messages. Reference number: [id number].   11/20/2023 2:35:02 PM CST

## 2023-11-20 NOTE — TELEPHONE ENCOUNTER
----- Message from Joceline Veloz sent at 11/20/2023  2:00 PM CST -----  Contact: omar [phone]  Type: Returning a call    Who left a message? Caller does not know she guess was Bronwyn    When did the practice call? 11/20/2023 1:51 Pm    Comments:

## 2023-12-20 ENCOUNTER — TELEPHONE (OUTPATIENT)
Dept: INTERNAL MEDICINE | Facility: CLINIC | Age: 88
End: 2023-12-20
Payer: MEDICARE

## 2023-12-20 ENCOUNTER — PATIENT MESSAGE (OUTPATIENT)
Dept: INTERNAL MEDICINE | Facility: CLINIC | Age: 88
End: 2023-12-20
Payer: MEDICARE

## 2023-12-20 NOTE — TELEPHONE ENCOUNTER
----- Message from Vincenzo Snider sent at 12/20/2023 10:08 AM CST -----  1MEDICALADVICE     Patient is calling for Medical Advice regarding: PT WOULD LIKE A SOONER APPT THAN PT HAS PT APPT IS 1/11/24    How long has patient had these symptoms:    Pharmacy name and phone#:    Would like response via [employer]:  MY CHART     Comments:

## 2024-01-11 ENCOUNTER — OFFICE VISIT (OUTPATIENT)
Dept: INTERNAL MEDICINE | Facility: CLINIC | Age: 89
End: 2024-01-11
Payer: MEDICARE

## 2024-01-11 VITALS
WEIGHT: 132.5 LBS | BODY MASS INDEX: 26.01 KG/M2 | SYSTOLIC BLOOD PRESSURE: 90 MMHG | HEIGHT: 60 IN | HEART RATE: 91 BPM | TEMPERATURE: 98 F | OXYGEN SATURATION: 97 % | DIASTOLIC BLOOD PRESSURE: 60 MMHG | RESPIRATION RATE: 20 BRPM

## 2024-01-11 DIAGNOSIS — I65.23 BILATERAL CAROTID ARTERY STENOSIS: Chronic | ICD-10-CM

## 2024-01-11 DIAGNOSIS — I10 ESSENTIAL HYPERTENSION: Primary | Chronic | ICD-10-CM

## 2024-01-11 DIAGNOSIS — E78.00 PURE HYPERCHOLESTEROLEMIA: Chronic | ICD-10-CM

## 2024-01-11 DIAGNOSIS — I70.0 ATHEROSCLEROSIS OF AORTA: Chronic | ICD-10-CM

## 2024-01-11 DIAGNOSIS — I50.32 CHRONIC DIASTOLIC HEART FAILURE: Chronic | ICD-10-CM

## 2024-01-11 DIAGNOSIS — N18.4 CKD (CHRONIC KIDNEY DISEASE), STAGE IV: Chronic | ICD-10-CM

## 2024-01-11 DIAGNOSIS — I70.1 RENAL ARTERY STENOSIS: Chronic | ICD-10-CM

## 2024-01-11 DIAGNOSIS — H35.3290 EXUDATIVE AGE-RELATED MACULAR DEGENERATION, UNSPECIFIED LATERALITY, UNSPECIFIED STAGE: ICD-10-CM

## 2024-01-11 DIAGNOSIS — D32.9 MENINGIOMA: ICD-10-CM

## 2024-01-11 DIAGNOSIS — R06.02 SOB (SHORTNESS OF BREATH): ICD-10-CM

## 2024-01-11 DIAGNOSIS — I25.10 CORONARY ARTERY DISEASE INVOLVING NATIVE CORONARY ARTERY OF NATIVE HEART WITHOUT ANGINA PECTORIS: Chronic | ICD-10-CM

## 2024-01-11 PROCEDURE — 99999 PR PBB SHADOW E&M-EST. PATIENT-LVL V: CPT | Mod: PBBFAC,HCNC,, | Performed by: INTERNAL MEDICINE

## 2024-01-11 PROCEDURE — 1159F MED LIST DOCD IN RCRD: CPT | Mod: HCNC,CPTII,S$GLB, | Performed by: INTERNAL MEDICINE

## 2024-01-11 PROCEDURE — 3288F FALL RISK ASSESSMENT DOCD: CPT | Mod: HCNC,CPTII,S$GLB, | Performed by: INTERNAL MEDICINE

## 2024-01-11 PROCEDURE — 93005 ELECTROCARDIOGRAM TRACING: CPT | Mod: HCNC,S$GLB,, | Performed by: INTERNAL MEDICINE

## 2024-01-11 PROCEDURE — 1157F ADVNC CARE PLAN IN RCRD: CPT | Mod: HCNC,CPTII,S$GLB, | Performed by: INTERNAL MEDICINE

## 2024-01-11 PROCEDURE — 99214 OFFICE O/P EST MOD 30 MIN: CPT | Mod: HCNC,S$GLB,, | Performed by: INTERNAL MEDICINE

## 2024-01-11 PROCEDURE — 1160F RVW MEDS BY RX/DR IN RCRD: CPT | Mod: HCNC,CPTII,S$GLB, | Performed by: INTERNAL MEDICINE

## 2024-01-11 PROCEDURE — 1101F PT FALLS ASSESS-DOCD LE1/YR: CPT | Mod: HCNC,CPTII,S$GLB, | Performed by: INTERNAL MEDICINE

## 2024-01-11 PROCEDURE — 1126F AMNT PAIN NOTED NONE PRSNT: CPT | Mod: HCNC,CPTII,S$GLB, | Performed by: INTERNAL MEDICINE

## 2024-01-11 PROCEDURE — 93010 ELECTROCARDIOGRAM REPORT: CPT | Mod: HCNC,S$GLB,, | Performed by: INTERNAL MEDICINE

## 2024-01-11 RX ORDER — NITROGLYCERIN 0.4 MG/1
TABLET SUBLINGUAL
Qty: 100 TABLET | Refills: 3 | Status: SHIPPED | OUTPATIENT
Start: 2024-01-11

## 2024-01-11 RX ORDER — CLONIDINE 0.2 MG/24H
1 PATCH, EXTENDED RELEASE TRANSDERMAL
Qty: 4 PATCH | Refills: 11 | Status: SHIPPED | OUTPATIENT
Start: 2024-01-11 | End: 2025-01-10

## 2024-01-11 NOTE — PROGRESS NOTES
Subjective:       Patient ID: Nicolasa Jurado is a 89 y.o. female.    Chief Complaint: Follow-up    HPI    89-year-old female with aortic atherosclerosis, bilateral carotid artery stenosis, renal artery stenosis, CKD stage 4, meningioma, macular degeneration here for follow-up.    She goes to the bathroom at night.  She climbs into bed, she feels SOB.  She has to use her inhaler.  She has to climb into bed and wait.  She gets so tired and SOB.  This happens every time she gets up at night.     HTN - Patient is currently on Micardis 80 mg (pm), Toprol- mg, clonidine 0.1 mg patch. She does check her BP at home, and it runs 90 systolic yesterday am and was 180 systolic last night.. Side effects of medications note: none. Denies headaches, blurred vision, chest pain, shortness of breath, nausea.    HLD - Patient is currently on Lipitor 80 mg.  Her last lipid panel was   Cholesterol   Date Value Ref Range Status   11/09/2023 139 120 - 199 mg/dL Final     Comment:     The National Cholesterol Education Program (NCEP) has set the  following guidelines (reference ranges) for Cholesterol:  Optimal.....................<200 mg/dL  Borderline High.............200-239 mg/dL  High........................> or = 240 mg/dL       Triglycerides   Date Value Ref Range Status   11/09/2023 196 (H) 30 - 150 mg/dL Final     Comment:     The National Cholesterol Education Program (NCEP) has set the  following guidelines (reference values) for triglycerides:  Normal......................<150 mg/dL  Borderline High.............150-199 mg/dL  High........................200-499 mg/dL       HDL   Date Value Ref Range Status   11/09/2023 35 (L) 40 - 75 mg/dL Final     Comment:     The National Cholesterol Education Program (NCEP) has set the  following guidelines (reference values) for HDL Cholesterol:  Low...............<40 mg/dL  Optimal...........>60 mg/dL       LDL Cholesterol   Date Value Ref Range Status   11/09/2023 64.8 63.0 -  159.0 mg/dL Final     Comment:     The National Cholesterol Education Program (NCEP) has set the  following guidelines (reference values) for LDL Cholesterol:  Optimal.......................<130 mg/dL  Borderline High...............130-159 mg/dL  High..........................160-189 mg/dL  Very High.....................>190 mg/dL     .  Side effects of the medication: none.    Patient has diastolic heart failure and is on no current diuretic.    Review of Systems      Objective:      Physical Exam  Vitals reviewed.   Constitutional:       Appearance: She is well-developed.   HENT:      Head: Normocephalic and atraumatic.      Mouth/Throat:      Pharynx: No oropharyngeal exudate.   Eyes:      General: No scleral icterus.        Right eye: No discharge.         Left eye: No discharge.      Pupils: Pupils are equal, round, and reactive to light.   Neck:      Thyroid: No thyromegaly.      Trachea: No tracheal deviation.   Cardiovascular:      Rate and Rhythm: Normal rate and regular rhythm.      Heart sounds: Normal heart sounds. No murmur heard.     No friction rub. No gallop.   Pulmonary:      Effort: Pulmonary effort is normal. No respiratory distress.      Breath sounds: Normal breath sounds. No wheezing or rales.   Chest:      Chest wall: No tenderness.   Abdominal:      General: Bowel sounds are normal. There is no distension.      Palpations: Abdomen is soft. There is no mass.      Tenderness: There is no abdominal tenderness. There is no guarding or rebound.   Musculoskeletal:         General: No tenderness. Normal range of motion.      Cervical back: Normal range of motion and neck supple.   Skin:     General: Skin is warm and dry.      Coloration: Skin is not pale.      Findings: No erythema or rash.   Neurological:      Mental Status: She is alert and oriented to person, place, and time.   Psychiatric:         Behavior: Behavior normal.         Assessment:       1. Essential hypertension  - Ambulatory  referral/consult to Cardiology; Future    2. Pure hypercholesterolemia    3. Chronic diastolic heart failure    4. Atherosclerosis of aorta    5. Bilateral carotid artery stenosis    6. Coronary artery disease involving native coronary artery of native heart without angina pectoris    7. Renal artery stenosis    8. CKD (chronic kidney disease), stage IV    9. Meningioma    10. Exudative age-related macular degeneration, unspecified laterality, unspecified stage    11. SOB (shortness of breath)  - IN OFFICE EKG 12-LEAD (to [place])  - Ambulatory referral/consult to Cardiology; Future  - Stress Echo Which stress agent will be used? Pharmacological; Color Flow Doppler? No; Future      Plan:       1. Refer to cardiology for wild swings in blood pressure between a.m. and p.m..  Clonidine patch 0.2 mg weekly.  Continue Toprol- mg.  May need to decrease Micardis to 40 mg, or stop.  Monitoring her blood pressures.    2/4/5/6.  Continue Lipitor 80 mg p.o.  3. Monitor.    7. Monitor.  8.  Monitor  9.  Monitor  10.  Monitor   11. Check EKG, pharmacologic stress echo, refer to cardiology.

## 2024-01-26 ENCOUNTER — PATIENT MESSAGE (OUTPATIENT)
Dept: INTERNAL MEDICINE | Facility: CLINIC | Age: 89
End: 2024-01-26
Payer: MEDICARE

## 2024-02-06 ENCOUNTER — TELEPHONE (OUTPATIENT)
Dept: ENDOSCOPY | Facility: HOSPITAL | Age: 89
End: 2024-02-06
Payer: MEDICARE

## 2024-02-06 ENCOUNTER — OFFICE VISIT (OUTPATIENT)
Dept: GASTROENTEROLOGY | Facility: CLINIC | Age: 89
End: 2024-02-06
Payer: MEDICARE

## 2024-02-06 VITALS
HEIGHT: 60 IN | WEIGHT: 134.25 LBS | DIASTOLIC BLOOD PRESSURE: 79 MMHG | HEART RATE: 64 BPM | BODY MASS INDEX: 26.35 KG/M2 | SYSTOLIC BLOOD PRESSURE: 153 MMHG

## 2024-02-06 DIAGNOSIS — R13.10 DYSPHAGIA, UNSPECIFIED TYPE: ICD-10-CM

## 2024-02-06 DIAGNOSIS — K21.9 GASTROESOPHAGEAL REFLUX DISEASE, UNSPECIFIED WHETHER ESOPHAGITIS PRESENT: ICD-10-CM

## 2024-02-06 DIAGNOSIS — R13.19 ESOPHAGEAL DYSPHAGIA: Primary | ICD-10-CM

## 2024-02-06 PROCEDURE — 3288F FALL RISK ASSESSMENT DOCD: CPT | Mod: CPTII,S$GLB,, | Performed by: INTERNAL MEDICINE

## 2024-02-06 PROCEDURE — 1159F MED LIST DOCD IN RCRD: CPT | Mod: CPTII,S$GLB,, | Performed by: INTERNAL MEDICINE

## 2024-02-06 PROCEDURE — 1101F PT FALLS ASSESS-DOCD LE1/YR: CPT | Mod: CPTII,S$GLB,, | Performed by: INTERNAL MEDICINE

## 2024-02-06 PROCEDURE — 1126F AMNT PAIN NOTED NONE PRSNT: CPT | Mod: CPTII,S$GLB,, | Performed by: INTERNAL MEDICINE

## 2024-02-06 PROCEDURE — 99204 OFFICE O/P NEW MOD 45 MIN: CPT | Mod: S$GLB,,, | Performed by: INTERNAL MEDICINE

## 2024-02-06 PROCEDURE — 1157F ADVNC CARE PLAN IN RCRD: CPT | Mod: CPTII,S$GLB,, | Performed by: INTERNAL MEDICINE

## 2024-02-06 PROCEDURE — 99999 PR PBB SHADOW E&M-EST. PATIENT-LVL V: CPT | Mod: PBBFAC,,, | Performed by: INTERNAL MEDICINE

## 2024-02-06 NOTE — PROGRESS NOTES
Reason for visit: GERD    HPI:  is a 89 year old lady (accompanied by her granddaughter) with regurgitation of gastric contents after a meal. Unable to lay down after a meal. This is going on for a year now. Pork gives her diarrhea. Milk also gives her diarrhea. Omeprazole 40 mg every 2-3 days helps. Has intermittent dysphagia to pills but not food. Medical history includes HTN, HLP, aortic atherosclerosis, bilateral carotid artery stenosis, renal artery stenosis, CKD stage 4, meningioma and macular degeneration (blindness). Denies any odynophagia, nausea or vomiting. No abdominal pains, changes in bowel pattern, blood/ mucus in stool or unintentional weight loss. No melena or maroon stools. No recent changes in diet or medications. No family history of IBD, Celiac disease or GI malignancy. No regular NSAIDs, alcohol or tobacco use. No recent antibiotic use, travels or sick contacts. No prior history of C.diff. EGD in 2013 for heartburn was normal. CT scan 2018 revealed a small HH, absent GB an d fatty atrophy of pancreas without PD dilation.    Past medical, surgical, social and family history reviewed in epic    Medication allergies reviewed in epic    Review of systems:    Constitutional:  No fever, no chills, no weight loss, appetite is normal  Eyes:  Legally blind; no red eyes  ENT:  No odynophagia or hoarseness of voice  Cardiovascular:  No angina or palpitation  Respiratory:  No shortness breath or wheezing  Genitourinary:  No dysuria or frequency  Musculoskeletal:  No myalgias or arthralgias  Skin:  No pruritus or eczema  Neurologic:  No headache or seizures  Psychiatric:  No anxiety depression  Gastrointestinal:  See HPI    Physical exam:  Vitals see epic, awake, alert, oriented x3 in no acute distress    Neck:  Supple, no carotid bruit, no cervical adenopathy  Abdomen:  Obese, soft, nontender, nondistended, no masses palpable, no hepatosplenomegaly detected, bowel sounds are normal, no ascites  clinically detectable  Eyes:  Conjunctivae anicteric, not injected  ENT:  Oral mucosa moist  Cardiovascular:  S1, S2 normal, no murmurs, no gallops, no abdominal bruits heard  Respiratory:  Bilateral air entry equal, no rhonchi, no crackles, normal effort  Skin:  No palmar erythema or spider angiomata  Neurologic:  No asterixis or tremors  Psychiatric:  Affect appropriate, proper judgment, proper insight, oriented to place and time  Lower extremities:  No pedal edema    Recent labs, prior CT imaging and endoscopy reports reviewed.      Impression: GERD with esophageal dysphagia- ? Structural vs motility    Recommendations:     Schedule EGD   Upper GI series to evaluate for hiatus hernia  Continue Omeprazole 40 mg daily

## 2024-02-06 NOTE — TELEPHONE ENCOUNTER
"    Endo Case Request  Received: Today  Andrew Thorne MD Piglia, Ashley, RN  Procedure: EGD    Diagnosis: GERD and Dysphagia    Procedure Timin-4 weeks    *If within 4 weeks selected, please alee as high priority*    *If greater than 12 weeks, please select "4-12 weeks" and delay sending until 2 months prior to requested date*    Provider: Myself    Location: [facility]    Additional Scheduling Information: No scheduling concerns    Prep Specifications:N/A    Have you attached a patient to this message: Yes  "

## 2024-02-08 ENCOUNTER — PATIENT MESSAGE (OUTPATIENT)
Dept: ENDOSCOPY | Facility: HOSPITAL | Age: 89
End: 2024-02-08
Payer: MEDICARE

## 2024-02-08 ENCOUNTER — HOSPITAL ENCOUNTER (OUTPATIENT)
Dept: CARDIOLOGY | Facility: HOSPITAL | Age: 89
Discharge: HOME | End: 2024-02-08
Attending: INTERNAL MEDICINE
Payer: MEDICARE

## 2024-02-08 VITALS
RESPIRATION RATE: 18 BRPM | BODY MASS INDEX: 26.35 KG/M2 | HEIGHT: 60 IN | HEART RATE: 73 BPM | WEIGHT: 134.25 LBS | DIASTOLIC BLOOD PRESSURE: 90 MMHG | SYSTOLIC BLOOD PRESSURE: 240 MMHG

## 2024-02-08 DIAGNOSIS — R06.02 SOB (SHORTNESS OF BREATH): ICD-10-CM

## 2024-02-08 DIAGNOSIS — R13.10 DYSPHAGIA, UNSPECIFIED TYPE: ICD-10-CM

## 2024-02-08 DIAGNOSIS — K21.9 GASTROESOPHAGEAL REFLUX DISEASE, UNSPECIFIED WHETHER ESOPHAGITIS PRESENT: Primary | ICD-10-CM

## 2024-02-08 LAB
ASCENDING AORTA: 3.05 CM
AV INDEX (PROSTH): 0.92
AV MEAN GRADIENT: 4 MMHG
AV PEAK GRADIENT: 7 MMHG
AV VALVE AREA BY VELOCITY RATIO: 2.57 CM²
AV VALVE AREA: 2.54 CM²
AV VELOCITY RATIO: 0.92
BSA FOR ECHO PROCEDURE: 1.61 M2
CV ECHO LV RWT: 0.4 CM
DOP CALC AO PEAK VEL: 1.33 M/S
DOP CALC AO VTI: 29.83 CM
DOP CALC LVOT AREA: 2.8 CM2
DOP CALC LVOT DIAMETER: 1.88 CM
DOP CALC LVOT PEAK VEL: 1.23 M/S
DOP CALC LVOT STROKE VOLUME: 75.74 CM3
DOP CALC MV VTI: 62.44 CM
DOP CALCLVOT PEAK VEL VTI: 27.3 CM
E WAVE DECELERATION TIME: 413.7 MSEC
E/A RATIO: 0.63
E/E' RATIO: 16.77 M/S
ECHO LV POSTERIOR WALL: 0.92 CM (ref 0.6–1.1)
FRACTIONAL SHORTENING: 34 % (ref 28–44)
HR MV ECHO: 66 BPM
INTERVENTRICULAR SEPTUM: 0.85 CM (ref 0.6–1.1)
LA MAJOR: 5.01 CM
LA MINOR: 5.15 CM
LA WIDTH: 2.85 CM
LEFT ATRIUM SIZE: 4.73 CM
LEFT ATRIUM VOLUME INDEX MOD: 22.4 ML/M2
LEFT ATRIUM VOLUME INDEX: 36.8 ML/M2
LEFT ATRIUM VOLUME MOD: 35.34 CM3
LEFT ATRIUM VOLUME: 58.2 CM3
LEFT INTERNAL DIMENSION IN SYSTOLE: 3.07 CM (ref 2.1–4)
LEFT VENTRICLE DIASTOLIC VOLUME INDEX: 62.29 ML/M2
LEFT VENTRICLE DIASTOLIC VOLUME: 98.42 ML
LEFT VENTRICLE MASS INDEX: 86 G/M2
LEFT VENTRICLE SYSTOLIC VOLUME INDEX: 23.4 ML/M2
LEFT VENTRICLE SYSTOLIC VOLUME: 37.03 ML
LEFT VENTRICULAR INTERNAL DIMENSION IN DIASTOLE: 4.62 CM (ref 3.5–6)
LEFT VENTRICULAR MASS: 135.64 G
LV LATERAL E/E' RATIO: 15.57 M/S
LV SEPTAL E/E' RATIO: 18.17 M/S
MV A" WAVE DURATION": 12.27 MSEC
MV MEAN GRADIENT: 5 MMHG
MV PEAK A VEL: 1.74 M/S
MV PEAK E VEL: 1.09 M/S
MV PEAK GRADIENT: 15 MMHG
MV STENOSIS PRESSURE HALF TIME: 119.97 MS
MV VALVE AREA BY CONTINUITY EQUATION: 1.21 CM2
MV VALVE AREA P 1/2 METHOD: 1.83 CM2
PISA TR MAX VEL: 3.38 M/S
PULM VEIN S/D RATIO: 1.16
PV PEAK D VEL: 0.37 M/S
PV PEAK S VEL: 0.43 M/S
RA MAJOR: 4.8 CM
RA PRESSURE ESTIMATED: 3 MMHG
RA WIDTH: 2.07 CM
RIGHT VENTRICULAR END-DIASTOLIC DIMENSION: 2.77 CM
RV TB RVSP: 6 MMHG
SINUS: 2.68 CM
STJ: 2.71 CM
TDI LATERAL: 0.07 M/S
TDI SEPTAL: 0.06 M/S
TDI: 0.07 M/S
TR MAX PG: 46 MMHG
TRICUSPID ANNULAR PLANE SYSTOLIC EXCURSION: 1.59 CM
TV REST PULMONARY ARTERY PRESSURE: 49 MMHG
Z-SCORE OF LEFT VENTRICULAR DIMENSION IN END DIASTOLE: 0.21
Z-SCORE OF LEFT VENTRICULAR DIMENSION IN END SYSTOLE: 0.71

## 2024-02-08 PROCEDURE — 93306 TTE W/DOPPLER COMPLETE: CPT

## 2024-02-08 PROCEDURE — 93306 TTE W/DOPPLER COMPLETE: CPT | Mod: 26,,, | Performed by: INTERNAL MEDICINE

## 2024-02-08 NOTE — NURSING
"Patient verified by 2 identifiers and allergies reviewed.  Unable to place 22g IV to either arm.   DSE explained to patient, consent obtained and pt verbalized understanding.    Upon taking blood pressure before starting DSE stress test, Blood pressure reading at 240/90. Upon speaking with the patient's granddaughter Nishi, the patient had her new weekly Clonidine 0.2mg placed this morning and she took amlodipine 10mg as instructed. Did not proceed with the DSE.     Sent this message to Dr. Gaston, the ordering provider "Dr. Gatson. This is Jodi, the nurse from [facility]. I am preparing to complete the DSE stress test as ordered. The patient's BP this morning before she came in was 193/90. She was given the amlodipine as prescribed at 10am and a new Clonidine 0.2mg was put on. Her Blood pressure is 240/90. That is too high for us to complete the test. We are doing the echocardiogram, but cannot do the stress test today. Would you like me to let the patient and granddaughter know next steps? Thank you." Response back from doctor "Yes, please send her to the ER with those blood pressures."      11:38 This RN called 911. Gave pertinent history and current concerns. They will send an ambulance. EMT arrived at 1158. Patient in stable condition upon discharge accompanied by [facility], 2 EMT technicians and granddaughter.  "

## 2024-02-15 ENCOUNTER — OFFICE VISIT (OUTPATIENT)
Dept: CARDIOLOGY | Facility: CLINIC | Age: 89
End: 2024-02-15
Payer: MEDICARE

## 2024-02-15 VITALS
BODY MASS INDEX: 25.53 KG/M2 | DIASTOLIC BLOOD PRESSURE: 77 MMHG | HEART RATE: 72 BPM | WEIGHT: 130.75 LBS | SYSTOLIC BLOOD PRESSURE: 187 MMHG

## 2024-02-15 DIAGNOSIS — I25.10 CORONARY ARTERY DISEASE INVOLVING NATIVE CORONARY ARTERY OF NATIVE HEART WITHOUT ANGINA PECTORIS: Chronic | ICD-10-CM

## 2024-02-15 DIAGNOSIS — E78.00 PURE HYPERCHOLESTEROLEMIA: Chronic | ICD-10-CM

## 2024-02-15 DIAGNOSIS — I50.32 CHRONIC DIASTOLIC HEART FAILURE: Chronic | ICD-10-CM

## 2024-02-15 DIAGNOSIS — I10 ESSENTIAL HYPERTENSION: Primary | Chronic | ICD-10-CM

## 2024-02-15 DIAGNOSIS — I70.1 RENAL ARTERY STENOSIS: Chronic | ICD-10-CM

## 2024-02-15 DIAGNOSIS — I65.23 BILATERAL CAROTID ARTERY STENOSIS: Chronic | ICD-10-CM

## 2024-02-15 DIAGNOSIS — R06.02 SOB (SHORTNESS OF BREATH): ICD-10-CM

## 2024-02-15 PROBLEM — Z91.81 HISTORY OF FALLING: Status: ACTIVE | Noted: 2021-06-25

## 2024-02-15 PROCEDURE — 1157F ADVNC CARE PLAN IN RCRD: CPT | Mod: CPTII,S$GLB,, | Performed by: INTERNAL MEDICINE

## 2024-02-15 PROCEDURE — 99204 OFFICE O/P NEW MOD 45 MIN: CPT | Mod: S$GLB,,, | Performed by: INTERNAL MEDICINE

## 2024-02-15 PROCEDURE — 99999 PR PBB SHADOW E&M-EST. PATIENT-LVL V: CPT | Mod: PBBFAC,,, | Performed by: INTERNAL MEDICINE

## 2024-02-15 PROCEDURE — 1101F PT FALLS ASSESS-DOCD LE1/YR: CPT | Mod: CPTII,S$GLB,, | Performed by: INTERNAL MEDICINE

## 2024-02-15 PROCEDURE — 1126F AMNT PAIN NOTED NONE PRSNT: CPT | Mod: CPTII,S$GLB,, | Performed by: INTERNAL MEDICINE

## 2024-02-15 PROCEDURE — 3288F FALL RISK ASSESSMENT DOCD: CPT | Mod: CPTII,S$GLB,, | Performed by: INTERNAL MEDICINE

## 2024-02-15 PROCEDURE — 1159F MED LIST DOCD IN RCRD: CPT | Mod: CPTII,S$GLB,, | Performed by: INTERNAL MEDICINE

## 2024-02-15 RX ORDER — HYDROCHLOROTHIAZIDE 25 MG/1
25 TABLET ORAL DAILY
Qty: 30 TABLET | Refills: 11 | Status: SHIPPED | OUTPATIENT
Start: 2024-02-15 | End: 2025-02-15

## 2024-02-15 RX ORDER — SPIRONOLACTONE 25 MG/1
25 TABLET ORAL
COMMUNITY
Start: 2024-01-22 | End: 2024-02-15

## 2024-02-15 RX ORDER — AMLODIPINE BESYLATE 10 MG/1
5 TABLET ORAL NIGHTLY
Qty: 30 TABLET | Refills: 11 | Status: SHIPPED | OUTPATIENT
Start: 2024-02-15 | End: 2026-02-04

## 2024-02-15 NOTE — PROGRESS NOTES
Subjective:   02/15/2024     Patient ID:  Nicolasa Jurado is a 89 y.o. female who presents for evaulation of Hypertension and Shortness of Breath        Patient here primarily because of fairly marked hypertension when she was here to undergo a dobutamine stress echo.  Echocardiography was performed, there is mild mitral stenosis, mild pulmonary hypertension.  Left ventricular systolic function is normal though.  She has never had a heart attack, she has not having exertional chest pains, has a constant chest pain below her breasts which she thinks was related to a prior fall.  After a marked elevation of blood pressure was noted,  Catapres was increased.  The notes from Dr. Pantoja say that if her blood pressure was decreased, she was to decrease amlodipine, hydrochlorothiazide and spironolactone.  They did decrease those as her blood pressure did come down initially with clonidine, but did not resume them with elevation of the blood pressure.      Prior renal ultrasound:  There is insignificant stenosis (0-59%) in the Right Renal Artery.  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  Right kidney 10.60 cm.  There is insignificant stenosis (0-59%) in the Left Renal Artery.  Elevated left renal resistive index suggestive of intrinsic kidney disease.  Left kidney 10.60 cm.      Echocardiogram 02/08/2024:   ·  Left Ventricle: The left ventricle is normal in size. Normal wall thickness. Normal wall motion. There is normal systolic function with a visually estimated ejection fraction of 65 - 70%. Grade II diastolic dysfunction. Elevated left ventricular filling pressure.  ·  Right Ventricle: Normal right ventricular cavity size. Wall thickness is normal. Right ventricle wall motion  is normal. Systolic function is normal.  ·  Left Atrium: Left atrium is severely dilated.  ·  Aortic Valve: There is mild aortic regurgitation.  ·  Mitral Valve: There is moderate mitral annular calcification  "present. There is mild stenosis. The mean pressure gradient across the mitral valve is 5 mmHg at a heart rate of 66 bpm. There is mild to moderate regurgitation.  ·  Tricuspid Valve: There is mild regurgitation.  ·  Pulmonary Artery: There is mild pulmonary hypertension. The estimated pulmonary artery systolic pressure is 49 mmHg.  ·  IVC/SVC: Normal venous pressure at 3 mmHg.     Prior office notes reviewed:   Problem List:  Labile hypertension    CAD    -LCX coated stent 08/14/2003    Diastolic heart failure, EF 65%    SANA s/p stent on the right side  Hypercholesterolemia    Hypercalcemia and primary hyperparathyroidism    Asthma     HPI:  Ms. Jurado is in clinic today with her son for routine follow up.  She has stable chronic SOB.  She is using her nebulizer twice a day.  Dr. Gaston re-started her wixela bid on 6/29.  The wixela caused a rash so she stopped it.  She took a 5 day course of the prednisone that she completed.  Reports nocturnal burning and "heat" in her legs that keeps her awake.  The gabapentin does help.  BP mostly runs 140s at home with occasional spiking.  She's taking all her anti-hypertensives in the am except the hctz that she takes late afternoon/evening      Assessment/Plan:  1. Chronic diastolic heart failure  Well compensated. Stable. Monitor.      2. Essential hypertension  BP not at goal <140/90.  Discussed  her medications for more consistent BP management.  Refilled mediations for better compliance.  Requested 2 week bp log.  If bp remains elevated consider changing metoprolol to carvedilol 25 mg BID.      3. Coronary artery disease involving native coronary artery of native heart without angina pectoris  Asymptomtatic. Stable. Monitor.      4. Atherosclerosis of aorta        5. Pure hypercholesterolemia  LDL at goal <70. Triglycerides not at goal <150.  Discussed increasing CV exercise and mediterranean diet.         6. Anemia due to stage 3b chronic kidney " disease  Previously followed by nephrology, Dr. Lange.  Defer to PCP     7. Asthma, unspecified asthma severity, unspecified whether complicated, unspecified whether persistent   Not treated as she associated the wixela with a rash.  Encouraged to f/u with PCP.      A copy of this note will be forwarded to Dr. Gaston and Dr. Maxwell.      Follow up in about 3 months (around 10/13/2022).        Past Medical History:   Diagnosis Date    Anemia     Asthma, chronic     Bilateral carotid artery stenosis 10/11/2022    CAD (coronary artery disease)     Elevated glucose 2/6/2015    GERD (gastroesophageal reflux disease)     protonix 40    Hypercalcemia 10/16/2012    Hyperlipidemia     Hyperparathyroidism     Hypertension     Insomnia 2/11/2016    Renal artery stenosis     Right low back pain 6/2/2016       Review of patient's allergies indicates:   Allergen Reactions    Venom-wasp     Penicillin      Other reaction(s): Rash    Amoxicillin-pot clavulanate      Other reaction(s): diarrea  Other reaction(s): Vomiting  Other reaction(s): diarrea    Tramadol Nausea And Vomiting         Current Outpatient Medications:     acyclovir 5% (ZOVIRAX) 5 % ointment, Use tid for buttock rash (Patient taking differently: as needed. Use tid for buttock rash), Disp: 15 g, Rfl: 6    albuterol (PROVENTIL/VENTOLIN HFA) 90 mcg/actuation inhaler, Inhale 2 puffs into the lungs every 6 (six) hours as needed for Wheezing., Disp: 18 g, Rfl: 6    aspirin 81 mg Tab, Take 81 mg by mouth every other day., Disp: , Rfl:     atorvastatin (LIPITOR) 80 MG tablet, TAKE 1 TABLET(80 MG) BY MOUTH EVERY DAY, Disp: 90 tablet, Rfl: 3    brimonidine 0.2% (ALPHAGAN) 0.2 % Drop, Place 1 drop into both eyes 3 (three) times daily. , Disp: , Rfl: 3    cinacalcet (SENSIPAR) 30 MG Tab, TAKE 2 TABLETS BY MOUTH EVERY DAY WITH BREAKFAST, Disp: 180 tablet, Rfl: 3    cloNIDine 0.2 mg/24 hr td ptwk (CATAPRES) 0.2 mg/24 hr, Place 1 patch onto the skin every 7 days., Disp: 4 patch,  Rfl: 11    diclofenac sodium (VOLTAREN) 1 % Gel, Apply 2 g topically 3 (three) times daily., Disp: 200 g, Rfl: 1    dorzolamide (TRUSOPT) 2 % ophthalmic solution, Place 1 drop into both eyes 3 (three) times daily. , Disp: , Rfl: 3    dorzolamide-timolol 2-0.5% (COSOPT) 22.3-6.8 mg/mL ophthalmic solution, 1 drop 2 (two) times daily., Disp: , Rfl:     ergocalciferol, vitamin D2, (VITAMIN D ORAL), Take 2,000 Int'l Units by mouth once daily., Disp: , Rfl:     fluocinonide (LIDEX) 0.05 % external solution, USE FOR SCALP AT BEDTIME AS NEEDED FOR PRURITUS, Disp: 60 mL, Rfl: 6    fluocinonide (LIDEX) 0.05 % external solution, AAA scalp qday prn itching, scaling, Disp: 60 mL, Rfl: 3    fluticasone propionate (FLONASE) 50 mcg/actuation nasal spray, 1 spray by Each Nare route daily as needed. , Disp: , Rfl: 0    gabapentin (NEURONTIN) 400 MG capsule, Take 1 capsule (400 mg total) by mouth 2 (two) times daily., Disp: 60 capsule, Rfl: 0    HYDROcodone-acetaminophen (NORCO) 5-325 mg per tablet, Take 1 tablet by mouth every 8 (eight) hours as needed for Pain., Disp: 21 tablet, Rfl: 0    ketoconazole (NIZORAL) 2 % shampoo, Wash hair with medicated shampoo at least 2x/week - let sit on scalp at least 5 minutes prior to rinsing, Disp: 120 mL, Rfl: 6    ketorolac 0.5% (ACULAR) 0.5 % Drop, Place 1 drop into both eyes 3 (three) times daily., Disp: , Rfl:     latanoprost 0.005 % ophthalmic solution, Place 1 drop into both eyes every evening. , Disp: , Rfl:     levalbuterol (XOPENEX) 0.63 mg/3 mL nebulizer solution, TAKE 3 MILLILITERS BY NEBULIZATION EVERY 4 HOURS AS NEEDED FOR WHEEZING(RESCUE), Disp: 1350 mL, Rfl: 3    methocarbamoL (ROBAXIN) 500 MG Tab, Take 1 tablet (500 mg total) by mouth 3 (three) times daily., Disp: 60 tablet, Rfl: 1    metoprolol succinate (TOPROL-XL) 100 MG 24 hr tablet, TAKE 1 TABLET(100 MG) BY MOUTH EVERY DAY, Disp: 90 tablet, Rfl: 3    nitroGLYCERIN (NITROSTAT) 0.4 MG SL tablet, 1 Tablet Sublingual  One tablet  under tounge as needed for chest pain., Disp: 100 tablet, Rfl: 3    omeprazole (PRILOSEC) 40 MG capsule, Take 1 capsule (40 mg total) by mouth once daily. For acid heartburn, Disp: 90 capsule, Rfl: 3    pilocarpine HCL 2% (PILOCAR) 2 % ophthalmic solution, INT 1 GTT INTO OU QID, Disp: , Rfl: 3    prednisoLONE acetate (PRED FORTE) 1 % DrpS, , Disp: , Rfl:     RHOPRESSA 0.02 % ophthalmic solution, , Disp: , Rfl:     telmisartan (MICARDIS) 80 MG Tab, Take 1 tablet (80 mg total) by mouth once daily., Disp: 90 tablet, Rfl: 2    timolol maleate 0.5% (TIMOPTIC) 0.5 % Drop, Place 1 drop into both eyes 2 (two) times a day., Disp: , Rfl:     tiZANidine (ZANAFLEX) 2 MG tablet, TAKE 1 TABLET(2 MG) BY MOUTH EVERY NIGHT AS NEEDED, Disp: 30 tablet, Rfl: 0    vit C-vit E-copper-zinc-lutein 226 mg-200 unit -5 mg-0.8 mg Cap, Take by mouth. 2 Capsule Oral Every day, Disp: , Rfl:     WIXELA INHUB 250-50 mcg/dose diskus inhaler, , Disp: , Rfl:     amLODIPine (NORVASC) 10 MG tablet, Take 0.5 tablets (5 mg total) by mouth every evening., Disp: 30 tablet, Rfl: 11    hydroCHLOROthiazide (HYDRODIURIL) 25 MG tablet, Take 1 tablet (25 mg total) by mouth once daily., Disp: 30 tablet, Rfl: 11     Objective:   Review of Systems   Cardiovascular:  Positive for chest pain and dyspnea on exertion. Negative for claudication, cyanosis, irregular heartbeat, leg swelling, near-syncope, orthopnea, palpitations, paroxysmal nocturnal dyspnea and syncope.         Vitals:    02/15/24 1113   BP: (!) 187/77   Pulse: 72     Wt Readings from Last 3 Encounters:   02/15/24 59.3 kg (130 lb 11.7 oz)   02/08/24 60.9 kg (134 lb 4.2 oz)   02/06/24 60.9 kg (134 lb 4.2 oz)     Temp Readings from Last 3 Encounters:   01/11/24 97.6 °F (36.4 °C) (Temporal)   11/09/23 97.9 °F (36.6 °C) (Temporal)   08/28/23 96.3 °F (35.7 °C) (Temporal)     BP Readings from Last 3 Encounters:   02/15/24 (!) 187/77   02/08/24 (!) 240/90   02/06/24 (!) 153/79     Pulse Readings from Last 3  Encounters:   02/15/24 72   02/08/24 73   02/06/24 64             Physical Exam  Vitals reviewed.   Constitutional:       General: She is not in acute distress.     Appearance: She is well-developed.   HENT:      Head: Normocephalic and atraumatic.      Nose: Nose normal.   Eyes:      Conjunctiva/sclera: Conjunctivae normal.      Pupils: Pupils are equal, round, and reactive to light.   Neck:      Vascular: No carotid bruit or JVD.   Cardiovascular:      Rate and Rhythm: Normal rate and regular rhythm.      Pulses: Intact distal pulses.      Heart sounds: Normal heart sounds. No murmur heard.     No friction rub. No gallop.   Pulmonary:      Effort: Pulmonary effort is normal. No respiratory distress.      Breath sounds: Normal breath sounds. No wheezing or rales.   Chest:      Chest wall: No tenderness.   Abdominal:      General: Bowel sounds are normal. There is no distension.      Palpations: Abdomen is soft.      Tenderness: There is no abdominal tenderness.   Musculoskeletal:         General: No tenderness or deformity. Normal range of motion.      Cervical back: Normal range of motion and neck supple.      Right lower leg: No edema.      Left lower leg: No edema.   Skin:     General: Skin is warm and dry.      Findings: No erythema or rash.   Neurological:      Mental Status: She is alert and oriented to person, place, and time.      Cranial Nerves: No cranial nerve deficit.      Motor: No abnormal muscle tone.      Coordination: Coordination normal.   Psychiatric:         Behavior: Behavior normal.         Thought Content: Thought content normal.         Judgment: Judgment normal.           Lab Results   Component Value Date    CHOL 139 11/09/2023    CHOL 158 11/07/2022    CHOL 110 (L) 11/11/2021     Lab Results   Component Value Date    HDL 35 (L) 11/09/2023    HDL 45 11/07/2022    HDL 32 (L) 11/11/2021     Lab Results   Component Value Date    LDLCALC 64.8 11/09/2023    LDLCALC 79.6 11/07/2022    LDLCALC  41.8 (L) 11/11/2021     Lab Results   Component Value Date    ALT 12 11/09/2023    AST 21 11/09/2023    AST 19 11/07/2022    AST 21 11/11/2021     Lab Results   Component Value Date    CREATININE 1.5 (H) 11/09/2023    BUN 25 11/09/2023     (L) 11/09/2023    K 4.9 11/09/2023    CO2 28 11/09/2023    CO2 28 11/07/2022    CO2 31 (H) 05/18/2022     Lab Results   Component Value Date    HGB 10.5 (L) 11/09/2023    HCT 32.5 (L) 11/09/2023    HCT 35.1 (L) 11/07/2022    HCT 31.7 (L) 05/18/2022                         Assessment and Plan:     Essential hypertension  Comments:  Continue current medications, not on amlodipine, resume 1/2 tablet daily, 5 mg daily.  Resume hydrochlorothiazide 25 mg daily.  Do not resume spironolactone.  Orders:  -     Ambulatory referral/consult to Cardiology  -     amLODIPine (NORVASC) 10 MG tablet; Take 0.5 tablets (5 mg total) by mouth every evening.  Dispense: 30 tablet; Refill: 11  -     hydroCHLOROthiazide (HYDRODIURIL) 25 MG tablet; Take 1 tablet (25 mg total) by mouth once daily.  Dispense: 30 tablet; Refill: 11  -     Basic Metabolic Panel; Standing    SOB (shortness of breath)  -     Ambulatory referral/consult to Cardiology    Renal artery stenosis  Comments:  Did not appear to be severe and renal ultrasound    Chronic diastolic heart failure  Comments:  appears euvolemic    Coronary artery disease involving native coronary artery of native heart without angina pectoris    Bilateral carotid artery stenosis  Comments:  asymptomatic    Pure hypercholesterolemia  Comments:  on high-dose statin     Medications reviewed with patient  And family member.  Will check lab on Monday x2.  Office visit in 2 weeks.     Follow up in about 2 weeks (around 2/29/2024).          Future Appointments   Date Time Provider Department Center   2/19/2024 10:30 AM [facility], [facility]

## 2024-02-19 ENCOUNTER — LAB VISIT (OUTPATIENT)
Dept: LAB | Facility: HOSPITAL | Age: 89
End: 2024-02-19
Attending: INTERNAL MEDICINE
Payer: MEDICARE

## 2024-02-19 DIAGNOSIS — I10 ESSENTIAL HYPERTENSION: Chronic | ICD-10-CM

## 2024-02-19 LAB
ANION GAP SERPL CALC-SCNC: 9 MMOL/L (ref 8–16)
BUN SERPL-MCNC: 23 MG/DL (ref 10–30)
CALCIUM SERPL-MCNC: 10.9 MG/DL (ref 8.7–10.5)
CHLORIDE SERPL-SCNC: 101 MMOL/L (ref 95–110)
CO2 SERPL-SCNC: 32 MMOL/L (ref 23–29)
CREAT SERPL-MCNC: 1.5 MG/DL (ref 0.5–1.4)
EST. GFR  (NO RACE VARIABLE): 32.3 ML/MIN/1.73 M^2
GLUCOSE SERPL-MCNC: 107 MG/DL (ref 70–110)
POTASSIUM SERPL-SCNC: 4 MMOL/L (ref 3.5–5.1)
SODIUM SERPL-SCNC: 142 MMOL/L (ref 136–145)

## 2024-02-19 PROCEDURE — 80048 BASIC METABOLIC PNL TOTAL CA: CPT | Mod: HCNC | Performed by: INTERNAL MEDICINE

## 2024-02-19 PROCEDURE — 36415 COLL VENOUS BLD VENIPUNCTURE: CPT | Performed by: INTERNAL MEDICINE

## 2024-03-21 DIAGNOSIS — I10 ESSENTIAL HYPERTENSION: Chronic | ICD-10-CM

## 2024-03-22 ENCOUNTER — TELEPHONE (OUTPATIENT)
Dept: ENDOSCOPY | Facility: HOSPITAL | Age: 89
End: 2024-03-22
Payer: MEDICARE

## 2024-03-22 RX ORDER — TELMISARTAN 80 MG/1
80 TABLET ORAL DAILY
Qty: 90 TABLET | Refills: 3 | Status: SHIPPED | OUTPATIENT
Start: 2024-03-22

## 2024-03-22 NOTE — TELEPHONE ENCOUNTER
Left voicemail and sent portal message for patient to call Endoscopy Scheduling to review instructions and confirm appointment for Upper endoscopy (EGD)  on 4/1/24.

## 2024-03-25 ENCOUNTER — ANESTHESIA EVENT (OUTPATIENT)
Dept: ENDOSCOPY | Facility: HOSPITAL | Age: 89
End: 2024-03-25
Payer: MEDICARE

## 2024-03-25 NOTE — ANESTHESIA PREPROCEDURE EVALUATION
03/25/2024  Nicolasa Jurado is a 89 y.o., female.       Patient Active Problem List   Diagnosis    Pure hypercholesterolemia    CAD (coronary artery disease)    Osteoporosis, postmenopausal    Renal artery stenosis    Mesenteric artery stenosis    Essential hypertension    Hypercalcemia    Atherosclerosis of aorta    Asthma    Chronic diastolic heart failure    Exudative macular degeneration    Insomnia    Anemia due to stage 4 chronic kidney disease    Overweight    Neuropathy    Lactose intolerance    CKD (chronic kidney disease), stage IV    Skin lesion of back    Sciatica of left side    Vision impairment    Meningioma    Bilateral carotid artery stenosis    Neck pain    Bilateral shoulder pain    Chronic pain of left knee    Degenerative spondylolisthesis    Weakness    Difficulty with activities of daily living    History of falling       Past Medical History:   Diagnosis Date    Anemia     Asthma, chronic     Bilateral carotid artery stenosis 10/11/2022    CAD (coronary artery disease)     Elevated glucose 2/6/2015    GERD (gastroesophageal reflux disease)     protonix 40    Hypercalcemia 10/16/2012    Hyperlipidemia     Hyperparathyroidism     Hypertension     Insomnia 2/11/2016    Renal artery stenosis     Right low back pain 6/2/2016       ECHO: Results for orders placed during the hospital encounter of 02/08/24    Echo    Interpretation Summary    Left Ventricle: The left ventricle is normal in size. Normal wall thickness. Normal wall motion. There is normal systolic function with a visually estimated ejection fraction of 65 - 70%. Grade II diastolic dysfunction. Elevated left ventricular filling pressure.    Right Ventricle: Normal right ventricular cavity size. Wall thickness is normal. Right ventricle wall motion  is normal. Systolic function is normal.    Left Atrium: Left atrium is severely  dilated.    Aortic Valve: There is mild aortic regurgitation.    Mitral Valve: There is moderate mitral annular calcification present. There is mild stenosis. The mean pressure gradient across the mitral valve is 5 mmHg at a heart rate of 66 bpm. There is mild to moderate regurgitation.    Tricuspid Valve: There is mild regurgitation.    Pulmonary Artery: There is mild pulmonary hypertension. The estimated pulmonary artery systolic pressure is 49 mmHg.    IVC/SVC: Normal venous pressure at 3 mmHg.      There is no height or weight on file to calculate BMI.    Tobacco Use: Low Risk  (2/15/2024)    Patient History     Smoking Tobacco Use: Never     Smokeless Tobacco Use: Never     Passive Exposure: Never       Social History     Substance and Sexual Activity   Drug Use Never        Alcohol Use: Not At Risk (1/5/2023)    AUDIT-C     Frequency of Alcohol Consumption: Never     Average Number of Drinks: Patient does not drink     Frequency of Binge Drinking: Never       Review of patient's allergies indicates:   Allergen Reactions    Venom-wasp     Penicillin      Other reaction(s): Rash    Amoxicillin-pot clavulanate      Other reaction(s): diarrea  Other reaction(s): Vomiting  Other reaction(s): diarrea    Tramadol Nausea And Vomiting         Airway:  No value filed.    Pre-op Assessment    I have reviewed the Patient Summary Reports.    I have reviewed the NPO Status.   I have reviewed the Medications.     Review of Systems  Anesthesia Hx:             Denies Family Hx of Anesthesia complications.    Denies Personal Hx of Anesthesia complications.                    Hematology/Oncology:  Hematology Normal   Oncology Normal                                   EENT/Dental:  EENT/Dental Normal           Cardiovascular:     Hypertension   CAD                                        Pulmonary:    Asthma                    Renal/:  Chronic Renal Disease                Hepatic/GI:     GERD              Musculoskeletal:  Musculoskeletal Normal                Neurological:    Neuromuscular Disease,                                   Endocrine:  Endocrine Normal            Dermatological:  Skin Normal    Psych:  Psychiatric Normal                    Physical Exam    Airway:  Mallampati: II         Anesthesia Plan  Type of Anesthesia, risks & benefits discussed:    Anesthesia Type: Gen Natural Airway  Intra-op Monitoring Plan: Standard ASA Monitors  Post Op Pain Control Plan: multimodal analgesia  Induction:  IV  Informed Consent: Informed consent signed with the Patient and all parties understand the risks and agree with anesthesia plan.  All questions answered.   ASA Score: 3  Day of Surgery Review of History & Physical: H&P Update referred to the surgeon/provider.    Ready For Surgery From Anesthesia Perspective.     .

## 2024-03-26 ENCOUNTER — TELEPHONE (OUTPATIENT)
Dept: ENDOSCOPY | Facility: HOSPITAL | Age: 89
End: 2024-03-26
Payer: MEDICARE

## 2024-03-26 NOTE — TELEPHONE ENCOUNTER
24-Jul-2018 09:00 Left 2nd voicemail for patient to call Endoscopy Scheduling to review instructions and confirm appointment for Upper endoscopy (EGD)  on 4/1/24.     24-Jul-2018 10:45

## 2024-04-01 ENCOUNTER — ANESTHESIA (OUTPATIENT)
Dept: ENDOSCOPY | Facility: HOSPITAL | Age: 89
End: 2024-04-01
Payer: MEDICARE

## 2024-04-01 ENCOUNTER — HOSPITAL ENCOUNTER (OUTPATIENT)
Facility: HOSPITAL | Age: 89
Discharge: HOME | End: 2024-04-01
Attending: INTERNAL MEDICINE | Admitting: INTERNAL MEDICINE
Payer: MEDICARE

## 2024-04-01 VITALS
HEIGHT: 55 IN | TEMPERATURE: 98 F | SYSTOLIC BLOOD PRESSURE: 137 MMHG | HEART RATE: 66 BPM | OXYGEN SATURATION: 94 % | RESPIRATION RATE: 14 BRPM | BODY MASS INDEX: 30.09 KG/M2 | WEIGHT: 130 LBS | DIASTOLIC BLOOD PRESSURE: 95 MMHG

## 2024-04-01 DIAGNOSIS — R13.19 ESOPHAGEAL DYSPHAGIA: Primary | ICD-10-CM

## 2024-04-01 DIAGNOSIS — R13.10 DYSPHAGIA: ICD-10-CM

## 2024-04-01 PROCEDURE — 37000009 HC ANESTHESIA EA ADD 15 MINS: Performed by: INTERNAL MEDICINE

## 2024-04-01 PROCEDURE — 63600175 PHARM REV CODE 636 W HCPCS: Performed by: NURSE ANESTHETIST, CERTIFIED REGISTERED

## 2024-04-01 PROCEDURE — 43235 EGD DIAGNOSTIC BRUSH WASH: CPT | Performed by: INTERNAL MEDICINE

## 2024-04-01 PROCEDURE — D9220A PRA ANESTHESIA: Mod: ,,, | Performed by: NURSE ANESTHETIST, CERTIFIED REGISTERED

## 2024-04-01 PROCEDURE — 37000008 HC ANESTHESIA 1ST 15 MINUTES: Performed by: INTERNAL MEDICINE

## 2024-04-01 PROCEDURE — 99900035 HC TECH TIME PER 15 MIN (STAT)

## 2024-04-01 PROCEDURE — 94761 N-INVAS EAR/PLS OXIMETRY MLT: CPT

## 2024-04-01 PROCEDURE — 43235 EGD DIAGNOSTIC BRUSH WASH: CPT | Mod: HCNC,,, | Performed by: INTERNAL MEDICINE

## 2024-04-01 PROCEDURE — 27201012 HC FORCEPS, HOT/COLD, DISP: Performed by: INTERNAL MEDICINE

## 2024-04-01 PROCEDURE — 25000003 PHARM REV CODE 250: Performed by: NURSE ANESTHETIST, CERTIFIED REGISTERED

## 2024-04-01 RX ORDER — RABEPRAZOLE SODIUM 20 MG/1
20 TABLET, DELAYED RELEASE ORAL DAILY
Qty: 30 TABLET | Refills: 11 | Status: SHIPPED | OUTPATIENT
Start: 2024-04-01 | End: 2025-04-01

## 2024-04-01 RX ORDER — SODIUM CHLORIDE 9 MG/ML
INJECTION, SOLUTION INTRAVENOUS CONTINUOUS
Status: DISCONTINUED | OUTPATIENT
Start: 2024-04-01 | End: 2024-04-01 | Stop reason: HOSPADM

## 2024-04-01 RX ORDER — LIDOCAINE HYDROCHLORIDE 20 MG/ML
INJECTION INTRAVENOUS
Status: DISCONTINUED | OUTPATIENT
Start: 2024-04-01 | End: 2024-04-01

## 2024-04-01 RX ORDER — PROPOFOL 10 MG/ML
VIAL (ML) INTRAVENOUS
Status: DISCONTINUED | OUTPATIENT
Start: 2024-04-01 | End: 2024-04-01

## 2024-04-01 RX ADMIN — LIDOCAINE HYDROCHLORIDE 100 MG: 20 INJECTION INTRAVENOUS at 11:04

## 2024-04-01 RX ADMIN — SODIUM CHLORIDE: 0.9 INJECTION, SOLUTION INTRAVENOUS at 10:04

## 2024-04-01 RX ADMIN — GLYCOPYRROLATE 0.2 MG: 0.2 INJECTION, SOLUTION INTRAMUSCULAR; INTRAVENOUS at 11:04

## 2024-04-01 RX ADMIN — PROPOFOL 20 MG: 10 INJECTION, EMULSION INTRAVENOUS at 11:04

## 2024-04-01 NOTE — ANESTHESIA POSTPROCEDURE EVALUATION
Anesthesia Post Evaluation    Patient: Nicolasa Jurado    Procedure(s) Performed: Procedure(s) (LRB):  EGD (ESOPHAGOGASTRODUODENOSCOPY) (N/A)    Final Anesthesia Type: general      Patient location during evaluation: PACU  Patient participation: Yes- Able to Participate  Level of consciousness: awake and alert  Post-procedure vital signs: reviewed and stable  Pain management: adequate  Airway patency: patent    PONV status at discharge: No PONV  Anesthetic complications: no      Cardiovascular status: stable  Respiratory status: spontaneous ventilation  Hydration status: euvolemic  Follow-up not needed.              Vitals Value Taken Time   /95 04/01/24 1130   Temp 36.6 °C (97.8 °F) 04/01/24 1130   Pulse 66 04/01/24 1130   Resp 14 04/01/24 1130   SpO2 94 % 04/01/24 1130         Event Time   Out of Recovery 11:30:00         Pain/Raman Score: Raman Score: 10 (4/1/2024 11:30 AM)

## 2024-04-01 NOTE — H&P
Short Stay Endoscopy History and Physical    PCP - Houston Gaston MD    Procedure - EGD  Sedation: GA  ASA - per anesthesia  Mallampati - per anesthesia  History of Anesthesia problems - no  Family history Anesthesia problems -  no     HPI:  This is a 89 y.o. female here for evaluation of : Esophageal dysphagia    Reflux - no  Dysphagia - yes  Abdominal pain - no  Diarrhea - no    ROS:  Constitutional: No fevers, chills, No weight loss  ENT: No allergies  CV: No chest pain  Pulm: No cough, No shortness of breath  Ophtho: No vision changes  GI: see HPI  Medical History:  has a past medical history of Anemia, Asthma, chronic, Bilateral carotid artery stenosis (10/11/2022), CAD (coronary artery disease), Elevated glucose (2/6/2015), GERD (gastroesophageal reflux disease), Hypercalcemia (10/16/2012), Hyperlipidemia, Hyperparathyroidism, Hypertension, Insomnia (2/11/2016), Renal artery stenosis, and Right low back pain (6/2/2016).    Surgical History:  has a past surgical history that includes Colonoscopy (2006); Coronary angioplasty with stent; Hysterectomy; Cholecystectomy; Kidney surgery; and Appendectomy.    Family History: family history includes Hypertension in her son; Liver disease in her father; Miscarriages / Stillbirths in her mother; No Known Problems in her sister; Splenomegaly in her daughter.. Otherwise no colon cancer, inflammatory bowel disease, or GI malignancies.    Social History:  reports that she has never smoked. She has never been exposed to tobacco smoke. She has never used smokeless tobacco. She reports that she does not drink alcohol and does not use drugs.    Review of patient's allergies indicates:   Allergen Reactions    Venom-wasp     Penicillin      Other reaction(s): Rash    Amoxicillin-pot clavulanate      Other reaction(s): diarrea  Other reaction(s): Vomiting  Other reaction(s): diarrea    Egg derived Diarrhea    Peaches [peach (prunus persica)]     Tramadol Nausea And Vomiting        Medications:   Medications Prior to Admission   Medication Sig Dispense Refill Last Dose    albuterol (PROVENTIL/VENTOLIN HFA) 90 mcg/actuation inhaler Inhale 2 puffs into the lungs every 6 (six) hours as needed for Wheezing. 18 g 6 Past Month    amLODIPine (NORVASC) 10 MG tablet Take 0.5 tablets (5 mg total) by mouth every evening. 30 tablet 11 3/31/2024    aspirin 81 mg Tab Take 81 mg by mouth every other day.   3/31/2024    atorvastatin (LIPITOR) 80 MG tablet TAKE 1 TABLET(80 MG) BY MOUTH EVERY DAY 90 tablet 3 3/31/2024    brimonidine 0.2% (ALPHAGAN) 0.2 % Drop Place 1 drop into both eyes 3 (three) times daily.   3 3/31/2024    cinacalcet (SENSIPAR) 30 MG Tab TAKE 2 TABLETS BY MOUTH EVERY DAY WITH BREAKFAST 180 tablet 3 3/31/2024    cloNIDine 0.2 mg/24 hr td ptwk (CATAPRES) 0.2 mg/24 hr Place 1 patch onto the skin every 7 days. 4 patch 11 4/1/2024    dorzolamide (TRUSOPT) 2 % ophthalmic solution Place 1 drop into both eyes 3 (three) times daily.   3 3/31/2024    ergocalciferol, vitamin D2, (VITAMIN D ORAL) Take 2,000 Int'l Units by mouth once daily.   3/31/2024    gabapentin (NEURONTIN) 400 MG capsule Take 1 capsule (400 mg total) by mouth 2 (two) times daily. 60 capsule 0 3/31/2024    hydroCHLOROthiazide (HYDRODIURIL) 25 MG tablet Take 1 tablet (25 mg total) by mouth once daily. 30 tablet 11 3/31/2024    latanoprost 0.005 % ophthalmic solution Place 1 drop into both eyes every evening.    3/31/2024    metoprolol succinate (TOPROL-XL) 100 MG 24 hr tablet TAKE 1 TABLET(100 MG) BY MOUTH EVERY DAY 90 tablet 3 3/31/2024 at 0900    omeprazole (PRILOSEC) 40 MG capsule Take 1 capsule (40 mg total) by mouth once daily. For acid heartburn 90 capsule 3 3/31/2024 at 0600    pilocarpine HCL 2% (PILOCAR) 2 % ophthalmic solution INT 1 GTT INTO OU QID  3 3/31/2024    acyclovir 5% (ZOVIRAX) 5 % ointment Use tid for buttock rash (Patient taking differently: as needed. Use tid for buttock rash) 15 g 6     diclofenac sodium  (VOLTAREN) 1 % Gel Apply 2 g topically 3 (three) times daily. 200 g 1     dorzolamide-timolol 2-0.5% (COSOPT) 22.3-6.8 mg/mL ophthalmic solution 1 drop 2 (two) times daily.       fluocinonide (LIDEX) 0.05 % external solution USE FOR SCALP AT BEDTIME AS NEEDED FOR PRURITUS 60 mL 6 no longer taking    fluocinonide (LIDEX) 0.05 % external solution AAA scalp qday prn itching, scaling 60 mL 3 no longer taking    fluticasone propionate (FLONASE) 50 mcg/actuation nasal spray 1 spray by Each Nare route daily as needed.   0     HYDROcodone-acetaminophen (NORCO) 5-325 mg per tablet Take 1 tablet by mouth every 8 (eight) hours as needed for Pain. 21 tablet 0 More than a month    ketoconazole (NIZORAL) 2 % shampoo Wash hair with medicated shampoo at least 2x/week - let sit on scalp at least 5 minutes prior to rinsing 120 mL 6 More than a month    ketorolac 0.5% (ACULAR) 0.5 % Drop Place 1 drop into both eyes 3 (three) times daily.   no longer taking    levalbuterol (XOPENEX) 0.63 mg/3 mL nebulizer solution TAKE 3 MILLILITERS BY NEBULIZATION EVERY 4 HOURS AS NEEDED FOR WHEEZING(RESCUE) 1350 mL 3 More than a month    methocarbamoL (ROBAXIN) 500 MG Tab Take 1 tablet (500 mg total) by mouth 3 (three) times daily. 60 tablet 1 no longer taking    nitroGLYCERIN (NITROSTAT) 0.4 MG SL tablet 1 Tablet Sublingual  One tablet under tounge as needed for chest pain. 100 tablet 3 More than a month    prednisoLONE acetate (PRED FORTE) 1 % DrpS    More than a month    RHOPRESSA 0.02 % ophthalmic solution    More than a month    telmisartan (MICARDIS) 80 MG Tab Take 1 tablet (80 mg total) by mouth once daily. 90 tablet 3 More than a month    timolol maleate 0.5% (TIMOPTIC) 0.5 % Drop Place 1 drop into both eyes 2 (two) times a day.   More than a month    tiZANidine (ZANAFLEX) 2 MG tablet TAKE 1 TABLET(2 MG) BY MOUTH EVERY NIGHT AS NEEDED 30 tablet 0 More than a month    vit C-vit E-copper-zinc-lutein 226 mg-200 unit -5 mg-0.8 mg Cap Take by  mouth. 2 Capsule Oral Every day   More than a month    WIXELA INHUB 250-50 mcg/dose diskus inhaler    More than a month       Objective Findings:    Vital Signs: Per nursing notes.    Physical Exam:  General Appearance: Well appearing in no acute distress  Head:   Normocephalic, without obvious abnormality  Eyes:    No scleral icterus  Airway: Open  Neck: No restriction in mobility  Lungs: CTA bilaterally in anterior and posterior fields, no wheezes, no crackles.  Heart:  Regular rate and rhythm, S1, S2 normal, no murmurs heard  Abdomen: Soft, non tender, non distended      Labs:  Lab Results   Component Value Date    WBC 7.33 11/09/2023    HGB 10.5 (L) 11/09/2023    HCT 32.5 (L) 11/09/2023     11/09/2023    CHOL 139 11/09/2023    TRIG 196 (H) 11/09/2023    HDL 35 (L) 11/09/2023    ALT 12 11/09/2023    AST 21 11/09/2023     02/19/2024    K 4.0 02/19/2024     02/19/2024    CREATININE 1.5 (H) 02/19/2024    BUN 23 02/19/2024    CO2 32 (H) 02/19/2024    TSH 2.447 11/09/2023    INR 1.0 05/18/2022    HGBA1C 5.7 (H) 11/11/2021         I have explained the risks and benefits of endoscopy procedures to the patient including but not limited to bleeding, perforation, infection, and death.    Thank you so much for allowing me to participate in the care of Nicolasa Thorne MD

## 2024-04-01 NOTE — TRANSFER OF CARE
"Anesthesia Transfer of Care Note    Patient: Nicolasa Jurado    Procedure(s) Performed: Procedure(s) (LRB):  EGD (ESOPHAGOGASTRODUODENOSCOPY) (N/A)    Patient location: PACU    Anesthesia Type: general    Transport from OR: Transported from OR on room air with adequate spontaneous ventilation    Post pain: adequate analgesia    Post assessment: no apparent anesthetic complications and tolerated procedure well    Post vital signs: stable    Level of consciousness: awake and alert    Nausea/Vomiting: no nausea/vomiting    Complications: none    Transfer of care protocol was followed      Last vitals: Visit Vitals  /61 (Patient Position: Lying)   Pulse 70   Temp 36.3 °C (97.4 °F) (Temporal)   Resp 20   Ht (!) 5" (0.127 m)   Wt 59 kg (130 lb)   SpO2 96%   Breastfeeding No   BMI 3656.00 kg/m²     "

## 2024-04-01 NOTE — PROGRESS NOTES
Pt in preop bay 5, VSS and IV inserted. Pt denies any open wounds on body or the use of any weight loss injections. Pt needs procedural consents, otherwise ready to roll.

## 2024-04-01 NOTE — [employer]OVATION PATIENT INSTRUCTIONS
Discharge Summary/Instructions after an Endoscopic Procedure  Patient Name: Nicolasa Jurado  Patient MRN: [id number]  Patient YOB: 1930 Monday, April 1, 2024  Andrew Thorne MD  Dear patient,  As a result of recent federal legislation (The Federal Cures Act), you may   receive lab or pathology results from your procedure in your [facility]   account before your physician is able to contact you. Your physician or   their representative will relay the results to you with their   recommendations at their soonest availability.  Thank you,  RESTRICTIONS:  During your procedure today, you received medications for sedation.  These   medications may affect your judgment, balance and coordination.  Therefore,   for 24 hours, you have the following restrictions:   - DO NOT drive a car, operate machinery, make legal/financial decisions,   sign important papers or drink alcohol.    ACTIVITY:  Today: no heavy lifting, straining or running due to procedural   sedation/anesthesia.  The following day: return to full activity including work.  DIET:  Eat and drink normally unless instructed otherwise.     TREATMENT FOR COMMON SIDE EFFECTS:  - Mild abdominal pain, nausea, belching, bloating or excessive gas:  rest,   eat lightly and use a heating pad.  - Sore Throat: treat with throat lozenges and/or gargle with warm salt   water.  - Because air was used during the procedure, expelling large amounts of air   from your rectum or belching is normal.  - If a bowel prep was taken, you may not have a bowel movement for 1-3 days.    This is normal.  SYMPTOMS TO WATCH FOR AND REPORT TO YOUR PHYSICIAN:  1. Abdominal pain or bloating, other than gas cramps.  2. Chest pain.  3. Back pain.  4. Signs of infection such as: chills or fever occurring within 24 hours   after the procedure.  5. Rectal bleeding, which would show as bright red, maroon, or black stools.   (A tablespoon of blood from the rectum is not serious, especially if    hemorrhoids are present.)  6. Vomiting.  7. Weakness or dizziness.  GO DIRECTLY TO THE NEAREST EMERGENCY ROOM IF YOU HAVE ANY OF THE FOLLOWING:      Difficulty breathing              Chills and/or fever over 101 F   Persistent vomiting and/or vomiting blood   Severe abdominal pain   Severe chest pain   Black, tarry stools   Bleeding- more than one tablespoon   Any other symptom or condition that you feel may need urgent attention  Your doctor recommends these additional instructions:  If any biopsies were taken, your doctors clinic will contact you in 1 to 2   weeks with any results.  - Patient has a contact number available for emergencies.  The signs and   symptoms of potential delayed complications were discussed with the   patient.  Return to normal activities tomorrow.  Written discharge   instructions were provided to the patient.   - Discharge patient to home.   - Resume previous diet.   - Continue present medications.   - Follow an antireflux regimen indefinitely.   - Use Aciphex (rabeprazole) 20 mg PO daily.   For questions, problems or results please call your physician - Andrew Thorne MD at Work:  [phone].  [facility] [place], EMERGENCY ROOM PHONE NUMBER: [phone]  IF A COMPLICATION OR EMERGENCY SITUATION ARISES AND YOU ARE UNABLE TO REACH   YOUR PHYSICIAN - GO DIRECTLY TO THE EMERGENCY ROOM.  Andrew Thorne MD  4/1/2024 11:11:56 AM  This report has been verified and signed electronically.  Dear patient,  As a result of recent federal legislation (The Federal Cures Act), you may   receive lab or pathology results from your procedure in your [facility]   account before your physician is able to contact you. Your physician or   their representative will relay the results to you with their   recommendations at their soonest availability.  Thank you,  PROVATION

## 2024-04-03 ENCOUNTER — TELEPHONE (OUTPATIENT)
Dept: GASTROENTEROLOGY | Facility: CLINIC | Age: 89
End: 2024-04-03
Payer: MEDICARE

## 2024-04-03 NOTE — TELEPHONE ENCOUNTER
----- Message from Andrew Thorne MD sent at 4/1/2024 11:14 AM CDT -----  Please schedule a 2 month follow up visit.

## 2024-04-03 NOTE — TELEPHONE ENCOUNTER
Ma forward message to the RN? Supervisor  Mayte. To schedule patient appointment. MA does not have access to do so.

## 2024-04-15 PROBLEM — E66.01 CLASS 3 OBESITY: Status: ACTIVE | Noted: 2024-04-15

## 2024-04-15 PROBLEM — E66.813 CLASS 3 OBESITY: Status: ACTIVE | Noted: 2024-04-15

## 2024-05-26 NOTE — TELEPHONE ENCOUNTER
Refill Routing Note   Medication(s) are not appropriate for processing by [facility] for the following reason(s):      No active prescription written by provider    [facility] action(s):  Defer Care Due:  None identified            Appointments  past 12m or future 3m with PCP    Date Provider   Last Visit   1/11/2024 Houston Gaston MD   Next Visit   5/26/2024 Houston Gaston MD   ED visits in past 90 days: 0        Note composed:1:16 PM 05/26/2024

## 2024-05-26 NOTE — TELEPHONE ENCOUNTER
No care due was identified.  [facility] Embedded Care Due Messages. Reference number: [id number].   5/26/2024 11:34:04 AM CDT

## 2024-05-26 NOTE — TELEPHONE ENCOUNTER
No care due was identified.  Health [facility] Embedded Care Due Messages. Reference number: [id number].   5/26/2024 6:00:13 AM CDT

## 2024-05-27 RX ORDER — SPIRONOLACTONE 25 MG/1
25 TABLET ORAL
Qty: 90 TABLET | Refills: 2 | Status: SHIPPED | OUTPATIENT
Start: 2024-05-27 | End: 2024-06-04

## 2024-05-27 RX ORDER — SPIRONOLACTONE 25 MG/1
25 TABLET ORAL
Qty: 30 TABLET | Refills: 11 | OUTPATIENT
Start: 2024-05-27

## 2024-05-27 NOTE — TELEPHONE ENCOUNTER
Refill Decision Note   Nicolasa Jurado  is requesting a refill authorization.  Brief Assessment and Rationale for Refill:  Quick Discontinue     Medication Therapy Plan:  The original prescription was discontinued on 1/11/2024 by Houston Gaston MD.      Comments:     Note composed:3:05 AM 05/27/2024

## 2024-06-04 ENCOUNTER — OFFICE VISIT (OUTPATIENT)
Dept: INTERNAL MEDICINE | Facility: CLINIC | Age: 89
End: 2024-06-04
Payer: MEDICARE

## 2024-06-04 VITALS
OXYGEN SATURATION: 96 % | RESPIRATION RATE: 20 BRPM | SYSTOLIC BLOOD PRESSURE: 110 MMHG | BODY MASS INDEX: 3586.71 KG/M2 | DIASTOLIC BLOOD PRESSURE: 44 MMHG | WEIGHT: 127.56 LBS | TEMPERATURE: 98 F | HEART RATE: 61 BPM

## 2024-06-04 DIAGNOSIS — H53.8 BLURRED VISION: ICD-10-CM

## 2024-06-04 DIAGNOSIS — E78.00 PURE HYPERCHOLESTEROLEMIA: Chronic | ICD-10-CM

## 2024-06-04 DIAGNOSIS — E66.01 CLASS 3 OBESITY: ICD-10-CM

## 2024-06-04 DIAGNOSIS — N18.4 CKD (CHRONIC KIDNEY DISEASE), STAGE IV: Chronic | ICD-10-CM

## 2024-06-04 DIAGNOSIS — I10 ESSENTIAL HYPERTENSION: Primary | Chronic | ICD-10-CM

## 2024-06-04 DIAGNOSIS — I70.0 ATHEROSCLEROSIS OF AORTA: Chronic | ICD-10-CM

## 2024-06-04 DIAGNOSIS — I65.23 BILATERAL CAROTID ARTERY STENOSIS: Chronic | ICD-10-CM

## 2024-06-04 DIAGNOSIS — R68.2 DRY MOUTH: ICD-10-CM

## 2024-06-04 PROCEDURE — 1160F RVW MEDS BY RX/DR IN RCRD: CPT | Mod: HCNC,CPTII,S$GLB, | Performed by: INTERNAL MEDICINE

## 2024-06-04 PROCEDURE — 99214 OFFICE O/P EST MOD 30 MIN: CPT | Mod: HCNC,S$GLB,, | Performed by: INTERNAL MEDICINE

## 2024-06-04 PROCEDURE — 1101F PT FALLS ASSESS-DOCD LE1/YR: CPT | Mod: HCNC,CPTII,S$GLB, | Performed by: INTERNAL MEDICINE

## 2024-06-04 PROCEDURE — 1159F MED LIST DOCD IN RCRD: CPT | Mod: HCNC,CPTII,S$GLB, | Performed by: INTERNAL MEDICINE

## 2024-06-04 PROCEDURE — 3288F FALL RISK ASSESSMENT DOCD: CPT | Mod: HCNC,CPTII,S$GLB, | Performed by: INTERNAL MEDICINE

## 2024-06-04 PROCEDURE — 1157F ADVNC CARE PLAN IN RCRD: CPT | Mod: HCNC,CPTII,S$GLB, | Performed by: INTERNAL MEDICINE

## 2024-06-04 PROCEDURE — 1126F AMNT PAIN NOTED NONE PRSNT: CPT | Mod: HCNC,CPTII,S$GLB, | Performed by: INTERNAL MEDICINE

## 2024-06-04 PROCEDURE — 99999 PR PBB SHADOW E&M-EST. PATIENT-LVL IV: CPT | Mod: PBBFAC,HCNC,, | Performed by: INTERNAL MEDICINE

## 2024-06-04 PROCEDURE — G2211 COMPLEX E/M VISIT ADD ON: HCPCS | Mod: HCNC,S$GLB,, | Performed by: INTERNAL MEDICINE

## 2024-06-04 NOTE — PROGRESS NOTES
Subjective:       Patient ID: Nicolasa Jurado is a 89 y.o. female.    Chief Complaint: Follow-up    HPI    89-year-old female with CKD stage 4, class 3 obesity here for follow-up.  She is complaining about her vision.  She has trouble seeing me.  Her vision started to act up this am. She has had issues like this with her vision in the last 6 months.  She has elevated pressure in her eye and was told this is the pressure in her eyes and her vision can go black if she is not exact with her eye drops.    She feels like she has something in her chest that moves when she walks/runs.  She had an EGD. She uses her pump twice a day for her asthma.  She used it the day of the EGD and coughed.  When she was coughing, something came out of her mouth into her hand.  It was pink.  This has not happened since.  She feels like she can breath better since this and like she has not had asthma since this day.    She has a dry mouth when she is going to sleep and wakes up with a dry mouth.    HTN -  Patient is currently on Toprol- mg, Micardis 80 mg, HCTZ 25 mg, amlodipine 10 mg, clonidine 0.2 mg patch. She does check her BP at home, and it runs 104/96-149m-877x systolic. Side effects of medications note: none. Denies headaches, blurred vision, chest pain, shortness of breath, nausea.    HLD/aortic atherosclerosis, bilateral carotid artery stenosis - Patient is currently on Lipitor 80 mg.  Her last lipid panel was   Cholesterol   Date Value Ref Range Status   11/09/2023 139 120 - 199 mg/dL Final     Comment:     The National Cholesterol Education Program (NCEP) has set the  following guidelines (reference ranges) for Cholesterol:  Optimal.....................<200 mg/dL  Borderline High.............200-239 mg/dL  High........................> or = 240 mg/dL       Triglycerides   Date Value Ref Range Status   11/09/2023 196 (H) 30 - 150 mg/dL Final     Comment:     The National Cholesterol Education Program (NCEP) has set  the  following guidelines (reference values) for triglycerides:  Normal......................<150 mg/dL  Borderline High.............150-199 mg/dL  High........................200-499 mg/dL       HDL   Date Value Ref Range Status   11/09/2023 35 (L) 40 - 75 mg/dL Final     Comment:     The National Cholesterol Education Program (NCEP) has set the  following guidelines (reference values) for HDL Cholesterol:  Low...............<40 mg/dL  Optimal...........>60 mg/dL       LDL Cholesterol   Date Value Ref Range Status   11/09/2023 64.8 63.0 - 159.0 mg/dL Final     Comment:     The National Cholesterol Education Program (NCEP) has set the  following guidelines (reference values) for LDL Cholesterol:  Optimal.......................<130 mg/dL  Borderline High...............130-159 mg/dL  High..........................160-189 mg/dL  Very High.....................>190 mg/dL     .  Side effects of the medication: none.        Review of Systems      Objective:      Physical Exam  Vitals reviewed.   Constitutional:       Appearance: She is well-developed.   HENT:      Head: Normocephalic and atraumatic.      Mouth/Throat:      Pharynx: No oropharyngeal exudate.   Eyes:      General: No scleral icterus.        Right eye: No discharge.         Left eye: No discharge.      Pupils: Pupils are equal, round, and reactive to light.   Neck:      Thyroid: No thyromegaly.      Trachea: No tracheal deviation.   Cardiovascular:      Rate and Rhythm: Normal rate and regular rhythm.      Heart sounds: Normal heart sounds. No murmur heard.     No friction rub. No gallop.   Pulmonary:      Effort: Pulmonary effort is normal. No respiratory distress.      Breath sounds: Normal breath sounds. No wheezing or rales.   Chest:      Chest wall: No tenderness.   Abdominal:      General: Bowel sounds are normal. There is no distension.      Palpations: Abdomen is soft. There is no mass.      Tenderness: There is no abdominal tenderness. There is no  guarding or rebound.   Musculoskeletal:         General: No tenderness. Normal range of motion.      Cervical back: Normal range of motion and neck supple.   Skin:     General: Skin is warm and dry.      Coloration: Skin is not pale.      Findings: No erythema or rash.   Neurological:      Mental Status: She is alert and oriented to person, place, and time.   Psychiatric:         Behavior: Behavior normal.         Assessment:       1. Essential hypertension    2. Pure hypercholesterolemia    3. Atherosclerosis of aorta    4. Bilateral carotid artery stenosis    5. CKD (chronic kidney disease), stage IV    6. Class 3 obesity    7. Dry mouth    8. Blurred vision      Plan:       1. Continue Toprol- mg, Micardis 80 mg, HCTZ 25 mg, amlodipine 10 mg, clonidine 0.2 mg patch.  2/3/4. Continue Lipitor 80 mg p.o.   5. Monitor   6.  Monitor.  7.  Increase fluid intake during the day, use ice chips at night.    8. Call eye doctor, and if no response, go to emergency room.

## 2024-06-14 RX ORDER — GABAPENTIN 400 MG/1
400 CAPSULE ORAL 2 TIMES DAILY
Qty: 60 CAPSULE | Refills: 0 | Status: SHIPPED | OUTPATIENT
Start: 2024-06-14 | End: 2025-06-14

## 2024-06-14 NOTE — TELEPHONE ENCOUNTER
No care due was identified.  [facility] Embedded Care Due Messages. Reference number: [id number].   6/14/2024 9:58:30 AM CDT

## 2024-07-25 DIAGNOSIS — R12 HEARTBURN: ICD-10-CM

## 2024-07-25 RX ORDER — OMEPRAZOLE 40 MG/1
CAPSULE, DELAYED RELEASE ORAL
Qty: 90 CAPSULE | Refills: 3 | OUTPATIENT
Start: 2024-07-25

## 2024-07-25 NOTE — TELEPHONE ENCOUNTER
No care due was identified.  Health [facility] Embedded Care Due Messages. Reference number: [id number].   7/25/2024 5:59:33 AM CDT

## 2024-07-25 NOTE — TELEPHONE ENCOUNTER
Refill Decision Note   Nicolasa Jurado  is requesting a refill authorization.  Brief Assessment and Rationale for Refill:  Quick Discontinue     Medication Therapy Plan:  discontinued on 4/1/2024 by Andrew Thorne MD;therapy change      Comments:     Note composed:1:04 PM 07/25/2024

## 2024-07-31 RX ORDER — GABAPENTIN 400 MG/1
CAPSULE ORAL
Qty: 60 CAPSULE | Refills: 0 | Status: SHIPPED | OUTPATIENT
Start: 2024-07-31

## 2024-07-31 NOTE — TELEPHONE ENCOUNTER
No care due was identified.  Health [facility] Embedded Care Due Messages. Reference number: [id number].   7/31/2024 3:18:01 PM CDT

## 2024-09-04 ENCOUNTER — OFFICE VISIT (OUTPATIENT)
Dept: INTERNAL MEDICINE | Facility: CLINIC | Age: 89
End: 2024-09-04
Payer: MEDICARE

## 2024-09-04 VITALS
HEIGHT: 60 IN | HEART RATE: 55 BPM | WEIGHT: 124.75 LBS | BODY MASS INDEX: 24.49 KG/M2 | DIASTOLIC BLOOD PRESSURE: 60 MMHG | TEMPERATURE: 98 F | OXYGEN SATURATION: 98 % | SYSTOLIC BLOOD PRESSURE: 125 MMHG

## 2024-09-04 DIAGNOSIS — M47.816 ARTHRITIS OF LUMBAR SPINE: ICD-10-CM

## 2024-09-04 DIAGNOSIS — L29.9 SCALP ITCH: ICD-10-CM

## 2024-09-04 DIAGNOSIS — I10 ESSENTIAL HYPERTENSION: Primary | Chronic | ICD-10-CM

## 2024-09-04 DIAGNOSIS — E78.00 PURE HYPERCHOLESTEROLEMIA: Chronic | ICD-10-CM

## 2024-09-04 DIAGNOSIS — R29.898 WEAKNESS OF BOTH LOWER EXTREMITIES: ICD-10-CM

## 2024-09-04 PROCEDURE — 99999 PR PBB SHADOW E&M-EST. PATIENT-LVL V: CPT | Mod: PBBFAC,HCNC,, | Performed by: INTERNAL MEDICINE

## 2024-09-04 RX ORDER — SELENIUM SULFIDE 22.5 MG/ML
1 SHAMPOO TOPICAL NIGHTLY PRN
Qty: 180 ML | Refills: 11 | Status: SHIPPED | OUTPATIENT
Start: 2024-09-04

## 2024-09-04 NOTE — PROGRESS NOTES
Subjective:       Patient ID: Nicolasa Jurado is a 89 y.o. female.    Chief Complaint: Follow-up    HPI    89-year-old female here for follow-up.  She gets weak and dizzy when she takes a shower.  If she does not hold herself, she will fall down.  She has issues from her hip down.  Her legs feel weak like they are going to collapse.  She has a shower chair.  She has to call her grandson to take her from her bathroom to her room.  She has a walker and uses it.  She has lower back pain when she stands up to cook.  She has to sit down.      Her head itches.      She has stuff that comes up that itches and burns on her skin.      HTN -  Patient is currently on Toprol- mg, Norvasc 10 mg, Micardis 80 mg, HCTZ 25 mg, clonidine 0.2 mg patch. She does check her BP at home, and it runs 120s/60s-170s/100s. Side effects of medications note: none. Denies headaches, blurred vision, chest pain, shortness of breath, nausea.    HLD - Patient is currently on Lipitor 80 mg.  Her last lipid panel was   Cholesterol   Date Value Ref Range Status   11/09/2023 139 120 - 199 mg/dL Final     Comment:     The National Cholesterol Education Program (NCEP) has set the  following guidelines (reference ranges) for Cholesterol:  Optimal.....................<200 mg/dL  Borderline High.............200-239 mg/dL  High........................> or = 240 mg/dL       Triglycerides   Date Value Ref Range Status   11/09/2023 196 (H) 30 - 150 mg/dL Final     Comment:     The National Cholesterol Education Program (NCEP) has set the  following guidelines (reference values) for triglycerides:  Normal......................<150 mg/dL  Borderline High.............150-199 mg/dL  High........................200-499 mg/dL       HDL   Date Value Ref Range Status   11/09/2023 35 (L) 40 - 75 mg/dL Final     Comment:     The National Cholesterol Education Program (NCEP) has set the  following guidelines (reference values) for HDL  Cholesterol:  Low...............<40 mg/dL  Optimal...........>60 mg/dL       LDL Cholesterol   Date Value Ref Range Status   11/09/2023 64.8 63.0 - 159.0 mg/dL Final     Comment:     The National Cholesterol Education Program (NCEP) has set the  following guidelines (reference values) for LDL Cholesterol:  Optimal.......................<130 mg/dL  Borderline High...............130-159 mg/dL  High..........................160-189 mg/dL  Very High.....................>190 mg/dL     .  Side effects of the medication: none.    Review of Systems      Objective:      Physical Exam  Vitals reviewed.   Constitutional:       Appearance: She is well-developed.   HENT:      Head: Normocephalic and atraumatic.      Mouth/Throat:      Pharynx: No oropharyngeal exudate.   Eyes:      General: No scleral icterus.        Right eye: No discharge.         Left eye: No discharge.      Pupils: Pupils are equal, round, and reactive to light.   Neck:      Thyroid: No thyromegaly.      Trachea: No tracheal deviation.   Cardiovascular:      Rate and Rhythm: Normal rate and regular rhythm.      Heart sounds: Normal heart sounds. No murmur heard.     No friction rub. No gallop.   Pulmonary:      Effort: Pulmonary effort is normal. No respiratory distress.      Breath sounds: Normal breath sounds. No wheezing or rales.   Chest:      Chest wall: No tenderness.   Abdominal:      General: Bowel sounds are normal. There is no distension.      Palpations: Abdomen is soft. There is no mass.      Tenderness: There is no abdominal tenderness. There is no guarding or rebound.   Musculoskeletal:         General: No tenderness. Normal range of motion.      Cervical back: Normal range of motion and neck supple.   Skin:     General: Skin is warm and dry.      Coloration: Skin is not pale.      Findings: No erythema or rash.   Neurological:      Mental Status: She is alert and oriented to person, place, and time.   Psychiatric:         Behavior: Behavior  normal.         Assessment:       1. Essential hypertension    2. Pure hypercholesterolemia    3. Weakness of both lower extremities  - Ambulatory referral/consult to Home Health; Future    4. Arthritis of lumbar spine  - Ambulatory referral/consult to Home Health; Future    5. Scalp itch  - selenium sulfide 2.25 % Sham; Apply 1 Application topically nightly as needed.  Dispense: 180 mL; Refill: 11      Plan:       1. Continue Toprol- mg, Norvasc 10 mg, Micardis 80 mg, HCTZ 25 mg, clonidine patch 0.2 mg p.o.   2.  Continue Lipitor 80 mg p.o.   3/4.  Refer to home health.    5. Selenium sulfide shampoo prescribed.

## 2024-09-05 ENCOUNTER — TELEPHONE (OUTPATIENT)
Dept: INTERNAL MEDICINE | Facility: CLINIC | Age: 89
End: 2024-09-05
Payer: MEDICARE

## 2024-09-05 DIAGNOSIS — M47.816 ARTHRITIS OF LUMBAR SPINE: ICD-10-CM

## 2024-09-05 DIAGNOSIS — R29.898 WEAKNESS OF BOTH LOWER EXTREMITIES: Primary | ICD-10-CM

## 2024-09-05 NOTE — TELEPHONE ENCOUNTER
----- Message from Jared Malik MA sent at 9/5/2024  4:29 PM CDT -----  Contact: [facility]/ [facility] rosas    ----- Message -----  From: Anika Denis  Sent: 9/5/2024   4:11 PM CDT  To: Alek Conrad Staff    Type:Home Health(orders,updates,clarifications,etc)    Home Health Agency/Nurse: [facility]/ [facility]    Phone number: [phone]    Reason for call:    Comments: referral that was sent. Referral was missing order for home health physical therapy. Fax # [phone]. Ph # [phone]. Thank you.

## 2024-09-07 PROCEDURE — G0180 MD CERTIFICATION HHA PATIENT: HCPCS | Mod: ,,, | Performed by: INTERNAL MEDICINE

## 2024-09-17 ENCOUNTER — TELEPHONE (OUTPATIENT)
Dept: INTERNAL MEDICINE | Facility: CLINIC | Age: 89
End: 2024-09-17
Payer: MEDICARE

## 2024-10-01 ENCOUNTER — DOCUMENT SCAN (OUTPATIENT)
Dept: HOME HEALTH SERVICES | Facility: HOSPITAL | Age: 89
End: 2024-10-01
Payer: MEDICARE

## 2024-10-03 NOTE — TELEPHONE ENCOUNTER
No care due was identified.  Health [facility] Embedded Care Due Messages. Reference number: [id number].   10/03/2024 11:59:57 AM CDT

## 2024-10-04 RX ORDER — GABAPENTIN 400 MG/1
CAPSULE ORAL
Qty: 60 CAPSULE | Refills: 0 | Status: SHIPPED | OUTPATIENT
Start: 2024-10-04

## 2024-10-04 NOTE — TELEPHONE ENCOUNTER
Refill Routing Note   Medication(s) are not appropriate for processing by [facility] for the following reason(s):        Outside of protocol    [facility] action(s):  Route             Appointments  past 12m or future 3m with PCP    Date Provider   Last Visit   Visit date not found Chao Fisher MD   Next Visit   Visit date not found Chao Fisher MD   ED visits in past 90 days: 0        Note composed:9:19 PM 10/03/2024

## 2024-10-10 ENCOUNTER — EXTERNAL HOME HEALTH (OUTPATIENT)
Dept: HOME HEALTH SERVICES | Facility: HOSPITAL | Age: 89
End: 2024-10-10
Payer: MEDICARE

## 2024-11-04 DIAGNOSIS — N18.30 CHRONIC KIDNEY DISEASE, STAGE III (MODERATE): ICD-10-CM

## 2024-11-04 RX ORDER — CINACALCET 30 MG/1
TABLET, FILM COATED ORAL
Qty: 180 TABLET | Refills: 3 | Status: SHIPPED | OUTPATIENT
Start: 2024-11-04

## 2024-11-04 NOTE — TELEPHONE ENCOUNTER
Refill Routing Note   Medication(s) are not appropriate for processing by [facility] for the following reason(s):        Outside of protocol    [facility] action(s):  Route               Appointments  past 12m or future 3m with PCP    Date Provider   Last Visit   9/4/2024 Houston Gaston MD   Next Visit   12/2/2024 Houston Gaston MD   ED visits in past 90 days: 0        Note composed:3:16 PM 11/04/2024

## 2024-11-13 ENCOUNTER — DOCUMENT SCAN (OUTPATIENT)
Dept: HOME HEALTH SERVICES | Facility: HOSPITAL | Age: 89
End: 2024-11-13
Payer: MEDICARE

## 2024-12-02 ENCOUNTER — OFFICE VISIT (OUTPATIENT)
Dept: INTERNAL MEDICINE | Facility: CLINIC | Age: 89
End: 2024-12-02
Payer: MEDICARE

## 2024-12-02 ENCOUNTER — LAB VISIT (OUTPATIENT)
Dept: LAB | Facility: HOSPITAL | Age: 89
End: 2024-12-02
Attending: INTERNAL MEDICINE
Payer: MEDICARE

## 2024-12-02 VITALS
SYSTOLIC BLOOD PRESSURE: 120 MMHG | HEIGHT: 60 IN | OXYGEN SATURATION: 98 % | BODY MASS INDEX: 24.09 KG/M2 | DIASTOLIC BLOOD PRESSURE: 70 MMHG | TEMPERATURE: 98 F | HEART RATE: 61 BPM | WEIGHT: 122.69 LBS

## 2024-12-02 DIAGNOSIS — N18.4 CKD (CHRONIC KIDNEY DISEASE), STAGE IV: Chronic | ICD-10-CM

## 2024-12-02 DIAGNOSIS — B35.9 TINEA: ICD-10-CM

## 2024-12-02 DIAGNOSIS — I50.32 CHRONIC DIASTOLIC HEART FAILURE: Chronic | ICD-10-CM

## 2024-12-02 DIAGNOSIS — K55.1 MESENTERIC ARTERY STENOSIS: Chronic | ICD-10-CM

## 2024-12-02 DIAGNOSIS — Z00.00 ANNUAL PHYSICAL EXAM: Primary | ICD-10-CM

## 2024-12-02 DIAGNOSIS — I10 ESSENTIAL HYPERTENSION: Chronic | ICD-10-CM

## 2024-12-02 DIAGNOSIS — L82.1 SEBORRHEIC KERATOSES: ICD-10-CM

## 2024-12-02 DIAGNOSIS — I70.0 ATHEROSCLEROSIS OF AORTA: Chronic | ICD-10-CM

## 2024-12-02 DIAGNOSIS — I65.23 BILATERAL CAROTID ARTERY STENOSIS: Chronic | ICD-10-CM

## 2024-12-02 DIAGNOSIS — L85.3 DRY SKIN: ICD-10-CM

## 2024-12-02 DIAGNOSIS — E78.00 PURE HYPERCHOLESTEROLEMIA: Chronic | ICD-10-CM

## 2024-12-02 DIAGNOSIS — Z78.0 MENOPAUSE: ICD-10-CM

## 2024-12-02 DIAGNOSIS — G47.00 INSOMNIA, UNSPECIFIED TYPE: Chronic | ICD-10-CM

## 2024-12-02 DIAGNOSIS — M54.16 LUMBAR RADICULOPATHY: ICD-10-CM

## 2024-12-02 LAB
ALBUMIN SERPL BCP-MCNC: 4.1 G/DL (ref 3.5–5.2)
ALP SERPL-CCNC: 99 U/L (ref 40–150)
ALT SERPL W/O P-5'-P-CCNC: 15 U/L (ref 10–44)
ANION GAP SERPL CALC-SCNC: 14 MMOL/L (ref 8–16)
AST SERPL-CCNC: 23 U/L (ref 10–40)
BASOPHILS # BLD AUTO: 0.03 K/UL (ref 0–0.2)
BASOPHILS NFR BLD: 0.4 % (ref 0–1.9)
BILIRUB SERPL-MCNC: 0.4 MG/DL (ref 0.1–1)
BUN SERPL-MCNC: 30 MG/DL (ref 10–30)
CALCIUM SERPL-MCNC: 9.2 MG/DL (ref 8.7–10.5)
CHLORIDE SERPL-SCNC: 98 MMOL/L (ref 95–110)
CHOLEST SERPL-MCNC: 158 MG/DL (ref 120–199)
CHOLEST/HDLC SERPL: 4.5 {RATIO} (ref 2–5)
CO2 SERPL-SCNC: 26 MMOL/L (ref 23–29)
CREAT SERPL-MCNC: 1.6 MG/DL (ref 0.5–1.4)
DIFFERENTIAL METHOD BLD: ABNORMAL
EOSINOPHIL # BLD AUTO: 0.1 K/UL (ref 0–0.5)
EOSINOPHIL NFR BLD: 1.7 % (ref 0–8)
ERYTHROCYTE [DISTWIDTH] IN BLOOD BY AUTOMATED COUNT: 14.4 % (ref 11.5–14.5)
EST. GFR  (NO RACE VARIABLE): 29.9 ML/MIN/1.73 M^2
GLUCOSE SERPL-MCNC: 101 MG/DL (ref 70–110)
HCT VFR BLD AUTO: 35.2 % (ref 37–48.5)
HDLC SERPL-MCNC: 35 MG/DL (ref 40–75)
HDLC SERPL: 22.2 % (ref 20–50)
HGB BLD-MCNC: 11 G/DL (ref 12–16)
IMM GRANULOCYTES # BLD AUTO: 0.03 K/UL (ref 0–0.04)
IMM GRANULOCYTES NFR BLD AUTO: 0.4 % (ref 0–0.5)
LDLC SERPL CALC-MCNC: 76.4 MG/DL (ref 63–159)
LYMPHOCYTES # BLD AUTO: 2.1 K/UL (ref 1–4.8)
LYMPHOCYTES NFR BLD: 28 % (ref 18–48)
MCH RBC QN AUTO: 27 PG (ref 27–31)
MCHC RBC AUTO-ENTMCNC: 31.3 G/DL (ref 32–36)
MCV RBC AUTO: 86 FL (ref 82–98)
MONOCYTES # BLD AUTO: 0.7 K/UL (ref 0.3–1)
MONOCYTES NFR BLD: 9.5 % (ref 4–15)
NEUTROPHILS # BLD AUTO: 4.6 K/UL (ref 1.8–7.7)
NEUTROPHILS NFR BLD: 60 % (ref 38–73)
NONHDLC SERPL-MCNC: 123 MG/DL
NRBC BLD-RTO: 0 /100 WBC
PLATELET # BLD AUTO: 181 K/UL (ref 150–450)
PMV BLD AUTO: 12.4 FL (ref 9.2–12.9)
POTASSIUM SERPL-SCNC: 3.9 MMOL/L (ref 3.5–5.1)
PROT SERPL-MCNC: 7.7 G/DL (ref 6–8.4)
RBC # BLD AUTO: 4.08 M/UL (ref 4–5.4)
SODIUM SERPL-SCNC: 138 MMOL/L (ref 136–145)
TRIGL SERPL-MCNC: 233 MG/DL (ref 30–150)
TSH SERPL DL<=0.005 MIU/L-ACNC: 1.98 UIU/ML (ref 0.4–4)
WBC # BLD AUTO: 7.61 K/UL (ref 3.9–12.7)

## 2024-12-02 PROCEDURE — 1101F PT FALLS ASSESS-DOCD LE1/YR: CPT | Mod: HCNC,CPTII,S$GLB, | Performed by: INTERNAL MEDICINE

## 2024-12-02 PROCEDURE — 99999 PR PBB SHADOW E&M-EST. PATIENT-LVL V: CPT | Mod: PBBFAC,HCNC,, | Performed by: INTERNAL MEDICINE

## 2024-12-02 PROCEDURE — 36415 COLL VENOUS BLD VENIPUNCTURE: CPT | Mod: HCNC,PO | Performed by: INTERNAL MEDICINE

## 2024-12-02 PROCEDURE — 99397 PER PM REEVAL EST PAT 65+ YR: CPT | Mod: HCNC,S$GLB,, | Performed by: INTERNAL MEDICINE

## 2024-12-02 PROCEDURE — 80053 COMPREHEN METABOLIC PANEL: CPT | Mod: HCNC | Performed by: INTERNAL MEDICINE

## 2024-12-02 PROCEDURE — 1126F AMNT PAIN NOTED NONE PRSNT: CPT | Mod: HCNC,CPTII,S$GLB, | Performed by: INTERNAL MEDICINE

## 2024-12-02 PROCEDURE — 1159F MED LIST DOCD IN RCRD: CPT | Mod: HCNC,CPTII,S$GLB, | Performed by: INTERNAL MEDICINE

## 2024-12-02 PROCEDURE — 1160F RVW MEDS BY RX/DR IN RCRD: CPT | Mod: HCNC,CPTII,S$GLB, | Performed by: INTERNAL MEDICINE

## 2024-12-02 PROCEDURE — 85025 COMPLETE CBC W/AUTO DIFF WBC: CPT | Mod: HCNC | Performed by: INTERNAL MEDICINE

## 2024-12-02 PROCEDURE — 1157F ADVNC CARE PLAN IN RCRD: CPT | Mod: HCNC,CPTII,S$GLB, | Performed by: INTERNAL MEDICINE

## 2024-12-02 PROCEDURE — 80061 LIPID PANEL: CPT | Mod: HCNC | Performed by: INTERNAL MEDICINE

## 2024-12-02 PROCEDURE — 84443 ASSAY THYROID STIM HORMONE: CPT | Mod: HCNC | Performed by: INTERNAL MEDICINE

## 2024-12-02 PROCEDURE — 3288F FALL RISK ASSESSMENT DOCD: CPT | Mod: HCNC,CPTII,S$GLB, | Performed by: INTERNAL MEDICINE

## 2024-12-02 RX ORDER — GABAPENTIN 400 MG/1
400 CAPSULE ORAL 3 TIMES DAILY
Qty: 270 CAPSULE | Refills: 0 | Status: SHIPPED | OUTPATIENT
Start: 2024-12-02

## 2024-12-02 RX ORDER — KETOCONAZOLE 20 MG/G
CREAM TOPICAL DAILY
Qty: 60 G | Refills: 0 | Status: SHIPPED | OUTPATIENT
Start: 2024-12-02

## 2024-12-02 NOTE — PROGRESS NOTES
Assessment:       1. Annual physical exam    2. Essential hypertension  - CBC Auto Differential; Future  - Comprehensive Metabolic Panel; Future  - TSH; Future  - Lipid Panel; Future    3. Pure hypercholesterolemia    4. Atherosclerosis of aorta    5. Chronic diastolic heart failure    6. Bilateral carotid artery stenosis    7. CKD (chronic kidney disease), stage IV    8. Insomnia, unspecified type    9. Mesenteric artery stenosis    10. Menopause    11. Lumbar radiculopathy  - gabapentin (NEURONTIN) 400 MG capsule; Take 1 capsule (400 mg total) by mouth 3 (three) times daily.  Dispense: 270 capsule; Refill: 0    12. Seborrheic keratoses    13. Dry skin    14. Tinea  - ketoconazole (NIZORAL) 2 % cream; Apply topically once daily.  Dispense: 60 g; Refill: 0        Plan:       1. Check CBC, CMP, TSH, lipids.  Discussed diet and exercise.  Discussed vaccines.  2. You continue amlodipine 10 mg, HCTZ 25 mg, Toprol- mg, Micardis 80 mg p.o.   3/4. Continue Lipitor 80 mg.    5. Continue to monitor.    6. Continue Lipitor 80 mg.    7. Check CMP.    8. Monitor   9. This is continue Lipitor 80 mg.  10. Discussed bone density and possible treatments.  Declined at this time.  11.  Increase gabapentin to 400 mg 3 times daily.  Not currently having side effects.    12.  Benign.    13. Recommend moisturizer.    14.  Ketoconazole ointment.    Assessment & Plan    IMPRESSION:  1. Assessed annual exam for 89-year-old female with multiple chronic conditions  2. Evaluated hypertension management; blood pressure readings variable, ranging from /60 systolic  3. Deferred bone density testing due to patient preference and uncertain utility of results  4. Diagnosed seborrheic keratosis on back  5. Identified tinea versicolor rash; prescribed topical ketoconazole  6. Assessed neuropathic pain; decided to increase gabapentin dosage for better symptom control    SEBORRHEIC KERATOSIS:   Explained seborrheic keratosis as a benign,  hereditary skin condition.   Ms. Jurado to take photo of skin lesion when it starts to bother them, before picking at it.    POLYNEUROPATHY:   Discussed neuropathy symptoms and their relation to possible arthritis in lower back.   Increased gabapentin from 400 mg twice daily to 400 mg 3 times daily for neuropathic pain.    OSTEOPOROSIS (IMPLIED FROM BONE DENSITY DISCUSSION):   Provided information on bone density testing and potential treatment options (Boniva, Fosamax).    DRY SKIN:   Ms. Jurado to use moisturizer regularly for dry skin.    TINEA VERSICOLOR:   Started ketoconazole (topical) for tinea versicolor rash.    HYPERTENSION:   Continued amlodipine 10 mg, HCTZ 25 mg, Topamax 100 mg, Carvedilol 80 mg for hypertension.    HYPERLIPIDEMIA AND VASCULAR DISORDERS:   Continued Lipitor 80 mg for hyperlipidemia, bilateral carotid artery stenosis, and mesenteric arteriosclerosis.    GENERAL HEALTH MONITORING:   Ordered CBC, CMP, TSH, and lipid panel.    IMMUNIZATION:   Flu shot administered in office.    FOLLOW-UP:   Follow up in 3-4 months.                 This note was generated with the assistance of ambient listening technology. Verbal consent was obtained by the patient and accompanying visitor(s) for the recording of patient appointment to facilitate this note. I attest to having reviewed and edited the generated note for accuracy, though some syntax or spelling errors may persist. Please contact the author of this note for any clarification.       Subjective:       Patient ID: Nicolasa Jurado is a 89 y.o. female.    Chief Complaint: Annual Exam    HPI    89 y.o. female here for annual exam.     Cholesterol: needs  Vaccines: Influenza - 2023; Tetanus - 2020; Prevnar 20 - 13 and 23 done; Zoster - 1 done; COVID - 4 done  Eye exam: UTD - done last month.  Mammogram:  No longer indicated (patient agrees)  Gyn exam: No longer indicated (patient agrees)  Colonoscopy: No longer indicated (patient agrees)  DEXA:   "2019, osteopenia     Exercise: no regular exercise.  Diet: fast food and home cooked    History of Present Illness    CHIEF COMPLAINT:  89-year-old female presents today for her annual exam.    CARDIOVASCULAR:  She reports no new symptoms related to chronic diastolic heart failure, denying increased shortness of breath, fatigue, or edema. She denies changes in exercise tolerance or daily activities due to her heart condition. She is being monitored for bilateral carotid artery stenosis. Her blood pressure readings vary, with occasional high readings, including a recent measurement of 180/60. Her blood pressure tends to be lower in the mornings, typically ranging from 100s to 120s. She reports a blood pressure reading of 197 systolic from the previous night, but denies any associated symptoms.    MEDICATIONS:  She is currently taking amlodipine 10 mg, hydrochlorothiazide 25 mg, topiramate 100 mg, and carvedilol 80 mg for blood pressure management. She takes Lipitor 80 mg for hyperlipidemia and mesenteric arteriosclerosis management. She is also taking gabapentin for nerve pain management and denies any side effects.    CHRONIC KIDNEY DISEASE:  She has chronic kidney disease stage 4.    SLEEP:  She reports persistent insomnia, experiencing difficulty falling asleep and describing nighttime as "horrible." She denies daytime drowsiness. She stays in her darkened room due to inability to engage in activities. She notes some improvement in sleep with previously prescribed pain medication, but does not take it consistently.    NEUROPATHY:  She experiences neuropathy symptoms, characterized by heat and pain in her toes nightly. She describes the sensation as intense, stating it feels like she wants to "cut her toes." The symptoms are particularly bothersome when she keeps her legs in bed for extended periods.    MUSCULOSKELETAL:  She reports persistent pain under her ribs since a fall last year, describing it as feeling " "like she has "something in there." The pain is occasionally severe and has not resolved. It is exacerbated by touching the area. Other injuries from the fall, such as those to her elbow and shoulders, have improved. She expresses dissatisfaction with therapy, stating it has not been helpful in alleviating her symptoms.    SKIN:  She reports an itchy back associated with seborrheic keratosis.    DIET AND EXERCISE:  Her diet consists of a combination of fast food and home-cooked meals. She reports limited exercise due to staying in her room, expressing uncertainty about activities to engage in. She acknowledges being advised to exercise but indicates difficulty due to discomfort in her legs and toes.    SOCIAL HISTORY:  She denies alcohol consumption, drug use, and smoking.    OTHER HEALTH CONCERNS:  She reports her hearing is declining. Her last bone density scan was performed in 2019.      ROS:  Constitutional: -fatigue  ENT: +hearing loss  Respiratory: -shortness of breath  Musculoskeletal: -muscle pain  Integumentary: +itching  Psychiatric: +sleep difficulty         Review of Systems          Objective:      Physical Exam  Vitals reviewed.   Constitutional:       Appearance: She is well-developed.   HENT:      Head: Normocephalic and atraumatic.      Mouth/Throat:      Pharynx: No oropharyngeal exudate.   Eyes:      General: No scleral icterus.        Right eye: No discharge.         Left eye: No discharge.      Pupils: Pupils are equal, round, and reactive to light.   Neck:      Thyroid: No thyromegaly.      Trachea: No tracheal deviation.   Cardiovascular:      Rate and Rhythm: Normal rate and regular rhythm.      Heart sounds: Normal heart sounds. No murmur heard.     No friction rub. No gallop.   Pulmonary:      Effort: Pulmonary effort is normal. No respiratory distress.      Breath sounds: Normal breath sounds. No wheezing or rales.   Chest:      Chest wall: No tenderness.   Abdominal:      General: Bowel " sounds are normal. There is no distension.      Palpations: Abdomen is soft. There is no mass.      Tenderness: There is no abdominal tenderness. There is no guarding or rebound.   Musculoskeletal:         General: No tenderness. Normal range of motion.      Cervical back: Normal range of motion and neck supple.   Skin:     General: Skin is warm and dry.      Coloration: Skin is not pale.      Findings: No erythema or rash.   Neurological:      Mental Status: She is alert and oriented to person, place, and time.   Psychiatric:         Behavior: Behavior normal.

## 2024-12-25 NOTE — TELEPHONE ENCOUNTER
No care due was identified.  Health [facility] Embedded Care Due Messages. Reference number: [id number].   12/25/2024 5:57:20 AM CST

## 2024-12-26 RX ORDER — CLONIDINE 0.2 MG/24H
PATCH, EXTENDED RELEASE TRANSDERMAL
Qty: 12 PATCH | Refills: 3 | Status: SHIPPED | OUTPATIENT
Start: 2024-12-26

## 2024-12-26 NOTE — TELEPHONE ENCOUNTER
Refill Routing Note   Medication(s) are not appropriate for processing by [facility] for the following reason(s):        Outside of protocol    [facility] action(s):  Route             Appointments  past 12m or future 3m with PCP    Date Provider   Last Visit   12/2/2024 Houston Gaston MD   Next Visit   4/1/2025 Houston Gaston MD   ED visits in past 90 days: 0        Note composed:9:29 AM 12/26/2024

## 2025-01-04 DIAGNOSIS — I10 ESSENTIAL HYPERTENSION: Chronic | ICD-10-CM

## 2025-01-04 NOTE — TELEPHONE ENCOUNTER
No care due was identified.  Health [facility] Embedded Care Due Messages. Reference number: [id number].   1/04/2025 4:16:15 PM CST

## 2025-01-05 RX ORDER — AMLODIPINE BESYLATE 10 MG/1
10 TABLET ORAL
Qty: 90 TABLET | OUTPATIENT
Start: 2025-01-05

## 2025-01-06 NOTE — TELEPHONE ENCOUNTER
Refill Decision Note   Nicolasa Jurado  is requesting a refill authorization.  Brief Assessment and Rationale for Refill:  Quick Discontinue     Medication Therapy Plan:  Dose adjustment 2/15/24      Comments:     Note composed:10:23 PM 01/05/2025

## 2025-02-11 RX ORDER — METOPROLOL SUCCINATE 100 MG/1
TABLET, EXTENDED RELEASE ORAL
Qty: 90 TABLET | Refills: 3 | Status: SHIPPED | OUTPATIENT
Start: 2025-02-11

## 2025-02-11 NOTE — TELEPHONE ENCOUNTER
No care due was identified.  [facility] Embedded Care Due Messages. Reference number: [id number].   2/11/2025 1:28:31 PM CST

## 2025-02-12 NOTE — TELEPHONE ENCOUNTER
Refill Decision Note   Nicolasa Adrien  is requesting a refill authorization.  Brief Assessment and Rationale for Refill:  Approve     Medication Therapy Plan:         Comments:     Note composed:8:16 PM 02/11/2025

## 2025-02-16 DIAGNOSIS — I10 ESSENTIAL HYPERTENSION: Chronic | ICD-10-CM

## 2025-02-17 RX ORDER — HYDROCHLOROTHIAZIDE 25 MG/1
25 TABLET ORAL
Qty: 30 TABLET | Refills: 11 | Status: SHIPPED | OUTPATIENT
Start: 2025-02-17

## 2025-02-21 DIAGNOSIS — Z00.00 ENCOUNTER FOR MEDICARE ANNUAL WELLNESS EXAM: ICD-10-CM

## 2025-03-07 DIAGNOSIS — M54.16 LUMBAR RADICULOPATHY: ICD-10-CM

## 2025-03-07 RX ORDER — RABEPRAZOLE SODIUM 20 MG/1
TABLET, DELAYED RELEASE ORAL
Qty: 30 TABLET | Refills: 11 | Status: SHIPPED | OUTPATIENT
Start: 2025-03-07

## 2025-03-07 RX ORDER — GABAPENTIN 400 MG/1
400 CAPSULE ORAL 3 TIMES DAILY
Qty: 270 CAPSULE | Refills: 0 | Status: SHIPPED | OUTPATIENT
Start: 2025-03-07

## 2025-03-07 NOTE — TELEPHONE ENCOUNTER
No care due was identified.  [facility] Embedded Care Due Messages. Reference number: [id number].   3/07/2025 5:53:49 AM CST

## 2025-03-10 DIAGNOSIS — I10 ESSENTIAL HYPERTENSION: Chronic | ICD-10-CM

## 2025-03-10 RX ORDER — TELMISARTAN 80 MG/1
80 TABLET ORAL DAILY
Qty: 90 TABLET | Refills: 3 | Status: SHIPPED | OUTPATIENT
Start: 2025-03-10

## 2025-03-12 ENCOUNTER — TELEPHONE (OUTPATIENT)
Dept: GASTROENTEROLOGY | Facility: CLINIC | Age: OVER 89
End: 2025-03-12
Payer: MEDICARE

## 2025-03-12 NOTE — TELEPHONE ENCOUNTER
----- Message from Andrew Thorne MD sent at 3/7/2025  8:48 AM CST -----  Please schedule a follow up with me. A virtual visit is fine.

## 2025-03-13 DIAGNOSIS — I10 ESSENTIAL HYPERTENSION: Chronic | ICD-10-CM

## 2025-03-13 RX ORDER — AMLODIPINE BESYLATE 10 MG/1
5 TABLET ORAL NIGHTLY
Qty: 45 TABLET | Refills: 3 | Status: SHIPPED | OUTPATIENT
Start: 2025-03-13 | End: 2027-03-03

## 2025-03-24 ENCOUNTER — PATIENT MESSAGE (OUTPATIENT)
Dept: INTERNAL MEDICINE | Facility: CLINIC | Age: OVER 89
End: 2025-03-24
Payer: MEDICARE

## 2025-04-01 ENCOUNTER — OFFICE VISIT (OUTPATIENT)
Dept: INTERNAL MEDICINE | Facility: CLINIC | Age: OVER 89
End: 2025-04-01
Payer: MEDICARE

## 2025-04-01 VITALS
HEART RATE: 60 BPM | HEIGHT: 60 IN | SYSTOLIC BLOOD PRESSURE: 162 MMHG | WEIGHT: 124.69 LBS | TEMPERATURE: 97 F | DIASTOLIC BLOOD PRESSURE: 86 MMHG | OXYGEN SATURATION: 94 % | BODY MASS INDEX: 24.48 KG/M2

## 2025-04-01 DIAGNOSIS — I50.32 CHRONIC DIASTOLIC HEART FAILURE: Chronic | ICD-10-CM

## 2025-04-01 DIAGNOSIS — I65.23 BILATERAL CAROTID ARTERY STENOSIS: Chronic | ICD-10-CM

## 2025-04-01 DIAGNOSIS — G62.9 NEUROPATHY: ICD-10-CM
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
## 2025-04-01 DIAGNOSIS — N18.4 ANEMIA DUE TO STAGE 4 CHRONIC KIDNEY DISEASE: Chronic | ICD-10-CM

## 2025-04-01 DIAGNOSIS — I70.1 RENAL ARTERY STENOSIS: Chronic | ICD-10-CM

## 2025-04-01 DIAGNOSIS — D63.1 ANEMIA DUE TO STAGE 4 CHRONIC KIDNEY DISEASE: Chronic | ICD-10-CM

## 2025-04-01 DIAGNOSIS — E78.00 PURE HYPERCHOLESTEROLEMIA: Chronic | ICD-10-CM

## 2025-04-01 DIAGNOSIS — R09.81 SINUS CONGESTION: ICD-10-CM

## 2025-04-01 DIAGNOSIS — N18.4 CKD (CHRONIC KIDNEY DISEASE), STAGE IV: Chronic | ICD-10-CM

## 2025-04-01 DIAGNOSIS — I70.0 ATHEROSCLEROSIS OF AORTA: Chronic | ICD-10-CM
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
## 2025-04-01 DIAGNOSIS — I10 ESSENTIAL HYPERTENSION: Chronic | ICD-10-CM

## 2025-04-01 DIAGNOSIS — S52.501D CLOSED FRACTURE OF DISTAL END OF RIGHT RADIUS WITH ROUTINE HEALING, UNSPECIFIED FRACTURE MORPHOLOGY, SUBSEQUENT ENCOUNTER: ICD-10-CM

## 2025-04-01 DIAGNOSIS — M54.16 LUMBAR RADICULOPATHY: ICD-10-CM

## 2025-04-01 DIAGNOSIS — R10.11 CHRONIC RUQ PAIN: ICD-10-CM

## 2025-04-01 DIAGNOSIS — S02.92XD CLOSED FRACTURE OF FACE BONES DUE TO FALL WITH ROUTINE HEALING, SUBSEQUENT ENCOUNTER: ICD-10-CM

## 2025-04-01 DIAGNOSIS — I25.10 CORONARY ARTERY DISEASE INVOLVING NATIVE CORONARY ARTERY OF NATIVE HEART WITHOUT ANGINA PECTORIS: Chronic | ICD-10-CM

## 2025-04-01 DIAGNOSIS — G89.29 CHRONIC RUQ PAIN: ICD-10-CM

## 2025-04-01 DIAGNOSIS — I10 ESSENTIAL HYPERTENSION: Primary | Chronic | ICD-10-CM

## 2025-04-01 DIAGNOSIS — R29.898 RIGHT HAND WEAKNESS: ICD-10-CM

## 2025-04-01 PROCEDURE — 99999 PR PBB SHADOW E&M-EST. PATIENT-LVL IV: CPT | Mod: PBBFAC,HCNC,, | Performed by: INTERNAL MEDICINE

## 2025-04-01 RX ORDER — GABAPENTIN 400 MG/1
CAPSULE ORAL
Qty: 360 CAPSULE | Refills: 0 | Status: SHIPPED | OUTPATIENT
Start: 2025-04-01

## 2025-04-01 RX ORDER — AMLODIPINE BESYLATE 5 MG/1
5 TABLET ORAL NIGHTLY
Qty: 90 TABLET | Refills: 3 | Status: SHIPPED | OUTPATIENT
Start: 2025-04-01 | End: 2027-03-22

## 2025-04-01 RX ORDER — FLUTICASONE PROPIONATE 50 MCG
1 SPRAY, SUSPENSION (ML) NASAL DAILY
Qty: 16 G | Refills: 0 | Status: SHIPPED | OUTPATIENT
Start: 2025-04-01

## 2025-04-01 NOTE — PROGRESS NOTES
Assessment:       1. Essential hypertension  - amLODIPine (NORVASC) 5 MG tablet; Take 1 tablet (5 mg total) by mouth every evening.  Dispense: 90 tablet; Refill: 3    2. Pure hypercholesterolemia    3. Atherosclerosis of aorta    4. Bilateral carotid artery stenosis    5. Coronary artery disease involving native coronary artery of native heart without angina pectoris    6. Chronic diastolic heart failure    7. Renal artery stenosis    8. CKD (chronic kidney disease), stage IV    9. Anemia due to stage 4 chronic kidney disease    10. Neuropathy    11. Essential hypertension  - amLODIPine (NORVASC) 5 MG tablet; Take 1 tablet (5 mg total) by mouth every evening.  Dispense: 90 tablet; Refill: 3    12. Fall, subsequent encounter    13. Closed fracture of face bones due to fall with routine healing, subsequent encounter    14. Closed fracture of distal end of right radius with routine healing, unspecified fracture morphology, subsequent encounter    15. Right hand weakness    16. Sinus congestion  - fluticasone propionate (FLONASE) 50 mcg/actuation nasal spray; 1 spray (50 mcg total) by Each Nostril route once daily.  Dispense: 16 g; Refill: 0    17. Lumbar radiculopathy  - gabapentin (NEURONTIN) 400 MG capsule; Take 400 mg am, 400 mg noon, and 800 mg night  Dispense: 360 capsule; Refill: 0    18. Chronic RUQ pain        Plan:       1/11. Continue Norvasc 10 mg, HCTZ 25 mg, Toprol- mg, Micardis 80 mg.  2/3/4.  Continue Lipitor 80 mg  5.  Continue aspirin 81 mg, Toprol- mg, Micardis 80 mg, Lipitor 80 mg   6. Monitor.    7.  Monitor   8. Monitor CMP.    9.  Monitor CBC.    10/17.  Increase gabapentin to 400 mg a.m., 400 mg noon, 800 mg at night  12/13/14.  Healing.  Follow-up with orthopedics.  15. Recommend patient follow up with occupational therapy.  16. Flonase refilled.  18. This could be related to her hiatal hernia given it is a sensation of fullness, but patient has no weight loss or decreased appetite  to go with this.    Deep Scribe:  IMPRESSION:  1. Adjusted antihypertensive regimen due to recent episodes of hypotension, particularly in the mornings, decreased amlodipine from 10 mg to 5 mg daily.  2. Considered recent fall and fracture when modifying medication dosages.  3. Evaluated abdominal discomfort, ruling out acute inflammatory process.  4. Assessed neuropathic pain in feet and adjusted medication accordingly, increased gabapentin to 2 tablets before bedtime for neuropathic pain relief.  5. Reviewed potential causes of abdominal fullness sensation, considering hiatal hernia as a possibility.    SUMMARY:   Decreased amlodipine dosage to 5 mg daily   Continue current medication regimen for heart failure and hypertension   Increased gabapentin to 2 tablets before bedtime for neuropathic pain   Consider referral to occupational therapy if symptoms persist   Regularly assess CMP and CBC   Follow up in 1 month for hand weakness and pain evaluation    MENINGIOMA:   Evaluated the patient's meningioma that was previously being considered for surgery.   Determined that it has not progressed, and surgery is not currently indicated.   Continue monitoring.    CHRONIC DIASTOLIC HEART FAILURE:   Continue current medication regimen for heart failure and hypertension, including hydrochlorothiazide 25 mg, metoprolol succinate 100 mg, and carvedilol 80 mg.   Decreased amlodipine dosage from 10 mg to 5 mg daily due to low blood pressure in the mornings.   Explained medication absorption and peak effects in relation to blood pressure fluctuations.   Instructed the patient to monitor blood pressure readings before and after taking medications, especially in the morning and evening.    CKD (CHRONIC KIDNEY DISEASE), STAGE IV:   Continue monitoring chronic kidney disease stage 4.   Regularly assess CMP and CBC.    NEUROPATHY:   Discussed neuropathy and its manifestations, including burning sensation in feet and muscle twitches.    Increased gabapentin to 2 tablets before bedtime for neuropathic pain relief.    FOLLOW-UP:   Follow up in 1 month for hand weakness and pain evaluation.   Consider referral to occupational therapy if symptoms persist.                 This note was generated with the assistance of ambient listening technology. Verbal consent was obtained by the patient and accompanying visitor(s) for the recording of patient appointment to facilitate this note. I attest to having reviewed and edited the generated note for accuracy, though some syntax or spelling errors may persist. Please contact the author of this note for any clarification.       Subjective:       Patient ID: Nicolasa Jurado is a 89 y.o. female.    Chief Complaint: Follow-up    HPI    89-year-old female here for follow-up.    Patient has hypertension on Norvasc 10 mg, HCTZ 25 mg, Toprol- mg, Micardis 80 mg.    Patient has hypercholesterolemia, aortic atherosclerosis, bilateral carotid artery stenosis on Lipitor 80 mg.    Patient has coronary artery disease on aspirin 81 mg, Lipitor 80 mg, Toprol- mg, Micardis 80 mg.    Patient has renal artery stenosis that is being monitored.    Patient has chronic diastolic heart failure on no current medication.    Patient has anemia of CKD stage 4 that is being monitored with CMP.    Patient has neuropathy on gabapentin 400 mg 3 times daily.    History of Present Illness    CHIEF COMPLAINT:  Ms. Jurado presents today for follow-up of multiple chronic conditions    HYPERTENSION:  She reports recent blood pressure fluctuations with concerning nighttime elevations. Blood pressure readings vary from 120 during daytime to 170 in morning and 240 at night. She ran out of amlodipine approximately one week ago. Prior to discontinuation, her blood pressure was well-controlled with readings around 100.    RECENT INJURIES FROM FALL:  Eight weeks ago, she fell while attempting to reach her walker from an elevated bed. The  fall resulted in multiple injuries including an arm fracture, cracked orbital bone, and intracranial hemorrhage. While brain surgery was initially considered, it was not performed. She reports current mild pain and swelling around the orbital area, and difficulty grasping objects such as coffee cups. The arm fracture is healing well without surgical intervention.    NEUROPATHY:  She experiences burning heat sensation in feet from knees down with involuntary toe movements, interfering with sleep despite cold ambient temperature. Symptoms have improved since increasing gabapentin dosage at bedtime. She notes previous hesitancy to increase gabapentin dosing had resulted in continued pain symptoms.    GASTROINTESTINAL:  She reports abdominal fullness described as feeling like a balloon. She denies pain but notes persistent discomfort that interferes with activities. No sharp or shooting pain with palpation.    MEDICATIONS:  She takes medications on a schedule with morning doses at 9 AM, cholesterol medication at midday, and evening doses at 9 PM. She recently increased from single dose to two tablets of gabapentin at bedtime.      ROS:  Head: +facial swelling, +facial pain  Cardiovascular: +orthostatic symptoms  Gastrointestinal: +abdominal distention  Neurological: +weakness, +involuntary movements, +burning sensation         Review of Systems          Objective:      Physical Exam  Vitals reviewed.   Constitutional:       Appearance: She is well-developed.   HENT:      Head: Normocephalic and atraumatic.      Mouth/Throat:      Pharynx: No oropharyngeal exudate.   Eyes:      General: No scleral icterus.        Right eye: No discharge.         Left eye: No discharge.      Pupils: Pupils are equal, round, and reactive to light.   Neck:      Thyroid: No thyromegaly.      Trachea: No tracheal deviation.   Cardiovascular:      Rate and Rhythm: Normal rate and regular rhythm.      Heart sounds: Normal heart sounds. No  murmur heard.     No friction rub. No gallop.   Pulmonary:      Effort: Pulmonary effort is normal. No respiratory distress.      Breath sounds: Normal breath sounds. No wheezing or rales.   Chest:      Chest wall: No tenderness.   Abdominal:      General: Bowel sounds are normal. There is no distension.      Palpations: Abdomen is soft. There is no mass.      Tenderness: There is no abdominal tenderness. There is no guarding or rebound.   Musculoskeletal:         General: No tenderness. Normal range of motion.      Cervical back: Normal range of motion and neck supple.   Skin:     General: Skin is warm and dry.      Coloration: Skin is not pale.      Findings: No erythema or rash.   Neurological:      Mental Status: She is alert and oriented to person, place, and time.   Psychiatric:         Behavior: Behavior normal.

## 2025-06-08 NOTE — PROGRESS NOTES
Digital Medicine: Clinician Follow-Up    The history is provided by the patient.     Follow Up  Follow-up reason(s): reading review      Medication Change: alternative therapy  Patient and son spoke to health , Radha, who alerted me of hypotensive events and symptoms. Had call transferred to me to discuss.     Patient reports poor sleep. States some nights she sleeps 1 hour.   Nicolasa explains she has used clonazepam to help with sleep in the past, but is out and has not been able to obtain a refill. Sees primary care next week.   On days when she has poor sleep, patient states her BP is labile (desribes it as going up too high and coming down too low). This is noted on submitted readings.     Explained to patient the correlation between poor sleep and elevated BP. Explained to patient that she is used to her BP being elevated, and this is her norm. As we bring it down with medication changes, she is likely to feel this change and should be mindful when changing positions and be mindful to maintain adequate hydration.     BP remains near goal of < 140/90 mmHg, but SBP remains slightly elevated. Some readings are emergent, but patient expresses no s/sx of hypertension.           Sleep Apnea  Patient not previously diagnosed with CAMERON and     Medication Affordability  Patient is currently not having problems affording medications    Medication Adherence:   She misses doses: never        INTERVENTION(S)  reviewed appropriate dose schedule, recommended diet modifications, recommended med change and encouragement/support    PLAN  patient verbalizes understanding, patient amenable to changes and additional monitoring needed    Will alter timing of amlodipine and dosing schedule - adjusted 10 mg tablet once a day to 5 mg tablets BID. Patient to take in AM and PM with carvedilol doses.     Patient was taking HCTZ QOD as she states she was directed tod o this by another physician, but also states Dr. Maxwell informed her to  take it daily.     Encouraged her to discuss sleep pattern with MD at appointment next week. Asked patient and son to contact me if hypotensive symptoms do not resolve or improve. Both stated understanding.     Will continue to monitor and will contact patient in the next few weeks for follow up.       There are no preventive care reminders to display for this patient.    Last 5 Patient Entered Readings                                      Current 30 Day Average: 142/67     Recent Readings 10/2/2019 10/2/2019 10/2/2019 10/1/2019 9/30/2019    SBP (mmHg) 119 183 167 123 149    DBP (mmHg) 56 83 73 58 72    Pulse 69 71 62 54 64             Hypertension Medications             carvedilol (COREG) 12.5 MG tablet Take 1 tablet (12.5 mg total) by mouth 2 (two) times daily with meals.    hydroCHLOROthiazide (HYDRODIURIL) 25 MG tablet Take 1 tablet (25 mg total) by mouth once daily.    nitroGLYCERIN (NITROSTAT) 0.4 MG SL tablet 1 Tablet Sublingual  One tablet under tounge as needed for chest pain.    telmisartan (MICARDIS) 80 MG Tab Take 1 tablet (80 mg total) by mouth once daily.                no

## 2025-07-03 ENCOUNTER — OFFICE VISIT (OUTPATIENT)
Dept: INTERNAL MEDICINE | Facility: CLINIC | Age: OVER 89
End: 2025-07-03
Payer: MEDICARE

## 2025-07-03 VITALS
BODY MASS INDEX: 23.81 KG/M2 | HEIGHT: 60 IN | SYSTOLIC BLOOD PRESSURE: 184 MMHG | DIASTOLIC BLOOD PRESSURE: 82 MMHG | WEIGHT: 121.25 LBS | TEMPERATURE: 97 F

## 2025-07-03 DIAGNOSIS — G89.29 CHRONIC RIGHT UPPER QUADRANT PAIN: ICD-10-CM

## 2025-07-03 DIAGNOSIS — I70.0 ATHEROSCLEROSIS OF AORTA: Chronic | ICD-10-CM

## 2025-07-03 DIAGNOSIS — I10 ESSENTIAL HYPERTENSION: Primary | Chronic | ICD-10-CM

## 2025-07-03 DIAGNOSIS — I65.23 BILATERAL CAROTID ARTERY STENOSIS: Chronic | ICD-10-CM

## 2025-07-03 DIAGNOSIS — H35.3290 EXUDATIVE AGE-RELATED MACULAR DEGENERATION, UNSPECIFIED LATERALITY, UNSPECIFIED STAGE: Chronic | ICD-10-CM

## 2025-07-03 DIAGNOSIS — H61.21 IMPACTED CERUMEN OF RIGHT EAR: ICD-10-CM

## 2025-07-03 DIAGNOSIS — E78.49 OTHER HYPERLIPIDEMIA: Chronic | ICD-10-CM

## 2025-07-03 DIAGNOSIS — I25.10 CORONARY ARTERY DISEASE INVOLVING NATIVE CORONARY ARTERY OF NATIVE HEART WITHOUT ANGINA PECTORIS: Chronic | ICD-10-CM

## 2025-07-03 DIAGNOSIS — D32.9 MENINGIOMA: ICD-10-CM

## 2025-07-03 DIAGNOSIS — G62.9 NEUROPATHY: Chronic | ICD-10-CM

## 2025-07-03 DIAGNOSIS — K55.1 MESENTERIC ARTERY STENOSIS: Chronic | ICD-10-CM

## 2025-07-03 DIAGNOSIS — R10.11 CHRONIC RIGHT UPPER QUADRANT PAIN: ICD-10-CM

## 2025-07-03 DIAGNOSIS — I50.32 CHRONIC DIASTOLIC HEART FAILURE: Chronic | ICD-10-CM

## 2025-07-03 DIAGNOSIS — I70.1 RENAL ARTERY STENOSIS: Chronic | ICD-10-CM

## 2025-07-03 DIAGNOSIS — H60.502 ACUTE OTITIS EXTERNA OF LEFT EAR, UNSPECIFIED TYPE: ICD-10-CM

## 2025-07-03 PROCEDURE — 1159F MED LIST DOCD IN RCRD: CPT | Mod: CPTII,HCNC,S$GLB, | Performed by: INTERNAL MEDICINE

## 2025-07-03 PROCEDURE — 1157F ADVNC CARE PLAN IN RCRD: CPT | Mod: CPTII,HCNC,S$GLB, | Performed by: INTERNAL MEDICINE

## 2025-07-03 PROCEDURE — G2211 COMPLEX E/M VISIT ADD ON: HCPCS | Mod: HCNC,S$GLB,, | Performed by: INTERNAL MEDICINE

## 2025-07-03 PROCEDURE — 3288F FALL RISK ASSESSMENT DOCD: CPT | Mod: CPTII,HCNC,S$GLB, | Performed by: INTERNAL MEDICINE

## 2025-07-03 PROCEDURE — 1160F RVW MEDS BY RX/DR IN RCRD: CPT | Mod: CPTII,HCNC,S$GLB, | Performed by: INTERNAL MEDICINE

## 2025-07-03 PROCEDURE — 1101F PT FALLS ASSESS-DOCD LE1/YR: CPT | Mod: CPTII,HCNC,S$GLB, | Performed by: INTERNAL MEDICINE

## 2025-07-03 PROCEDURE — 1125F AMNT PAIN NOTED PAIN PRSNT: CPT | Mod: CPTII,HCNC,S$GLB, | Performed by: INTERNAL MEDICINE

## 2025-07-03 PROCEDURE — 99999 PR PBB SHADOW E&M-EST. PATIENT-LVL V: CPT | Mod: PBBFAC,HCNC,, | Performed by: INTERNAL MEDICINE

## 2025-07-03 PROCEDURE — 99214 OFFICE O/P EST MOD 30 MIN: CPT | Mod: HCNC,S$GLB,, | Performed by: INTERNAL MEDICINE

## 2025-07-03 RX ORDER — CIPROFLOXACIN HYDROCHLORIDE 3 MG/ML
SOLUTION/ DROPS OPHTHALMIC
Qty: 10 ML | Refills: 0 | Status: SHIPPED | OUTPATIENT
Start: 2025-07-03

## 2025-07-03 NOTE — PROGRESS NOTES
Assessment:       1. Essential hypertension  - Ambulatory referral/consult to Cardiology; Future    2. Other hyperlipidemia    3. Atherosclerosis of aorta    4. Bilateral carotid artery stenosis    5. Coronary artery disease involving native coronary artery of native heart without angina pectoris    6. Chronic diastolic heart failure    7. Renal artery stenosis    8. Exudative age-related macular degeneration, unspecified laterality, unspecified stage    9. Neuropathy    10. Mesenteric artery stenosis    11. Meningioma    12. Acute otitis externa of left ear, unspecified type  - ciprofloxacin HCl (CILOXAN) 0.3 % ophthalmic solution; Apply 5 drops to the affected EAR twice daily for 1 week (not for ophthalmic use)  Dispense: 10 mL; Refill: 0    13. Impacted cerumen of right ear  - Ambulatory referral/consult to ENT; Future    14. Chronic right upper quadrant pain        Plan:       1. Continue Norvasc 5 mg, HCTZ 25 mg, Toprol- mg, Micardis 80 mg   2/3/4/5/7/10.  Continue Lipitor 80 mg   6. Monitor weight and swelling.    8.  Followed by Ophthalmology.    9. Continue gabapentin 400 mg 3 times daily   11.  Monitor.  12. Cipro drops prescribed twice daily for week.  13. Refer to ENT.    14. Think this is hiatal hernia.    Deep Scribe:  IMPRESSION:  1. Assessed HTN management, noting evening BP spikes up to 200 mmHg with morning lows of 120-130 mmHg.  2. Evaluated ear pain, observed swollen ear canal compared to the other side, likely due to infection.  3. Considered hiatal hernia as cause of abdominal discomfort, opted against surgical intervention due to advanced age (89 years).  4. Attributed nighttime leg heat sensation to neuropathic pain.    PLAN SUMMARY:   Increased gabapentin dosage: 400 mg morning, 400 mg noon, 800 mg night   Referred to new cardiologist for BP management   Referred to ENT for earwax cleaning   Started topical antibiotic ear drops, twice daily for 7 days    MENINGIOMA:   Explained that  tumor pain would not improve with vinegar and alcohol treatment, distinguishing it from the current ear condition.    CHRONIC DIASTOLIC HEART FAILURE:   Referred to new cardiologist (options mentioned: Dr. Mcdonald at [facility] or Dr. Aida Nguyen) for BP management.    ESSENTIAL HYPERTENSION:   Ms. Jurado to monitor BP, particularly noting evening increases, continuing current BP management strategy with second dose if evening readings are 160s-170s, but leaving alone if lower.   Referred to new cardiologist (options mentioned: Dr. Mcdonald at [facility] or Dr. Aida Nguyen) for BP management.    NEUROPATHY:   Increased gabapentin dosage to address nighttime leg discomfort: 400 mg in the morning, 400 mg at noon, 800 mg at night.    ACUTE OTITIS EXTERNA OF LEFT EAR, UNSPECIFIED TYPE:   Started topical antibiotic ear drops, to be used twice daily for 7 days.    IMPACTED CERUMEN OF RIGHT EAR:   Referred to ENT for cleaning of earwax in the other ear.                 This note was generated with the assistance of ambient listening technology. Verbal consent was obtained by the patient and accompanying visitor(s) for the recording of patient appointment to facilitate this note. I attest to having reviewed and edited the generated note for accuracy, though some syntax or spelling errors may persist. Please contact the author of this note for any clarification.       Subjective:       Patient ID: Nicolasa Jurado is a 89 y.o. female.    Chief Complaint: Follow-up (hypertension), Otalgia (Painful to the touch. Pain in the ear.), and Headache (Left side pain. Has a tumor on the right side. )    HPI    89 y.o. female here for follow-up.    Patient has hypertension on Norvasc 5 mg, HCTZ 25 mg, Toprol- mg, Micardis 80 mg.    Patient has hyperlipidemia, atherosclerosis of the aorta, bilateral carotid artery disease, coronary artery disease on Lipitor 80 mg, aspirin 81 mg     Patient has chronic diastolic heart failure that is being  "monitored with weights and swelling.    Patient has been artery stenosis on Lipitor 80 mg.    Patient has age-related macular degeneration that is being monitored by Ophthalmology.    Patient has neuropathy on gabapentin 400 mg 3 times daily.    Patient has mesenteric artery stenosis on Lipitor 80 mg     Patient has a meningioma that is being monitored.      History of Present Illness    CHIEF COMPLAINT:  Ms. Jurado presents today for follow-up    HYPERTENSION:  She reports fluctuating blood pressure readings with significant evening elevations reaching 200-220 mmHg, occasionally peaking at 240 mmHg. Morning readings are significantly lower, ranging between 121-130 mmHg. She takes hydrochlorothiazide and amlodipine in the morning, and occasionally takes an additional mg of amlodipine when evening blood pressure is elevated.    CARDIOVASCULAR HISTORY:  She has multiple cardiovascular conditions including hypertension, hyperlipidemia, atherosclerosis of the aorta, bilateral cardiac artery disease, coronary artery disease, and chronic diastolic heart failure. She is being monitored for renal artery stenosis and mesenteric artery stenosis.    EAR PAIN:  She reports two weeks of progressive right ear pain, described as steady and severe with tenderness that prevents touching the edge or inside of the ear. She notes slight improvement in pain prior to today's visit. She has tried home remedies including cold water, ice, and a mixture of alcohol and vinegar drops twice daily without significant relief. She denies prior history of ear problems or associated hearing difficulties.    HIATAL HERNIA:  She reports persistent symptoms including sensation of a "balloon" in her abdomen that restricts movement and makes it difficult to reach for objects. She experiences mechanical discomfort and restricted mobility but denies acute pain.    NEUROPATHY:  She experiences hot sensations in her legs at night causing significant sleep " disturbance and fatigue. She denies leg pain.    CURRENT MEDICATIONS:  Cardiovascular medications: Norvasc 5mg, Amlodipine 100mg, Carvedilol 6.25mg, Lipitor 20mg, Aspirin 81mg, Hydrochlorothiazide 25mg. Neuropathy management: Gabapentin 400mg in morning, 800mg at bedtime with variable adherence.    OTHER MEDICAL CONDITIONS:  She has age-related macular degeneration.      ROS:  ENT: +ear pain, +ear discharge  Cardiovascular: -chest pain  Neurological: +burning sensation, +difficulty falling asleep         Review of Systems          Objective:      Physical Exam  Vitals reviewed.   Constitutional:       Appearance: She is well-developed.   HENT:      Head: Normocephalic and atraumatic.      Right Ear: There is impacted cerumen.      Left Ear: Tympanic membrane normal. Tenderness present.      Mouth/Throat:      Pharynx: No oropharyngeal exudate.   Eyes:      General: No scleral icterus.        Right eye: No discharge.         Left eye: No discharge.      Pupils: Pupils are equal, round, and reactive to light.   Neck:      Thyroid: No thyromegaly.      Trachea: No tracheal deviation.   Cardiovascular:      Rate and Rhythm: Normal rate and regular rhythm.      Heart sounds: Normal heart sounds. No murmur heard.     No friction rub. No gallop.   Pulmonary:      Effort: Pulmonary effort is normal. No respiratory distress.      Breath sounds: Normal breath sounds. No wheezing or rales.   Chest:      Chest wall: No tenderness.   Abdominal:      General: Bowel sounds are normal. There is no distension.      Palpations: Abdomen is soft. There is no mass.      Tenderness: There is no abdominal tenderness. There is no guarding or rebound.   Musculoskeletal:         General: No tenderness. Normal range of motion.      Cervical back: Normal range of motion and neck supple.   Skin:     General: Skin is warm and dry.      Coloration: Skin is not pale.      Findings: No erythema or rash.   Neurological:      Mental Status: She is  alert and oriented to person, place, and time.   Psychiatric:         Behavior: Behavior normal.

## 2025-07-14 ENCOUNTER — TELEPHONE (OUTPATIENT)
Dept: CARDIOLOGY | Facility: CLINIC | Age: OVER 89
End: 2025-07-14
Payer: MEDICARE

## 2025-07-15 ENCOUNTER — TELEPHONE (OUTPATIENT)
Dept: CARDIOLOGY | Facility: CLINIC | Age: OVER 89
End: 2025-07-15
Payer: MEDICARE

## 2025-07-17 ENCOUNTER — OFFICE VISIT (OUTPATIENT)
Dept: CARDIOLOGY | Facility: CLINIC | Age: OVER 89
End: 2025-07-17
Payer: MEDICARE

## 2025-07-17 VITALS
OXYGEN SATURATION: 95 % | SYSTOLIC BLOOD PRESSURE: 197 MMHG | WEIGHT: 119.06 LBS | DIASTOLIC BLOOD PRESSURE: 95 MMHG | HEART RATE: 81 BPM | BODY MASS INDEX: 23.37 KG/M2 | HEIGHT: 60 IN

## 2025-07-17 DIAGNOSIS — I70.0 ATHEROSCLEROSIS OF AORTA: Primary | Chronic | ICD-10-CM

## 2025-07-17 DIAGNOSIS — E78.49 OTHER HYPERLIPIDEMIA: Chronic | ICD-10-CM

## 2025-07-17 DIAGNOSIS — I65.23 BILATERAL CAROTID ARTERY STENOSIS: Chronic | ICD-10-CM

## 2025-07-17 DIAGNOSIS — I10 ESSENTIAL HYPERTENSION: Chronic | ICD-10-CM

## 2025-07-17 DIAGNOSIS — N18.4 ANEMIA DUE TO STAGE 4 CHRONIC KIDNEY DISEASE: Chronic | ICD-10-CM

## 2025-07-17 DIAGNOSIS — D63.1 ANEMIA DUE TO STAGE 4 CHRONIC KIDNEY DISEASE: Chronic | ICD-10-CM

## 2025-07-17 DIAGNOSIS — I50.32 CHRONIC DIASTOLIC HEART FAILURE: Chronic | ICD-10-CM

## 2025-07-17 PROCEDURE — 99999 PR PBB SHADOW E&M-EST. PATIENT-LVL V: CPT | Mod: PBBFAC,HCNC,, | Performed by: INTERNAL MEDICINE

## 2025-07-17 PROCEDURE — 1160F RVW MEDS BY RX/DR IN RCRD: CPT | Mod: CPTII,HCNC,S$GLB, | Performed by: INTERNAL MEDICINE

## 2025-07-17 PROCEDURE — 1101F PT FALLS ASSESS-DOCD LE1/YR: CPT | Mod: CPTII,HCNC,S$GLB, | Performed by: INTERNAL MEDICINE

## 2025-07-17 PROCEDURE — 1157F ADVNC CARE PLAN IN RCRD: CPT | Mod: CPTII,HCNC,S$GLB, | Performed by: INTERNAL MEDICINE

## 2025-07-17 PROCEDURE — 1159F MED LIST DOCD IN RCRD: CPT | Mod: CPTII,HCNC,S$GLB, | Performed by: INTERNAL MEDICINE

## 2025-07-17 PROCEDURE — 99203 OFFICE O/P NEW LOW 30 MIN: CPT | Mod: HCNC,S$GLB,, | Performed by: INTERNAL MEDICINE

## 2025-07-17 PROCEDURE — 1125F AMNT PAIN NOTED PAIN PRSNT: CPT | Mod: CPTII,HCNC,S$GLB, | Performed by: INTERNAL MEDICINE

## 2025-07-17 PROCEDURE — 3288F FALL RISK ASSESSMENT DOCD: CPT | Mod: CPTII,HCNC,S$GLB, | Performed by: INTERNAL MEDICINE

## 2025-07-17 RX ORDER — AMLODIPINE BESYLATE 5 MG/1
5 TABLET ORAL 2 TIMES DAILY
Qty: 120 TABLET | Refills: 3 | Status: SHIPPED | OUTPATIENT
Start: 2025-07-17

## 2025-07-17 NOTE — PROGRESS NOTES
Subjective:   Patient ID:  Nicolasa Jurado is a 89 y.o. female is a new patient who presents for evaluation of Essential hypertension, Chronic diastolic heart failure, and Dizziness      Patient here primarily because of fairly marked hypertension when she was here to undergo a dobutamine stress echo.  Echocardiography was performed, there is mild mitral stenosis, mild pulmonary hypertension.  Left ventricular systolic function is normal though.  She has never had a heart attack, she has not having exertional chest pains, has a constant chest pain below her breasts which she thinks was related to a prior fall.  After a marked elevation of blood pressure was noted,  Catapres was increased.  The notes from Dr. Pantoja say that if her blood pressure was decreased, she was to decrease amlodipine, hydrochlorothiazide and spironolactone.  They did decrease those as her blood pressure did come down initially with clonidine, but did not resume them with elevation of the blood pressure.        Prior renal ultrasound:  There is insignificant stenosis (0-59%) in the Right Renal Artery.  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  Right kidney 10.60 cm.  There is insignificant stenosis (0-59%) in the Left Renal Artery.  Elevated left renal resistive index suggestive of intrinsic kidney disease.  Left kidney 10.60 cm.        Echocardiogram 02/08/2024:   ·  Left Ventricle: The left ventricle is normal in size. Normal wall thickness. Normal wall motion. There is normal systolic function with a visually estimated ejection fraction of 65 - 70%. Grade II diastolic dysfunction. Elevated left ventricular filling pressure.  ·  Right Ventricle: Normal right ventricular cavity size. Wall thickness is normal. Right ventricle wall motion  is normal. Systolic function is normal.  ·  Left Atrium: Left atrium is severely dilated.  ·  Aortic Valve: There is mild aortic regurgitation.  ·  Mitral Valve: There is  "moderate mitral annular calcification present. There is mild stenosis. The mean pressure gradient across the mitral valve is 5 mmHg at a heart rate of 66 bpm. There is mild to moderate regurgitation.  ·  Tricuspid Valve: There is mild regurgitation.  ·  Pulmonary Artery: There is mild pulmonary hypertension. The estimated pulmonary artery systolic pressure is 49 mmHg.  ·  IVC/SVC: Normal venous pressure at 3 mmHg.     Prior office notes reviewed:   Problem List:  Labile hypertension    CAD    -LCX coated stent 08/14/2003    Diastolic heart failure, EF 65%    SANA s/p stent on the right side  Hypercholesterolemia    Hypercalcemia and primary hyperparathyroidism    Asthma     HPI:  Ms. Jurado is in clinic today with her son for routine follow up.  She has stable chronic SOB.  She is using her nebulizer twice a day.  Dr. Gaston re-started her wixela bid on 6/29.  The wixela caused a rash so she stopped it.  She took a 5 day course of the prednisone that she completed.  Reports nocturnal burning and "heat" in her legs that keeps her awake.  The gabapentin does help.  BP mostly runs 140s at home with occasional spiking.  She's taking all her anti-hypertensives in the am except the hctz that she takes late afternoon/evening        Assessment/Plan:  1. Chronic diastolic heart failure  Well compensated. Stable. Monitor.      2. Essential hypertension  BP not at goal <140/90.  Discussed  her medications for more consistent BP management.  Refilled mediations for better compliance.  Requested 2 week bp log.  If bp remains elevated consider changing metoprolol to carvedilol 25 mg BID.      3. Coronary artery disease involving native coronary artery of native heart without angina pectoris  Asymptomtatic. Stable. Monitor.      4. Atherosclerosis of aorta        5. Pure hypercholesterolemia  LDL at goal <70. Triglycerides not at goal <150.  Discussed increasing CV exercise and mediterranean diet.         6. Anemia due to " "stage 3b chronic kidney disease  Previously followed by nephrology, Dr. Lange.  Defer to PCP     7. Asthma, unspecified asthma severity, unspecified whether complicated, unspecified whether persistent   Not treated as she associated the wixela with a rash.  Encouraged to f/u with PCP.      A copy of this note will be forwarded to Dr. Gaston and Dr. Maxwell.      Follow up in about 3 months (around 10/13/2022).     HPI:   Pressures are highest in the evening she takes HCTZ In the morning and amlodipine in the evening   No chest pain, Orthopnea, PND of heart failure symptoms.   Denies palpitations or fluttering in the chest      History of Present Illness    CHIEF COMPLAINT:  Patient presents today for follow up of HTN.    HYPERTENSION:  She reports lifelong BP variability with diurnal fluctuations. Morning readings are consistently 100-119, associated with significant fatigue and weakness where she feels unable to move and is fearful of falling, though denies dizziness. BP peaks around 9 PM prior to medication administration. She describes these BP variations as a persistent lifelong concern.    CARDIAC:  She experiences palpitations described as a "ticking" sensation when lying down at night. She also reports dyspnea with rapid activity and upon returning to bed. She denies chest pain.    MEDICATIONS:  Current medications include:  - Amlodipine 5 mg at 9 PM  - HCTZ in morning  - Clonidine patch weekly    GASTROINTESTINAL:  She reports new onset of hiatal hernia symptoms including chest discomfort and sensation of something rolling in the chest. She experiences discomfort with food passage and upper abdominal symptoms. She reports recent episodes of vomiting 4 times, which is unusual as she has never vomited before, even during pregnancy.    ENT:  She reports 4 weeks of right ear pain with drainage. She has been self-treating with vinegar and alcohol. She denies fever, hearing loss, or dizziness.      ROS:  General: " -fever, -chills, +fatigue, -weight gain, -weight loss, +excessive fear  Eyes: -vision changes, -redness, -discharge  ENT: +ear pain, -nasal congestion, -sore throat, +ear discharge, +difficulty hearing, +hearing loss  Cardiovascular: -chest pain, +palpitations, -lower extremity edema  Respiratory: -cough, -shortness of breath, +exertional dyspnea  Gastrointestinal: -abdominal pain, +nausea, +vomiting, -diarrhea, -constipation, -blood in stool, +difficulty swallowing  Genitourinary: -dysuria, -hematuria, -frequency  Musculoskeletal: -joint pain, -muscle pain, +limb pain  Skin: -rash, -lesion  Neurological: -headache, -dizziness, -numbness, -tingling  Psychiatric: -anxiety, -depression, -sleep difficulty           Problem List[1]  BP (!) 197/95   Pulse 81   Ht 5' (1.524 m)   Wt 54 kg (119 lb 0.8 oz)   SpO2 95%   BMI 23.25 kg/m²   Body mass index is 23.25 kg/m².  CrCl cannot be calculated (Patient's most recent lab result is older than the maximum 7 days allowed.).    Lab Results   Component Value Date     12/31/2024     12/02/2024    K 3.4 (L) 12/31/2024    K 3.9 12/02/2024    CL 98 12/02/2024    CO2 32 12/31/2024    CO2 26 12/02/2024    BUN 19.0 12/31/2024    BUN 30 12/02/2024    CREATININE 1.35 (H) 12/31/2024    CREATININE 1.6 (H) 12/02/2024     12/02/2024    HGBA1C 5.7 (H) 11/11/2021    MG 1.4 (L) 05/18/2022    AST 22 12/31/2024    AST 23 12/02/2024    ALT 17 (L) 12/31/2024    ALT 15 12/02/2024    ALBUMIN 4.0 12/31/2024    ALBUMIN 4.1 12/02/2024    PROT 7.7 12/02/2024    BILITOT 0.5 12/31/2024    BILITOT 0.4 12/02/2024    WBC 7.61 12/02/2024    HGB 11.0 (L) 12/02/2024    HCT 35.2 (L) 12/02/2024    MCV 86 12/02/2024     12/02/2024    INR 0.9 12/31/2024    INR 1.0 05/18/2022    TSH 1.984 12/02/2024    CHOL 158 12/02/2024    HDL 35 (L) 12/02/2024    LDLCALC 76.4 12/02/2024    TRIG 233 (H) 12/02/2024       Current Outpatient Medications   Medication Sig    albuterol (PROVENTIL/VENTOLIN  HFA) 90 mcg/actuation inhaler Inhale 2 puffs into the lungs every 6 (six) hours as needed for Wheezing.    aspirin 81 mg Tab Take 81 mg by mouth every other day.    atorvastatin (LIPITOR) 80 MG tablet TAKE 1 TABLET(80 MG) BY MOUTH EVERY DAY    brimonidine 0.2% (ALPHAGAN) 0.2 % Drop Place 1 drop into both eyes 3 (three) times daily.     cinacalcet (SENSIPAR) 30 MG Tab TAKE 2 TABLETS BY MOUTH ONCE DAILY WITH BREAKFAST    ciprofloxacin HCl (CILOXAN) 0.3 % ophthalmic solution Apply 5 drops to the affected EAR twice daily for 1 week (not for ophthalmic use)    cloNIDine 0.2 mg/24 hr td ptwk (CATAPRES) 0.2 mg/24 hr APPLY 1 PATCH TOPICALLY TO THE SKIN EVERY 7 DAYS    dorzolamide (TRUSOPT) 2 % ophthalmic solution Place 1 drop into both eyes 3 (three) times daily.     dorzolamide-timolol 2-0.5% (COSOPT) 22.3-6.8 mg/mL ophthalmic solution 1 drop 2 (two) times daily.    ergocalciferol, vitamin D2, (VITAMIN D ORAL) Take 2,000 Int'l Units by mouth once daily.    fluticasone propionate (FLONASE) 50 mcg/actuation nasal spray 1 spray (50 mcg total) by Each Nostril route once daily.    gabapentin (NEURONTIN) 400 MG capsule Take 400 mg am, 400 mg noon, and 800 mg night    hydroCHLOROthiazide (HYDRODIURIL) 25 MG tablet TAKE 1 TABLET(25 MG) BY MOUTH EVERY DAY    ketoconazole (NIZORAL) 2 % cream Apply topically once daily.    latanoprost 0.005 % ophthalmic solution Place 1 drop into both eyes every evening.     levalbuterol (XOPENEX) 0.63 mg/3 mL nebulizer solution TAKE 3 MILLILITERS BY NEBULIZATION EVERY 4 HOURS AS NEEDED FOR WHEEZING(RESCUE)    metoprolol succinate (TOPROL-XL) 100 MG 24 hr tablet TAKE 1 TABLET(100 MG) BY MOUTH EVERY DAY    nitroGLYCERIN (NITROSTAT) 0.4 MG SL tablet 1 Tablet Sublingual  One tablet under tounge as needed for chest pain.    pilocarpine HCL 2% (PILOCAR) 2 % ophthalmic solution     RABEprazole (ACIPHEX) 20 mg tablet TAKE 1 TABLET(20 MG) BY MOUTH DAILY    selenium sulfide 2.25 % Sham Apply 1 Application  topically nightly as needed.    telmisartan (MICARDIS) 80 MG Tab Take 1 tablet (80 mg total) by mouth once daily.    WIXELA INHUB 250-50 mcg/dose diskus inhaler     amLODIPine (NORVASC) 5 MG tablet Take 1 tablet (5 mg total) by mouth 2 (two) times daily. Take 1 tab at 1 pm and next one at 8 pm.     No current facility-administered medications for this visit.       Review of Systems   Constitutional:        Patient is in a wheel chair, she uses a cane and walks on her own. She showers herself  She watches tv   Musculoskeletal:  Positive for arthritis.       Objective:   Physical Exam  Constitutional:       General: She is not in acute distress.     Appearance: She is well-developed. She is not diaphoretic.   HENT:      Head: Normocephalic and atraumatic.      Nose: Nose normal.      Mouth/Throat:      Pharynx: No oropharyngeal exudate.   Eyes:      General: No scleral icterus.        Right eye: No discharge.         Left eye: No discharge.      Conjunctiva/sclera: Conjunctivae normal.      Pupils: Pupils are equal, round, and reactive to light.   Neck:      Thyroid: No thyromegaly.      Vascular: No JVD.      Trachea: No tracheal deviation.   Cardiovascular:      Rate and Rhythm: Normal rate and regular rhythm.      Pulses: Intact distal pulses.      Heart sounds: Normal heart sounds. No murmur heard.     No friction rub. No gallop.   Pulmonary:      Effort: Pulmonary effort is normal. No respiratory distress.      Breath sounds: Normal breath sounds. No stridor. No wheezing or rales.   Chest:      Chest wall: No tenderness.   Abdominal:      General: Bowel sounds are normal. There is no distension.      Palpations: Abdomen is soft. There is no mass.      Tenderness: There is no abdominal tenderness. There is no guarding or rebound.   Musculoskeletal:         General: No tenderness. Normal range of motion.      Cervical back: Normal range of motion and neck supple.   Lymphadenopathy:      Cervical: No cervical  adenopathy.   Skin:     General: Skin is warm.      Coloration: Skin is not pale.      Findings: No erythema or rash.   Neurological:      Mental Status: She is alert and oriented to person, place, and time.      Cranial Nerves: No cranial nerve deficit.      Motor: No abnormal muscle tone.      Coordination: Coordination normal.      Deep Tendon Reflexes: Reflexes are normal and symmetric. Reflexes normal.   Psychiatric:         Behavior: Behavior normal.         Thought Content: Thought content normal.         Judgment: Judgment normal.         Physical Exam    General: No acute distress. Well-developed. Well-nourished.  Eyes: EOMI. Sclerae anicteric.  HENT: Normocephalic. Atraumatic. Nares patent. Moist oral mucosa.  Ears: Bilateral TMs clear. Bilateral EACs clear.  Cardiovascular: Regular rate. Regular rhythm. No murmurs. No rubs. No gallops. Normal S1, S2.  Respiratory: Normal respiratory effort. Clear to auscultation bilaterally. No rales. No rhonchi. No wheezing.  Abdomen: Soft. Non-tender. Non-distended. Normoactive bowel sounds.  Musculoskeletal: No  obvious deformity.  Extremities: No lower extremity edema.  Neurological: Alert & oriented x3. No slurred speech. Normal gait.  Psychiatric: Normal mood. Normal affect. Good insight. Good judgment.  Skin: Warm. Dry. No rash.           Assessment:     1. Atherosclerosis of aorta    2. Essential hypertension    3. Bilateral carotid artery stenosis    4. Chronic diastolic heart failure    5. Other hyperlipidemia    6. Anemia due to stage 4 chronic kidney disease    7. Essential hypertension        Assessment & Plan    I50.32 Chronic diastolic heart failure  I10 Essential hypertension  I70.0 Atherosclerosis of aorta  I65.23 Bilateral carotid artery stenosis  E78.49 Other hyperlipidemia  N18.4, D63.1 Anemia due to Stage IV chronic kidney disease    IMPRESSION:  - Blood pressure fluctuates, with elevated readings at night and low in the morning.  - Started Norvasc  (amlodipine) 5 mg twice daily: 1 tablet at 1:00 PM and 1 tablet at 8:00 PM to improve BP control, as current nighttime regimen was insufficient.  - Aim to gradually lower BP to around 160, avoiding rapid reduction due to brain adaptation to higher pressures.    ESSENTIAL HYPERTENSION:  - Started Norvasc (amlodipine) 5 mg twice daily: 1 tablet at 1:00 PM and 1 tablet at 8:00 PM to improve BP control, as current nighttime regimen was insufficient.  - Patient to maintain a low salt diet.  - Follow up in 3 months with one of our nurse practitioners.           Plan:     Nicolasa was seen today for essential hypertension, chronic diastolic heart failure and dizziness.    Diagnoses and all orders for this visit:    Atherosclerosis of aorta    Essential hypertension  -     Ambulatory referral/consult to Cardiology    Bilateral carotid artery stenosis    Chronic diastolic heart failure    Other hyperlipidemia    Anemia due to stage 4 chronic kidney disease    Essential hypertension  Comments:  Continue current medications, not on amlodipine, resume 1/2 tablet daily, 5 mg daily.  Resume hydrochlorothiazide 25 mg daily.  Do not resume spironolactone.  Orders:  -     Ambulatory referral/consult to Cardiology    Other orders  -     amLODIPine (NORVASC) 5 MG tablet; Take 1 tablet (5 mg total) by mouth 2 (two) times daily. Take 1 tab at 1 pm and next one at 8 pm.      I am changing her amlodipine to two times a day goal would be to decrease her SBP to 160s if that does not help than the next strategy would be to increase clonidine patch dose. Counseled on low salt diet    This note was generated with the assistance of ambient listening technology. Verbal consent was obtained by the patient and accompanying visitor(s) for the recording of patient appointment to facilitate this note. I attest to having reviewed and edited the generated note for accuracy, though some syntax or spelling errors may persist. Please contact the author of  this note for any clarification.           [1]   Patient Active Problem List  Diagnosis    Other hyperlipidemia    CAD (coronary artery disease)    Osteoporosis, postmenopausal    Renal artery stenosis    Mesenteric artery stenosis    Essential hypertension    Hypercalcemia    Atherosclerosis of aorta    Asthma    Chronic diastolic heart failure    Exudative macular degeneration    Insomnia    Anemia due to stage 4 chronic kidney disease    Overweight    Neuropathy    Lactose intolerance    CKD (chronic kidney disease), stage IV    Skin lesion of back    Sciatica of left side    Vision impairment    Meningioma    Bilateral carotid artery stenosis    Neck pain    Bilateral shoulder pain    Chronic pain of left knee    Degenerative spondylolisthesis    Weakness    Difficulty with activities of daily living    History of falling    Class 3 obesity

## 2025-07-18 ENCOUNTER — OFFICE VISIT (OUTPATIENT)
Dept: OTOLARYNGOLOGY | Facility: CLINIC | Age: OVER 89
End: 2025-07-18
Attending: INTERNAL MEDICINE
Payer: MEDICARE

## 2025-07-18 VITALS — HEART RATE: 71 BPM | SYSTOLIC BLOOD PRESSURE: 133 MMHG | DIASTOLIC BLOOD PRESSURE: 75 MMHG

## 2025-07-18 DIAGNOSIS — H91.91 ACUTE HEARING LOSS, RIGHT: Primary | ICD-10-CM

## 2025-07-18 DIAGNOSIS — H90.3 SENSORINEURAL HEARING LOSS (SNHL) OF BOTH EARS: ICD-10-CM

## 2025-07-18 DIAGNOSIS — H61.21 IMPACTED CERUMEN OF RIGHT EAR: ICD-10-CM

## 2025-07-18 NOTE — PROGRESS NOTES
"  Ear, Nose, & Throat  Otolaryngology - Head & Neck Surgery      Subjective:     Chief Complaint:   Chief Complaint   Patient presents with    Ear Problem     Pt c/o ear pain bilaterally, left ear 2/10 today and right ear pain is not present at this time. Reports clear ear drainage bilaterally and "popping" soind in right ear.        Nicolasa Jurado is a 89 y.o. female who was referred to me by Dr. Houston Gaston in consultation for progressive hearing loss, AD. This is associated with the occasional popping sound. She is also currently on day 3 of ciprodex drops per Dr Gaston for otitis media. Reports some thin otorrhea. Localized otalgia to the root of her helix on the left. No history of ear infections.     Previously offered hearing aids but was unable to afford.     Referred mostly for cerumen impaction; however, she is concerned about her asymmetric loss. No other neurologic changes.     Past Medical History  Active Ambulatory Problems     Diagnosis Date Noted    Other hyperlipidemia 07/25/2012    CAD (coronary artery disease) 12/20/2012    Osteoporosis, postmenopausal 02/08/2013    Renal artery stenosis 02/21/2013    Mesenteric artery stenosis 02/21/2013    Essential hypertension 02/21/2013    Hypercalcemia 10/18/2013    Atherosclerosis of aorta 12/27/2013    Asthma 04/03/2014    Chronic diastolic heart failure 12/08/2014    Exudative macular degeneration 11/03/2015    Insomnia 02/11/2016    Anemia due to stage 4 chronic kidney disease     Overweight 08/31/2016    Neuropathy 09/29/2016    Lactose intolerance 05/02/2017    CKD (chronic kidney disease), stage IV 05/15/2017    Skin lesion of back 10/08/2019    Sciatica of left side 10/08/2019    Vision impairment 10/08/2019    Meningioma 06/29/2022    Bilateral carotid artery stenosis 10/11/2022    Neck pain 11/14/2022    Bilateral shoulder pain 11/14/2022    Chronic pain of left knee 02/27/2023    Degenerative spondylolisthesis 03/30/2023    Weakness 03/30/2023    " Difficulty with activities of daily living 10/04/2023    History of falling 06/25/2021    Class 3 obesity 04/15/2024     Resolved Ambulatory Problems     Diagnosis Date Noted    Hypercalcemia 10/16/2012    Primary hyperparathyroidism 02/08/2013    Elevated glucose 02/06/2015    Left ventricular diastolic dysfunction with preserved systolic function 11/03/2015    Right low back pain 06/02/2016    Thrombocytopenia 08/31/2016    Right leg pain 10/04/2016    Hypertension 03/07/2018    Cough 03/07/2018     Past Medical History:   Diagnosis Date    Anemia     Asthma, chronic     GERD (gastroesophageal reflux disease)     Hyperlipidemia     Hyperparathyroidism        Past Surgical History  She has a past surgical history that includes Colonoscopy (2006); Coronary angioplasty with stent; Hysterectomy; Cholecystectomy; Kidney surgery; Appendectomy; and Esophagogastroduodenoscopy (N/A, 4/1/2024).    Past Surgical History:   Procedure Laterality Date    APPENDECTOMY      CHOLECYSTECTOMY      COLONOSCOPY  2006    CORONARY ANGIOPLASTY WITH STENT PLACEMENT      1 heart/1 kidney    ESOPHAGOGASTRODUODENOSCOPY N/A 4/1/2024    Procedure: EGD (ESOPHAGOGASTRODUODENOSCOPY);  Surgeon: Andrew Thorne MD;  Location: [facility] ENDOSCOPY;  Service: Endoscopy;  Laterality: N/A;  2/8 [facility] Pt; spoke with [facility]; instr sent via portal  [facility]  3/22- [facility] and portal msg for pc. DB  3/26- [address] for pc. DB  3/26- [facility] returned call, pc complete. DBM  **Cannot come in earlier**    HYSTERECTOMY      KIDNEY SURGERY      stent in renal artery        Family History  Her family history includes Hypertension in her son; Liver disease in her father; Miscarriages / Stillbirths in her mother; No Known Problems in her sister; Splenomegaly in her daughter.    Social History  She reports that she has never smoked. She has never been exposed to tobacco smoke. She has never used smokeless tobacco. She reports that she does not drink  alcohol and does not use drugs.    Allergies  She is allergic to venom-wasp, penicillin, amoxicillin-pot clavulanate, egg derived, peaches [peach (prunus persica)], and tramadol.    Medications  She has a current medication list which includes the following prescription(s): albuterol, amlodipine, aspirin, atorvastatin, brimonidine 0.2%, cinacalcet, ciprofloxacin hcl, clonidine 0.2 mg/24 hr td ptwk, dorzolamide, dorzolamide-timolol 2-0.5%, ergocalciferol (vitamin d2), fluticasone propionate, gabapentin, hydrochlorothiazide, ketoconazole, latanoprost, levalbuterol, metoprolol succinate, nitroglycerin, pilocarpine hcl 2%, rabeprazole, selenium sulfide, telmisartan, and wixela inhub.    ROS:  Pertinent positive and negative review of systems as noted in HPI.      Objective:     /75 (BP Location: Left arm, Patient Position: Sitting)   Pulse 71    Constitutional: Well appearing, communicating. No acute distress  Right Ear:    Auricle normally developed   EAC: normal   Tympanic membrane: intact   Middle Ear: No effusion present, Ossicles in normal position, and Auto-insufflates  Left Ear:    Auricle normally developed   EAC: normal and cerumen-filled   Tympanic membrane: intact   Middle Ear: No effusion present and Ossicles in normal position  Neuro/Psychiatric:     Affect: Appropriate   Eyes: EOMI intact  Respiratory: No increased WOB, no stridor       Data Review:   MEDICAL RECORDS    I have reviewed the following medical records relevant to the care of this patient from unique sources outside of my institution or departmental specialty:  Primary Care    LABS    I have independently reviewed the lab results shown above. Findings significant for the conditions being treated include: none    IMAGING    No pertinent imaging available    AUDIO    not performed today        Procedures:     Procedure: Cerumen removal [id number]     Pre procedure Diagnosis: left Cerumen Impaction     Post procedure Diagnosis: same      Instrument: Binocular Microscope     Anesthesia: None     Procedure: After verbal consent was obtained, the patient was laid supine in the small procedure room. A microscope was used to examine the ear. Instrumentation and suction were used to remove any cerumen from the EAC. If indicated, the procedure was repeated on the contralateral side. The patient tolerated the procedure well and without any acute complication. Findings below.      Findings:   Left Ear:               EAC: Normal Cerumen filled              Tympanic membrane: Intact              Middle Ear: No effusion present and Ossicles in normal position     The cerumen was removed completely from both ears.     Assessment:     1. Acute hearing loss, right    2. Impacted cerumen of right ear    3. Sensorineural hearing loss (SNHL) of both ears      Plan:     I had a long discussion with the patient regarding her condition and the further workup and management options.  Will get audiogram to assess for acute hearing complaints AD. Exam normal. Ok to stop ciprodex drops.     Voice recognition software was used in the creation of this note/communication and any sound-alike errors which may have occurred from its use should be taken in context when interpreting. If such errors prevent a clear understanding of the note/communication, please contact the office for clarification.   Problem List Items Addressed This Visit    None  Visit Diagnoses         Acute hearing loss, right    -  Primary      Impacted cerumen of right ear          Sensorineural hearing loss (SNHL) of both ears

## 2025-07-28 RX ORDER — OMEPRAZOLE 40 MG/1
40 CAPSULE, DELAYED RELEASE ORAL DAILY
Qty: 30 CAPSULE | Refills: 2 | Status: SHIPPED | OUTPATIENT
Start: 2025-07-28 | End: 2025-08-27

## 2025-07-28 RX ORDER — RABEPRAZOLE SODIUM 20 MG/1
20 TABLET, DELAYED RELEASE ORAL DAILY
Qty: 90 TABLET | Refills: 3 | Status: SHIPPED | OUTPATIENT
Start: 2025-07-28

## 2025-09-03 DIAGNOSIS — N18.30 CHRONIC KIDNEY DISEASE, STAGE III (MODERATE): ICD-10-CM

## 2025-09-04 RX ORDER — CINACALCET 30 MG/1
60 TABLET, FILM COATED ORAL
Qty: 180 TABLET | Refills: 3 | Status: SHIPPED | OUTPATIENT
Start: 2025-09-04

## 2025-09-04 RX ORDER — METOPROLOL SUCCINATE 100 MG/1
100 TABLET, EXTENDED RELEASE ORAL
Qty: 90 TABLET | Refills: 3 | Status: SHIPPED | OUTPATIENT
Start: 2025-09-04